# Patient Record
Sex: FEMALE | Race: WHITE | Employment: OTHER | URBAN - METROPOLITAN AREA
[De-identification: names, ages, dates, MRNs, and addresses within clinical notes are randomized per-mention and may not be internally consistent; named-entity substitution may affect disease eponyms.]

---

## 2017-04-03 RX ORDER — DICLOFENAC SODIUM 75 MG/1
TABLET, DELAYED RELEASE ORAL
Qty: 180 TABLET | Refills: 3 | Status: SHIPPED | OUTPATIENT
Start: 2017-04-03 | End: 2018-04-25 | Stop reason: SDUPTHER

## 2017-05-02 ENCOUNTER — OFFICE VISIT (OUTPATIENT)
Dept: ORTHOPEDIC SURGERY | Age: 67
End: 2017-05-02

## 2017-05-02 VITALS
HEART RATE: 78 BPM | HEIGHT: 64 IN | SYSTOLIC BLOOD PRESSURE: 125 MMHG | BODY MASS INDEX: 39.27 KG/M2 | WEIGHT: 230 LBS | DIASTOLIC BLOOD PRESSURE: 74 MMHG

## 2017-05-02 DIAGNOSIS — M75.81 ROTATOR CUFF TENDONITIS, RIGHT: Primary | ICD-10-CM

## 2017-05-02 DIAGNOSIS — M75.22 BICEPS TENDINITIS OF LEFT SHOULDER: ICD-10-CM

## 2017-05-02 DIAGNOSIS — M19.019 ACROMIOCLAVICULAR JOINT ARTHRITIS: ICD-10-CM

## 2017-05-02 PROCEDURE — 1090F PRES/ABSN URINE INCON ASSESS: CPT | Performed by: ORTHOPAEDIC SURGERY

## 2017-05-02 PROCEDURE — G8400 PT W/DXA NO RESULTS DOC: HCPCS | Performed by: ORTHOPAEDIC SURGERY

## 2017-05-02 PROCEDURE — G8417 CALC BMI ABV UP PARAM F/U: HCPCS | Performed by: ORTHOPAEDIC SURGERY

## 2017-05-02 PROCEDURE — 3017F COLORECTAL CA SCREEN DOC REV: CPT | Performed by: ORTHOPAEDIC SURGERY

## 2017-05-02 PROCEDURE — 1036F TOBACCO NON-USER: CPT | Performed by: ORTHOPAEDIC SURGERY

## 2017-05-02 PROCEDURE — 1123F ACP DISCUSS/DSCN MKR DOCD: CPT | Performed by: ORTHOPAEDIC SURGERY

## 2017-05-02 PROCEDURE — 20610 DRAIN/INJ JOINT/BURSA W/O US: CPT | Performed by: ORTHOPAEDIC SURGERY

## 2017-05-02 PROCEDURE — 3014F SCREEN MAMMO DOC REV: CPT | Performed by: ORTHOPAEDIC SURGERY

## 2017-05-02 PROCEDURE — 4040F PNEUMOC VAC/ADMIN/RCVD: CPT | Performed by: ORTHOPAEDIC SURGERY

## 2017-05-02 PROCEDURE — G8427 DOCREV CUR MEDS BY ELIG CLIN: HCPCS | Performed by: ORTHOPAEDIC SURGERY

## 2017-05-02 PROCEDURE — 99214 OFFICE O/P EST MOD 30 MIN: CPT | Performed by: ORTHOPAEDIC SURGERY

## 2017-05-23 ENCOUNTER — OFFICE VISIT (OUTPATIENT)
Dept: ORTHOPEDIC SURGERY | Age: 67
End: 2017-05-23

## 2017-05-23 VITALS
BODY MASS INDEX: 39.26 KG/M2 | DIASTOLIC BLOOD PRESSURE: 81 MMHG | WEIGHT: 229.94 LBS | HEIGHT: 64 IN | HEART RATE: 67 BPM | SYSTOLIC BLOOD PRESSURE: 136 MMHG

## 2017-05-23 DIAGNOSIS — M75.81 ROTATOR CUFF TENDONITIS, RIGHT: Primary | ICD-10-CM

## 2017-05-23 PROCEDURE — G8400 PT W/DXA NO RESULTS DOC: HCPCS | Performed by: ORTHOPAEDIC SURGERY

## 2017-05-23 PROCEDURE — 3014F SCREEN MAMMO DOC REV: CPT | Performed by: ORTHOPAEDIC SURGERY

## 2017-05-23 PROCEDURE — 20610 DRAIN/INJ JOINT/BURSA W/O US: CPT | Performed by: ORTHOPAEDIC SURGERY

## 2017-05-23 PROCEDURE — 1036F TOBACCO NON-USER: CPT | Performed by: ORTHOPAEDIC SURGERY

## 2017-05-23 PROCEDURE — 3017F COLORECTAL CA SCREEN DOC REV: CPT | Performed by: ORTHOPAEDIC SURGERY

## 2017-05-23 PROCEDURE — 1090F PRES/ABSN URINE INCON ASSESS: CPT | Performed by: ORTHOPAEDIC SURGERY

## 2017-05-23 PROCEDURE — 99212 OFFICE O/P EST SF 10 MIN: CPT | Performed by: ORTHOPAEDIC SURGERY

## 2017-05-23 PROCEDURE — G8417 CALC BMI ABV UP PARAM F/U: HCPCS | Performed by: ORTHOPAEDIC SURGERY

## 2017-05-23 PROCEDURE — 1123F ACP DISCUSS/DSCN MKR DOCD: CPT | Performed by: ORTHOPAEDIC SURGERY

## 2017-05-23 PROCEDURE — 4040F PNEUMOC VAC/ADMIN/RCVD: CPT | Performed by: ORTHOPAEDIC SURGERY

## 2017-05-23 PROCEDURE — G8427 DOCREV CUR MEDS BY ELIG CLIN: HCPCS | Performed by: ORTHOPAEDIC SURGERY

## 2017-06-01 ENCOUNTER — TELEPHONE (OUTPATIENT)
Dept: INTERNAL MEDICINE CLINIC | Age: 67
End: 2017-06-01

## 2017-06-01 DIAGNOSIS — F32.A DEPRESSION, UNSPECIFIED DEPRESSION TYPE: ICD-10-CM

## 2017-06-01 DIAGNOSIS — I10 ESSENTIAL HYPERTENSION: ICD-10-CM

## 2017-06-01 RX ORDER — PRAVASTATIN SODIUM 80 MG/1
TABLET ORAL
Qty: 90 TABLET | Refills: 3 | Status: SHIPPED | OUTPATIENT
Start: 2017-06-01 | End: 2018-07-03 | Stop reason: SDUPTHER

## 2017-06-01 RX ORDER — LISINOPRIL AND HYDROCHLOROTHIAZIDE 25; 20 MG/1; MG/1
TABLET ORAL
Qty: 90 TABLET | Refills: 3 | Status: SHIPPED | OUTPATIENT
Start: 2017-06-01 | End: 2018-07-03 | Stop reason: SDUPTHER

## 2017-06-01 RX ORDER — CITALOPRAM 20 MG/1
TABLET ORAL
Qty: 90 TABLET | Refills: 1 | Status: SHIPPED | OUTPATIENT
Start: 2017-06-01 | End: 2017-09-19 | Stop reason: SDUPTHER

## 2017-06-21 ENCOUNTER — OFFICE VISIT (OUTPATIENT)
Dept: ORTHOPEDIC SURGERY | Age: 67
End: 2017-06-21

## 2017-06-21 VITALS
WEIGHT: 229 LBS | HEART RATE: 78 BPM | HEIGHT: 64 IN | SYSTOLIC BLOOD PRESSURE: 131 MMHG | DIASTOLIC BLOOD PRESSURE: 75 MMHG | BODY MASS INDEX: 39.09 KG/M2

## 2017-06-21 DIAGNOSIS — M25.462 EFFUSION OF LEFT KNEE: ICD-10-CM

## 2017-06-21 DIAGNOSIS — M22.42 PATELLA, CHONDROMALACIA, LEFT: ICD-10-CM

## 2017-06-21 DIAGNOSIS — M22.2X2 PATELLOFEMORAL STRESS SYNDROME OF LEFT KNEE: ICD-10-CM

## 2017-06-21 DIAGNOSIS — M23.304 MENISCUS, MEDIAL, DERANGEMENT, LEFT: ICD-10-CM

## 2017-06-21 DIAGNOSIS — M17.12 PRIMARY OSTEOARTHRITIS OF LEFT KNEE: Primary | ICD-10-CM

## 2017-06-21 PROBLEM — M23.305 MENISCUS, MEDIAL, DERANGEMENT: Status: ACTIVE | Noted: 2017-06-21

## 2017-06-21 PROBLEM — M22.40 PATELLA, CHONDROMALACIA: Status: ACTIVE | Noted: 2017-06-21

## 2017-06-21 PROCEDURE — 1123F ACP DISCUSS/DSCN MKR DOCD: CPT | Performed by: ORTHOPAEDIC SURGERY

## 2017-06-21 PROCEDURE — G8427 DOCREV CUR MEDS BY ELIG CLIN: HCPCS | Performed by: ORTHOPAEDIC SURGERY

## 2017-06-21 PROCEDURE — 1036F TOBACCO NON-USER: CPT | Performed by: ORTHOPAEDIC SURGERY

## 2017-06-21 PROCEDURE — 20610 DRAIN/INJ JOINT/BURSA W/O US: CPT | Performed by: ORTHOPAEDIC SURGERY

## 2017-06-21 PROCEDURE — 99214 OFFICE O/P EST MOD 30 MIN: CPT | Performed by: ORTHOPAEDIC SURGERY

## 2017-06-21 PROCEDURE — 3017F COLORECTAL CA SCREEN DOC REV: CPT | Performed by: ORTHOPAEDIC SURGERY

## 2017-06-21 PROCEDURE — 3014F SCREEN MAMMO DOC REV: CPT | Performed by: ORTHOPAEDIC SURGERY

## 2017-06-21 PROCEDURE — 4040F PNEUMOC VAC/ADMIN/RCVD: CPT | Performed by: ORTHOPAEDIC SURGERY

## 2017-06-21 PROCEDURE — 1090F PRES/ABSN URINE INCON ASSESS: CPT | Performed by: ORTHOPAEDIC SURGERY

## 2017-06-21 PROCEDURE — G8417 CALC BMI ABV UP PARAM F/U: HCPCS | Performed by: ORTHOPAEDIC SURGERY

## 2017-06-21 PROCEDURE — G8400 PT W/DXA NO RESULTS DOC: HCPCS | Performed by: ORTHOPAEDIC SURGERY

## 2017-06-26 ENCOUNTER — TELEPHONE (OUTPATIENT)
Dept: ORTHOPEDIC SURGERY | Age: 67
End: 2017-06-26

## 2017-06-27 ENCOUNTER — OFFICE VISIT (OUTPATIENT)
Dept: INTERNAL MEDICINE CLINIC | Age: 67
End: 2017-06-27

## 2017-06-27 VITALS
SYSTOLIC BLOOD PRESSURE: 130 MMHG | RESPIRATION RATE: 16 BRPM | DIASTOLIC BLOOD PRESSURE: 78 MMHG | BODY MASS INDEX: 39.79 KG/M2 | WEIGHT: 231.8 LBS | HEART RATE: 76 BPM

## 2017-06-27 DIAGNOSIS — M25.552 LEFT HIP PAIN: Primary | ICD-10-CM

## 2017-06-27 PROCEDURE — 1090F PRES/ABSN URINE INCON ASSESS: CPT | Performed by: INTERNAL MEDICINE

## 2017-06-27 PROCEDURE — 1123F ACP DISCUSS/DSCN MKR DOCD: CPT | Performed by: INTERNAL MEDICINE

## 2017-06-27 PROCEDURE — G8417 CALC BMI ABV UP PARAM F/U: HCPCS | Performed by: INTERNAL MEDICINE

## 2017-06-27 PROCEDURE — 4040F PNEUMOC VAC/ADMIN/RCVD: CPT | Performed by: INTERNAL MEDICINE

## 2017-06-27 PROCEDURE — G8400 PT W/DXA NO RESULTS DOC: HCPCS | Performed by: INTERNAL MEDICINE

## 2017-06-27 PROCEDURE — 99213 OFFICE O/P EST LOW 20 MIN: CPT | Performed by: INTERNAL MEDICINE

## 2017-06-27 PROCEDURE — 3017F COLORECTAL CA SCREEN DOC REV: CPT | Performed by: INTERNAL MEDICINE

## 2017-06-27 PROCEDURE — 1036F TOBACCO NON-USER: CPT | Performed by: INTERNAL MEDICINE

## 2017-06-27 PROCEDURE — 3014F SCREEN MAMMO DOC REV: CPT | Performed by: INTERNAL MEDICINE

## 2017-06-27 PROCEDURE — G8427 DOCREV CUR MEDS BY ELIG CLIN: HCPCS | Performed by: INTERNAL MEDICINE

## 2017-06-27 RX ORDER — METHOCARBAMOL 750 MG/1
750 TABLET, FILM COATED ORAL 3 TIMES DAILY PRN
Qty: 30 TABLET | Refills: 1 | Status: SHIPPED | OUTPATIENT
Start: 2017-06-27 | End: 2018-04-05

## 2017-06-27 RX ORDER — METHYLPREDNISOLONE 4 MG/1
TABLET ORAL
Qty: 1 KIT | Refills: 0 | Status: SHIPPED | OUTPATIENT
Start: 2017-06-27 | End: 2017-07-03

## 2017-06-27 ASSESSMENT — ENCOUNTER SYMPTOMS: COLOR CHANGE: 0

## 2017-07-27 ENCOUNTER — TELEPHONE (OUTPATIENT)
Dept: INTERNAL MEDICINE CLINIC | Age: 67
End: 2017-07-27

## 2017-09-19 DIAGNOSIS — F32.A DEPRESSION, UNSPECIFIED DEPRESSION TYPE: ICD-10-CM

## 2017-09-19 RX ORDER — CITALOPRAM 20 MG/1
TABLET ORAL
Qty: 90 TABLET | Refills: 3 | Status: SHIPPED | OUTPATIENT
Start: 2017-09-19 | End: 2018-10-01 | Stop reason: SDUPTHER

## 2017-12-04 ENCOUNTER — EVALUATION (OUTPATIENT)
Dept: PHYSICAL THERAPY | Age: 67
End: 2017-12-04

## 2017-12-04 DIAGNOSIS — M25.551 HIP PAIN, RIGHT: ICD-10-CM

## 2017-12-04 DIAGNOSIS — M70.61 TROCHANTERIC BURSITIS OF RIGHT HIP: Primary | ICD-10-CM

## 2017-12-04 PROCEDURE — G8427 DOCREV CUR MEDS BY ELIG CLIN: HCPCS | Performed by: PHYSICAL THERAPIST

## 2017-12-04 PROCEDURE — 97110 THERAPEUTIC EXERCISES: CPT | Performed by: PHYSICAL THERAPIST

## 2017-12-04 PROCEDURE — G8978 MOBILITY CURRENT STATUS: HCPCS | Performed by: PHYSICAL THERAPIST

## 2017-12-04 PROCEDURE — G8539 DOC FUNCT AND CARE PLAN: HCPCS | Performed by: PHYSICAL THERAPIST

## 2017-12-04 PROCEDURE — 97161 PT EVAL LOW COMPLEX 20 MIN: CPT | Performed by: PHYSICAL THERAPIST

## 2017-12-04 PROCEDURE — 97112 NEUROMUSCULAR REEDUCATION: CPT | Performed by: PHYSICAL THERAPIST

## 2017-12-04 PROCEDURE — G8979 MOBILITY GOAL STATUS: HCPCS | Performed by: PHYSICAL THERAPIST

## 2017-12-04 PROCEDURE — G8730 PAIN DOC POS AND PLAN: HCPCS | Performed by: PHYSICAL THERAPIST

## 2017-12-04 PROCEDURE — 1100F PTFALLS ASSESS-DOCD GE2>/YR: CPT | Performed by: PHYSICAL THERAPIST

## 2017-12-04 NOTE — FLOWSHEET NOTE
[facility]   Phone: [phone]    Fax: [phone]                                                       Physical Therapy Daily Treatment Note  Date:  2017    Patient Name:  Victor Manuel Lawrence    :  1950  MRN: [id number]  Medical/Treatment Diagnosis Information:  · Diagnosis: R Trochanteric bursitis  ·    Insurance/Certification information:  PT Insurance Information: Medicare  Physician Information:  Referring Practitioner: Dr. Twila Primrose of care signed (Y/N): sent 17    Date of Patient follow up with Physician: PRN    G-Code (if applicable):      Date G-Code Applied:  17  PT G-Codes  Functional Assessment Tool Used: Hip Outcome Score  Score: 50/76, 66% limitation  Functional Limitation: Mobility: Walking and moving around  Mobility: Walking and Moving Around Current Status (): At least 60 percent but less than 80 percent impaired, limited or restricted  Mobility: Walking and Moving Around Goal Status ():  At least 1 percent but less than 20 percent impaired, limited or restricted    Progress Note: [x]  Yes  []  No  Next due by: Visit #10       Latex Allergy:  [x]NO      []YES  Preferred Language for Healthcare:   [x]English       []other:    Visit # Insurance Allowable   1 ~20  ($1960     Pain level:  5-6/10     SUBJECTIVE:  See eval    OBJECTIVE: See eval      ROM PROM AROM Comments:  17     Left Right Left Right     Flexion     115° 95°     Extension             Abduction     22° 15°     Adduction             ER     54° 25°     IR     30° 15°                      Strength Left Right Comments:  17   Hip flexors 5/5 3/5     Hip extension         Hip abduction 4+/5 4+/5     Hip adduction 4/5 4-/5     Hip ER         Hip IR         Quads         Hamstrings               Flexibility Left Right Comments:  17   Hamstrings No deficit No deficit     ITB (Obers test)   (+) severe limit     gastroc   No deficit        Special  Test Left Right Comments:  12/4/17   FABERS   (+)     Scour test   (-)     Impingement test   (-)     Trendelenburg test         TUG     12.5 sec             RESTRICTIONS/PRECAUTIONS: hx of 4 TIAs, 3 lumbar surgeries    Exercises/Interventions:     Exercise/Equipment Reps/sets Other comments   Stretching     Hamstring     Hip Flexion     ITB- Rope     Grion     Quad     Inclined Calf     Towel Pull 10x10\"    Piriformis     Fig 4 w/ strap 5x20\"              SLR     Supine     Prone     Abduction     Adducton 10x10\"    SLR+          Isometrics     Quad sets 10x10\"         Patellar Glides     Medial     Superior     Inferior          ROM     Passive     Active     Weight Shift     Hang Weights     Sheet Pulls     Ankle Pumps          CKC     Calf raises     Wall sits     Step ups     1 leg stand     Squatting     CC TKE     Balance     Monster Walks     Bridging     Triple threats     Stool Scoots     PRE     Extension  RANGE:   Flexion  RANGE:        Cable Column          Leg Press  RANGE:        Bike     Treadmill                  Therapeutic Exercise and NMR EXR  [x] ([id number]) Provided verbal/tactile cueing for activities related to strengthening, flexibility, endurance, ROM for improvements in LE, proximal hip, and core control with self care, mobility, lifting, ambulation. [x] (17714) Provided verbal/tactile cueing for activities related to improving balance, coordination, kinesthetic sense, posture, motor skill, proprioception  to assist with LE, proximal hip, and core control in self care, mobility, lifting, ambulation and eccentric single leg control.      NMR and Therapeutic Activities:    [] (30650 or 93881) Provided verbal/tactile cueing for activities related to improving balance, coordination, kinesthetic sense, posture, motor skill, proprioception and motor activation to allow for proper function of core, proximal hip and LE with self care and ADLs  [] (49258) Gait Re-education- Provided training and instruction facilitate improvement in movement, function, and ADLs as indicated by Functional Deficits. Long Term Goals: To be achieved in: 6 weeks  1. Disability index score of 15% or less for the Hp Outcome Score to assist with reaching prior level of function. 2. Patient will demonstrate increased R hip flexion, abduction , IR and ER AROM to >110°, >25°, >25°, and >45° to allow for proper joint functioning as indicated by patients Functional Deficits. 3. Patient will demonstrate an increase in R hip Strength in all planes by a half grade or greater to allow for proper functional mobility as indicated by patients Functional Deficits. 4. Patient will return to all functional activities without increased symptoms or restriction. 5. Patient will be able to stand for >1 hour without pain or limitation so that she may return to PLOF for ADLs. 6. Patient will complete TUG with SPC in <10 seconds to demonstrate low risk of falls. Progression Towards Functional goals:  [] Patient is progressing as expected towards functional goals listed. [] Progression is slowed due to complexities listed. [] Progression has been slowed due to co-morbidities.   [x] Plan just implemented, too soon to assess goals progression  [] Other:     ASSESSMENT:  See eval    Treatment/Activity Tolerance:  [] Patient tolerated treatment well [x] Patient limited by fatique  [x] Patient limited by pain  [] Patient limited by other medical complications  [] Other:     Prognosis: [x] Good [] Fair  [] Poor    Patient Requires Follow-up: [x] Yes  [] No    PLAN: See eval  [] Continue per plan of care [] Alter current plan (see comments)  [x] Plan of care initiated [] Hold pending MD visit [] Discharge    Electronically signed by:      Marianna See: [id number]     Mary Ann Santiago PT, DPT

## 2017-12-04 NOTE — PLAN OF CARE
[facility]   Phone: [phone]    Fax: [phone]          Physical Therapy Certification    Dear Referring Practitioner: Dr. Melina Larson,    We had the pleasure of evaluating the following patient for physical therapy services at [address] [place] [address]. A summary of our findings can be found in the initial assessment below. This includes our plan of care. If you have any questions or concerns regarding these findings, please do not hesitate to contact me at the office phone number checked above. Thank you for the referral.       Physician Signature:_______________________________Date:__________________  By signing above (or electronic signature), therapists plan is approved by physician      Patient: aCta Mueller   : 1950   MRN: [id number]  Referring Physician: Referring Practitioner: Dr. Melina Larson      Evaluation Date: 2017      Medical Diagnosis Information:  Diagnosis: R Trochanteric bursitis                                             Insurance information: PT Insurance Information: Medicare     Precautions/ Contra-indications: Hx of 3 lumbar surgeries, Hx of frequent falls  Latex Allergy:  [x]NO      []YES  Preferred Language for Healthcare:   [x]English       []other:    SUBJECTIVE: Patient stated complaint: Patient reports that she has had pain and issues with the R hip and thigh for several months, but notes that weakness and pain have been worsening. She notes that she has had 4 falls in the last 6 weeks. She reports that she has had significant bruising but no other injuries. She reports significant history of lumbar spine involvement with a hx of 3 surgeries the most recent of which was a lumbar fusion. 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  She notes that the neurologist would like her to undergo genetic testing, but test  (-)    Trendelenburg test      TUG   12.5 sec       Reflexes/Sensation:    []Dermatomes/Myotomes intact    []Reflexes equal and normal bilaterally   [x]Other: sensation intact to light touch    Joint mobility:    []Normal    [x]Hypo   []Hyper    Palpation: +2 TTP at greater trochanter    Functional Mobility/Transfers: difficulty and weakness with walking and ascending/descending stairs    Posture: forward flexed posture    Bandages/Dressings/Incisions: n/a    Gait: (include devices/WB status) unsteady, decreased valerie and decreased stride length    Orthopedic Special Tests: see above                       [x] Patient history, allergies, meds reviewed. Medical chart reviewed. See intake form. Review Of Systems (ROS):  [x]Performed Review of systems (Integumentary, CardioPulmonary, Neurological) by intake and observation. Intake form has been scanned into medical record. Patient has been instructed to contact their primary care physician regarding ROS issues if not already being addressed at this time.       Co-morbidities/Complexities (which will affect course of rehabilitation):   []None           Arthritic conditions   []Rheumatoid arthritis (M05.9)  [x]Osteoarthritis (M19.91)   Cardiovascular conditions   [x]Hypertension (I10)  []Hyperlipidemia (E78.5)  []Angina pectoris (I20)  []Atherosclerosis (I70)   Musculoskeletal conditions   []Disc pathology   []Congenital spine pathologies   []Prior surgical intervention  []Osteoporosis (M81.8)  []Osteopenia (M85.8)   Endocrine conditions   []Hypothyroid (E03.9)  []Hyperthyroid Gastrointestinal conditions   []Constipation (Y79.81)   Metabolic conditions   []Morbid obesity (E66.01)  []Diabetes type 1(E10.65) or 2 (E11.65)   []Neuropathy (G60.9)     Pulmonary conditions   []Asthma (J45)  []Coughing   []COPD (J44.9)   Psychological Disorders  [x]Anxiety (F41.9)  []Depression (F32.9)   []Other:   [x]Other: Hx of 4 TIAs, Hx of 3 lumbar surgeries, See scanned medical hx        Barriers to/and or personal factors that will affect rehab potential:              []Age  []Sex              [x]Motivation/Lack of Motivation:                        []Co-Morbidities              []Cognitive Function, education/learning barriers              []Environmental, home barriers              []profession/work barriers  [x]past PT/medical experience: Patient has had prior skilled PT experience and demonstrates good understanding of POC  []other:  Justification: see above    Falls Risk Assessment (30 days):   [] Falls Risk assessed and no intervention required. [x] Falls Risk assessed and Patient requires intervention due to being higher risk   TUG score (>12s at risk):    12.5 seconds (12/4/17)  [x] Falls education provided, including       G-Codes:  PT G-Codes  Functional Assessment Tool Used: Hip Outcome Score  Score: 50/76, 66% limitation  Functional Limitation: Mobility: Walking and moving around  Mobility: Walking and Moving Around Current Status (): At least 60 percent but less than 80 percent impaired, limited or restricted  Mobility: Walking and Moving Around Goal Status (): At least 1 percent but less than 20 percent impaired, limited or restricted        Pain  [x]  Pain diagram was completed, pain was present and a follow up plan to address the pain is in the plan of care. ()   []  Pain diagram was completed and there was no pain present and therefore no follow up plan to address pain in the plan of care. ()   []  Pain diagram was not completed and the reason for not completing the pain diagram is in the medical chart ()  []  Patient refused to participate   []  Severe mental or physical capacity, patient is in urgent emergent situation and to complete the questionnaire would jeopardize the patient's health status        Functional Questionnaire  [x]  Patient completed the functional outcome questionnaire and deficiencies were addressed in the plan of care. demonstrate good body mechanics with sitting, bending, and lifting   [x]Reduced ability to sleep   [] Reduced ability or tolerance with driving and/or computer work   []Reduced ability to perform lifting, carrying tasks   [x]Reduced ability to squat   []Reduced ability to forward bend   [x]Reduced ability to ambulate prolonged functional periods/distances/surfaces   [x]Reduced ability to ascend/descend stairs   []Reduced ability to run, hop, cut or jump   []other:    Participation Restrictions   [x]Reduced participation in self care activities   [x]Reduced participation in home management activities   []Reduced participation in work activities   [x]Reduced participation in social activities. []Reduced participation in sport/recreation activities. Classification :    []Signs/symptoms consistent with post-surgical status including decreased ROM, strength and function.    []Signs/symptoms consistent with joint sprain/strain   []Signs/symptoms consistent with patella-femoral syndrome   []Signs/symptoms consistent with knee OA/hip OA   []Signs/symptoms consistent with internal derangement of knee/Hip   []Signs/symptoms consistent with functional hip weakness/NMR control      [x]Signs/symptoms consistent with tendinitis/tendinosis    []signs/symptoms consistent with pathology which may benefit from Dry needling      []other:      Prognosis/Rehab Potential:      []Excellent   [x]Good    []Fair   []Poor    Tolerance of evaluation/treatment:    []Excellent   [x]Good    []Fair   []Poor    Physical Therapy Evaluation Complexity Justification  [x] A history of present problem with:  [] no personal factors and/or comorbidities that impact the plan of care;  [x]1-2 personal factors and/or comorbidities that impact the plan of care  []3 personal factors and/or comorbidities that impact the plan of care  [x] An examination of body systems using standardized tests and measures addressing any of the following: body structures and

## 2017-12-06 ENCOUNTER — TELEPHONE (OUTPATIENT)
Dept: INTERNAL MEDICINE CLINIC | Age: 67
End: 2017-12-06

## 2017-12-06 DIAGNOSIS — Z78.0 POST-MENOPAUSAL: Primary | ICD-10-CM

## 2017-12-07 ENCOUNTER — TREATMENT (OUTPATIENT)
Dept: PHYSICAL THERAPY | Age: 67
End: 2017-12-07

## 2017-12-07 DIAGNOSIS — M70.61 TROCHANTERIC BURSITIS OF RIGHT HIP: Primary | ICD-10-CM

## 2017-12-07 DIAGNOSIS — M25.551 HIP PAIN, RIGHT: ICD-10-CM

## 2017-12-07 PROCEDURE — G8427 DOCREV CUR MEDS BY ELIG CLIN: HCPCS | Performed by: PHYSICAL THERAPIST

## 2017-12-07 PROCEDURE — 97140 MANUAL THERAPY 1/> REGIONS: CPT | Performed by: PHYSICAL THERAPIST

## 2017-12-07 PROCEDURE — 97112 NEUROMUSCULAR REEDUCATION: CPT | Performed by: PHYSICAL THERAPIST

## 2017-12-07 PROCEDURE — 97110 THERAPEUTIC EXERCISES: CPT | Performed by: PHYSICAL THERAPIST

## 2017-12-07 NOTE — FLOWSHEET NOTE
[facility]   Phone: [phone]    Fax: [phone]                                                       Physical Therapy Daily Treatment Note  Date:  2017    Patient Name:  Klarissa Enciso    :  1950  MRN: [id number]  Medical/Treatment Diagnosis Information:  · Diagnosis: R Trochanteric bursitis     Insurance/Certification information:  PT Insurance Information: Medicare  Physician Information:  Referring Practitioner: Dr. Alyson Ruff of care signed (Y/N): sent 17    Date of Patient follow up with Physician: PRN    G-Code (if applicable):      Date G-Code Applied:  17  PT G-Codes  Functional Assessment Tool Used: Hip Outcome Score  Score: 50/76, 66% limitation  Functional Limitation: Mobility: Walking and moving around  Mobility: Walking and Moving Around Current Status (): At least 60 percent but less than 80 percent impaired, limited or restricted  Mobility: Walking and Moving Around Goal Status (): At least 1 percent but less than 20 percent impaired, limited or restricted      PQRS:   Reviewed medication list with patient. No changes since last PT visit. 17    Progress Note: []  Yes  [x]  No  Next due by: Visit #10       Latex Allergy:  [x]NO      []YES  Preferred Language for Healthcare:   [x]English       []other:    Visit # Insurance Allowable   2 ~20  ($1960 cap)     Pain level:  5-6/10     SUBJECTIVE:  Patient reports that she had significant pain in the R hip the last 2 nights. She notes that she has been compliant with HEP but it has been difficult.     OBJECTIVE: See eval              ROM PROM AROM Comments:  17     Left Right Left Right     Flexion     115° 95°     Extension             Abduction     22° 15°     Adduction             ER     54° 25°     IR     30° 15°                      Strength Left Right Comments:  17   Hip flexors 5/5 3/5     Hip extension         Hip abduction 4+/5 4+/5     Hip adduction 4/5 4-/5     Hip ER         Hip IR         Quads         Hamstrings               Flexibility Left Right Comments:  12/4/17   Hamstrings No deficit No deficit     ITB (Obers test)   (+) severe limit     gastroc   No deficit        Special  Test Left Right Comments:  12/4/17   FABERS   (+)     Scour test   (-)     Impingement test   (-)     Trendelenburg test         TUG     12.5 sec             RESTRICTIONS/PRECAUTIONS: hx of 4 TIAs, 3 lumbar surgeries    Exercises/Interventions:     Exercise/Equipment Reps/sets Other comments   Stretching     Hamstring     Hip Flexion     ITB- Rope     Grion     Quad     Inclined Calf     Heel slide 10x10\"    Piriformis     Fig 4 w/ strap 5x20\"              SLR     Supine     Prone     Abduction     Adducton 10x10\"    SLR+          Isometrics     Quad sets 10x10\"         Patellar Glides     Medial     Superior     Inferior          ROM     Passive     Active     Weight Shift     Hang Weights     Sheet Pulls     Ankle Pumps          CKC     Calf raises 2x10    Wall sits     Step ups     1 leg stand     Mini Squats 2x10    CC TKE     Balance     Monster Walks     Bridging     Triple threats     Stool Scoots     PRE     Extension  RANGE:   Flexion  RANGE:        Cable Column          Leg Press  RANGE:        Bike     Treadmill          Manual/Modalities      STM to lateral R hip, R HF and hip adductor stretches, 10'                 Therapeutic Exercise and NMR EXR  [x] (01834) Provided verbal/tactile cueing for activities related to strengthening, flexibility, endurance, ROM for improvements in LE, proximal hip, and core control with self care, mobility, lifting, ambulation. [x] (06840) Provided verbal/tactile cueing for activities related to improving balance, coordination, kinesthetic sense, posture, motor skill, proprioception  to assist with LE, proximal hip, and core control in self care, mobility, lifting, ambulation and eccentric single leg control.      NMR and Therapeutic Activities:    [] (07586 or 24100) Provided verbal/tactile cueing for activities related to improving balance, coordination, kinesthetic sense, posture, motor skill, proprioception and motor activation to allow for proper function of core, proximal hip and LE with self care and ADLs  [] (97879) Gait Re-education- Provided training and instruction to the patient for proper LE, core and proximal hip recruitment and positioning and eccentric body weight control with ambulation re-education including up and down stairs     Home Exercise Program:    [x] (33675) Reviewed/Progressed HEP activities related to strengthening, flexibility, endurance, ROM of core, proximal hip and LE for functional self-care, mobility, lifting and ambulation/stair navigation   [] (35904)Reviewed/Progressed HEP activities related to improving balance, coordination, kinesthetic sense, posture, motor skill, proprioception of core, proximal hip and LE for self care, mobility, lifting, and ambulation/stair navigation      Manual Treatments:  PROM / STM / Oscillations-Mobs:  G-I, II, III, IV (PA's, Inf., Post.)  [x] (81098) Provided manual therapy to mobilize LE, proximal hip and/or LS spine soft tissue/joints for the purpose of modulating pain, promoting relaxation,  increasing ROM, reducing/eliminating soft tissue swelling/inflammation/restriction, improving soft tissue extensibility and allowing for proper ROM for normal function with self care, mobility, lifting and ambulation.      Modalities:  CP to R hip x10'    Charges:  Timed Code Treatment Minutes: 40   Total Treatment Minutes: 50     [] EVAL (LOW) 58839 (typically 20 minutes face-to-face)  [] EVAL (MOD) 31597 (typically 30 minutes face-to-face)  [] EVAL (HIGH) 09807 (typically 45 minutes face-to-face)  [] RE-EVAL   [x] VC(68407) x  1   [] IONTO  [x] NMR (40080) x  1   [] VASO  [x] Manual (01332) x  1    [] Other:  [] TA (69262) x       [] Mech Traction (46874)  [] ES(attended) (55506) [] ES (un) (14017):     GOALS:   Patient stated goal: Able to stand/walk for 4 hour shift without pain, weakness, or R LE \"giving out    Therapist goals for Patient:   Short Term Goals: To be achieved in: 2 weeks  1. Independent in HEP and progression per patient tolerance, in order to prevent re-injury. 2. Patient will have a decrease in pain to facilitate improvement in movement, function, and ADLs as indicated by Functional Deficits. Long Term Goals: To be achieved in: 6 weeks  1. Disability index score of 15% or less for the Hp Outcome Score to assist with reaching prior level of function. 2. Patient will demonstrate increased R hip flexion, abduction , IR and ER AROM to >110°, >25°, >25°, and >45° to allow for proper joint functioning as indicated by patients Functional Deficits. 3. Patient will demonstrate an increase in R hip Strength in all planes by a half grade or greater to allow for proper functional mobility as indicated by patients Functional Deficits. 4. Patient will return to all functional activities without increased symptoms or restriction. 5. Patient will be able to stand for >1 hour without pain or limitation so that she may return to PLOF for ADLs. 6. Patient will complete TUG with SPC in <10 seconds to demonstrate low risk of falls. Progression Towards Functional goals:  [] Patient is progressing as expected towards functional goals listed. [] Progression is slowed due to complexities listed. [] Progression has been slowed due to co-morbidities. [x] Plan just implemented, too soon to assess goals progression  [] Other:     ASSESSMENT:  Patient continues to demonstrate significant limitations in activity tolerance today. PT experienced difficulty eliciting an ITB stretch due to patient's reports of \"pinching\" in the R hip flexor and groin. Plan to progress activities per patient tolerance.     Treatment/Activity Tolerance:  [] Patient tolerated treatment well [x] Patient limited by fatique  [x] Patient limited by pain  [] Patient limited by other medical complications  [] Other:     Prognosis: [x] Good [] Fair  [] Poor    Patient Requires Follow-up: [x] Yes  [] No    PLAN: See eval  [x] Continue per plan of care [] Alter current plan (see comments)  [] Plan of care initiated [] Hold pending MD visit [] Discharge    Electronically signed by:      Mitch Sell: [id number]     Bo Sacks PT, DPT

## 2017-12-11 ENCOUNTER — OFFICE VISIT (OUTPATIENT)
Dept: INTERNAL MEDICINE CLINIC | Age: 67
End: 2017-12-11

## 2017-12-11 ENCOUNTER — TREATMENT (OUTPATIENT)
Dept: PHYSICAL THERAPY | Age: 67
End: 2017-12-11

## 2017-12-11 VITALS
SYSTOLIC BLOOD PRESSURE: 138 MMHG | WEIGHT: 230 LBS | DIASTOLIC BLOOD PRESSURE: 72 MMHG | TEMPERATURE: 98.4 F | RESPIRATION RATE: 18 BRPM | HEART RATE: 72 BPM | BODY MASS INDEX: 39.48 KG/M2 | OXYGEN SATURATION: 96 %

## 2017-12-11 DIAGNOSIS — M25.551 HIP PAIN, RIGHT: ICD-10-CM

## 2017-12-11 DIAGNOSIS — M70.61 TROCHANTERIC BURSITIS OF RIGHT HIP: Primary | ICD-10-CM

## 2017-12-11 DIAGNOSIS — J02.9 PHARYNGITIS, UNSPECIFIED ETIOLOGY: Primary | ICD-10-CM

## 2017-12-11 LAB — S PYO AG THROAT QL: NORMAL

## 2017-12-11 PROCEDURE — 1123F ACP DISCUSS/DSCN MKR DOCD: CPT | Performed by: INTERNAL MEDICINE

## 2017-12-11 PROCEDURE — 1090F PRES/ABSN URINE INCON ASSESS: CPT | Performed by: INTERNAL MEDICINE

## 2017-12-11 PROCEDURE — G8427 DOCREV CUR MEDS BY ELIG CLIN: HCPCS | Performed by: INTERNAL MEDICINE

## 2017-12-11 PROCEDURE — 3017F COLORECTAL CA SCREEN DOC REV: CPT | Performed by: INTERNAL MEDICINE

## 2017-12-11 PROCEDURE — G8400 PT W/DXA NO RESULTS DOC: HCPCS | Performed by: INTERNAL MEDICINE

## 2017-12-11 PROCEDURE — G8482 FLU IMMUNIZE ORDER/ADMIN: HCPCS | Performed by: INTERNAL MEDICINE

## 2017-12-11 PROCEDURE — 87880 STREP A ASSAY W/OPTIC: CPT | Performed by: INTERNAL MEDICINE

## 2017-12-11 PROCEDURE — 3014F SCREEN MAMMO DOC REV: CPT | Performed by: INTERNAL MEDICINE

## 2017-12-11 PROCEDURE — 4040F PNEUMOC VAC/ADMIN/RCVD: CPT | Performed by: INTERNAL MEDICINE

## 2017-12-11 PROCEDURE — 99213 OFFICE O/P EST LOW 20 MIN: CPT | Performed by: INTERNAL MEDICINE

## 2017-12-11 PROCEDURE — G8417 CALC BMI ABV UP PARAM F/U: HCPCS | Performed by: INTERNAL MEDICINE

## 2017-12-11 PROCEDURE — 1036F TOBACCO NON-USER: CPT | Performed by: INTERNAL MEDICINE

## 2017-12-11 RX ORDER — AMOXICILLIN 250 MG/1
250 CAPSULE ORAL 3 TIMES DAILY
Qty: 30 CAPSULE | Refills: 0 | Status: SHIPPED | OUTPATIENT
Start: 2017-12-11 | End: 2017-12-21

## 2017-12-11 RX ORDER — AMOXICILLIN 250 MG/1
250 CAPSULE ORAL 3 TIMES DAILY
Qty: 30 CAPSULE | Refills: 0 | Status: SHIPPED | OUTPATIENT
Start: 2017-12-11 | End: 2017-12-11 | Stop reason: SDUPTHER

## 2017-12-11 ASSESSMENT — ENCOUNTER SYMPTOMS
COUGH: 0
TROUBLE SWALLOWING: 1
SINUS PAIN: 0
SORE THROAT: 1
SHORTNESS OF BREATH: 0
SINUS PRESSURE: 0

## 2017-12-11 NOTE — PROGRESS NOTES
Subjective:      Patient ID: Natividad Poole is a 74 y.o. female. HPI  Here with couple days of fatigue and ST. Fatigue/ST - some fatigue noted last week but yesterday started with ST. No fever or chills. No head congestion. No cough or sob. Review of Systems   Constitutional: Positive for fatigue. Negative for chills and fever. HENT: Positive for ear pain, sore throat and trouble swallowing. Negative for congestion, sinus pain and sinus pressure. Respiratory: Negative for cough and shortness of breath. Cardiovascular: Negative for chest pain. Objective:   Physical Exam   Constitutional: She appears well-developed and well-nourished. No distress. HENT:   Right Ear: External ear normal.   Left Ear: External ear normal.   Pharynx 1+   Cardiovascular: Normal rate and regular rhythm. Pulmonary/Chest: Effort normal. No respiratory distress. She has no rales. Lymphadenopathy:     She has no cervical adenopathy. Assessment:      1. Pharyngitis, unspecified etiology            Plan:      Karrie Mi was seen today for pharyngitis, fatigue and emesis. Diagnoses and all orders for this visit:    Pharyngitis, unspecified etiology  -     POCT rapid strep A  - Amoxil 250 mg tid for 10 days; only to be used if not improving or worsening over the next 48 hours.

## 2017-12-11 NOTE — FLOWSHEET NOTE
[facility]   Phone: [phone]    Fax: [phone]            Physical Therapy  Cancellation/No-show Note  Patient Name:  Maya Dias  :  1950   Date:  2017  Cancelled visits to date: 1  No-shows to date: 0    For today's appointment patient:  [x]  Cancelled  []  Rescheduled appointment  []  No-show     Reason given by patient:  [x]  Patient ill  []  Conflicting appointment  []  No transportation    []  Conflict with work  []  No reason given  []  Other:     Comments:      Electronically signed by:       [id number] [id number] S: [id number]     Raheem Krishnan, PT

## 2017-12-19 ENCOUNTER — TREATMENT (OUTPATIENT)
Dept: PHYSICAL THERAPY | Age: 67
End: 2017-12-19

## 2017-12-19 DIAGNOSIS — M25.551 HIP PAIN, RIGHT: ICD-10-CM

## 2017-12-19 DIAGNOSIS — M70.61 TROCHANTERIC BURSITIS OF RIGHT HIP: Primary | ICD-10-CM

## 2017-12-19 PROCEDURE — G8427 DOCREV CUR MEDS BY ELIG CLIN: HCPCS | Performed by: PHYSICAL THERAPIST

## 2017-12-19 PROCEDURE — 97112 NEUROMUSCULAR REEDUCATION: CPT | Performed by: PHYSICAL THERAPIST

## 2017-12-19 PROCEDURE — 97110 THERAPEUTIC EXERCISES: CPT | Performed by: PHYSICAL THERAPIST

## 2017-12-19 PROCEDURE — 97140 MANUAL THERAPY 1/> REGIONS: CPT | Performed by: PHYSICAL THERAPIST

## 2017-12-19 NOTE — FLOWSHEET NOTE
Right     Flexion     115° 95°     Extension             Abduction     22° 15°     Adduction             ER     54° 25°     IR     30° 15°                      Strength Left Right Comments:  12/4/17   Hip flexors 5/5 3/5     Hip extension         Hip abduction 4+/5 4+/5     Hip adduction 4/5 4-/5     Hip ER         Hip IR         Quads         Hamstrings               Flexibility Left Right Comments:  12/4/17   Hamstrings No deficit No deficit     ITB (Obers test)   (+) severe limit     gastroc   No deficit        Special  Test Left Right Comments:  12/4/17   FABERS   (+)     Scour test   (-)     Impingement test   (-)     Trendelenburg test         TUG     12.5 sec             RESTRICTIONS/PRECAUTIONS: hx of 4 TIAs, 3 lumbar surgeries    Exercises/Interventions:     Exercise/Equipment Reps/sets Other comments   Stretching     Hamstring     Hip Flexion     ITB- Rope     Grion     Quad     Inclined Calf     Heel slide 10x10\"    Piriformis     Fig 4 w/ strap 5x20\"    SKTC 10x10\"         SLR     Supine     Prone     Abduction     Adducton 10x10\"    SLR+          Isometrics     Quad sets 10x10\"         Patellar Glides     Medial     Superior     Inferior          ROM     Passive     Active     Weight Shift     Hang Weights     Sheet Pulls     Ankle Pumps          CKC     Calf raises 2x10    Wall sits     Step ups     1 leg stand     Mini Squats 2x10    CC TKE     Balance     Monster Walks     Bridging     Triple threats     Stool Scoots     Side stepping 3 laps         PRE     Extension  RANGE:   Flexion  RANGE:        Cable Column          Leg Press  RANGE:        Bike     Treadmill          Manual/Modalities       R HS, SKTC, and ITB stretches, 10'                 Therapeutic Exercise and NMR EXR  [x] (20373) Provided verbal/tactile cueing for activities related to strengthening, flexibility, endurance, ROM for improvements in LE, proximal hip, and core control with self care, mobility, lifting, ambulation.   [x] (51868) Provided verbal/tactile cueing for activities related to improving balance, coordination, kinesthetic sense, posture, motor skill, proprioception  to assist with LE, proximal hip, and core control in self care, mobility, lifting, ambulation and eccentric single leg control. NMR and Therapeutic Activities:    [] (00382 or 18108) Provided verbal/tactile cueing for activities related to improving balance, coordination, kinesthetic sense, posture, motor skill, proprioception and motor activation to allow for proper function of core, proximal hip and LE with self care and ADLs  [] (10905) Gait Re-education- Provided training and instruction to the patient for proper LE, core and proximal hip recruitment and positioning and eccentric body weight control with ambulation re-education including up and down stairs     Home Exercise Program:    [x] (84056) Reviewed/Progressed HEP activities related to strengthening, flexibility, endurance, ROM of core, proximal hip and LE for functional self-care, mobility, lifting and ambulation/stair navigation   [] (03939)Reviewed/Progressed HEP activities related to improving balance, coordination, kinesthetic sense, posture, motor skill, proprioception of core, proximal hip and LE for self care, mobility, lifting, and ambulation/stair navigation      Manual Treatments:  PROM / STM / Oscillations-Mobs:  G-I, II, III, IV (PA's, Inf., Post.)  [x] (57968) Provided manual therapy to mobilize LE, proximal hip and/or LS spine soft tissue/joints for the purpose of modulating pain, promoting relaxation,  increasing ROM, reducing/eliminating soft tissue swelling/inflammation/restriction, improving soft tissue extensibility and allowing for proper ROM for normal function with self care, mobility, lifting and ambulation.      Modalities:  CP to R hip x10'    Charges:  Timed Code Treatment Minutes: 40   Total Treatment Minutes: 50     [] EVAL (LOW) 88003 (typically 20 minutes face-to-face)  [] EVAL (MOD) 49418 (typically 30 minutes face-to-face)  [] EVAL (HIGH) 08871 (typically 45 minutes face-to-face)  [] RE-EVAL   [x] SANCHEZ(89814) x  1   [] IONTO  [x] NMR (05933) x  1   [] VASO  [x] Manual (08543) x  1    [] Other:  [] TA (19021) x       [] Mech Traction (46441)  [] ES(attended) (46713)      [] ES (un) (51760):     GOALS:   Patient stated goal: Able to stand/walk for 4 hour shift without pain, weakness, or R LE \"giving out    Therapist goals for Patient:   Short Term Goals: To be achieved in: 2 weeks  1. Independent in HEP and progression per patient tolerance, in order to prevent re-injury. 2. Patient will have a decrease in pain to facilitate improvement in movement, function, and ADLs as indicated by Functional Deficits. Long Term Goals: To be achieved in: 6 weeks  1. Disability index score of 15% or less for the Hp Outcome Score to assist with reaching prior level of function. 2. Patient will demonstrate increased R hip flexion, abduction , IR and ER AROM to >110°, >25°, >25°, and >45° to allow for proper joint functioning as indicated by patients Functional Deficits. 3. Patient will demonstrate an increase in R hip Strength in all planes by a half grade or greater to allow for proper functional mobility as indicated by patients Functional Deficits. 4. Patient will return to all functional activities without increased symptoms or restriction. 5. Patient will be able to stand for >1 hour without pain or limitation so that she may return to PLOF for ADLs. 6. Patient will complete TUG with SPC in <10 seconds to demonstrate low risk of falls. Progression Towards Functional goals:  [] Patient is progressing as expected towards functional goals listed. [] Progression is slowed due to complexities listed. [] Progression has been slowed due to co-morbidities.   [x] Plan just implemented, too soon to assess goals progression  [] Other:     ASSESSMENT:  Patient

## 2017-12-21 ENCOUNTER — TREATMENT (OUTPATIENT)
Dept: PHYSICAL THERAPY | Age: 67
End: 2017-12-21

## 2017-12-21 DIAGNOSIS — M25.551 HIP PAIN, RIGHT: ICD-10-CM

## 2017-12-21 DIAGNOSIS — M70.61 TROCHANTERIC BURSITIS OF RIGHT HIP: Primary | ICD-10-CM

## 2017-12-21 PROCEDURE — 97110 THERAPEUTIC EXERCISES: CPT | Performed by: PHYSICAL THERAPIST

## 2017-12-21 PROCEDURE — 97112 NEUROMUSCULAR REEDUCATION: CPT | Performed by: PHYSICAL THERAPIST

## 2017-12-21 PROCEDURE — 97140 MANUAL THERAPY 1/> REGIONS: CPT | Performed by: PHYSICAL THERAPIST

## 2017-12-21 PROCEDURE — G8427 DOCREV CUR MEDS BY ELIG CLIN: HCPCS | Performed by: PHYSICAL THERAPIST

## 2017-12-21 NOTE — FLOWSHEET NOTE
[facility]   Phone: [phone]    Fax: [phone]                                                       Physical Therapy Daily Treatment Note  Date:  2017    Patient Name:  Natividad Poole    :  1950  MRN: [id number]  Medical/Treatment Diagnosis Information:  · Diagnosis: R Trochanteric bursitis     Insurance/Certification information:  PT Insurance Information: Medicare  Physician Information:  Referring Practitioner: Dr. Nirali Rodriguez of care signed (Y/N): sent 17    Date of Patient follow up with Physician: 18    G-Code (if applicable):      Date G-Code Applied:  17  PT G-Codes  Functional Assessment Tool Used: Hip Outcome Score  Score: 50/76, 66% limitation  Functional Limitation: Mobility: Walking and moving around  Mobility: Walking and Moving Around Current Status (): At least 60 percent but less than 80 percent impaired, limited or restricted  Mobility: Walking and Moving Around Goal Status (): At least 1 percent but less than 20 percent impaired, limited or restricted      PQRS:   Reviewed medication list with patient. No changes since last PT visit. 17    Progress Note: []  Yes  [x]  No  Next due by: Visit #10       Latex Allergy:  [x]NO      []YES  Preferred Language for Healthcare:   [x]English       []other:    Visit # Insurance Allowable   4 ~20  ($1960 cap)     Pain level:  5-6/10     SUBJECTIVE:  Patient reports that 2 nights ago and yesterday, she \"had the worst pain of her life\" in the R hip and anterior R thigh. She notes that the R quad continues to spasm frequently. She notes that she has made an appointment to follow up with MD on 18.     OBJECTIVE: See eval              ROM PROM AROM Comments:  17     Left Right Left Right     Flexion     115° 95°     Extension             Abduction     22° 15°     Adduction             ER     54° 25°     IR     30° 15°                      Strength Left Right control in self care, mobility, lifting, ambulation and eccentric single leg control. NMR and Therapeutic Activities:    [] (51860 or 25770) Provided verbal/tactile cueing for activities related to improving balance, coordination, kinesthetic sense, posture, motor skill, proprioception and motor activation to allow for proper function of core, proximal hip and LE with self care and ADLs  [] (05354) Gait Re-education- Provided training and instruction to the patient for proper LE, core and proximal hip recruitment and positioning and eccentric body weight control with ambulation re-education including up and down stairs     Home Exercise Program:    [x] (60488) Reviewed/Progressed HEP activities related to strengthening, flexibility, endurance, ROM of core, proximal hip and LE for functional self-care, mobility, lifting and ambulation/stair navigation   [] (49585)Reviewed/Progressed HEP activities related to improving balance, coordination, kinesthetic sense, posture, motor skill, proprioception of core, proximal hip and LE for self care, mobility, lifting, and ambulation/stair navigation      Manual Treatments:  PROM / STM / Oscillations-Mobs:  G-I, II, III, IV (PA's, Inf., Post.)  [x] (55794) Provided manual therapy to mobilize LE, proximal hip and/or LS spine soft tissue/joints for the purpose of modulating pain, promoting relaxation,  increasing ROM, reducing/eliminating soft tissue swelling/inflammation/restriction, improving soft tissue extensibility and allowing for proper ROM for normal function with self care, mobility, lifting and ambulation.      Modalities:  CP to R hip x10'    Charges:  Timed Code Treatment Minutes: 40   Total Treatment Minutes: 50     [] EVAL (LOW) 66764 (typically 20 minutes face-to-face)  [] EVAL (MOD) 40994 (typically 30 minutes face-to-face)  [] EVAL (HIGH) 97267 (typically 45 minutes face-to-face)  [] RE-EVAL   [x] LF(30849) x  1   [] IONTO  [x] NMR (53970) x  1   [] VASO  [x] today. Patient was instructed to continue with gentle stretching for HEP.     Treatment/Activity Tolerance:  [] Patient tolerated treatment well [x] Patient limited by fatique  [x] Patient limited by pain  [] Patient limited by other medical complications  [] Other:     Prognosis: [x] Good [] Fair  [] Poor    Patient Requires Follow-up: [x] Yes  [] No    PLAN: See eval  [x] Continue per plan of care [] Alter current plan (see comments)  [] Plan of care initiated [] Hold pending MD visit [] Discharge    Electronically signed by:      [place]: [id number]     Suzette Ward PT, DPT

## 2017-12-22 ENCOUNTER — NURSE ONLY (OUTPATIENT)
Dept: INTERNAL MEDICINE CLINIC | Age: 67
End: 2017-12-22

## 2017-12-22 DIAGNOSIS — Z23 NEED FOR VACCINATION WITH 13-POLYVALENT PNEUMOCOCCAL CONJUGATE VACCINE: Primary | ICD-10-CM

## 2017-12-22 PROCEDURE — G0009 ADMIN PNEUMOCOCCAL VACCINE: HCPCS | Performed by: INTERNAL MEDICINE

## 2017-12-22 PROCEDURE — 90670 PCV13 VACCINE IM: CPT | Performed by: INTERNAL MEDICINE

## 2017-12-29 ENCOUNTER — OFFICE VISIT (OUTPATIENT)
Dept: ORTHOPEDIC SURGERY | Age: 67
End: 2017-12-29

## 2017-12-29 VITALS
DIASTOLIC BLOOD PRESSURE: 87 MMHG | BODY MASS INDEX: 39.26 KG/M2 | HEART RATE: 76 BPM | SYSTOLIC BLOOD PRESSURE: 160 MMHG | WEIGHT: 229.94 LBS | HEIGHT: 64 IN

## 2017-12-29 DIAGNOSIS — M16.11 ARTHRITIS OF RIGHT HIP: ICD-10-CM

## 2017-12-29 DIAGNOSIS — M25.551 RIGHT HIP PAIN: ICD-10-CM

## 2017-12-29 DIAGNOSIS — M70.61 TROCHANTERIC BURSITIS OF RIGHT HIP: Primary | ICD-10-CM

## 2017-12-29 PROCEDURE — 3014F SCREEN MAMMO DOC REV: CPT | Performed by: ORTHOPAEDIC SURGERY

## 2017-12-29 PROCEDURE — 99213 OFFICE O/P EST LOW 20 MIN: CPT | Performed by: ORTHOPAEDIC SURGERY

## 2017-12-29 PROCEDURE — 1123F ACP DISCUSS/DSCN MKR DOCD: CPT | Performed by: ORTHOPAEDIC SURGERY

## 2017-12-29 PROCEDURE — G8482 FLU IMMUNIZE ORDER/ADMIN: HCPCS | Performed by: ORTHOPAEDIC SURGERY

## 2017-12-29 PROCEDURE — 1036F TOBACCO NON-USER: CPT | Performed by: ORTHOPAEDIC SURGERY

## 2017-12-29 PROCEDURE — G8427 DOCREV CUR MEDS BY ELIG CLIN: HCPCS | Performed by: ORTHOPAEDIC SURGERY

## 2017-12-29 PROCEDURE — G8400 PT W/DXA NO RESULTS DOC: HCPCS | Performed by: ORTHOPAEDIC SURGERY

## 2017-12-29 PROCEDURE — 3017F COLORECTAL CA SCREEN DOC REV: CPT | Performed by: ORTHOPAEDIC SURGERY

## 2017-12-29 PROCEDURE — 1090F PRES/ABSN URINE INCON ASSESS: CPT | Performed by: ORTHOPAEDIC SURGERY

## 2017-12-29 PROCEDURE — 4040F PNEUMOC VAC/ADMIN/RCVD: CPT | Performed by: ORTHOPAEDIC SURGERY

## 2017-12-29 PROCEDURE — G8417 CALC BMI ABV UP PARAM F/U: HCPCS | Performed by: ORTHOPAEDIC SURGERY

## 2017-12-29 RX ORDER — TRAMADOL HYDROCHLORIDE 50 MG/1
50 TABLET ORAL EVERY 6 HOURS PRN
Qty: 40 TABLET | Refills: 0 | Status: CANCELLED | OUTPATIENT
Start: 2017-12-29 | End: 2018-01-05

## 2017-12-29 NOTE — PROGRESS NOTES
Patient returns today for right hip. I did 8 bursal injection little over a month ago and then about 2 weeks ago she had an intra-articular injection which she said helped a lot for 3 days but she said her pain is back after 3 days she is having a lot of pain at this point. She's been on diclofenac as well. Her BMI is about 40. ROS: Pertinent items are noted in HPI. No notes on file    Past Medical History:  No date: Depression  No date: GERD (gastroesophageal reflux disease)  No date: HTN (hypertension)  No date: Hyperlipidemia  No date: Raynaud's disease  No date: Spinal stenosis     Past Surgical History:  07/17/2016: APPENDECTOMY  No date: BLADDER REPAIR  4/13; 10/13; 4/14: FINGER TRIGGER RELEASE      Comment: Multiple fingers  No date: HAMMER TOE SURGERY      Comment: Right.  02/1991: HYSTERECTOMY  12/13: LUMBAR FUSION      Comment: L4-L5;L5-S1  11/1997 and 1981: LUMBAR LAMINECTOMY  No date: SHOULDER SURGERY      Comment: Left    History reviewed. No pertinent family history. Social History    Marital status:             Spouse name:                       Years of education:                 Number of children:               Social History Main Topics    Smoking status: Former Smoker                                                                Packs/day: 0.00      Years: 0.00           Quit date: 7/17/2005    Smokeless tobacco: Never Used                        Alcohol use: No                 Comment: ocassinally    Drug use:  No                Current Outpatient Prescriptions:  citalopram (CELEXA) 20 MG tablet, TAKE 1 TABLET BY MOUTH EVERY DAY, Disp: 90 tablet, Rfl: 3  methocarbamol (ROBAXIN) 750 MG tablet, Take 1 tablet by mouth 3 times daily as needed (Muscle spasms), Disp: 30 tablet, Rfl: 1  pravastatin (PRAVACHOL) 80 MG tablet, TAKE 1 TABLET BY MOUTH DAILY, Disp: 90 tablet, Rfl: 3  lisinopril-hydrochlorothiazide (PRINZIDE;ZESTORETIC) 20-25 MG per tablet, TAKE 1 TABLET BY MOUTH DAILY,

## 2018-01-08 ENCOUNTER — TELEPHONE (OUTPATIENT)
Dept: INTERNAL MEDICINE CLINIC | Age: 68
End: 2018-01-08

## 2018-01-15 DIAGNOSIS — M15.9 PRIMARY OSTEOARTHRITIS INVOLVING MULTIPLE JOINTS: Primary | ICD-10-CM

## 2018-01-15 RX ORDER — HYDROCODONE BITARTRATE AND ACETAMINOPHEN 5; 325 MG/1; MG/1
1 TABLET ORAL EVERY 6 HOURS PRN
Qty: 90 TABLET | Refills: 0 | Status: SHIPPED | OUTPATIENT
Start: 2018-01-15 | End: 2018-02-14

## 2018-01-23 ENCOUNTER — OFFICE VISIT (OUTPATIENT)
Dept: INTERNAL MEDICINE CLINIC | Age: 68
End: 2018-01-23

## 2018-01-23 ENCOUNTER — OFFICE VISIT (OUTPATIENT)
Dept: ORTHOPEDIC SURGERY | Age: 68
End: 2018-01-23

## 2018-01-23 VITALS
HEIGHT: 64 IN | HEART RATE: 79 BPM | WEIGHT: 229.94 LBS | DIASTOLIC BLOOD PRESSURE: 90 MMHG | BODY MASS INDEX: 39.26 KG/M2 | SYSTOLIC BLOOD PRESSURE: 122 MMHG

## 2018-01-23 VITALS
RESPIRATION RATE: 16 BRPM | DIASTOLIC BLOOD PRESSURE: 86 MMHG | OXYGEN SATURATION: 96 % | SYSTOLIC BLOOD PRESSURE: 120 MMHG | HEIGHT: 64 IN | WEIGHT: 228 LBS | BODY MASS INDEX: 38.93 KG/M2 | TEMPERATURE: 98 F | HEART RATE: 70 BPM

## 2018-01-23 DIAGNOSIS — M48.07 SPINAL STENOSIS OF LUMBOSACRAL REGION: ICD-10-CM

## 2018-01-23 DIAGNOSIS — M16.11 PRIMARY OSTEOARTHRITIS OF RIGHT HIP: ICD-10-CM

## 2018-01-23 DIAGNOSIS — M75.82 ROTATOR CUFF TENDINITIS, LEFT: Primary | ICD-10-CM

## 2018-01-23 DIAGNOSIS — K21.9 GASTROESOPHAGEAL REFLUX DISEASE WITHOUT ESOPHAGITIS: ICD-10-CM

## 2018-01-23 DIAGNOSIS — E78.2 MIXED HYPERLIPIDEMIA: ICD-10-CM

## 2018-01-23 DIAGNOSIS — M19.012 ARTHROSIS OF LEFT ACROMIOCLAVICULAR JOINT: ICD-10-CM

## 2018-01-23 DIAGNOSIS — Z01.818 PRE-OP EXAMINATION: Primary | ICD-10-CM

## 2018-01-23 DIAGNOSIS — I10 ESSENTIAL HYPERTENSION: ICD-10-CM

## 2018-01-23 PROCEDURE — 1090F PRES/ABSN URINE INCON ASSESS: CPT | Performed by: INTERNAL MEDICINE

## 2018-01-23 PROCEDURE — 4040F PNEUMOC VAC/ADMIN/RCVD: CPT | Performed by: INTERNAL MEDICINE

## 2018-01-23 PROCEDURE — 20610 DRAIN/INJ JOINT/BURSA W/O US: CPT | Performed by: ORTHOPAEDIC SURGERY

## 2018-01-23 PROCEDURE — G8417 CALC BMI ABV UP PARAM F/U: HCPCS | Performed by: ORTHOPAEDIC SURGERY

## 2018-01-23 PROCEDURE — G8427 DOCREV CUR MEDS BY ELIG CLIN: HCPCS | Performed by: INTERNAL MEDICINE

## 2018-01-23 PROCEDURE — 3017F COLORECTAL CA SCREEN DOC REV: CPT | Performed by: INTERNAL MEDICINE

## 2018-01-23 PROCEDURE — 1036F TOBACCO NON-USER: CPT | Performed by: INTERNAL MEDICINE

## 2018-01-23 PROCEDURE — G8417 CALC BMI ABV UP PARAM F/U: HCPCS | Performed by: INTERNAL MEDICINE

## 2018-01-23 PROCEDURE — 4040F PNEUMOC VAC/ADMIN/RCVD: CPT | Performed by: ORTHOPAEDIC SURGERY

## 2018-01-23 PROCEDURE — 1123F ACP DISCUSS/DSCN MKR DOCD: CPT | Performed by: INTERNAL MEDICINE

## 2018-01-23 PROCEDURE — 93000 ELECTROCARDIOGRAM COMPLETE: CPT | Performed by: INTERNAL MEDICINE

## 2018-01-23 PROCEDURE — 3017F COLORECTAL CA SCREEN DOC REV: CPT | Performed by: ORTHOPAEDIC SURGERY

## 2018-01-23 PROCEDURE — 1090F PRES/ABSN URINE INCON ASSESS: CPT | Performed by: ORTHOPAEDIC SURGERY

## 2018-01-23 PROCEDURE — G8427 DOCREV CUR MEDS BY ELIG CLIN: HCPCS | Performed by: ORTHOPAEDIC SURGERY

## 2018-01-23 PROCEDURE — G8399 PT W/DXA RESULTS DOCUMENT: HCPCS | Performed by: INTERNAL MEDICINE

## 2018-01-23 PROCEDURE — 1036F TOBACCO NON-USER: CPT | Performed by: ORTHOPAEDIC SURGERY

## 2018-01-23 PROCEDURE — 3014F SCREEN MAMMO DOC REV: CPT | Performed by: INTERNAL MEDICINE

## 2018-01-23 PROCEDURE — G8399 PT W/DXA RESULTS DOCUMENT: HCPCS | Performed by: ORTHOPAEDIC SURGERY

## 2018-01-23 PROCEDURE — G8482 FLU IMMUNIZE ORDER/ADMIN: HCPCS | Performed by: ORTHOPAEDIC SURGERY

## 2018-01-23 PROCEDURE — 1123F ACP DISCUSS/DSCN MKR DOCD: CPT | Performed by: ORTHOPAEDIC SURGERY

## 2018-01-23 PROCEDURE — G8482 FLU IMMUNIZE ORDER/ADMIN: HCPCS | Performed by: INTERNAL MEDICINE

## 2018-01-23 PROCEDURE — 99212 OFFICE O/P EST SF 10 MIN: CPT | Performed by: ORTHOPAEDIC SURGERY

## 2018-01-23 PROCEDURE — 99215 OFFICE O/P EST HI 40 MIN: CPT | Performed by: INTERNAL MEDICINE

## 2018-01-23 PROCEDURE — 3014F SCREEN MAMMO DOC REV: CPT | Performed by: ORTHOPAEDIC SURGERY

## 2018-01-23 ASSESSMENT — ENCOUNTER SYMPTOMS
GASTROINTESTINAL NEGATIVE: 1
RESPIRATORY NEGATIVE: 1

## 2018-01-23 NOTE — PROGRESS NOTES
Subjective:      Patient ID: Dana Miranda is a 74 y.o. female. HPI  Melburn Paul is here for pre-op exam prior to right THR by Dr. Irineo Bowden at [facility] on 1/31/18. Has severe pain and disability. Past Medical History:   Diagnosis Date    Depression     GERD (gastroesophageal reflux disease)     HTN (hypertension)     Hyperlipidemia     Raynaud's disease     Spinal stenosis        Past Surgical History:   Procedure Laterality Date    APPENDECTOMY  07/17/2016    BLADDER REPAIR      FINGER TRIGGER RELEASE  4/13; 10/13; 4/14    Multiple fingers    HAMMER TOE SURGERY      Right.  HIP SURGERY      total hip replacement     HYSTERECTOMY  02/1991    LUMBAR FUSION  12/13    L4-L5;L5-S1    LUMBAR LAMINECTOMY  11/1997 and 1981    SHOULDER SURGERY      Left       Current Outpatient Prescriptions   Medication Sig Dispense Refill    HYDROcodone-acetaminophen (NORCO) 5-325 MG per tablet Take 1 tablet by mouth every 6 hours as needed for Pain for up to 30 days. Earliest Fill Date: 1/15/18 90 tablet 0    citalopram (CELEXA) 20 MG tablet TAKE 1 TABLET BY MOUTH EVERY DAY 90 tablet 3    methocarbamol (ROBAXIN) 750 MG tablet Take 1 tablet by mouth 3 times daily as needed (Muscle spasms) 30 tablet 1    pravastatin (PRAVACHOL) 80 MG tablet TAKE 1 TABLET BY MOUTH DAILY 90 tablet 3    lisinopril-hydrochlorothiazide (PRINZIDE;ZESTORETIC) 20-25 MG per tablet TAKE 1 TABLET BY MOUTH DAILY 90 tablet 3    diclofenac (VOLTAREN) 75 MG EC tablet TAKE 1 TABLET BY MOUTH TWICE DAILY 180 tablet 3    CHONDROITIN SULFATE A PO Take 2 tablets by mouth daily      Glucosamine Sulfate 500 MG TABS Take 2 tablets by mouth daily      aspirin 81 MG tablet Take 81 mg by mouth daily      Multiple Vitamins-Minerals (CENTRUM SILVER) TABS Take  by mouth.  Ascorbic Acid (VITAMIN C) 500 MG tablet Take 500 mg by mouth daily.       vitamin E 400 UNIT capsule Take 400 Units by mouth every other day        No current

## 2018-01-23 NOTE — PROGRESS NOTES
Lido    NDC#: 9075-4394-91   Lot:-[id number]  Expiration Date: 5/19  Dose: 8cc  Location: injection was given in Left Shoulder     Depomedrol     NDC#: 1816-8998-67  Lot: [id number]  Expiration Date: 04/20  Dose: 2cc  Location: injection was given in Left  Shoulder

## 2018-01-24 NOTE — PROGRESS NOTES
History of Present Illness:  Abril Gipson is a pleasant 74 y.o. female presenting today for follow-up of LEFT shoulder chronic rotator cuff tendinopathy and low-grade attritional tear. Historically she has responded well to corticosteroid injections getting approximately 1 year of relief. She is here today for another corticosteroid injection. Date of last left shoulder injection 5/2/2017. Lastly, Ms. Bruno Phillips will be undergoing a right hip replacement next week. She was hopeful to have the corticosteroid injection priro to surgery because she will need to weight bear on her arms postoperatively. Medical History:  Patient's medications, allergies, past medical, surgical, social and family histories were reviewed and updated as appropriate. Pertinent items are noted in HPI  Review of systems reviewed from Patient History Form dated on 5/2/2017 and available in the patient's chart under the Media tab. Vital Signs:  Vitals:    01/23/18 1133   BP: (!) 122/90   Pulse: 79         Constitutional: In no apparent distress. Normal affect. Alert and oriented X3 and is cooperative. Left Shoulder Examination:    Inspection:  Benign without gross deformity    Palpation:  Tenderness overlying RCF. Active Range of Motion: No apparent deficit relative to right. Passive Range of Motion:  deferred    Strength:  deferred    Special Tests:  Distally neurovascularly intact          Radiology:     Deferred         Assessment :  Abril Gipson is a pleasant 74 y.o. female presenting today with chronic left shoulder rotator cuff tendinopathy and would like a CS injection today. Impression:  Encounter Diagnoses   Name Primary?     Arthrosis of left acromioclavicular joint     Rotator cuff tendinitis, left Yes       Office Procedures:  Orders Placed This Encounter   Procedures    ID ARTHROCENTESIS ASPIR&/INJ MAJOR JT/BURSA W/O US    ID DEPO MEDROL       Treatment Plan:  Risks, benefits, and aftercare of CS injection

## 2018-01-26 ENCOUNTER — TELEPHONE (OUTPATIENT)
Dept: INTERNAL MEDICINE CLINIC | Age: 68
End: 2018-01-26

## 2018-01-26 NOTE — TELEPHONE ENCOUNTER
Please have patient call surgeon and inform of increased pain in spite of increased Hydrocodone dosages. Patient may also benefit from warm compresses.  Thank you

## 2018-01-26 NOTE — TELEPHONE ENCOUNTER
Called pt, she said she is doubling her dose every 6 hours, she has had no further trauma, and she has not contacted her surgeon because she was instructed to contact PCP about this    Pt said Dr. Mansi Bailey I having her hold diclofenac prior to surgery which is causing her pain to be worse, she is scheduled for surgery on Wednesday

## 2018-03-26 ENCOUNTER — OFFICE VISIT (OUTPATIENT)
Dept: ORTHOPEDIC SURGERY | Age: 68
End: 2018-03-26

## 2018-03-26 VITALS
DIASTOLIC BLOOD PRESSURE: 74 MMHG | WEIGHT: 225 LBS | BODY MASS INDEX: 38.41 KG/M2 | HEART RATE: 81 BPM | HEIGHT: 64 IN | SYSTOLIC BLOOD PRESSURE: 133 MMHG

## 2018-03-26 DIAGNOSIS — M17.12 PRIMARY OSTEOARTHRITIS OF LEFT KNEE: Primary | ICD-10-CM

## 2018-03-26 DIAGNOSIS — M25.562 LEFT KNEE PAIN, UNSPECIFIED CHRONICITY: ICD-10-CM

## 2018-03-26 PROCEDURE — G8417 CALC BMI ABV UP PARAM F/U: HCPCS | Performed by: ORTHOPAEDIC SURGERY

## 2018-03-26 PROCEDURE — 3014F SCREEN MAMMO DOC REV: CPT | Performed by: ORTHOPAEDIC SURGERY

## 2018-03-26 PROCEDURE — 1036F TOBACCO NON-USER: CPT | Performed by: ORTHOPAEDIC SURGERY

## 2018-03-26 PROCEDURE — G8427 DOCREV CUR MEDS BY ELIG CLIN: HCPCS | Performed by: ORTHOPAEDIC SURGERY

## 2018-03-26 PROCEDURE — G8482 FLU IMMUNIZE ORDER/ADMIN: HCPCS | Performed by: ORTHOPAEDIC SURGERY

## 2018-03-26 PROCEDURE — 4040F PNEUMOC VAC/ADMIN/RCVD: CPT | Performed by: ORTHOPAEDIC SURGERY

## 2018-03-26 PROCEDURE — 1090F PRES/ABSN URINE INCON ASSESS: CPT | Performed by: ORTHOPAEDIC SURGERY

## 2018-03-26 PROCEDURE — 3017F COLORECTAL CA SCREEN DOC REV: CPT | Performed by: ORTHOPAEDIC SURGERY

## 2018-03-26 PROCEDURE — 20610 DRAIN/INJ JOINT/BURSA W/O US: CPT | Performed by: ORTHOPAEDIC SURGERY

## 2018-03-26 PROCEDURE — 1123F ACP DISCUSS/DSCN MKR DOCD: CPT | Performed by: ORTHOPAEDIC SURGERY

## 2018-03-26 PROCEDURE — 99214 OFFICE O/P EST MOD 30 MIN: CPT | Performed by: ORTHOPAEDIC SURGERY

## 2018-03-26 PROCEDURE — G8399 PT W/DXA RESULTS DOCUMENT: HCPCS | Performed by: ORTHOPAEDIC SURGERY

## 2018-03-26 NOTE — PROGRESS NOTES
a corticosteroid injection of her left knee and continuing on diclofenac as tolerated in conjunction with home exercises. Risks, benefits, postinjection care were reviewed. Risks and benefits of oral NSAIDs were also reviewed. Home exercises for knee osteoarthritis were provided. Follow-up as needed. All questions were answered to patient's satisfaction and was encouraged to call with any further questions. Jero Rosario is in full agreement with this plan. The indications and risks of steroid injection as well as treatment alternatives were discussed with the patient who consented to the procedure. Under sterile conditions and after informed consent was obtained the patient was given an injection into the LEFT knee. 40 mg of Depo-Medrol and 4 mL of 1% lidocaine were placed in the knee after it was prepped with chlorhexidine. This resulted in good relief of symptoms. There were no complications. The patient was advised to ice the knee this evening and to avoid vigorous activities for the next 2 days. They were advised to call us if there was any erythema, enduration, swelling or increasing pain. Orders Placed This Encounter   Procedures    XR KNEE LEFT (1-2 VIEWS)     56125     Order Specific Question:   Reason for exam:     Answer:   knee pain    XR Knee Bilateral Standard     Order Specific Question:   Reason for exam:     Answer:   knee pain           Andrea Rojo PA-C  [facility]  Date:    3/26/2018      During this examination, Dilia GUERRERO PA-C, functioned as a scribe for Dr. Caroline West. The history taking and physical examination were performed by Dr. Caroline West. All counseling during the appointment was performed between the patient and Dr. Caroline West. 3/26/2018 4:27 PM      This dictation was performed with a verbal recognition program (DRAGON) and it was checked for errors.   It is possible that there are still dictated errors within this office

## 2018-03-26 NOTE — PROGRESS NOTES
Date:  3/26/2018    Name:  Karol Damon  Address:  [id number]    :  1950      Age:   74 y.o.    SSN:  [id number]      Medical Record Number:  [id number]    Reason for Visit:    Chief Complaint    Knee Pain (np left knee pain, fell in 2017; prev pt of dr. Juana Payan)      DOS:3/26/2018     HPI: Karol Damon is a 74 y.o. female here today for evaluation of left knee. She's a previous patient of Dr. Jf Bowers and is transferring care today. She has a long history of left knee osteoarthritis. She's currently taking diclofenac and is tolerating it well and with significant meaningful relief. She's undergone corticosteroid injections in the past (date of last injection 2017) with about 5 months of relief. Denies any numbness or tingling in left lower extremity. Positive history of right total hip replacement on 2018 by Dr. Sagar Duenas. Pain Assessment  Location of Pain: Knee  Location Modifiers: Left  Severity of Pain: 4  Quality of Pain: Aching  Duration of Pain: Persistent  Frequency of Pain: Intermittent  Aggravating Factors: Stairs (movement)  Limiting Behavior: Yes  Relieving Factors: Rest  Work-Related Injury: No  Are there other pain locations you wish to document?: No  ROS: All systems reviewed on patient intake form. Pertinent items are noted in HPI. Past Medical History:   Diagnosis Date    Depression     GERD (gastroesophageal reflux disease)     HTN (hypertension)     Hyperlipidemia     Raynaud's disease     Spinal stenosis         Past Surgical History:   Procedure Laterality Date    APPENDECTOMY  2016    BLADDER REPAIR      FINGER TRIGGER RELEASE  ; 10/13;     Multiple fingers    HAMMER TOE SURGERY      Right.     HIP SURGERY      total hip replacement     HYSTERECTOMY  1991    LUMBAR FUSION      L4-L5;L5-S1    LUMBAR LAMINECTOMY  1997 and     SHOULDER SURGERY      Left       No family history on file.    Social History     Social History    Marital status:      Spouse name: N/A    Number of children: N/A    Years of education: N/A     Social History Main Topics    Smoking status: Former Smoker     Quit date: 7/17/2005    Smokeless tobacco: Never Used    Alcohol use Yes      Comment: occassionally    Drug use: No    Sexual activity: Not Asked     Other Topics Concern    None     Social History Narrative    None       Current Outpatient Prescriptions   Medication Sig Dispense Refill    citalopram (CELEXA) 20 MG tablet TAKE 1 TABLET BY MOUTH EVERY DAY 90 tablet 3    methocarbamol (ROBAXIN) 750 MG tablet Take 1 tablet by mouth 3 times daily as needed (Muscle spasms) 30 tablet 1    pravastatin (PRAVACHOL) 80 MG tablet TAKE 1 TABLET BY MOUTH DAILY 90 tablet 3    lisinopril-hydrochlorothiazide (PRINZIDE;ZESTORETIC) 20-25 MG per tablet TAKE 1 TABLET BY MOUTH DAILY 90 tablet 3    diclofenac (VOLTAREN) 75 MG EC tablet TAKE 1 TABLET BY MOUTH TWICE DAILY 180 tablet 3    CHONDROITIN SULFATE A PO Take 2 tablets by mouth daily      Glucosamine Sulfate 500 MG TABS Take 2 tablets by mouth daily      aspirin 81 MG tablet Take 81 mg by mouth daily      Multiple Vitamins-Minerals (CENTRUM SILVER) TABS Take  by mouth.  Ascorbic Acid (VITAMIN C) 500 MG tablet Take 500 mg by mouth daily.  vitamin E 400 UNIT capsule Take 400 Units by mouth every other day        No current facility-administered medications for this visit. Allergies   Allergen Reactions    Reglan [Metoclopramide] Itching and Other (See Comments)     Flushed      Oxycodone Nausea And Vomiting       Vital signs:  /74   Pulse 81   Ht 5' 4\" (1.626 m)   Wt 225 lb (102.1 kg)   BMI 38.62 kg/m²        Neuro: Alert & oriented x 3,  normal,  no focal deficits noted. Normal affect.   Eyes: sclera clear  Ears: Normal external ear  Mouth:  No perioral lesions  Pulm: Respirations unlabored and regular  Pulse: Regular rate

## 2018-04-05 ENCOUNTER — OFFICE VISIT (OUTPATIENT)
Dept: INTERNAL MEDICINE CLINIC | Age: 68
End: 2018-04-05

## 2018-04-05 VITALS
BODY MASS INDEX: 38.41 KG/M2 | DIASTOLIC BLOOD PRESSURE: 70 MMHG | SYSTOLIC BLOOD PRESSURE: 120 MMHG | HEART RATE: 104 BPM | TEMPERATURE: 99.9 F | HEIGHT: 64 IN | WEIGHT: 225 LBS | RESPIRATION RATE: 16 BRPM | OXYGEN SATURATION: 96 %

## 2018-04-05 DIAGNOSIS — A09 DIARRHEA OF INFECTIOUS ORIGIN: ICD-10-CM

## 2018-04-05 LAB
ANION GAP SERPL CALCULATED.3IONS-SCNC: 16 MMOL/L (ref 3–16)
BASOPHILS ABSOLUTE: 0 K/UL (ref 0–0.2)
BASOPHILS RELATIVE PERCENT: 0.5 %
BUN BLDV-MCNC: 16 MG/DL (ref 7–20)
CALCIUM SERPL-MCNC: 8.8 MG/DL (ref 8.3–10.6)
CHLORIDE BLD-SCNC: 101 MMOL/L (ref 99–110)
CO2: 22 MMOL/L (ref 21–32)
CREAT SERPL-MCNC: 0.6 MG/DL (ref 0.6–1.2)
EOSINOPHILS ABSOLUTE: 0.1 K/UL (ref 0–0.6)
EOSINOPHILS RELATIVE PERCENT: 1.1 %
GFR AFRICAN AMERICAN: >60
GFR NON-AFRICAN AMERICAN: >60
GLUCOSE BLD-MCNC: 109 MG/DL (ref 70–99)
HCT VFR BLD CALC: 40.9 % (ref 36–48)
HEMOGLOBIN: 13.9 G/DL (ref 12–16)
LYMPHOCYTES ABSOLUTE: 0.7 K/UL (ref 1–5.1)
LYMPHOCYTES RELATIVE PERCENT: 10.3 %
MCH RBC QN AUTO: 32.1 PG (ref 26–34)
MCHC RBC AUTO-ENTMCNC: 34.1 G/DL (ref 31–36)
MCV RBC AUTO: 94.3 FL (ref 80–100)
MONOCYTES ABSOLUTE: 0.5 K/UL (ref 0–1.3)
MONOCYTES RELATIVE PERCENT: 7.1 %
NEUTROPHILS ABSOLUTE: 5.9 K/UL (ref 1.7–7.7)
NEUTROPHILS RELATIVE PERCENT: 81 %
PDW BLD-RTO: 13.6 % (ref 12.4–15.4)
PLATELET # BLD: 256 K/UL (ref 135–450)
PMV BLD AUTO: 9.9 FL (ref 5–10.5)
POTASSIUM SERPL-SCNC: 3.4 MMOL/L (ref 3.5–5.1)
RBC # BLD: 4.33 M/UL (ref 4–5.2)
SODIUM BLD-SCNC: 139 MMOL/L (ref 136–145)
WBC # BLD: 7.2 K/UL (ref 4–11)

## 2018-04-05 PROCEDURE — 1090F PRES/ABSN URINE INCON ASSESS: CPT | Performed by: INTERNAL MEDICINE

## 2018-04-05 PROCEDURE — 99213 OFFICE O/P EST LOW 20 MIN: CPT | Performed by: INTERNAL MEDICINE

## 2018-04-05 PROCEDURE — 1036F TOBACCO NON-USER: CPT | Performed by: INTERNAL MEDICINE

## 2018-04-05 PROCEDURE — G8417 CALC BMI ABV UP PARAM F/U: HCPCS | Performed by: INTERNAL MEDICINE

## 2018-04-05 PROCEDURE — 3014F SCREEN MAMMO DOC REV: CPT | Performed by: INTERNAL MEDICINE

## 2018-04-05 PROCEDURE — 4040F PNEUMOC VAC/ADMIN/RCVD: CPT | Performed by: INTERNAL MEDICINE

## 2018-04-05 PROCEDURE — G8427 DOCREV CUR MEDS BY ELIG CLIN: HCPCS | Performed by: INTERNAL MEDICINE

## 2018-04-05 PROCEDURE — 3017F COLORECTAL CA SCREEN DOC REV: CPT | Performed by: INTERNAL MEDICINE

## 2018-04-05 PROCEDURE — 1123F ACP DISCUSS/DSCN MKR DOCD: CPT | Performed by: INTERNAL MEDICINE

## 2018-04-05 PROCEDURE — G8399 PT W/DXA RESULTS DOCUMENT: HCPCS | Performed by: INTERNAL MEDICINE

## 2018-04-05 PROCEDURE — 36415 COLL VENOUS BLD VENIPUNCTURE: CPT | Performed by: INTERNAL MEDICINE

## 2018-04-05 ASSESSMENT — PATIENT HEALTH QUESTIONNAIRE - PHQ9
SUM OF ALL RESPONSES TO PHQ QUESTIONS 1-9: 0
1. LITTLE INTEREST OR PLEASURE IN DOING THINGS: 0
2. FEELING DOWN, DEPRESSED OR HOPELESS: 0
SUM OF ALL RESPONSES TO PHQ9 QUESTIONS 1 & 2: 0

## 2018-04-06 ENCOUNTER — TELEPHONE (OUTPATIENT)
Dept: INTERNAL MEDICINE CLINIC | Age: 68
End: 2018-04-06

## 2018-04-06 RX ORDER — POTASSIUM CHLORIDE 20 MEQ/1
20 TABLET, EXTENDED RELEASE ORAL DAILY
Qty: 60 TABLET | Refills: 3 | Status: SHIPPED | OUTPATIENT
Start: 2018-04-06 | End: 2018-12-27 | Stop reason: SDUPTHER

## 2018-04-25 RX ORDER — DICLOFENAC SODIUM 75 MG/1
TABLET, DELAYED RELEASE ORAL
Qty: 180 TABLET | Refills: 1 | Status: SHIPPED | OUTPATIENT
Start: 2018-04-25 | End: 2018-11-02

## 2018-05-24 ENCOUNTER — TELEPHONE (OUTPATIENT)
Dept: INTERNAL MEDICINE CLINIC | Age: 68
End: 2018-05-24

## 2018-05-31 ENCOUNTER — OFFICE VISIT (OUTPATIENT)
Dept: ORTHOPEDIC SURGERY | Age: 68
End: 2018-05-31

## 2018-05-31 VITALS
WEIGHT: 225.09 LBS | HEART RATE: 81 BPM | DIASTOLIC BLOOD PRESSURE: 79 MMHG | HEIGHT: 64 IN | SYSTOLIC BLOOD PRESSURE: 138 MMHG | BODY MASS INDEX: 38.43 KG/M2

## 2018-05-31 DIAGNOSIS — M75.82 ROTATOR CUFF TENDINITIS, LEFT: Primary | ICD-10-CM

## 2018-05-31 PROCEDURE — G8399 PT W/DXA RESULTS DOCUMENT: HCPCS | Performed by: ORTHOPAEDIC SURGERY

## 2018-05-31 PROCEDURE — 1090F PRES/ABSN URINE INCON ASSESS: CPT | Performed by: ORTHOPAEDIC SURGERY

## 2018-05-31 PROCEDURE — 3017F COLORECTAL CA SCREEN DOC REV: CPT | Performed by: ORTHOPAEDIC SURGERY

## 2018-05-31 PROCEDURE — 1036F TOBACCO NON-USER: CPT | Performed by: ORTHOPAEDIC SURGERY

## 2018-05-31 PROCEDURE — 99213 OFFICE O/P EST LOW 20 MIN: CPT | Performed by: ORTHOPAEDIC SURGERY

## 2018-05-31 PROCEDURE — G8417 CALC BMI ABV UP PARAM F/U: HCPCS | Performed by: ORTHOPAEDIC SURGERY

## 2018-05-31 PROCEDURE — 4040F PNEUMOC VAC/ADMIN/RCVD: CPT | Performed by: ORTHOPAEDIC SURGERY

## 2018-05-31 PROCEDURE — 1123F ACP DISCUSS/DSCN MKR DOCD: CPT | Performed by: ORTHOPAEDIC SURGERY

## 2018-05-31 PROCEDURE — G8427 DOCREV CUR MEDS BY ELIG CLIN: HCPCS | Performed by: ORTHOPAEDIC SURGERY

## 2018-06-07 ENCOUNTER — OFFICE VISIT (OUTPATIENT)
Dept: ORTHOPEDIC SURGERY | Age: 68
End: 2018-06-07

## 2018-06-07 VITALS
HEIGHT: 64 IN | DIASTOLIC BLOOD PRESSURE: 80 MMHG | BODY MASS INDEX: 38.43 KG/M2 | WEIGHT: 225.09 LBS | SYSTOLIC BLOOD PRESSURE: 120 MMHG | HEART RATE: 80 BPM

## 2018-06-07 DIAGNOSIS — M75.82 ROTATOR CUFF TENDINITIS, LEFT: Primary | ICD-10-CM

## 2018-06-07 DIAGNOSIS — M75.122 COMPLETE TEAR OF LEFT ROTATOR CUFF: ICD-10-CM

## 2018-06-07 PROCEDURE — 1090F PRES/ABSN URINE INCON ASSESS: CPT | Performed by: ORTHOPAEDIC SURGERY

## 2018-06-07 PROCEDURE — G8399 PT W/DXA RESULTS DOCUMENT: HCPCS | Performed by: ORTHOPAEDIC SURGERY

## 2018-06-07 PROCEDURE — 1123F ACP DISCUSS/DSCN MKR DOCD: CPT | Performed by: ORTHOPAEDIC SURGERY

## 2018-06-07 PROCEDURE — 4040F PNEUMOC VAC/ADMIN/RCVD: CPT | Performed by: ORTHOPAEDIC SURGERY

## 2018-06-07 PROCEDURE — 3017F COLORECTAL CA SCREEN DOC REV: CPT | Performed by: ORTHOPAEDIC SURGERY

## 2018-06-07 PROCEDURE — 99213 OFFICE O/P EST LOW 20 MIN: CPT | Performed by: ORTHOPAEDIC SURGERY

## 2018-06-07 PROCEDURE — G8417 CALC BMI ABV UP PARAM F/U: HCPCS | Performed by: ORTHOPAEDIC SURGERY

## 2018-06-07 PROCEDURE — G8427 DOCREV CUR MEDS BY ELIG CLIN: HCPCS | Performed by: ORTHOPAEDIC SURGERY

## 2018-06-07 PROCEDURE — 1036F TOBACCO NON-USER: CPT | Performed by: ORTHOPAEDIC SURGERY

## 2018-06-12 ENCOUNTER — TELEPHONE (OUTPATIENT)
Dept: ORTHOPEDIC SURGERY | Age: 68
End: 2018-06-12

## 2018-07-03 ENCOUNTER — TELEPHONE (OUTPATIENT)
Dept: ORTHOPEDIC SURGERY | Age: 68
End: 2018-07-03

## 2018-07-03 ENCOUNTER — OFFICE VISIT (OUTPATIENT)
Dept: INTERNAL MEDICINE CLINIC | Age: 68
End: 2018-07-03

## 2018-07-03 VITALS
BODY MASS INDEX: 39.27 KG/M2 | DIASTOLIC BLOOD PRESSURE: 82 MMHG | HEART RATE: 80 BPM | OXYGEN SATURATION: 98 % | WEIGHT: 230 LBS | HEIGHT: 64 IN | RESPIRATION RATE: 18 BRPM | SYSTOLIC BLOOD PRESSURE: 138 MMHG

## 2018-07-03 DIAGNOSIS — K21.9 GASTROESOPHAGEAL REFLUX DISEASE WITHOUT ESOPHAGITIS: ICD-10-CM

## 2018-07-03 DIAGNOSIS — E78.2 MIXED HYPERLIPIDEMIA: ICD-10-CM

## 2018-07-03 DIAGNOSIS — I73.00 RAYNAUD'S DISEASE WITHOUT GANGRENE: ICD-10-CM

## 2018-07-03 DIAGNOSIS — I10 ESSENTIAL HYPERTENSION: ICD-10-CM

## 2018-07-03 DIAGNOSIS — M75.102 TEAR OF LEFT ROTATOR CUFF, UNSPECIFIED TEAR EXTENT: ICD-10-CM

## 2018-07-03 DIAGNOSIS — Z01.818 PRE-OPERATIVE EXAMINATION: Primary | ICD-10-CM

## 2018-07-03 LAB
ANION GAP SERPL CALCULATED.3IONS-SCNC: 13 MMOL/L (ref 3–16)
BASOPHILS ABSOLUTE: 0.1 K/UL (ref 0–0.2)
BASOPHILS RELATIVE PERCENT: 0.8 %
BUN BLDV-MCNC: 15 MG/DL (ref 7–20)
CALCIUM SERPL-MCNC: 9.5 MG/DL (ref 8.3–10.6)
CHLORIDE BLD-SCNC: 102 MMOL/L (ref 99–110)
CO2: 26 MMOL/L (ref 21–32)
CREAT SERPL-MCNC: 0.6 MG/DL (ref 0.6–1.2)
EOSINOPHILS ABSOLUTE: 0.2 K/UL (ref 0–0.6)
EOSINOPHILS RELATIVE PERCENT: 3 %
GFR AFRICAN AMERICAN: >60
GFR NON-AFRICAN AMERICAN: >60
GLUCOSE BLD-MCNC: 87 MG/DL (ref 70–99)
HCT VFR BLD CALC: 36.5 % (ref 36–48)
HEMOGLOBIN: 12.8 G/DL (ref 12–16)
LYMPHOCYTES ABSOLUTE: 2.2 K/UL (ref 1–5.1)
LYMPHOCYTES RELATIVE PERCENT: 30.4 %
MCH RBC QN AUTO: 32.8 PG (ref 26–34)
MCHC RBC AUTO-ENTMCNC: 35 G/DL (ref 31–36)
MCV RBC AUTO: 93.8 FL (ref 80–100)
MONOCYTES ABSOLUTE: 0.6 K/UL (ref 0–1.3)
MONOCYTES RELATIVE PERCENT: 7.8 %
NEUTROPHILS ABSOLUTE: 4.1 K/UL (ref 1.7–7.7)
NEUTROPHILS RELATIVE PERCENT: 58 %
PDW BLD-RTO: 14.1 % (ref 12.4–15.4)
PLATELET # BLD: 240 K/UL (ref 135–450)
PMV BLD AUTO: 9.8 FL (ref 5–10.5)
POTASSIUM SERPL-SCNC: 4 MMOL/L (ref 3.5–5.1)
RBC # BLD: 3.9 M/UL (ref 4–5.2)
SODIUM BLD-SCNC: 141 MMOL/L (ref 136–145)
WBC # BLD: 7.1 K/UL (ref 4–11)

## 2018-07-03 PROCEDURE — 1123F ACP DISCUSS/DSCN MKR DOCD: CPT | Performed by: INTERNAL MEDICINE

## 2018-07-03 PROCEDURE — G8427 DOCREV CUR MEDS BY ELIG CLIN: HCPCS | Performed by: INTERNAL MEDICINE

## 2018-07-03 PROCEDURE — 3017F COLORECTAL CA SCREEN DOC REV: CPT | Performed by: INTERNAL MEDICINE

## 2018-07-03 PROCEDURE — 4040F PNEUMOC VAC/ADMIN/RCVD: CPT | Performed by: INTERNAL MEDICINE

## 2018-07-03 PROCEDURE — G8399 PT W/DXA RESULTS DOCUMENT: HCPCS | Performed by: INTERNAL MEDICINE

## 2018-07-03 PROCEDURE — 1036F TOBACCO NON-USER: CPT | Performed by: INTERNAL MEDICINE

## 2018-07-03 PROCEDURE — G8417 CALC BMI ABV UP PARAM F/U: HCPCS | Performed by: INTERNAL MEDICINE

## 2018-07-03 PROCEDURE — 36415 COLL VENOUS BLD VENIPUNCTURE: CPT | Performed by: INTERNAL MEDICINE

## 2018-07-03 PROCEDURE — 1090F PRES/ABSN URINE INCON ASSESS: CPT | Performed by: INTERNAL MEDICINE

## 2018-07-03 PROCEDURE — 99215 OFFICE O/P EST HI 40 MIN: CPT | Performed by: INTERNAL MEDICINE

## 2018-07-03 RX ORDER — LISINOPRIL AND HYDROCHLOROTHIAZIDE 25; 20 MG/1; MG/1
TABLET ORAL
Qty: 90 TABLET | Refills: 3 | Status: SHIPPED | OUTPATIENT
Start: 2018-07-03 | End: 2019-03-25 | Stop reason: SDUPTHER

## 2018-07-03 RX ORDER — PRAVASTATIN SODIUM 80 MG/1
TABLET ORAL
Qty: 90 TABLET | Refills: 3 | Status: SHIPPED | OUTPATIENT
Start: 2018-07-03 | End: 2019-03-25 | Stop reason: SDUPTHER

## 2018-07-03 ASSESSMENT — ENCOUNTER SYMPTOMS
GASTROINTESTINAL NEGATIVE: 1
EYES NEGATIVE: 1
RESPIRATORY NEGATIVE: 1

## 2018-07-03 NOTE — PROGRESS NOTES
Subjective:      Patient ID: Los Maynard is a 79 y.o. female. KEIRA Daugherty is here for a pre-op exam prior to a left rotator cuff surgery at [facility], [facility]. on 7/19/18 by Dr. Amelia Alcantara. Has moderate pain and nocturnal pain. She has no history of anesthesia complications. Past Medical History:   Diagnosis Date    Depression     GERD (gastroesophageal reflux disease)     HTN (hypertension)     Hyperlipidemia     Raynaud's disease     Spinal stenosis        Past Surgical History:   Procedure Laterality Date    APPENDECTOMY  07/17/2016    BLADDER REPAIR      FINGER TRIGGER RELEASE  4/13; 10/13; 4/14    Multiple fingers    HAMMER TOE SURGERY      Right.  HIP SURGERY      total hip replacement     HYSTERECTOMY  02/1991    LUMBAR FUSION  12/13    L4-L5;L5-S1    LUMBAR LAMINECTOMY  11/1997 and 1981    SHOULDER SURGERY      Left       Current Outpatient Prescriptions   Medication Sig Dispense Refill    diclofenac (VOLTAREN) 75 MG EC tablet TAKE 1 TABLET BY MOUTH TWICE DAILY 180 tablet 1    potassium chloride (KLOR-CON M) 20 MEQ extended release tablet Take 1 tablet by mouth daily 60 tablet 3    citalopram (CELEXA) 20 MG tablet TAKE 1 TABLET BY MOUTH EVERY DAY 90 tablet 3    pravastatin (PRAVACHOL) 80 MG tablet TAKE 1 TABLET BY MOUTH DAILY 90 tablet 3    lisinopril-hydrochlorothiazide (PRINZIDE;ZESTORETIC) 20-25 MG per tablet TAKE 1 TABLET BY MOUTH DAILY 90 tablet 3    CHONDROITIN SULFATE A PO Take 2 tablets by mouth daily      Glucosamine Sulfate 500 MG TABS Take 2 tablets by mouth daily      aspirin 81 MG tablet Take 81 mg by mouth daily      Multiple Vitamins-Minerals (CENTRUM SILVER) TABS Take  by mouth.  Ascorbic Acid (VITAMIN C) 500 MG tablet Take 500 mg by mouth daily.  vitamin E 400 UNIT capsule Take 400 Units by mouth every other day        No current facility-administered medications for this visit.         Allergies   Allergen Reactions    Reglan [Metoclopramide] Itching and

## 2018-07-16 NOTE — PROGRESS NOTES
[facility] / [facility] ([facility]) [address], [address]    Acknowledgment of Informed Consent for Surgical or Medical Procedure and Sedation  I agree to allow doctor(s) [address] [facility] and his/her associates or assistants, including residents and/or other qualified medical practitioner to perform the following medical treatment or procedure and to administer or direct the administration of sedation as necessary:  Procedure(s): LEFT SHOULDER ARTHROSCOPY, DEBRIDEMENT, DECOMPRESSION, LYSIS OF ADHESIONS, ROTATOR CUFF REPAIR  My doctor has explained the following regarding the proposed procedure:   the explanation of the procedure   the benefits of the procedure   the potential problems that might occur during recuperation   the risks and side effects of the procedure which could include but are not limited to severe blood loss, infection, stroke or death   the benefits, risks and side effect of alternative procedures including the consequences of declining this procedure or any alternative procedures   the likelihood of achieving satisfactory results. I acknowledge no guarantee or assurance has been made to me regarding the results. I understand that during the course of this treatment/procedure, unforeseen conditions can occur which require an additional or different procedure. I agree to allow my physician or assistants to perform such extension of the original procedure as they may find necessary. I understand that sedation will often result in temporary impairment of memory and fine motor skills and that sedation can occasionally progress to a state of deep sedation or general anesthesia. I understand the risks of anesthesia for surgery include, but are not limited to, sore throat, hoarseness, injury to face, mouth, or teeth; nausea; headache; injury to blood vessels or nerves; death, brain damage, or paralysis.     I understand that if I have a Limitation of Treatment order in effect during my hospitalization, the order may or may not be in effect during this procedure. I give my doctor permission to give me blood or blood products. I understand that there are risks with receiving blood such as hepatitis, AIDS, fever, or allergic reaction. I acknowledge that the risks, benefits, and alternatives of this treatment have been explained to me and that no express or implied warranty has been given by the hospital, any blood bank, or any person or entity as to the blood or blood components transfused. At the discretion of my doctor, I agree to allow observers, equipment/product representatives and allow photographing, and/or televising of the procedure, provided my name or identity is maintained confidentially. I agree the hospital may dispose of or use for scientific or educational purposes any tissue, fluid, or body parts which may be removed.     ________________________________Date________Time______ am/pm  ([place] [facility])  Patient or Signature of Closest Relative or Legal Guardian    ________________________________Date________Time______am/pm      Page 1 of  1  Witness

## 2018-07-18 ENCOUNTER — ANESTHESIA EVENT (OUTPATIENT)
Dept: OPERATING ROOM | Age: 68
End: 2018-07-18
Payer: MEDICARE

## 2018-07-18 NOTE — PROGRESS NOTES
you have not been preregistered yet. On the day of your procedure bring your insurance card and photo ID. You will be registered at your bedside once brought back to your room. 5. DO NOT EAT OR DRINK ANYTHING AFTER MIDNIGHT. 6. MEDICATIONS    Take the following medications with a SMALL sip of water: NONE   Use your usual dose of inhalers the morning of surgery. BRING your rescue inhaler with you to hospital.    Anesthesia does NOT want you to take insulin the morning of surgery. They will control your blood sugar while you are at the hospital. Please contact your ordering physician for instructions regarding your insulin the night before your procedure. If you have an insulin pump, please keep it set on basal rate. 7. Do not swallow water when brushing teeth. No gum, candy, mints or ice chips. Refrain from smoking or at least decrease the amount. 8. Dress in loose, comfortable clothing appropriate for redressing after your procedure. Do not wear jewelry (including body piercings), make-up (especially NO eye make-up), fingernail polish (NO toenail polish if foot/leg surgery), lotion, powders or metal hairclips. 9. Dentures, glasses, or contacts will need to be removed before your procedure. Bring cases for your glasses, contacts, dentures, or hearing aids to protect them while you are in surgery. 10. If you use a CPAP, please bring it with you on the day of your procedure. 11. We recommend that valuable personal  belongings, such as credit cards, cash, cell phones, e-tablets or jewelry, be left at home during your stay. The hospital will not be responsible for valuables that are not secured in the hospital safe. However, if your insurance requires a co-pay, you may want to bring a method of payment, i.e. Check or credit card, if you wish to pay your co-pay the day of surgery. 12. If you are to stay overnight, you may bring a bag with personal items.  Please have any large items you may also occur after anesthesia. Your nurse will help you manage these potential side effects. 2. For comfort and safety, arrange to have someone at home with you for the first 24 hours after discharge. 3. You and your family will be given written instructions about your diet, activity, dressing care, medications, and return visits. 4. Once at home, should issues with nausea, pain, or bleeding occur, or should you notice any signs of infection, you should call your surgeon. 5. Always clean your hands before and after caring for your wound. Do not let your family touch your surgery site without cleaning their hands. 6. Narcotic pain medications can cause significant constipation. You may want to add a stool softener to your postoperative medication schedule or speak to your surgeon on how best to manage this SIDE EFFECT. SPECIAL INSTRUCTIONS     Thank you for allowing us to care for you. We strive to exceed your expectations in the delivery of care and service provided to you and your family. If you need to contact us for any reason, please call us at [phone]    Instructions reviewed with patient during preadmission testing phone interview. Brandi Bains. 7/18/2018 .7:46 AM      ADDITIONAL EDUCATIONAL INFORMATION REVIEWED PER PHONE WITH YOU AND/OR YOUR FAMILY:  No Bring a urine sample on day of surgery  Yes Pain Goal-Taking Control of Your Pain  Yes FAQs about Surgical Site Infections  No Hibiclens® Bathing Instructions   Yes Antibacterial Soap  No Shaheen® Wipes Bathing Instructions (Obtained from: https://www.[employer]/. pdf )  No Incentive Spirometer Education  No Other

## 2018-07-19 ENCOUNTER — HOSPITAL ENCOUNTER (OUTPATIENT)
Age: 68
Setting detail: OUTPATIENT SURGERY
Discharge: HOME | End: 2018-07-19
Attending: ORTHOPAEDIC SURGERY | Admitting: ORTHOPAEDIC SURGERY
Payer: MEDICARE

## 2018-07-19 ENCOUNTER — ANESTHESIA (OUTPATIENT)
Dept: OPERATING ROOM | Age: 68
End: 2018-07-19
Payer: MEDICARE

## 2018-07-19 VITALS — DIASTOLIC BLOOD PRESSURE: 66 MMHG | TEMPERATURE: 96.6 F | SYSTOLIC BLOOD PRESSURE: 151 MMHG | OXYGEN SATURATION: 100 %

## 2018-07-19 VITALS
TEMPERATURE: 98 F | HEIGHT: 64 IN | BODY MASS INDEX: 39.27 KG/M2 | RESPIRATION RATE: 16 BRPM | OXYGEN SATURATION: 94 % | SYSTOLIC BLOOD PRESSURE: 126 MMHG | DIASTOLIC BLOOD PRESSURE: 67 MMHG | HEART RATE: 75 BPM | WEIGHT: 230 LBS

## 2018-07-19 DIAGNOSIS — G89.18 POST-OP PAIN: Primary | ICD-10-CM

## 2018-07-19 PROCEDURE — 2580000003 HC RX 258: Performed by: ANESTHESIOLOGY

## 2018-07-19 PROCEDURE — 2500000003 HC RX 250 WO HCPCS: Performed by: NURSE ANESTHETIST, CERTIFIED REGISTERED

## 2018-07-19 PROCEDURE — 6360000002 HC RX W HCPCS

## 2018-07-19 PROCEDURE — 2709999900 HC NON-CHARGEABLE SUPPLY: Performed by: ORTHOPAEDIC SURGERY

## 2018-07-19 PROCEDURE — 64415 NJX AA&/STRD BRCH PLXS IMG: CPT | Performed by: ANESTHESIOLOGY

## 2018-07-19 PROCEDURE — 3700000001 HC ADD 15 MINUTES (ANESTHESIA): Performed by: ORTHOPAEDIC SURGERY

## 2018-07-19 PROCEDURE — 3700000000 HC ANESTHESIA ATTENDED CARE: Performed by: ORTHOPAEDIC SURGERY

## 2018-07-19 PROCEDURE — 3600000004 HC SURGERY LEVEL 4 BASE: Performed by: ORTHOPAEDIC SURGERY

## 2018-07-19 PROCEDURE — 6360000002 HC RX W HCPCS: Performed by: ORTHOPAEDIC SURGERY

## 2018-07-19 PROCEDURE — C1889 IMPLANT/INSERT DEVICE, NOC: HCPCS | Performed by: ORTHOPAEDIC SURGERY

## 2018-07-19 PROCEDURE — L3650 SO 8 ABD RESTRAINT PRE OTS: HCPCS | Performed by: ORTHOPAEDIC SURGERY

## 2018-07-19 PROCEDURE — C1776 JOINT DEVICE (IMPLANTABLE): HCPCS | Performed by: ORTHOPAEDIC SURGERY

## 2018-07-19 PROCEDURE — 7100000011 HC PHASE II RECOVERY - ADDTL 15 MIN: Performed by: ORTHOPAEDIC SURGERY

## 2018-07-19 PROCEDURE — 2580000003 HC RX 258

## 2018-07-19 PROCEDURE — 7100000001 HC PACU RECOVERY - ADDTL 15 MIN: Performed by: ORTHOPAEDIC SURGERY

## 2018-07-19 PROCEDURE — C1713 ANCHOR/SCREW BN/BN,TIS/BN: HCPCS | Performed by: ORTHOPAEDIC SURGERY

## 2018-07-19 PROCEDURE — 7100000000 HC PACU RECOVERY - FIRST 15 MIN: Performed by: ORTHOPAEDIC SURGERY

## 2018-07-19 PROCEDURE — 2720000010 HC SURG SUPPLY STERILE: Performed by: ORTHOPAEDIC SURGERY

## 2018-07-19 PROCEDURE — 7100000010 HC PHASE II RECOVERY - FIRST 15 MIN: Performed by: ORTHOPAEDIC SURGERY

## 2018-07-19 PROCEDURE — 6360000002 HC RX W HCPCS: Performed by: NURSE ANESTHETIST, CERTIFIED REGISTERED

## 2018-07-19 PROCEDURE — 3600000014 HC SURGERY LEVEL 4 ADDTL 15MIN: Performed by: ORTHOPAEDIC SURGERY

## 2018-07-19 DEVICE — ANCHOR SUT L14.7MM DIA5.5MM BIOCOMPOSITE W/ 3 SZ 2: Type: IMPLANTABLE DEVICE | Site: SHOULDER | Status: FUNCTIONAL

## 2018-07-19 RX ORDER — LIDOCAINE HYDROCHLORIDE 20 MG/ML
INJECTION, SOLUTION INFILTRATION; PERINEURAL PRN
Status: DISCONTINUED | OUTPATIENT
Start: 2018-07-19 | End: 2018-07-19 | Stop reason: SDUPTHER

## 2018-07-19 RX ORDER — MORPHINE SULFATE 2 MG/ML
1 INJECTION, SOLUTION INTRAMUSCULAR; INTRAVENOUS EVERY 5 MIN PRN
Status: DISCONTINUED | OUTPATIENT
Start: 2018-07-19 | End: 2018-07-19 | Stop reason: HOSPADM

## 2018-07-19 RX ORDER — LABETALOL HYDROCHLORIDE 5 MG/ML
5 INJECTION, SOLUTION INTRAVENOUS EVERY 10 MIN PRN
Status: DISCONTINUED | OUTPATIENT
Start: 2018-07-19 | End: 2018-07-19 | Stop reason: HOSPADM

## 2018-07-19 RX ORDER — DEXAMETHASONE SODIUM PHOSPHATE 4 MG/ML
INJECTION, SOLUTION INTRA-ARTICULAR; INTRALESIONAL; INTRAMUSCULAR; INTRAVENOUS; SOFT TISSUE PRN
Status: DISCONTINUED | OUTPATIENT
Start: 2018-07-19 | End: 2018-07-19 | Stop reason: SDUPTHER

## 2018-07-19 RX ORDER — OXYCODONE HYDROCHLORIDE 5 MG/1
10 TABLET ORAL PRN
Status: DISCONTINUED | OUTPATIENT
Start: 2018-07-19 | End: 2018-07-19 | Stop reason: HOSPADM

## 2018-07-19 RX ORDER — PROPOFOL 10 MG/ML
INJECTION, EMULSION INTRAVENOUS PRN
Status: DISCONTINUED | OUTPATIENT
Start: 2018-07-19 | End: 2018-07-19 | Stop reason: SDUPTHER

## 2018-07-19 RX ORDER — OXYCODONE HYDROCHLORIDE 5 MG/1
5 TABLET ORAL PRN
Status: DISCONTINUED | OUTPATIENT
Start: 2018-07-19 | End: 2018-07-19 | Stop reason: HOSPADM

## 2018-07-19 RX ORDER — DIPHENHYDRAMINE HYDROCHLORIDE 50 MG/ML
12.5 INJECTION INTRAMUSCULAR; INTRAVENOUS
Status: DISCONTINUED | OUTPATIENT
Start: 2018-07-19 | End: 2018-07-19 | Stop reason: HOSPADM

## 2018-07-19 RX ORDER — ONDANSETRON 2 MG/ML
INJECTION INTRAMUSCULAR; INTRAVENOUS PRN
Status: DISCONTINUED | OUTPATIENT
Start: 2018-07-19 | End: 2018-07-19 | Stop reason: SDUPTHER

## 2018-07-19 RX ORDER — PROMETHAZINE HYDROCHLORIDE 25 MG/ML
6.25 INJECTION, SOLUTION INTRAMUSCULAR; INTRAVENOUS
Status: DISCONTINUED | OUTPATIENT
Start: 2018-07-19 | End: 2018-07-19 | Stop reason: HOSPADM

## 2018-07-19 RX ORDER — FENTANYL CITRATE 50 UG/ML
INJECTION, SOLUTION INTRAMUSCULAR; INTRAVENOUS PRN
Status: DISCONTINUED | OUTPATIENT
Start: 2018-07-19 | End: 2018-07-19 | Stop reason: SDUPTHER

## 2018-07-19 RX ORDER — MEPERIDINE HYDROCHLORIDE 25 MG/ML
12.5 INJECTION INTRAMUSCULAR; INTRAVENOUS; SUBCUTANEOUS EVERY 5 MIN PRN
Status: DISCONTINUED | OUTPATIENT
Start: 2018-07-19 | End: 2018-07-19 | Stop reason: HOSPADM

## 2018-07-19 RX ORDER — ROCURONIUM BROMIDE 10 MG/ML
INJECTION, SOLUTION INTRAVENOUS PRN
Status: DISCONTINUED | OUTPATIENT
Start: 2018-07-19 | End: 2018-07-19 | Stop reason: SDUPTHER

## 2018-07-19 RX ORDER — EPHEDRINE SULFATE 50 MG/ML
INJECTION INTRAVENOUS PRN
Status: DISCONTINUED | OUTPATIENT
Start: 2018-07-19 | End: 2018-07-19 | Stop reason: SDUPTHER

## 2018-07-19 RX ORDER — SODIUM CHLORIDE, SODIUM LACTATE, POTASSIUM CHLORIDE, CALCIUM CHLORIDE 600; 310; 30; 20 MG/100ML; MG/100ML; MG/100ML; MG/100ML
INJECTION, SOLUTION INTRAVENOUS CONTINUOUS
Status: DISCONTINUED | OUTPATIENT
Start: 2018-07-19 | End: 2018-07-19 | Stop reason: HOSPADM

## 2018-07-19 RX ORDER — HYDRALAZINE HYDROCHLORIDE 20 MG/ML
5 INJECTION INTRAMUSCULAR; INTRAVENOUS EVERY 10 MIN PRN
Status: DISCONTINUED | OUTPATIENT
Start: 2018-07-19 | End: 2018-07-19 | Stop reason: HOSPADM

## 2018-07-19 RX ORDER — HYDROCODONE BITARTRATE AND ACETAMINOPHEN 5; 325 MG/1; MG/1
1 TABLET ORAL EVERY 6 HOURS PRN
Qty: 28 TABLET | Refills: 0 | Status: SHIPPED | OUTPATIENT
Start: 2018-07-19 | End: 2018-07-24 | Stop reason: SDUPTHER

## 2018-07-19 RX ADMIN — ROCURONIUM BROMIDE 50 MG: 10 INJECTION, SOLUTION INTRAVENOUS at 12:20

## 2018-07-19 RX ADMIN — ONDANSETRON 4 MG: 2 INJECTION INTRAMUSCULAR; INTRAVENOUS at 13:20

## 2018-07-19 RX ADMIN — SODIUM CHLORIDE, SODIUM LACTATE, POTASSIUM CHLORIDE, AND CALCIUM CHLORIDE: 600; 310; 30; 20 INJECTION, SOLUTION INTRAVENOUS at 10:25

## 2018-07-19 RX ADMIN — EPHEDRINE SULFATE 10 MG: 50 INJECTION INTRAVENOUS at 12:45

## 2018-07-19 RX ADMIN — EPHEDRINE SULFATE 10 MG: 50 INJECTION INTRAVENOUS at 12:42

## 2018-07-19 RX ADMIN — EPHEDRINE SULFATE 10 MG: 50 INJECTION INTRAVENOUS at 13:07

## 2018-07-19 RX ADMIN — SODIUM CHLORIDE, SODIUM LACTATE, POTASSIUM CHLORIDE, AND CALCIUM CHLORIDE: 600; 310; 30; 20 INJECTION, SOLUTION INTRAVENOUS at 14:55

## 2018-07-19 RX ADMIN — Medication 2 G: at 12:28

## 2018-07-19 RX ADMIN — SODIUM CHLORIDE, SODIUM LACTATE, POTASSIUM CHLORIDE, AND CALCIUM CHLORIDE: 600; 310; 30; 20 INJECTION, SOLUTION INTRAVENOUS at 13:25

## 2018-07-19 RX ADMIN — EPHEDRINE SULFATE 10 MG: 50 INJECTION INTRAVENOUS at 13:20

## 2018-07-19 RX ADMIN — DEXAMETHASONE SODIUM PHOSPHATE 4 MG: 4 INJECTION, SOLUTION INTRAMUSCULAR; INTRAVENOUS at 12:20

## 2018-07-19 RX ADMIN — PROPOFOL 180 MG: 10 INJECTION, EMULSION INTRAVENOUS at 12:20

## 2018-07-19 RX ADMIN — EPHEDRINE SULFATE 10 MG: 50 INJECTION INTRAVENOUS at 12:36

## 2018-07-19 RX ADMIN — EPHEDRINE SULFATE 10 MG: 50 INJECTION INTRAVENOUS at 13:01

## 2018-07-19 RX ADMIN — Medication 2 G: at 12:36

## 2018-07-19 RX ADMIN — LIDOCAINE HYDROCHLORIDE 60 MG: 20 INJECTION, SOLUTION INFILTRATION; PERINEURAL at 12:20

## 2018-07-19 RX ADMIN — FENTANYL CITRATE 100 MCG: 50 INJECTION INTRAMUSCULAR; INTRAVENOUS at 12:20

## 2018-07-19 RX ADMIN — SODIUM CHLORIDE, SODIUM LACTATE, POTASSIUM CHLORIDE, AND CALCIUM CHLORIDE: 600; 310; 30; 20 INJECTION, SOLUTION INTRAVENOUS at 12:20

## 2018-07-19 ASSESSMENT — PULMONARY FUNCTION TESTS
PIF_VALUE: 2
PIF_VALUE: 22
PIF_VALUE: 23
PIF_VALUE: 24
PIF_VALUE: 22
PIF_VALUE: 21
PIF_VALUE: 21
PIF_VALUE: 23
PIF_VALUE: 23
PIF_VALUE: 21
PIF_VALUE: 23
PIF_VALUE: 22
PIF_VALUE: 2
PIF_VALUE: 24
PIF_VALUE: 21
PIF_VALUE: 21
PIF_VALUE: 23
PIF_VALUE: 23
PIF_VALUE: 21
PIF_VALUE: 23
PIF_VALUE: 21
PIF_VALUE: 14
PIF_VALUE: 24
PIF_VALUE: 24
PIF_VALUE: 23
PIF_VALUE: 21
PIF_VALUE: 26
PIF_VALUE: 21
PIF_VALUE: 0
PIF_VALUE: 22
PIF_VALUE: 25
PIF_VALUE: 22
PIF_VALUE: 23
PIF_VALUE: 23
PIF_VALUE: 21
PIF_VALUE: 22
PIF_VALUE: 14
PIF_VALUE: 23
PIF_VALUE: 21
PIF_VALUE: 14
PIF_VALUE: 21
PIF_VALUE: 14
PIF_VALUE: 21
PIF_VALUE: 21
PIF_VALUE: 1
PIF_VALUE: 21
PIF_VALUE: 23
PIF_VALUE: 24
PIF_VALUE: 1
PIF_VALUE: 22
PIF_VALUE: 1
PIF_VALUE: 22
PIF_VALUE: 21
PIF_VALUE: 23
PIF_VALUE: 24
PIF_VALUE: 23
PIF_VALUE: 1
PIF_VALUE: 24
PIF_VALUE: 22
PIF_VALUE: 22
PIF_VALUE: 14
PIF_VALUE: 14
PIF_VALUE: 1
PIF_VALUE: 21
PIF_VALUE: 24
PIF_VALUE: 23
PIF_VALUE: 20
PIF_VALUE: 23
PIF_VALUE: 23
PIF_VALUE: 22
PIF_VALUE: 23
PIF_VALUE: 21
PIF_VALUE: 22
PIF_VALUE: 22
PIF_VALUE: 21
PIF_VALUE: 23
PIF_VALUE: 14
PIF_VALUE: 14
PIF_VALUE: 24
PIF_VALUE: 0
PIF_VALUE: 21
PIF_VALUE: 21
PIF_VALUE: 22
PIF_VALUE: 21
PIF_VALUE: 21
PIF_VALUE: 23
PIF_VALUE: 23
PIF_VALUE: 21
PIF_VALUE: 21
PIF_VALUE: 24
PIF_VALUE: 25
PIF_VALUE: 14
PIF_VALUE: 22
PIF_VALUE: 21
PIF_VALUE: 23
PIF_VALUE: 21
PIF_VALUE: 23
PIF_VALUE: 22
PIF_VALUE: 22
PIF_VALUE: 23
PIF_VALUE: 0
PIF_VALUE: 22
PIF_VALUE: 22
PIF_VALUE: 1
PIF_VALUE: 14
PIF_VALUE: 15
PIF_VALUE: 20
PIF_VALUE: 21
PIF_VALUE: 21
PIF_VALUE: 22
PIF_VALUE: 23
PIF_VALUE: 22
PIF_VALUE: 22
PIF_VALUE: 21
PIF_VALUE: 22
PIF_VALUE: 21
PIF_VALUE: 24
PIF_VALUE: 21
PIF_VALUE: 21
PIF_VALUE: 24
PIF_VALUE: 23
PIF_VALUE: 37
PIF_VALUE: 21
PIF_VALUE: 23
PIF_VALUE: 22
PIF_VALUE: 24
PIF_VALUE: 23
PIF_VALUE: 22
PIF_VALUE: 24
PIF_VALUE: 23
PIF_VALUE: 25
PIF_VALUE: 24
PIF_VALUE: 14
PIF_VALUE: 22
PIF_VALUE: 22
PIF_VALUE: 21
PIF_VALUE: 20
PIF_VALUE: 22
PIF_VALUE: 21
PIF_VALUE: 23
PIF_VALUE: 20
PIF_VALUE: 23
PIF_VALUE: 1
PIF_VALUE: 21
PIF_VALUE: 1
PIF_VALUE: 23
PIF_VALUE: 21
PIF_VALUE: 3
PIF_VALUE: 20
PIF_VALUE: 20
PIF_VALUE: 23
PIF_VALUE: 23
PIF_VALUE: 22
PIF_VALUE: 28
PIF_VALUE: 22
PIF_VALUE: 22
PIF_VALUE: 25
PIF_VALUE: 21
PIF_VALUE: 14
PIF_VALUE: 25
PIF_VALUE: 22
PIF_VALUE: 22
PIF_VALUE: 21
PIF_VALUE: 23
PIF_VALUE: 4
PIF_VALUE: 3
PIF_VALUE: 22
PIF_VALUE: 24
PIF_VALUE: 21
PIF_VALUE: 21
PIF_VALUE: 14
PIF_VALUE: 23
PIF_VALUE: 22
PIF_VALUE: 3
PIF_VALUE: 23
PIF_VALUE: 20
PIF_VALUE: 21
PIF_VALUE: 1

## 2018-07-19 ASSESSMENT — PAIN SCALES - GENERAL: PAINLEVEL_OUTOF10: 0

## 2018-07-19 ASSESSMENT — PAIN - FUNCTIONAL ASSESSMENT
PAIN_FUNCTIONAL_ASSESSMENT: 0-10

## 2018-07-19 NOTE — BRIEF OP NOTE
Brief Postoperative Note  ______________________________________________________________    Patient: Doris Pena  YOB: 1950  MRN: [id number]  Date of Procedure: 7/19/2018    Pre-Op Diagnosis: ROTATOR CUFF TEAR LEFT SHOULDER    Post-Op Diagnosis: Same       Procedure(s):  LEFT SHOULDER ARTHROSCOPY, DEBRIDEMENT, DECOMPRESSION, LYSIS OF ADHESIONS, ROTATOR CUFF REPAIR    Anesthesia: General    Surgeon(s):  Delano Ortiz MD  [address] [place] [address]    Staff:  Surgical Assistant: Noble Lockhart Assistant: Татьяна Oliveros First: Justin Ovalles     Estimated Blood Loss: * No values recorded between 7/19/2018 12:08 PM and 7/19/2018  3:06 PM * mL    Complications: None    Specimens:   * No specimens in log *    Implants:  * No implants in log *      Drains:      Findings: failed rotator cuff repair, torn subscap, supra    Delano Ortiz MD  Date: 7/19/2018  Time: 2:57 PM

## 2018-07-19 NOTE — PROGRESS NOTES
Time out done for left interscalene block  IV meds given by anesthesia  O2 3L/per cannula   Block tolerated well   No pain   IV remains patent  Tolerated block well  To OR per stretcher

## 2018-07-19 NOTE — ANESTHESIA PROCEDURE NOTES
Peripheral Block    Patient location during procedure: pre-op  Staffing  Anesthesiologist: Brandon Plaza  Performed: anesthesiologist   Preanesthetic Checklist  Completed: patient identified, site marked, surgical consent, pre-op evaluation, timeout performed, IV checked, risks and benefits discussed, monitors and equipment checked, anesthesia consent given, oxygen available and patient being monitored  Peripheral Block  Patient position: sitting  Prep: ChloraPrep  Patient monitoring: cardiac monitor, continuous pulse ox, frequent blood pressure checks and IV access  Block type: Brachial plexus  Laterality: left  Injection technique: single-shot  Procedures: ultrasound guided  Interscalene  Provider prep: mask and sterile gloves  Needle  Needle type: short-bevel   Needle gauge: 22 G  Needle length: 8 cm  Needle localization: ultrasound guidance  Assessment  Injection assessment: negative aspiration for heme, no paresthesia on injection and local visualized surrounding nerve on ultrasound  Paresthesia pain: none  Slow fractionated injection: yes  Hemodynamics: stable  Additional Notes  Left Ultrasound-Guided Interscalene Brachial Plexus Block    Indication: Postoperative analgesia upon request of the attending surgeon. Procedure: Informed consent obtained and pre-procedural timeout procedure performed. Patient supine in semi-upright position. Landmarks identified. Sterile prep. Left brachial plexus was identified using an ultrasound probe and an 80mm 22G insulated regional block needle was inserted posterior to the left interscalene groove at the level of the C6 tubercle and directed in an anterior manner toward the brachial plexus. The needle was visualized on ultrasound as it entered the plexus sheath. Ropivacaine 0.5% was injected in 5cc increments to a total volume of 30cc after aspiration was performed prior to each increment and found to be negative for both heme and CSF.   Block was tolerated well

## 2018-07-19 NOTE — H&P
Julio Dumont    [address] [facility] Same Day Surgery Update H & P  Department of General Surgery   Surgical Service   CNP Pre-operative History and Physical  Last H & P within the last 30 days. DIAGNOSIS:   ROTATOR CUFF TEAR LEFT SHOULDER    PROCEDURE:  Left Shoulder Arthroscopy, Debridement, Decompression, Lysis Of Adhesions, Rotator Cuff Repair      HISTORY OF PRESENT ILLNESS: Pt. Is a 79 y.o. Morbidly Obese Female who c/o of left shoulder pain, stiffness, limited ROM and weakened grasp. Sx. Not relieved with conservative treatment. Please see initial H & P     Past Medical History:        Diagnosis Date    Arthritis     Cerebral artery occlusion with cerebral infarction ([facility] [address]     TIA X 4    Depression     GERD (gastroesophageal reflux disease)     HTN (hypertension)     Hyperlipidemia     Overweight     Raynaud's disease     Spinal stenosis      Past Surgical History:        Procedure Laterality Date    APPENDECTOMY  07/17/2016    BLADDER REPAIR      FINGER TRIGGER RELEASE  4/13; 10/13; 4/14    Multiple fingers    HAMMER TOE SURGERY      Right.  HERNIA REPAIR      ABDOMINAL INCISIONAL HERNIA REPAIR    HIP SURGERY      total hip replacement     HYSTERECTOMY  02/1991    JOINT REPLACEMENT  01/2018    right THR    LUMBAR FUSION  12/13    L4-L5;L5-S1    LUMBAR LAMINECTOMY  11/1997 and 1981    SHOULDER SURGERY      Left     Past Social History:  Social History     Social History    Marital status:      Spouse name: N/A    Number of children: N/A    Years of education: N/A     Social History Main Topics    Smoking status: Former Smoker     Quit date: 7/17/2005    Smokeless tobacco: Never Used    Alcohol use No    Drug use: No    Sexual activity: Not Asked     Other Topics Concern    None     Social History Narrative    None         Medications Prior to Admission:      Prior to Admission medications    Medication Sig Start Date End Date Taking?  Authorizing Provider

## 2018-07-19 NOTE — ANESTHESIA POSTPROCEDURE EVALUATION
Department of Anesthesiology  Postprocedure Note    Patient: Julio Dumont  MRN: [id number]  YOB: 1950  Date of evaluation: 7/19/2018  Time:  4:10 PM     Procedure Summary     Date:  07/19/18 Room / Location:  [facility] OR 08 / [facility] OR    Anesthesia Start:  4704 Anesthesia Stop:  4121    Procedure:  LEFT SHOULDER EUA, DIAGNOSTIC  ARTHROSCOPY, EXTENSIVE DEBRIDEMENT, LYSIS OF ADHESIONS, REMOVAL OF RETAINED SUTURE, BICEPS TENOTOMY, REVISION ROTATOR CUFF REPAIR (Left Shoulder) Diagnosis:  (ROTATOR CUFF TEAR LEFT SHOULDER)    Surgeon:  Terra Dixon MD Responsible Provider:  Negrito Jefferson MD    Anesthesia Type:  general ASA Status:  3          Anesthesia Type: general    Flora Phase I: Flora Score: 7    Flora Phase II:      Last vitals: Reviewed and per EMR flowsheets.        Anesthesia Post Evaluation    Patient location during evaluation: PACU  Patient participation: complete - patient participated  Level of consciousness: awake and alert  Pain score: 0  Airway patency: patent  Nausea & Vomiting: no nausea and no vomiting  Complications: no  Cardiovascular status: blood pressure returned to baseline  Respiratory status: acceptable  Hydration status: euvolemic

## 2018-07-19 NOTE — PROGRESS NOTES
Patient admitted to PACU #13 from OR per stretcher at 1521 s/p LEFT SHOULDER EUA, DIAGNOSTIC  ARTHROSCOPY, EXTENSIVE DEBRIDEMENT, LYSIS OF ADHESIONS, REMOVAL OF RETAINED SUTURE, BICEPS TENOTOMY, REVISION ROTATOR CUFF REPAIR (Left Shoulder). Report received at bedside in PACU per CRNA. Patient reported to be hemodynamically stable during surgery with no complications reported. Patient connected to PACU monitoring equipment. IVF's infusing with site unremarkable. Patient arrived to PACU responsive but remains drowsy d/t not being fully wakeful from anesthesia but with respirations easy, even and regular with no complaints of pain verbalized. Left shoulder surgical dressing remains C,D,I with ice pack applied. Left arm remains in abductor sling which was placed on patient in OR. No further changes noted. Will continue to monitor.

## 2018-07-19 NOTE — ANESTHESIA PRE PROCEDURE
Department of Anesthesiology  Preprocedure Note       Name:  Tolu Duncan   Age:  79 y.o.  :  1950                                          MRN:  [id number]         Date:  2018      Surgeon: Latia Hill):  Tariq Olivo MD    Procedure: Procedure(s):  LEFT SHOULDER ARTHROSCOPY, DEBRIDEMENT, DECOMPRESSION, LYSIS OF ADHESIONS, ROTATOR CUFF REPAIR    Medications prior to admission:   Prior to Admission medications    Medication Sig Start Date End Date Taking? Authorizing Provider   vitamin D (CHOLECALCIFEROL) 1000 UNIT TABS tablet Take 1,000 Units by mouth daily   Yes Historical Provider, MD   calcium acetate (PHOSLO) 667 MG capsule Take by mouth 3 times daily (with meals)   Yes Historical Provider, MD   pravastatin (PRAVACHOL) 80 MG tablet TAKE 1 TABLET BY MOUTH DAILY 7/3/18  Yes Drake Hawkins MD   lisinopril-hydrochlorothiazide (PRINZIDE;ZESTORETIC) 20-25 MG per tablet TAKE 1 TABLET BY MOUTH DAILY 7/3/18  Yes Drake Hawkins MD   diclofenac (VOLTAREN) 75 MG EC tablet TAKE 1 TABLET BY MOUTH TWICE DAILY 18  Yes Drake Hawkins MD   potassium chloride (KLOR-CON M) 20 MEQ extended release tablet Take 1 tablet by mouth daily 18  Yes Drake Hawkins MD   citalopram (CELEXA) 20 MG tablet TAKE 1 TABLET BY MOUTH EVERY DAY 17  Yes Drake Hawkins MD   CHONDROITIN SULFATE A PO Take 800 tablets by mouth daily    Yes Historical Provider, MD   Glucosamine Sulfate 500 MG TABS Take 2 tablets by mouth daily   Yes Historical Provider, MD   aspirin 81 MG tablet Take 81 mg by mouth daily   Yes Historical Provider, MD   Multiple Vitamins-Minerals (CENTRUM SILVER) TABS Take  by mouth. Yes Historical Provider, MD   Ascorbic Acid (VITAMIN C) 500 MG tablet Take 500 mg by mouth daily.    Yes Historical Provider, MD   vitamin E 400 UNIT capsule Take 400 Units by mouth every other day    Yes Historical Provider, MD       Current medications:    Current Facility-Administered Medications   Medication Dose Route Frequency Provider Last Rate Last Dose    ceFAZolin (ANCEF) 2 g in dextrose 5 % 50 mL IVPB  2 g Intravenous Once Constance Saenz MD        lactated ringers infusion   Intravenous Continuous Sheri Ramires  mL/hr at 07/19/18 1025         Allergies: Allergies   Allergen Reactions    Reglan [Metoclopramide] Itching and Other (See Comments)     Flushed      Tape Madalynn Miners Tape]     Oxycodone Nausea And Vomiting       Problem List:    Patient Active Problem List   Diagnosis Code    Neck pain M54.2    Depression F32.9    Herpes zoster B02.9    Other and unspecified hyperlipidemia E78.5    Spinal stenosis M48.00    Raynaud's disease I73.00    GERD (gastroesophageal reflux disease) K21.9    Essential hypertension I10    Mixed hyperlipidemia E78.2    Patellofemoral stress syndrome of left knee M22.2X2    Patella, 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    Effusion of left knee M25.462    Primary osteoarthritis of left knee M17.12    Meniscus, medial, derangement M23.305    Diarrhea of infectious origin A09       Past Medical History:        Diagnosis Date    Arthritis     Cerebral artery occlusion with cerebral infarction ([facility] [address]     TIA X 4    Depression     GERD (gastroesophageal reflux disease)     HTN (hypertension)     Hyperlipidemia     Overweight     Raynaud's disease     Spinal stenosis        Past Surgical History:        Procedure Laterality Date    APPENDECTOMY  07/17/2016    BLADDER REPAIR      FINGER TRIGGER RELEASE  4/13; 10/13; 4/14    Multiple fingers    HAMMER TOE SURGERY      Right.     HERNIA REPAIR      ABDOMINAL INCISIONAL HERNIA REPAIR    HIP SURGERY      total hip replacement     HYSTERECTOMY  02/1991    JOINT REPLACEMENT  01/2018    right THR    LUMBAR FUSION  12/13    L4-L5;L5-S1    LUMBAR LAMINECTOMY  11/1997 and 1981    SHOULDER SURGERY      Left       Social History:    Social History   Substance Use Topics    Smoking status: Former Smoker     Quit date: 7/17/2005   

## 2018-07-20 ENCOUNTER — TELEPHONE (OUTPATIENT)
Dept: ORTHOPEDIC SURGERY | Age: 68
End: 2018-07-20

## 2018-07-20 NOTE — TELEPHONE ENCOUNTER
Called patient and let her know that yes she can double up on pain meds.   SHe can take 1 to 2 q 4 tp 6

## 2018-07-20 NOTE — OP NOTE
subscapularis and one limb of the  second suture was passed through the subscapularis and that second limb was  passed through the posterior aspect of the supraspinatus tear to bring the  edges together. We tied the knots with mid external rotation. The last  suture from the third anchor was not employed. During the process, we  identified the biceps tendon right through the rotator interval.  This was  quite macerated and shredded and had been encased in the scar. We debrided  the stump using the shaver. No attempt was made to find the distal end of  that tendon as it was likely chronically torn and retracted. We thus had  three suture anchor repairs with one anchor devoted exclusively to  subscapularis, one to the supraspinatus, and one to both. We had very nice  reconstitution of the footprint and did not see a need for any  augmentation. We copiously irrigated the subacromial space. We went back with the  arthroscope into the joint and saw the subscapularis tendon excursion with  rotation. It stayed attached to the footprint and the supraspinatus repair  looked good. Copiously irrigated the glenohumeral joint and subacromial  space. It should be noted that we had also removed quite a bit of  synovitis that we had identified in the glenohumeral joint. This was done  using cautery and the shaver under direct visualization. We closed the  arthroscopic portals using 4-0 Monocryl closure and Steri-Strips. Sterile  compressive dressing was applied. The arm was placed in a padded soft  brace. The patient was repositioned in supine position, promptly awoken  from anesthesia having tolerated the procedure well, and was taken from the  operating room to the recovery room in satisfactory condition. PLAN:  The patient will be discharged on oral analgesics with instructions  to enroll in a delayed rehab program and follow up with me in the office in  one week.         Michaeleen Olszewski, MD    D: 07/19/2018 11:90:39       T: 07/19/2018 22:07:29     [email or web address]  Job#: [id number]     Doc#: [id number]    CC:

## 2018-07-24 DIAGNOSIS — G89.18 POST-OP PAIN: ICD-10-CM

## 2018-07-24 RX ORDER — HYDROCODONE BITARTRATE AND ACETAMINOPHEN 5; 325 MG/1; MG/1
1 TABLET ORAL EVERY 6 HOURS PRN
Qty: 28 TABLET | Refills: 0 | Status: SHIPPED | OUTPATIENT
Start: 2018-07-24 | End: 2018-07-31

## 2018-07-24 NOTE — ADDENDUM NOTE
Addendum  created 07/24/18 1256 by Al Agarwal MD    Child order released for a procedure order, Order Canceled from Note

## 2018-07-24 NOTE — ADDENDUM NOTE
Addendum  created 07/24/18 0756 by Bharat King MD    Child order released for a procedure order, Order Canceled from Note

## 2018-07-31 ENCOUNTER — OFFICE VISIT (OUTPATIENT)
Dept: ORTHOPEDIC SURGERY | Age: 68
End: 2018-07-31

## 2018-07-31 VITALS
HEIGHT: 64 IN | SYSTOLIC BLOOD PRESSURE: 121 MMHG | DIASTOLIC BLOOD PRESSURE: 63 MMHG | BODY MASS INDEX: 38.41 KG/M2 | HEART RATE: 69 BPM | WEIGHT: 225 LBS

## 2018-07-31 DIAGNOSIS — M75.82 ROTATOR CUFF TENDINITIS, LEFT: ICD-10-CM

## 2018-07-31 DIAGNOSIS — G89.18 POST-OP PAIN: ICD-10-CM

## 2018-07-31 DIAGNOSIS — Z98.890 S/P ROTATOR CUFF REPAIR: Primary | ICD-10-CM

## 2018-07-31 PROCEDURE — 99024 POSTOP FOLLOW-UP VISIT: CPT | Performed by: ORTHOPAEDIC SURGERY

## 2018-08-06 ENCOUNTER — EVALUATION (OUTPATIENT)
Dept: PHYSICAL THERAPY | Age: 68
End: 2018-08-06

## 2018-08-06 DIAGNOSIS — M75.122 COMPLETE ROTATOR CUFF TEAR OF LEFT SHOULDER: ICD-10-CM

## 2018-08-06 DIAGNOSIS — G89.18 ACUTE POSTOPERATIVE PAIN OF LEFT SHOULDER: Primary | ICD-10-CM

## 2018-08-06 DIAGNOSIS — M25.512 ACUTE POSTOPERATIVE PAIN OF LEFT SHOULDER: Primary | ICD-10-CM

## 2018-08-06 PROCEDURE — 1100F PTFALLS ASSESS-DOCD GE2>/YR: CPT | Performed by: PHYSICAL THERAPIST

## 2018-08-06 PROCEDURE — G8539 DOC FUNCT AND CARE PLAN: HCPCS | Performed by: PHYSICAL THERAPIST

## 2018-08-06 PROCEDURE — G8730 PAIN DOC POS AND PLAN: HCPCS | Performed by: PHYSICAL THERAPIST

## 2018-08-06 PROCEDURE — 97110 THERAPEUTIC EXERCISES: CPT | Performed by: PHYSICAL THERAPIST

## 2018-08-06 PROCEDURE — G8427 DOCREV CUR MEDS BY ELIG CLIN: HCPCS | Performed by: PHYSICAL THERAPIST

## 2018-08-06 PROCEDURE — G8985 CARRY GOAL STATUS: HCPCS | Performed by: PHYSICAL THERAPIST

## 2018-08-06 PROCEDURE — 97530 THERAPEUTIC ACTIVITIES: CPT | Performed by: PHYSICAL THERAPIST

## 2018-08-06 PROCEDURE — 97161 PT EVAL LOW COMPLEX 20 MIN: CPT | Performed by: PHYSICAL THERAPIST

## 2018-08-06 PROCEDURE — G8984 CARRY CURRENT STATUS: HCPCS | Performed by: PHYSICAL THERAPIST

## 2018-08-06 NOTE — FLOWSHEET NOTE
[facility]   Phone: [phone]    Fax: [phone]        Physical Therapy Daily Treatment Note  Date:  2018    Patient Name:  Karol Wiseman    :  1950  MRN: [id number]  Medical/Treatment Diagnosis Information:  · Diagnosis: s/p Left shoulder revision RTC Repair   DOS      Insurance/Certification information:  PT Insurance Information: Medicare  Physician Information:  Referring Practitioner: Dr Mia Jang of care signed (Y/N):      Date of Patient follow up with Physician: 18    G-Code (if applicable):      Date G-Code Applied:  18  PT G-Codes  Functional Assessment Tool Used: UEFI  Score: 0/80=0%   (100% FUNCTION LIMITATION)  Functional Limitation: Carrying, moving and handling objects  Carrying, Moving and Handling Objects Current Status (): 100 percent impaired, limited or restricted  Carrying, Moving and Handling Objects Goal Status (): At least 1 percent but less than 20 percent impaired, limited or restricted    PQRS: 18  Reviewed medication list with patient. No changes since last PT visit.         Progress Note: [x]  Yes  []  No  Next due by: Visit #10      Latex Allergy:  [x]NO      []YES  Preferred Language for Healthcare:   [x]English       []other:    Visit # Insurance Allowable        Pain level:  1/10     SUBJECTIVE:  See eval    OBJECTIVE:              ROM PROM AROM  Comment     L R L R 18   Flexion         Shoulder ROM NA due to surgery   Abduction             ER             IR             Elbow flex     125 145     Elbow ext     -25 -5           Strength: deferred secondary to surgery L R Comment         Special Tests Results/Comment: deferred secondary to surgery            RESTRICTIONS/PRECAUTIONS:   Delayed protocol    Exercises/Interventions:     Exercise/Equipment Resistance/Repetitions Comments   Cardio/Warm-up     Bike          Stretching/PROM     Wand     Table Slides     UE [place]     Pulleys Pendulum      Elbow AAROM End range assist for elbow ext and flexion x20     Elbow ext hang off edge of table x3'    STRENGTH     Isometrics     Retraction x20     Red ball x5'    Weight shift     Flexion     Abduction     External Rotation     Internal Rotation     Biceps     Triceps          PRE's     Flexion     Abduction     External Rotation     Internal Rotation     Shrugs x20    EXT     Reverse Flys     Serratus     Horizontal Abd with ER     Biceps     Triceps     Retraction          Cable Column/Theraband     External Rotation     Internal Rotation     Shrugs     Lats     Ext     Flex     Scapular Retraction     BIC     TRIC     PNF          Neuromuscular Re-ed     Dynamic Stability                              [place]ack                    Manual/Modalities                           Therapeutic Exercise and NMR EXR  [x] (39578) Provided verbal/tactile cueing for activities related to strengthening, flexibility, endurance, ROM  for improvements in scapular, scapulothoracic and UE control with self care, reaching, carrying, lifting, house/yardwork, driving/computer work.    [] (26122) Provided verbal/tactile cueing for activities related to improving balance, coordination, kinesthetic sense, posture, motor skill, proprioception  to assist with  scapular, scapulothoracic and UE control with self care, reaching, carrying, lifting, house/yardwork, driving/computer work. Therapeutic Activities:    [x] (66582 or 41941) Provided verbal/tactile cueing for activities related to improving balance, coordination, kinesthetic sense, posture, motor skill, proprioception and motor activation to allow for proper function of scapular, scapulothoracic and UE control with self care, carrying, lifting, driving/computer work. 8/6/18 Spent time educating patient on post op expectations and time frames, sling wearing education, and HEP review.     Home Exercise Program:    [x] (19168) Reviewed/Progressed HEP activities to assist with reaching prior level of function. 2. Patient will demonstrate increased AAROM to 50-75% of her right UE to allow for proper joint functioning as indicated by patients Functional Deficits. 3. Patient will return to all basic functional activities without increased symptoms or restriction. 4. Begin to wean out of sling and use left UE for low level activities     Progression Towards Functional goals:  [] Patient is progressing as expected towards functional goals listed. [] Progression is slowed due to complexities listed. [] Progression has been slowed due to co-morbidities. [x] Plan just implemented, too soon to assess goals progression  [] Other:     ASSESSMENT:  See eval    Treatment/Activity Tolerance:  [x] Patient tolerated treatment well [] Patient limited by fatique  [] Patient limited by pain  [] Patient limited by other medical complications  [] Other:     Prognosis: [x] Good [] Fair  [] Poor    Patient Requires Follow-up: [x] Yes  [] No    PLAN: See eval.   Delayed protocol due to revision surgery. Initially see patient 1x/week until she sees Dr Arabella Franco then anticipate ramping up to 2x/week.   [] Continue per plan of care [] Alter current plan (see comments)  [x] Plan of care initiated [] Hold pending MD visit [] Discharge    Electronically signed by:     Vinod Tapia PT #[id number]

## 2018-08-06 NOTE — PLAN OF CARE
[facility]   Phone: [phone]    Fax: [phone]        Physical Therapy Certification    Dear Referring Practitioner: Dr Alfreda Bearden,    We had the pleasure of evaluating the following patient for physical therapy services at [address] [place] [address]. A summary of our findings can be found in the initial assessment below. This includes our plan of care. If you have any questions or concerns regarding these findings, please do not hesitate to contact me at the office phone number checked above. Thank you for the referral.       Physician Signature:_______________________________Date:__________________  By signing above (or electronic signature), therapists plan is approved by physician      Patient: Kevin Alvarez   : 1950   MRN: [id number]  Referring Physician: Referring Practitioner: Dr Alfreda Bearden      Evaluation Date: 2018      Medical Diagnosis Information:  Diagnosis: s/p Left shoulder revision RTC Repair   DOS 18                                             Insurance information: PT Insurance Information: Medicare    Precautions/ Contra-indications: delayed protocol  Latex Allergy:  [x]NO      []YES  Preferred Language for Healthcare:   [x]English       []other:    SUBJECTIVE:   She reports that she originally injured her left shoulder as a result of a fall. Dr Alfreda Bearden repaired her left RTC in . She did feel like she fully recovered from that. Had a couple of cortisone injections in the past.   She has had a couple of falls since then. Most recently in April. She had increased pain and difficulty sleeping as a result of left shoulder pain. Finally got in to see him, had an MRI and surgery was scheduled. She is now s/p left shoulder RTC revision 18. She was told she had an old tear and a new tear in her shoulder. She states her pain levels are doing pretty good, hasn't had to take any meds lately.     Right hand dominant    Relevant Medical History:  History of mild TIAs (not sure if that is what is causing her falls/has had brain scans done in the past), history of back surgery (fusion 2013), Right hip replacement Jan 2018. For other medical history, see chart    Functional Disability Index:PT G-Codes  Functional Assessment Tool Used: UEFI  Score: 0/80=0%   (100% FUNCTION LIMITATION)  Functional Limitation: Carrying, moving and handling objects  Carrying, Moving and Handling Objects Current Status (): 100 percent impaired, limited or restricted  Carrying, Moving and Handling Objects Goal Status ():  At least 1 percent but less than 20 percent impaired, limited or restricted    Pain Scale: 1/10  Easing factors: rest  Provocative factors: any movement out of sling     Type: []Constant   [x]Intermittent  []Radiating []Localized []other:      Numbness/Tingling: none noted    Occupation/School: recently retired    Living Status/Prior Level of Function: Independent with ADLs and IADLs, lives alone in a house (has some trouble with getting dressed/requires some increased time and compensation),   Likes to [place] and gardening; unable to do these currently    OBJECTIVE:      ROM PROM AROM  Comment    L R L R 8/6/18   Flexion     Shoulder ROM NA due to surgery   Abduction        ER        IR        Elbow flex   125 145    Elbow ext   -25 -5        Strength: deferred secondary to surgery L R Comment       Special Tests Results/Comment: deferred secondary to surgery       Reflexes/Sensation:    [x]Dermatomes/Myotomes intact    []Reflexes equal and normal bilaterally   []Other  Joint mobility: NA due to recent surgery   []Normal    []Hypo   []Hyper    Palpation: NA due to recent surgery    Functional Mobility/Transfers: indep    Posture: wearing ultrasling    Bandages/Dressings/Incisions: steri strips still in place over incisions    Gait: (include devices/WB status): WNL    Orthopedic Special Tests: NA documented in the medical chart. (6545E)   []  Falls were addressed in the plan of care. (7616Q)   []  A falls risk assessment was not completed and documented in the medical chart (9531J,2B)   []   Falls were not addressed in the plan of care and reason was documented in the plan of care. (5017B 1P)        Medication     [x] A list of current medications (including prescription, over the counter, herbal supplements, vitamin supplements, mineral supplements, dietary supplements) was reviewed with the patient and documented in the medical chart. ()        ASSESSMENT:   Functional Impairments   []Noted spinal or UE joint hypomobility   []Noted spinal or UE joint hypermobility   [x]Decreased UE functional ROM   [x]Decreased UE functional strength   []Abnormal reflexes/sensation/myotomal/dermatomal deficits   [x]Decreased RC/scapular/core strength and neuromuscular control   []other:      Functional Activity Limitations (from functional questionnaire and intake)   []Reduced ability to tolerate prolonged functional positions   []Reduced ability or difficulty with changes of positions or transfers between positions   []Reduced ability to maintain good posture and demonstrate good body mechanics with sitting, bending, and lifting   [x] Reduced ability or tolerance with driving and/or computer work   []Reduced ability to sleep   [x]Reduced ability to perform lifting, reaching, carrying tasks   [x]Reduced ability to tolerate impact through UE   [x]Reduced ability to reach behind back   [x]Reduced ability to  or hold objects   [x]Reduced ability to throw or toss an object   []other:    Participation Restrictions   [x]Reduced participation in self care activities   [x]Reduced participation in home management activities   []Reduced participation in work activities   [x]Reduced participation in social activities. [x]Reduced participation in sport/recreation activities.     Classification:   [x]Signs/symptoms consistent with post-surgical status including decreased ROM, strength and function. []Signs/symptoms consistent with joint sprain/strain   []Signs/symptoms consistent with shoulder impingement   []Signs/symptoms consistent with shoulder/elbow/wrist tendinopathy   []Signs/symptoms consistent with Rotator cuff tear   []Signs/symptoms consistent with labral tear   []Signs/symptoms consistent with postural dysfunction    []Signs/symptoms consistent with Glenohumeral IR Deficit - <45 degrees   []Signs/symptoms consistent with facet dysfunction of cervical/thoracic spine    []Signs/symptoms consistent with pathology which may benefit from Dry needling     []other:     Prognosis/Rehab Potential:      []Excellent   [x]Good    []Fair   []Poor    Tolerance of evaluation/treatment:    []Excellent   [x]Good    []Fair   []Poor    Physical Therapy Evaluation Complexity Justification  [x] A history of present problem with:  [] no personal factors and/or comorbidities that impact the plan of care;  []1-2 personal factors and/or comorbidities that impact the plan of care  [x]3 personal factors and/or comorbidities that impact the plan of care  [x] An examination of body systems using standardized tests and measures addressing any of the following: body structures and functions (impairments), activity limitations, and/or participation restrictions;:  [x] a total of 1-2 or more elements   [] a total of 3 or more elements   [] a total of 4 or more elements   [x] A clinical presentation with:  [x] stable and/or uncomplicated characteristics   [] evolving clinical presentation with changing characteristics  [] unstable and unpredictable characteristics;   [x] Clinical decision making of [x] low, [] moderate, [] high complexity using standardized patient assessment instrument and/or measurable assessment of functional outcome.     [x] EVAL (LOW) 72994 (typically 20 minutes face-to-face)  [] EVAL (MOD) 12473 (typically 30 minutes face-to-face)  [] EVAL (HIGH) 77784 (typically 45 minutes face-to-face)  [] RE-EVAL     PLAN:  Frequency/Duration:  1 days per week for 4 Weeks:  INTERVENTIONS:  [x] Therapeutic exercise including: strength training, ROM, for Upper extremity and core   [x]  NMR activation and proprioception for UE, scap and Core   [x] Manual therapy as indicated for shoulder, scapula and spine to include: Dry Needling/IASTM, STM, PROM, Gr I-IV mobilizations, manipulation. [x] Modalities as needed that may include: thermal agents, E-stim, Biofeedback, US, iontophoresis as indicated  [x] Patient education on joint protection, postural re-education, activity modification, progression of HEP. HEP instruction: Patient returned proper demonstration of HEP. Issued patient written program clearly stating sets/reps/sessions per day. Written program was scanned into media section of medical record. GOALS:  Patient stated goal: Return to full use of left UE with all PLOF    Therapist goals for Patient:   Short Term Goals: To be achieved in: 2 weeks  1. Independent in HEP and progression per patient tolerance, in order to prevent re-injury. 2. Patient will have a decrease in pain to facilitate improvement in movement, function, and ADLs as indicated by Functional Deficits. Long Term Goals: To be achieved in: 6 weeks  1. Disability index score of 40% or less for the UEFI to assist with reaching prior level of function. 2. Patient will demonstrate increased AAROM to 50-75% of her right UE to allow for proper joint functioning as indicated by patients Functional Deficits. 3. Patient will return to all basic functional activities without increased symptoms or restriction.    4. Begin to wean out of sling and use left UE for low level activities     Electronically signed by:      Jana Curry, [address] #[id number]

## 2018-08-13 ENCOUNTER — TREATMENT (OUTPATIENT)
Dept: PHYSICAL THERAPY | Age: 68
End: 2018-08-13

## 2018-08-13 DIAGNOSIS — M25.512 ACUTE POSTOPERATIVE PAIN OF LEFT SHOULDER: Primary | ICD-10-CM

## 2018-08-13 DIAGNOSIS — M75.122 COMPLETE ROTATOR CUFF TEAR OF LEFT SHOULDER: ICD-10-CM

## 2018-08-13 DIAGNOSIS — G89.18 ACUTE POSTOPERATIVE PAIN OF LEFT SHOULDER: Primary | ICD-10-CM

## 2018-08-13 PROCEDURE — G8427 DOCREV CUR MEDS BY ELIG CLIN: HCPCS | Performed by: PHYSICAL THERAPIST

## 2018-08-13 PROCEDURE — 97110 THERAPEUTIC EXERCISES: CPT | Performed by: PHYSICAL THERAPIST

## 2018-08-13 PROCEDURE — 97530 THERAPEUTIC ACTIVITIES: CPT | Performed by: PHYSICAL THERAPIST

## 2018-08-13 NOTE — FLOWSHEET NOTE
[facility]   Phone: [phone]    Fax: [phone]        Physical Therapy Daily Treatment Note  Date:  2018    Patient Name:  Elvin Teresa    :  1950  MRN: [id number]  Medical/Treatment Diagnosis Information:  · Diagnosis: s/p Left shoulder revision RTC Repair   DOS 3/90/31     Insurance/Certification information:  PT Insurance Information: Medicare  Physician Information:  Referring Practitioner: Dr Sumeet Motley of care signed (Y/N):      Date of Patient follow up with Physician: 18    G-Code (if applicable):      Date G-Code Applied:  18  PT G-Codes  Functional Assessment Tool Used: UEFI  Score: 0/80=0%   (100% FUNCTION LIMITATION)  Functional Limitation: Carrying, moving and handling objects  Carrying, Moving and Handling Objects Current Status (): 100 percent impaired, limited or restricted  Carrying, Moving and Handling Objects Goal Status (): At least 1 percent but less than 20 percent impaired, limited or restricted    PQRS: 18  Reviewed medication list with patient. No changes since last PT visit. Progress Note: []  Yes  []  No  Next due by: Visit #10      Latex Allergy:  [x]NO      []YES  Preferred Language for Healthcare:   [x]English       []other:    Visit # Insurance Allowable   2 ~25     Pain level:  Up to 4/10 8/13/18    SUBJECTIVE:      Patient reports that her exercises are going well. She is doing them as prescribed. She does complain of shoulder pain that gets worse at times. Takes pain meds as needed.          OBJECTIVE:              ROM PROM AROM  Comment     L R L R 18   Flexion         Shoulder ROM NA due to surgery   Abduction             ER             IR             Elbow flex     125 145     Elbow ext     -25 -5           Strength: deferred secondary to surgery L R Comment         Special Tests Results/Comment: deferred secondary to surgery            RESTRICTIONS/PRECAUTIONS:   Delayed UE control with self care, carrying, lifting, driving/computer work. 8/6/18 Spent time educating patient on post op expectations and time frames, sling wearing education, and HEP review. Home Exercise Program:    [x] (51315) Reviewed/Progressed HEP activities related to strengthening, flexibility, endurance, ROM of scapular, scapulothoracic and UE control with self care, reaching, carrying, lifting, house/yardwork, driving/computer work  [] (86790) Reviewed/Progressed HEP activities related to improving balance, coordination, kinesthetic sense, posture, motor skill, proprioception of scapular, scapulothoracic and UE control with self care, reaching, carrying, lifting, house/yardwork, driving/computer work      Manual Treatments:  PROM / STM / Oscillations-Mobs:  G-I, II, III, IV (PA's, Inf., Post.)  [] (93560) Provided manual therapy to mobilize soft tissue/joints of cervical/CT, scapular GHJ and UE for the purpose of modulating pain, promoting relaxation,  increasing ROM, reducing/eliminating soft tissue swelling/inflammation/restriction, improving soft tissue extensibility and allowing for proper ROM for normal function with self care, reaching, carrying, lifting, house/yardwork, driving/computer work    Modalities:  Ice x12' after     Charges:  Timed Code Treatment Minutes: 30   Total Treatment Minutes: 45     [] EVAL (LOW) 96158 (typically 20 minutes face-to-face)  [] EVAL (MOD) 42675 (typically 30 minutes face-to-face)  [] EVAL (HIGH) 95929 (typically 45 minutes face-to-face)  [] RE-EVAL   [x] VO(89872) x  1   [] IONTO  [] NMR (22110) x      [] VASO  [] Manual (21096) x       [] Other:  [x] TA (76179) x  1    [] Mech Traction (40172)  [] ES(attended) (14668)      [] ES (un) (19001):       GOALS:  Patient stated goal: Return to full use of left UE with all PLOF    Therapist goals for Patient:   Short Term Goals: To be achieved in: 2 weeks  1.  Independent in HEP and progression per patient tolerance, in

## 2018-08-20 ENCOUNTER — OFFICE VISIT (OUTPATIENT)
Dept: INTERNAL MEDICINE CLINIC | Age: 68
End: 2018-08-20

## 2018-08-20 ENCOUNTER — TREATMENT (OUTPATIENT)
Dept: PHYSICAL THERAPY | Age: 68
End: 2018-08-20

## 2018-08-20 VITALS
HEART RATE: 80 BPM | HEIGHT: 64 IN | SYSTOLIC BLOOD PRESSURE: 136 MMHG | WEIGHT: 237 LBS | DIASTOLIC BLOOD PRESSURE: 80 MMHG | RESPIRATION RATE: 18 BRPM | BODY MASS INDEX: 40.46 KG/M2

## 2018-08-20 DIAGNOSIS — M75.122 COMPLETE ROTATOR CUFF TEAR OF LEFT SHOULDER: ICD-10-CM

## 2018-08-20 DIAGNOSIS — S52.512A CLOSED DISPLACED FRACTURE OF STYLOID PROCESS OF LEFT RADIUS, INITIAL ENCOUNTER: Primary | ICD-10-CM

## 2018-08-20 DIAGNOSIS — M25.512 ACUTE POSTOPERATIVE PAIN OF LEFT SHOULDER: Primary | ICD-10-CM

## 2018-08-20 DIAGNOSIS — G89.18 ACUTE POSTOPERATIVE PAIN OF LEFT SHOULDER: Primary | ICD-10-CM

## 2018-08-20 PROCEDURE — 3017F COLORECTAL CA SCREEN DOC REV: CPT | Performed by: INTERNAL MEDICINE

## 2018-08-20 PROCEDURE — 1123F ACP DISCUSS/DSCN MKR DOCD: CPT | Performed by: INTERNAL MEDICINE

## 2018-08-20 PROCEDURE — 4040F PNEUMOC VAC/ADMIN/RCVD: CPT | Performed by: INTERNAL MEDICINE

## 2018-08-20 PROCEDURE — 1036F TOBACCO NON-USER: CPT | Performed by: INTERNAL MEDICINE

## 2018-08-20 PROCEDURE — G8427 DOCREV CUR MEDS BY ELIG CLIN: HCPCS | Performed by: INTERNAL MEDICINE

## 2018-08-20 PROCEDURE — 99213 OFFICE O/P EST LOW 20 MIN: CPT | Performed by: INTERNAL MEDICINE

## 2018-08-20 PROCEDURE — G8417 CALC BMI ABV UP PARAM F/U: HCPCS | Performed by: INTERNAL MEDICINE

## 2018-08-20 PROCEDURE — 1101F PT FALLS ASSESS-DOCD LE1/YR: CPT | Performed by: INTERNAL MEDICINE

## 2018-08-20 PROCEDURE — 1090F PRES/ABSN URINE INCON ASSESS: CPT | Performed by: INTERNAL MEDICINE

## 2018-08-20 PROCEDURE — G8399 PT W/DXA RESULTS DOCUMENT: HCPCS | Performed by: INTERNAL MEDICINE

## 2018-08-20 RX ORDER — HYDROCODONE BITARTRATE AND ACETAMINOPHEN 5; 325 MG/1; MG/1
TABLET ORAL
Qty: 60 TABLET | Refills: 0 | Status: SHIPPED | OUTPATIENT
Start: 2018-08-20 | End: 2018-09-20

## 2018-08-20 RX ORDER — HYDROCODONE BITARTRATE AND ACETAMINOPHEN 5; 325 MG/1; MG/1
1 TABLET ORAL EVERY 6 HOURS PRN
COMMUNITY
End: 2018-09-06

## 2018-08-23 ENCOUNTER — OFFICE VISIT (OUTPATIENT)
Dept: ORTHOPEDIC SURGERY | Age: 68
End: 2018-08-23

## 2018-08-23 VITALS
HEIGHT: 64 IN | BODY MASS INDEX: 38.41 KG/M2 | SYSTOLIC BLOOD PRESSURE: 131 MMHG | HEART RATE: 79 BPM | DIASTOLIC BLOOD PRESSURE: 77 MMHG | WEIGHT: 225 LBS

## 2018-08-23 DIAGNOSIS — Z98.890 S/P ROTATOR CUFF REPAIR: Primary | ICD-10-CM

## 2018-08-23 DIAGNOSIS — M75.82 ROTATOR CUFF TENDINITIS, LEFT: ICD-10-CM

## 2018-08-23 DIAGNOSIS — G89.18 POST-OP PAIN: ICD-10-CM

## 2018-08-23 PROCEDURE — 99024 POSTOP FOLLOW-UP VISIT: CPT | Performed by: ORTHOPAEDIC SURGERY

## 2018-08-23 NOTE — PROGRESS NOTES
History of Present Illness:  Lukas Vogt is a pleasant 79 y.o. female here today 5 weeks out from left shoulder arthroscopic revision rotator cuff repair performed on 7/19/2018. She had a fall on Saturday 8/18/2018 landing face down primarily on left side and was evaluated the same day [facility] [address] [facility]. Sustained a left wrist scapholunate dissociation, large abrasion above left eye, and contusion to left knee. Patient had left shoulder brace on and using cane in in right hand at time of fall. She's getting evaluated by Dr. Surekha Dias this afternoon for her left wrist and plans to follow up with Dr. Juan Lennon for left knee. On Monday 8/20/2018 patient saw her PCP who gave her a refill of #60 Hydrocodone 5-325mg. Will be following up with PCP on Monday 8/27/2018  for suture removal of abrasion above left eye. Patient canceled physical therapy appointment for Monday 8/20/2018 and has not been to physical therapy since the fall. Immediately post fall shoulder pain was not signficantly elevated, but on Tuesday she noticed her shoulder began aching more. Pain is an 8/10. No fever, chills, or flulike symptoms. No numbness or tingling in left upper extremity. Medical History:  Patient's medications, allergies, past medical, surgical, social and family histories were reviewed and updated as appropriate. Pertinent items are noted in HPI  Review of systems reviewed from Patient History Form dated on 5/31/2018 and available in the patient's chart under the Media tab. Vital Signs:  Vitals:    08/23/18 1004   BP: 131/77   Pulse: 79         Constitutional: In no apparent distress. Normal affect. Alert and oriented X3 and is cooperative. Left Shoulder Examination:    Inspection: Incisions healing well. No indication of infection. No dehiscence. No diffuse erythema. No ecchymosis. Benign without gross deformity    Palpation: Pain with gentle circumduction, but smooth and without course grinding.  Nontender to light touch    Active Range of Motion: deferred    Passive Range of Motion:  Shoulder height. Strength: deferred    Special Tests:  IOM intact. Distally neurovascularly intact      Radiology:     No new x-rays obtained today         Assessment :  Nury Kang is a 79 y.o. female 5 weeks out from left shoulder arthroscopic revision rotator cuff repair performed on 7/19/2018. She has since suffered bad fall and may have injured her freshly repaired left shoulder in the process. However, there are no obvious signs of this clinically today. Impression:  Encounter Diagnoses   Name Primary?  Post-op pain     Rotator cuff tendinitis, left     S/P rotator cuff repair Yes       Office Procedures:  Orders Placed This Encounter   Procedures    Ambulatory referral to Physical Therapy     Referral Priority:   Routine     Referral Type:   Eval and Treat     Referral Reason:   Specialty Services Required     Requested Specialty:   Physical Therapy     Number of Visits Requested:   1         Plan: Discussed that there's no grinding with circumduction and also no ecchymosis about the shoulder thus we're cautiously optimistic that her repair is still intact. She can bring her shoulder safely to shoulder height if she ends up needing surgery for her wrist. Patient should hold off on physical therapy for now until evaluated by Dr. Donna De La Rosa, she's okay to continue doing pendulums. Follow-up in 2 weeks and/or as needed. Nruy Kang is in agreement with this plan. All questions were answered to patient's satisfaction and was encouraged to call with any further questions. DIXIE Dobbs  8/23/2018     During this examination, Dilia GUERRERO PA-C, functioned as a scribe for Dr. Josias Darden. The history taking and physical examination were performed by Dr. Josias Darden. All counseling during the appointment was performed between the patient and Dr. Josias Darden.  8/23/2018 10:15 AM      This dictation was performed with a

## 2018-08-27 ENCOUNTER — OFFICE VISIT (OUTPATIENT)
Dept: ORTHOPEDIC SURGERY | Age: 68
End: 2018-08-27

## 2018-08-27 ENCOUNTER — OFFICE VISIT (OUTPATIENT)
Dept: INTERNAL MEDICINE CLINIC | Age: 68
End: 2018-08-27

## 2018-08-27 VITALS
WEIGHT: 225 LBS | BODY MASS INDEX: 38.41 KG/M2 | HEART RATE: 69 BPM | HEIGHT: 64 IN | SYSTOLIC BLOOD PRESSURE: 124 MMHG | DIASTOLIC BLOOD PRESSURE: 68 MMHG

## 2018-08-27 VITALS
HEART RATE: 80 BPM | RESPIRATION RATE: 16 BRPM | SYSTOLIC BLOOD PRESSURE: 130 MMHG | BODY MASS INDEX: 38.62 KG/M2 | OXYGEN SATURATION: 97 % | DIASTOLIC BLOOD PRESSURE: 70 MMHG | HEIGHT: 64 IN

## 2018-08-27 DIAGNOSIS — M17.12 PRIMARY OSTEOARTHRITIS OF LEFT KNEE: Primary | ICD-10-CM

## 2018-08-27 DIAGNOSIS — M25.562 LEFT KNEE PAIN, UNSPECIFIED CHRONICITY: ICD-10-CM

## 2018-08-27 DIAGNOSIS — S01.112A LEFT EYELID LACERATION, INITIAL ENCOUNTER: Primary | ICD-10-CM

## 2018-08-27 PROCEDURE — G8427 DOCREV CUR MEDS BY ELIG CLIN: HCPCS | Performed by: INTERNAL MEDICINE

## 2018-08-27 PROCEDURE — 1090F PRES/ABSN URINE INCON ASSESS: CPT | Performed by: ORTHOPAEDIC SURGERY

## 2018-08-27 PROCEDURE — 4040F PNEUMOC VAC/ADMIN/RCVD: CPT | Performed by: INTERNAL MEDICINE

## 2018-08-27 PROCEDURE — 1036F TOBACCO NON-USER: CPT | Performed by: ORTHOPAEDIC SURGERY

## 2018-08-27 PROCEDURE — G8399 PT W/DXA RESULTS DOCUMENT: HCPCS | Performed by: ORTHOPAEDIC SURGERY

## 2018-08-27 PROCEDURE — 1101F PT FALLS ASSESS-DOCD LE1/YR: CPT | Performed by: INTERNAL MEDICINE

## 2018-08-27 PROCEDURE — G8399 PT W/DXA RESULTS DOCUMENT: HCPCS | Performed by: INTERNAL MEDICINE

## 2018-08-27 PROCEDURE — 4040F PNEUMOC VAC/ADMIN/RCVD: CPT | Performed by: ORTHOPAEDIC SURGERY

## 2018-08-27 PROCEDURE — G8417 CALC BMI ABV UP PARAM F/U: HCPCS | Performed by: INTERNAL MEDICINE

## 2018-08-27 PROCEDURE — 1123F ACP DISCUSS/DSCN MKR DOCD: CPT | Performed by: ORTHOPAEDIC SURGERY

## 2018-08-27 PROCEDURE — G8427 DOCREV CUR MEDS BY ELIG CLIN: HCPCS | Performed by: ORTHOPAEDIC SURGERY

## 2018-08-27 PROCEDURE — 1036F TOBACCO NON-USER: CPT | Performed by: INTERNAL MEDICINE

## 2018-08-27 PROCEDURE — 99214 OFFICE O/P EST MOD 30 MIN: CPT | Performed by: ORTHOPAEDIC SURGERY

## 2018-08-27 PROCEDURE — 20610 DRAIN/INJ JOINT/BURSA W/O US: CPT | Performed by: ORTHOPAEDIC SURGERY

## 2018-08-27 PROCEDURE — 1101F PT FALLS ASSESS-DOCD LE1/YR: CPT | Performed by: ORTHOPAEDIC SURGERY

## 2018-08-27 PROCEDURE — 3017F COLORECTAL CA SCREEN DOC REV: CPT | Performed by: ORTHOPAEDIC SURGERY

## 2018-08-27 PROCEDURE — 99213 OFFICE O/P EST LOW 20 MIN: CPT | Performed by: INTERNAL MEDICINE

## 2018-08-27 PROCEDURE — 3017F COLORECTAL CA SCREEN DOC REV: CPT | Performed by: INTERNAL MEDICINE

## 2018-08-27 PROCEDURE — 1090F PRES/ABSN URINE INCON ASSESS: CPT | Performed by: INTERNAL MEDICINE

## 2018-08-27 PROCEDURE — G8417 CALC BMI ABV UP PARAM F/U: HCPCS | Performed by: ORTHOPAEDIC SURGERY

## 2018-08-27 PROCEDURE — 1123F ACP DISCUSS/DSCN MKR DOCD: CPT | Performed by: INTERNAL MEDICINE

## 2018-08-27 NOTE — PROGRESS NOTES
Cortisone injection: left knee     2cc depo medrol 40mg    Lot:[id number]  Exp:08/2020  Ndc:3554-7543-32    4cc ropivocaine 0.5%    VPN:[id number]  Exp:02/2022  Ndc: 32818-331-49

## 2018-08-27 NOTE — PROGRESS NOTES
History of Present Illness:  Lakshmi Singh is a pleasant 79 y.o. female presenting today follow up of left knee osteoarthritis. Underwent a left knee cortisone injection on 3/26/2018 then had a fall in April 2018, but didn't say much because she had an upcoming left shoulder rotator cuff repair surgery planned with Dr. Zhang Gomez for 7/19/2018. Since we last saw her back at the end of March 2018 she's had multiple falls. Her most recent one being last Saturday at which time she was evaluated by [facility]  where they ordered new x-rays. Other treatment has included diclofenac (well tolerated) and home exercises. Medical History:  Patient's medications, allergies, past medical, surgical, social and family histories were reviewed and updated as appropriate. Pertinent items are noted in HPI  Review of systems reviewed from Patient History Form dated on 8/27/2018 and available in the patient's chart under the Media tab. Vital Signs:  Vitals:    08/27/18 1005   BP: 124/68   Pulse: 69         Neuro: Alert & oriented x 3,  normal,  no focal deficits noted. Normal affect. Eyes: sclera clear  Ears: Normal external ear  Mouth:  No perioral lesions  Pulm: Respirations unlabored and regular  Pulse: Regular rate and rhythm   Skin: Warm, well perfused      Constitutional: In no apparent distress. Normal affect. Alert and oriented X3 and is cooperative. LEFT Knee Examination:    Gait: No use of assistive devices. No antalgic gait. Alignment: Alignment appreciated. Inspection/skin: Quadriceps well developed. Skin is intact without erythema or ecchymosis. No gross deformity. Palpation: Crepitus. Nontender along joint line. No pain with compression of patella. Nontender to light touch. Range of Motion: Full ROM. Strength: 5/5 quad strength    Effusion: No apparent effusion. Ligamentous stability: Stable to valgus and varus stress at 0° and 30°. Solid endpoint with Lachman's.  Negative posterior and anterior drawer signs. Patella tracking: Smooth translation of patella. Special tests: Negative Stephanie sign. Patella apprehension sign negative. Neurologic and vascular: Skin is warm and well-perfused. Distally neurovascularly intact. Additional findings: Calf soft nontender. Sensation is intact to light-touch. No pretibial edema. Comparison RIGHT Knee Examination:    Gait: No use of assistive devices. No antalgic gait. Alignment: Alignment appreciated. Inspection/skin: Quadriceps well developed. Skin is intact without erythema or ecchymosis. No gross deformity. Palpation: Crepitus. Nontender along joint line. No pain with compression of patella. Nontender to light touch. Range of Motion: Full ROM. Strength: 5/5 quad strength    Effusion: No apparent effusion. Ligamentous stability: Stable to valgus and varus stress at 0° and 30°. Solid endpoint with Lachman's. Negative posterior and anterior drawer signs. Patella tracking: Smooth translation of patella. Special tests: Negative Stephanie sign. Patella apprehension sign negative. Neurologic and vascular: Skin is warm and well-perfused. Distally neurovascularly intact. Additional findings: Calf soft nontender. Sensation is intact to light-touch. No pretibial edema. Radiology:     Radiographs were obtained and reviewed in the office; 4 views: bilateral PA, bilateral Kaur, bilateral Merchants AND LEFT lateral    Impression: no fractures of left knee. Bilateral knee medial joint space narrowing, left > right. Trauma series XR form [facility]:    XR KNEE LEFT AP LAT INT EXT OBLIQUES AND SUNRISE,  8/18/2018 10:26 AM    CLINICAL HISTORY:  -FALL      COMPARISON:  None.      PROCEDURE COMMENTS: 5 views per protocol.      FINDINGS:    No fracture or traumatic malalignment.  No effusion.     There is mild tricompartmental osteoarthritis.                Assessment :  61-year-old female with taking and physical examination were performed by Dr. Dina Skinner. All counseling during the appointment was performed between the patient and Dr. Dina Skinner. 8/27/2018 10:07 AM      This dictation was performed with a verbal recognition program (DRAGON) and it was checked for errors. It is possible that there are still dictated errors within this office note. If so, please bring any errors to my attention for an addendum. All efforts were made to ensure that this office note is accurate.

## 2018-08-29 NOTE — PROGRESS NOTES
posterior and anterior drawer signs. Patella tracking: Smooth translation of patella. Special tests: Negative Stephanie sign. Patella apprehension sign negative. Neurologic and vascular: Skin is warm and well-perfused. Distally neurovascularly intact. Additional findings: Calf soft nontender. Sensation is intact to light-touch. No pretibial edema. Comparison RIGHT Knee Examination:    Gait: No use of assistive devices. No antalgic gait. Alignment: Alignment appreciated. Inspection/skin: Quadriceps well developed. Skin is intact without erythema or ecchymosis. No gross deformity. Palpation: Crepitus. Nontender along joint line. No pain with compression of patella. Nontender to light touch. Range of Motion: Full ROM. Strength: 5/5 quad strength    Effusion: No apparent effusion. Ligamentous stability: Stable to valgus and varus stress at 0° and 30°. Solid endpoint with Lachman's. Negative posterior and anterior drawer signs. Patella tracking: Smooth translation of patella. Special tests: Negative Stephanie sign. Patella apprehension sign negative. Neurologic and vascular: Skin is warm and well-perfused. Distally neurovascularly intact. Additional findings: Calf soft nontender. Sensation is intact to light-touch. No pretibial edema. Radiology:     Radiographs were obtained and reviewed in the office; 4 views: bilateral PA, bilateral Kaur, bilateral Merchants AND LEFT lateral    Impression: no fractures of left knee. Bilateral knee medial joint space narrowing, left > right. Trauma series XR form [facility]:    XR KNEE LEFT AP LAT INT EXT OBLIQUES AND SUNRISE,  8/18/2018 10:26 AM    CLINICAL HISTORY:  -FALL      COMPARISON:  None.      PROCEDURE COMMENTS: 5 views per protocol.      FINDINGS:    No fracture or traumatic malalignment.  No effusion.     There is mild tricompartmental osteoarthritis.                Assessment :  80-year-old female with taking and physical examination were performed by Dr. Anahi Young. All counseling during the appointment was performed between the patient and Dr. Anahi Young. 8/29/2018 6:13 PM      This dictation was performed with a verbal recognition program (DRAGON) and it was checked for errors. It is possible that there are still dictated errors within this office note. If so, please bring any errors to my attention for an addendum. All efforts were made to ensure that this office note is accurate. Greater than 50% of the visit was spent counseling the patient. I personally reviewed the patient's pain scale, review of systems, family history, social history, past medical history, allergies and medications. 13 point review of systems was collected and reviewed today. It is noncontributory. I personally performed the services described in this documentation and scribed by Candy Erickson PA-C. Juan Alberto Houser MD  Sports Medicine, Arthroscopic Knee and Shoulder Surgery    This dictation was performed with a verbal recognition program [facility]) and it was checked for errors. It is possible that there are still dictated errors within this office note. If so, please bring any errors to my attention for an addendum. All efforts were made to ensure that this office note is accurate.

## 2018-09-06 ENCOUNTER — OFFICE VISIT (OUTPATIENT)
Dept: ORTHOPEDIC SURGERY | Age: 68
End: 2018-09-06

## 2018-09-06 VITALS
HEIGHT: 64 IN | HEART RATE: 81 BPM | SYSTOLIC BLOOD PRESSURE: 115 MMHG | BODY MASS INDEX: 39.27 KG/M2 | WEIGHT: 230 LBS | DIASTOLIC BLOOD PRESSURE: 67 MMHG

## 2018-09-06 DIAGNOSIS — Z98.890 S/P ROTATOR CUFF REPAIR: Primary | ICD-10-CM

## 2018-09-06 DIAGNOSIS — M75.122 COMPLETE TEAR OF LEFT ROTATOR CUFF: ICD-10-CM

## 2018-09-06 DIAGNOSIS — M19.012 ARTHROSIS OF LEFT ACROMIOCLAVICULAR JOINT: ICD-10-CM

## 2018-09-06 DIAGNOSIS — M75.82 ROTATOR CUFF TENDINITIS, LEFT: ICD-10-CM

## 2018-09-06 PROCEDURE — 99024 POSTOP FOLLOW-UP VISIT: CPT | Performed by: ORTHOPAEDIC SURGERY

## 2018-09-11 ENCOUNTER — TREATMENT (OUTPATIENT)
Dept: PHYSICAL THERAPY | Age: 68
End: 2018-09-11

## 2018-09-11 DIAGNOSIS — M75.122 COMPLETE ROTATOR CUFF TEAR OF LEFT SHOULDER: ICD-10-CM

## 2018-09-11 DIAGNOSIS — M25.512 ACUTE POSTOPERATIVE PAIN OF LEFT SHOULDER: Primary | ICD-10-CM

## 2018-09-11 DIAGNOSIS — G89.18 ACUTE POSTOPERATIVE PAIN OF LEFT SHOULDER: Primary | ICD-10-CM

## 2018-09-11 PROCEDURE — G8427 DOCREV CUR MEDS BY ELIG CLIN: HCPCS | Performed by: PHYSICAL THERAPIST

## 2018-09-11 PROCEDURE — 97110 THERAPEUTIC EXERCISES: CPT | Performed by: PHYSICAL THERAPIST

## 2018-09-11 PROCEDURE — 97530 THERAPEUTIC ACTIVITIES: CPT | Performed by: PHYSICAL THERAPIST

## 2018-09-11 PROCEDURE — 97140 MANUAL THERAPY 1/> REGIONS: CPT | Performed by: PHYSICAL THERAPIST

## 2018-09-11 NOTE — FLOWSHEET NOTE
had to take any pain meds lately. She has been out of the sling since she saw Dr Coy Gonzalez, but still puts her arm in there from time to time to give it a break. Going to see the doctor for her wrist today, and hopes to get the cast off and be put in a removable splint.          OBJECTIVE:              ROM PROM AROM  Comment     L R L R 9/11/18   Flexion  110      supine   Abduction  90           ER @45  50           IR             Elbow flex     138 145     Elbow ext     -20 -5           Strength: deferred secondary to surgery L R Comment         Special Tests Results/Comment: deferred secondary to surgery            RESTRICTIONS/PRECAUTIONS:   Delayed protocol    Exercises/Interventions:     Exercise/Equipment Resistance/Repetitions Comments   Cardio/Warm-up     Bike          Stretching/PROM     Wand ER at 0 5\"x15  Supine ER at 45 5\"x15    Table Slides Flexion 5\"x15    UE [place]     Pulleys NPV    Gentle BB TP 10\"x10 Across her back   Elbow AROM Elbow flex/ext x30            STRENGTH     Isometrics     Retraction x20       Weight shift     Flexion     Abduction     External Rotation     Internal Rotation     Biceps     Triceps          PRE's     Flexion     Abduction     External Rotation     Internal Rotation     Shrugs x20    EXT     Reverse Flys     Serratus     Horizontal Abd with ER     Biceps     Triceps     Retraction          Cable Column/Theraband     External Rotation     Internal Rotation     Shrugs     Lats     Ext     Flex     Scapular Retraction     BIC     TRIC     PNF          Neuromuscular Re-ed     Dynamic Stability                              Plyoback                    Manual/Modalities  Manual PROM left shoulder all planes x10'                         Therapeutic Exercise and NMR EXR  [x] (06390) Provided verbal/tactile cueing for activities related to strengthening, flexibility, endurance, ROM  for improvements in scapular, scapulothoracic and UE control with self care, reaching, carrying, Also advised her that it is ok to put her sling on now and then if it gives her some relief, but not to stay in the sling for extended periods of time. Treatment/Activity Tolerance:  [x] Patient tolerated treatment well [] Patient limited by fatique  [] Patient limited by pain  [] Patient limited by other medical complications  [] Other:     Prognosis: [x] Good [] Fair  [] Poor    Patient Requires Follow-up: [x] Yes  [] No    PLAN: See eval.   Delayed protocol due to revision surgery. Initially see patient 1x/week until she sees Dr Josias Darden then anticipate ramping up to 2x/week.   [x] Continue per plan of care [] Alter current plan (see comments)  [] Plan of care initiated [] Hold pending MD visit [] Discharge    Electronically signed by:     Darrius Barone PT #[id number]

## 2018-09-13 ENCOUNTER — TREATMENT (OUTPATIENT)
Dept: PHYSICAL THERAPY | Age: 68
End: 2018-09-13

## 2018-09-13 DIAGNOSIS — G89.18 ACUTE POSTOPERATIVE PAIN OF LEFT SHOULDER: Primary | ICD-10-CM

## 2018-09-13 DIAGNOSIS — M25.512 ACUTE POSTOPERATIVE PAIN OF LEFT SHOULDER: Primary | ICD-10-CM

## 2018-09-13 DIAGNOSIS — M75.122 COMPLETE ROTATOR CUFF TEAR OF LEFT SHOULDER: ICD-10-CM

## 2018-09-13 PROCEDURE — 97140 MANUAL THERAPY 1/> REGIONS: CPT | Performed by: PHYSICAL THERAPIST

## 2018-09-13 PROCEDURE — 97110 THERAPEUTIC EXERCISES: CPT | Performed by: PHYSICAL THERAPIST

## 2018-09-13 PROCEDURE — 97530 THERAPEUTIC ACTIVITIES: CPT | Performed by: PHYSICAL THERAPIST

## 2018-09-13 PROCEDURE — G8427 DOCREV CUR MEDS BY ELIG CLIN: HCPCS | Performed by: PHYSICAL THERAPIST

## 2018-09-13 NOTE — FLOWSHEET NOTE
supine   Abduction  90           ER @45  50           IR             Elbow flex     138 145     Elbow ext     -20 -5           Strength: deferred secondary to surgery L R Comment         Special Tests Results/Comment: deferred secondary to surgery            RESTRICTIONS/PRECAUTIONS:   Delayed protocol    Exercises/Interventions:     Exercise/Equipment Resistance/Repetitions Comments   Cardio/Warm-up     Bike          Stretching/PROM     Wand ER at 0 5\"x15  Supine ER at 45 5\"x15    Table Slides Flexion 5\"x30    UE [place]     Pulleys NPV    Gentle BB TP 10\"x10 Across her back              STRENGTH     Isometrics     Retraction x30       Weight shift     Flexion     Abduction     External Rotation     Internal Rotation     Biceps     Triceps          PRE's     Flexion     Abduction     External Rotation     Internal Rotation     Shrugs x30    EXT     Reverse Flys     Serratus     Horizontal Abd with ER     Biceps     Triceps     Retraction          Cable Column/Theraband     External Rotation     Internal Rotation     Shrugs     Lats     Ext     Flex     Scapular Retraction     BIC     TRIC     PNF          Neuromuscular Re-ed     Dynamic Stability                              Plyoback                    Manual/Modalities  Manual PROM left shoulder all planes x10'  Also did some supine elbow ext stretching                         Therapeutic Exercise and NMR EXR  [x] (22125) Provided verbal/tactile cueing for activities related to strengthening, flexibility, endurance, ROM  for improvements in scapular, scapulothoracic and UE control with self care, reaching, carrying, lifting, house/yardwork, driving/computer work.    [] (68325) Provided verbal/tactile cueing for activities related to improving balance, coordination, kinesthetic sense, posture, motor skill, proprioception  to assist with  scapular, scapulothoracic and UE control with self care, reaching, carrying, lifting, house/yardwork, driving/computer work.    Therapeutic Activities:    [x] (85269 or 76830) Provided verbal/tactile cueing for activities related to improving balance, coordination, kinesthetic sense, posture, motor skill, proprioception and motor activation to allow for proper function of scapular, scapulothoracic and UE control with self care, carrying, lifting, driving/computer work. 8/6/18 Spent time educating patient on post op expectations and time frames, sling wearing education, and HEP review.     Home Exercise Program:    [x] (72201) Reviewed/Progressed HEP activities related to strengthening, flexibility, endurance, ROM of scapular, scapulothoracic and UE control with self care, reaching, carrying, lifting, house/yardwork, driving/computer work  [] (68240) Reviewed/Progressed HEP activities related to improving balance, coordination, kinesthetic sense, posture, motor skill, proprioception of scapular, scapulothoracic and UE control with self care, reaching, carrying, lifting, house/yardwork, driving/computer work      Manual Treatments:  PROM / STM / Oscillations-Mobs:  G-I, II, III, IV (PA's, Inf., Post.)  [x] (92683) Provided manual therapy to mobilize soft tissue/joints of cervical/CT, scapular GHJ and UE for the purpose of modulating pain, promoting relaxation,  increasing ROM, reducing/eliminating soft tissue swelling/inflammation/restriction, improving soft tissue extensibility and allowing for proper ROM for normal function with self care, reaching, carrying, lifting, house/yardwork, driving/computer work    Modalities:  Ice x10' after     Charges:  Timed Code Treatment Minutes: 40   Total Treatment Minutes: 55     [] EVAL (LOW) 99559 (typically 20 minutes face-to-face)  [] EVAL (MOD) 22783 (typically 30 minutes face-to-face)  [] EVAL (HIGH) 98990 (typically 45 minutes face-to-face)  [] RE-EVAL   [x] CQ(67843) x  1   [] IONTO  [] NMR (74594) x      [] VASO  [x] Manual (98416) x  1    [] Other:  [x] TA (55352) x  1    [] [facility] Traction (37237)  [] ES(attended) (59908)      [] ES (un) (36552):       GOALS:  Patient stated goal: Return to full use of left UE with all PLOF    Therapist goals for Patient:   Short Term Goals: To be achieved in: 2 weeks  1. Independent in HEP and progression per patient tolerance, in order to prevent re-injury. 2. Patient will have a decrease in pain to facilitate improvement in movement, function, and ADLs as indicated by Functional Deficits. Long Term Goals: To be achieved in: 6 weeks  1. Disability index score of 40% or less for the UEFI to assist with reaching prior level of function. 2. Patient will demonstrate increased AAROM to 50-75% of her right UE to allow for proper joint functioning as indicated by patients Functional Deficits. 3. Patient will return to all basic functional activities without increased symptoms or restriction. 4. Begin to wean out of sling and use left UE for low level activities     Progression Towards Functional goals:  [] Patient is progressing as expected towards functional goals listed. [] Progression is slowed due to complexities listed. [] Progression has been slowed due to co-morbidities. [x] Plan just implemented, too soon to assess goals progression  [] Other:     ASSESSMENT:    We reviewed her exercises that she has been doing. She demonstrates good technique and takes her time with the reps. She is now wearing a velcro/removable wrist splint and we discussed what ROM exercises she is doing for her wrist.   Reminded her not to do any extreme reaching with her left UE and no lifting of any kind. At times, she finds herself carrying her purse with her left hand because her cane is in her right. Advised her to think about an over the shoulder type of purse.          Treatment/Activity Tolerance:  [x] Patient tolerated treatment well [] Patient limited by fatique  [] Patient limited by pain  [] Patient limited by other medical complications  [] Other: Prognosis: [x] Good [] Fair  [] Poor    Patient Requires Follow-up: [x] Yes  [] No    PLAN: See eval.   Delayed protocol due to revision surgery.  Now 2x/week for progressions    [x] Continue per plan of care [] Alter current plan (see comments)  [] Plan of care initiated [] Hold pending MD visit [] Discharge    Electronically signed by:     David Marroquin PT #[id number]

## 2018-09-18 ENCOUNTER — OFFICE VISIT (OUTPATIENT)
Dept: INTERNAL MEDICINE CLINIC | Age: 68
End: 2018-09-18

## 2018-09-18 ENCOUNTER — TREATMENT (OUTPATIENT)
Dept: PHYSICAL THERAPY | Age: 68
End: 2018-09-18

## 2018-09-18 VITALS
BODY MASS INDEX: 39.48 KG/M2 | HEART RATE: 96 BPM | DIASTOLIC BLOOD PRESSURE: 74 MMHG | OXYGEN SATURATION: 98 % | TEMPERATURE: 98.7 F | RESPIRATION RATE: 18 BRPM | SYSTOLIC BLOOD PRESSURE: 136 MMHG | HEIGHT: 64 IN

## 2018-09-18 DIAGNOSIS — G89.18 ACUTE POSTOPERATIVE PAIN OF LEFT SHOULDER: Primary | ICD-10-CM

## 2018-09-18 DIAGNOSIS — R51.9 TEMPORAL PAIN: ICD-10-CM

## 2018-09-18 DIAGNOSIS — H53.411 VISUAL FIELD SCOTOMA OF RIGHT EYE: Primary | ICD-10-CM

## 2018-09-18 DIAGNOSIS — M75.122 COMPLETE ROTATOR CUFF TEAR OF LEFT SHOULDER: ICD-10-CM

## 2018-09-18 DIAGNOSIS — M25.512 ACUTE POSTOPERATIVE PAIN OF LEFT SHOULDER: Primary | ICD-10-CM

## 2018-09-18 LAB — SEDIMENTATION RATE, ERYTHROCYTE: 54 MM/HR (ref 0–30)

## 2018-09-18 PROCEDURE — 97140 MANUAL THERAPY 1/> REGIONS: CPT | Performed by: PHYSICAL THERAPIST

## 2018-09-18 PROCEDURE — 3017F COLORECTAL CA SCREEN DOC REV: CPT | Performed by: INTERNAL MEDICINE

## 2018-09-18 PROCEDURE — 36415 COLL VENOUS BLD VENIPUNCTURE: CPT | Performed by: INTERNAL MEDICINE

## 2018-09-18 PROCEDURE — 97110 THERAPEUTIC EXERCISES: CPT | Performed by: PHYSICAL THERAPIST

## 2018-09-18 PROCEDURE — 99213 OFFICE O/P EST LOW 20 MIN: CPT | Performed by: INTERNAL MEDICINE

## 2018-09-18 PROCEDURE — 1123F ACP DISCUSS/DSCN MKR DOCD: CPT | Performed by: INTERNAL MEDICINE

## 2018-09-18 PROCEDURE — 1101F PT FALLS ASSESS-DOCD LE1/YR: CPT | Performed by: INTERNAL MEDICINE

## 2018-09-18 PROCEDURE — G8427 DOCREV CUR MEDS BY ELIG CLIN: HCPCS | Performed by: PHYSICAL THERAPIST

## 2018-09-18 PROCEDURE — G8399 PT W/DXA RESULTS DOCUMENT: HCPCS | Performed by: INTERNAL MEDICINE

## 2018-09-18 PROCEDURE — 97530 THERAPEUTIC ACTIVITIES: CPT | Performed by: PHYSICAL THERAPIST

## 2018-09-18 PROCEDURE — G8417 CALC BMI ABV UP PARAM F/U: HCPCS | Performed by: INTERNAL MEDICINE

## 2018-09-18 PROCEDURE — 4040F PNEUMOC VAC/ADMIN/RCVD: CPT | Performed by: INTERNAL MEDICINE

## 2018-09-18 PROCEDURE — 1090F PRES/ABSN URINE INCON ASSESS: CPT | Performed by: INTERNAL MEDICINE

## 2018-09-18 PROCEDURE — G8427 DOCREV CUR MEDS BY ELIG CLIN: HCPCS | Performed by: INTERNAL MEDICINE

## 2018-09-18 PROCEDURE — 1036F TOBACCO NON-USER: CPT | Performed by: INTERNAL MEDICINE

## 2018-09-18 NOTE — FLOWSHEET NOTE
[facility]   Phone: [phone]    Fax: [phone]        Physical Therapy Daily Treatment Note  Date:  2018    Patient Name:  Julio Dumont    :  1950  MRN: [id number]  Medical/Treatment Diagnosis Information:  · Diagnosis: s/p Left shoulder revision RTC Repair   DOS      Insurance/Certification information:  PT Insurance Information: Medicare  Physician Information:  Referring Practitioner: Dr Naomi Girard of care signed (Y/N):      Date of Patient follow up with Physician: 10/4/18    G-Code (if applicable):      Date G-Code Applied:  18  PT G-Codes  Functional Assessment Tool Used: UEFI  Score: 0/80=0%   (100% FUNCTION LIMITATION)  Functional Limitation: Carrying, moving and handling objects  Carrying, Moving and Handling Objects Current Status (): 100 percent impaired, limited or restricted  Carrying, Moving and Handling Objects Goal Status (): At least 1 percent but less than 20 percent impaired, limited or restricted    PQRS: 18  Reviewed medication list with patient. No changes since last PT visit. Progress Note: []  Yes  []  No  Next due by: Visit #10      Latex Allergy:  [x]NO      []YES  Preferred Language for Healthcare:   [x]English       []other:    Visit # Insurance Allowable   5 ~25     Pain level:  Up to 5/10 9/18/18    SUBJECTIVE:   8+ weeks post op  She states that her wrist is moving better. Her hand/ feels weak. She continues to complain of constant soreness in her left upper arm/shoulder. Has to take breaks with ER stretch and BB stretch at home. Not taking any meds for her shoulder on a regular basis, does use ice, has tried Tylenol but that doesn't seem to help.          OBJECTIVE:              ROM PROM AROM  Comment     L R L R 18   Flexion  110      supine   Abduction  90           ER @45  50           IR             Elbow flex     138 145     Elbow ext     -20 -5           Strength:

## 2018-09-18 NOTE — PROGRESS NOTES
Subjective:      Patient ID: Julio Dumont is a 79 y.o. female. HPI  Ovidio Nina is here with recent worsening (increased frequency)  of visual distortion in the right eye, some head pain and now for the last week skin sensitivity lateral to the right eye and temporal area. The visual distortion has occurred in the past but recently more frequent with \"picket fence\" type sensation in that eye alone. Usually infrequent and lasting 20 minutes. Recently mutiple times a day. Last night had some right periorbital pain which is gone now. For the last week has has hypersensitivity of the skin lateral to the right eye and superior and anterior to the ear. No frequent ha's associated with right visual scotomata. No other neurologic symptoms. Review of Systems   Eyes: Positive for visual disturbance. Neurological: Positive for headaches. Negative for dizziness, seizures, facial asymmetry, speech difficulty, weakness and light-headedness. Objective:   Physical Exam   Constitutional: She is oriented to person, place, and time. She appears well-developed and well-nourished. No distress. Eyes: Pupils are equal, round, and reactive to light. EOM are normal.   Neurological: She is alert and oriented to person, place, and time. No cranial nerve deficit. No rash but skin is sensitive to the touch lateral to the right eye. Assessment:      1. Visual field scotoma of right eye    2. Temporal pain              Plan:      Ovidio Nina was seen today for facial pain and headache. Diagnoses and all orders for this visit:    Visual field scotoma of right eye        - I have requested that she see her eye doctor soon.     Temporal pain  -     Sedimentation Rate              Kristie Merino MD

## 2018-09-20 ENCOUNTER — TELEPHONE (OUTPATIENT)
Dept: INTERNAL MEDICINE CLINIC | Age: 68
End: 2018-09-20

## 2018-09-20 ENCOUNTER — TREATMENT (OUTPATIENT)
Dept: PHYSICAL THERAPY | Age: 68
End: 2018-09-20

## 2018-09-20 DIAGNOSIS — M25.512 ACUTE POSTOPERATIVE PAIN OF LEFT SHOULDER: Primary | ICD-10-CM

## 2018-09-20 DIAGNOSIS — M75.122 COMPLETE ROTATOR CUFF TEAR OF LEFT SHOULDER: ICD-10-CM

## 2018-09-20 DIAGNOSIS — G89.18 ACUTE POSTOPERATIVE PAIN OF LEFT SHOULDER: Primary | ICD-10-CM

## 2018-09-20 PROCEDURE — 97530 THERAPEUTIC ACTIVITIES: CPT | Performed by: PHYSICAL THERAPIST

## 2018-09-20 PROCEDURE — G8427 DOCREV CUR MEDS BY ELIG CLIN: HCPCS | Performed by: PHYSICAL THERAPIST

## 2018-09-20 PROCEDURE — 97110 THERAPEUTIC EXERCISES: CPT | Performed by: PHYSICAL THERAPIST

## 2018-09-20 PROCEDURE — 97140 MANUAL THERAPY 1/> REGIONS: CPT | Performed by: PHYSICAL THERAPIST

## 2018-09-20 RX ORDER — PREDNISONE 10 MG/1
TABLET ORAL
Qty: 30 TABLET | Refills: 0 | Status: SHIPPED | OUTPATIENT
Start: 2018-09-20 | End: 2018-09-25 | Stop reason: SDUPTHER

## 2018-09-20 NOTE — FLOWSHEET NOTE
Flexion  110      supine   Abduction  90           ER @45  50           IR             Elbow flex     138 145     Elbow ext     -20 -5           Strength: deferred secondary to surgery L R Comment         Special Tests Results/Comment: deferred secondary to surgery            RESTRICTIONS/PRECAUTIONS:   Delayed protocol    Exercises/Interventions:     Exercise/Equipment Resistance/Repetitions Comments   Cardio/Warm-up     Bike          Stretching/PROM     Wand ER at 0 5\"x15  Supine ER at 45 5\"x15  Supine flexion x15    Table Slides Flexion 5\"x30  Scaption 5\"x30    UE [place]     Pulleys BTD x5'    Gentle BB TP 10\"x10 Across her back              STRENGTH     Isometrics     Retraction x30       Weight shift     Flexion     Abduction     External Rotation     Internal Rotation     Biceps     Triceps          PRE's     Flexion     Abduction     External Rotation     Internal Rotation     Shrugs x30    EXT     Reverse Flys     Serratus     Horizontal Abd with ER     Biceps     Triceps     Retraction          Cable Column/Theraband     External Rotation     Internal Rotation     Shrugs     Lats     Ext     Flex     Scapular Retraction     BIC     TRIC     PNF          Neuromuscular Re-ed     Dynamic Stability                              Plyoback                    Manual/Modalities  Manual PROM left shoulder all planes x10'  Also did some supine elbow ext stretching     Gentle R/S IR/ER holds from neutral 3x30\"                    Therapeutic Exercise and NMR EXR  [x] (46488) Provided verbal/tactile cueing for activities related to strengthening, flexibility, endurance, ROM  for improvements in scapular, scapulothoracic and UE control with self care, reaching, carrying, lifting, house/yardwork, driving/computer work.    [] (68707) Provided verbal/tactile cueing for activities related to improving balance, coordination, kinesthetic sense, posture, motor skill, proprioception  to assist with  scapular, scapulothoracic and UE control with self care, reaching, carrying, lifting, house/yardwork, driving/computer work. Therapeutic Activities:    [x] (39306 or 37299) Provided verbal/tactile cueing for activities related to improving balance, coordination, kinesthetic sense, posture, motor skill, proprioception and motor activation to allow for proper function of scapular, scapulothoracic and UE control with self care, carrying, lifting, driving/computer work. 8/6/18 Spent time educating patient on post op expectations and time frames, sling wearing education, and HEP review.     Home Exercise Program:    [x] (64448) Reviewed/Progressed HEP activities related to strengthening, flexibility, endurance, ROM of scapular, scapulothoracic and UE control with self care, reaching, carrying, lifting, house/yardwork, driving/computer work  [] (85317) Reviewed/Progressed HEP activities related to improving balance, coordination, kinesthetic sense, posture, motor skill, proprioception of scapular, scapulothoracic and UE control with self care, reaching, carrying, lifting, house/yardwork, driving/computer work      Manual Treatments:  PROM / STM / Oscillations-Mobs:  G-I, II, III, IV (PA's, Inf., Post.)  [x] (93102) Provided manual therapy to mobilize soft tissue/joints of cervical/CT, scapular GHJ and UE for the purpose of modulating pain, promoting relaxation,  increasing ROM, reducing/eliminating soft tissue swelling/inflammation/restriction, improving soft tissue extensibility and allowing for proper ROM for normal function with self care, reaching, carrying, lifting, house/yardwork, driving/computer work    Modalities:  Ice x10' after     Charges:  Timed Code Treatment Minutes: 40   Total Treatment Minutes: 50     [] EVAL (LOW) 49472 (typically 20 minutes face-to-face)  [] EVAL (MOD) 59976 (typically 30 minutes face-to-face)  [] EVAL (HIGH) 69544 (typically 45 minutes face-to-face)  [] RE-EVAL   [x] RX(22719) x  1   [] IONTO  [] NMR (19962) x Prognosis: [x] Good [] Fair  [] Poor    Patient Requires Follow-up: [x] Yes  [] No    PLAN: See eval.   Delayed protocol due to revision surgery.  Now 2x/week for progressions    [x] Continue per plan of care [] Alter current plan (see comments)  [] Plan of care initiated [] Hold pending MD visit [] Discharge    Electronically signed by:     Vijay Putnam PT #[id number]

## 2018-09-21 ENCOUNTER — TELEPHONE (OUTPATIENT)
Dept: SURGERY | Age: 68
End: 2018-09-21

## 2018-09-24 NOTE — TELEPHONE ENCOUNTER
Spoke with pt and she is experiencing some facial sensitivity and \"visual migraines\".    Tolerating steroids

## 2018-09-25 ENCOUNTER — TREATMENT (OUTPATIENT)
Dept: PHYSICAL THERAPY | Age: 68
End: 2018-09-25
Payer: MEDICARE

## 2018-09-25 DIAGNOSIS — M25.512 ACUTE POSTOPERATIVE PAIN OF LEFT SHOULDER: Primary | ICD-10-CM

## 2018-09-25 DIAGNOSIS — G89.18 ACUTE POSTOPERATIVE PAIN OF LEFT SHOULDER: Primary | ICD-10-CM

## 2018-09-25 DIAGNOSIS — M75.122 COMPLETE ROTATOR CUFF TEAR OF LEFT SHOULDER: ICD-10-CM

## 2018-09-25 PROCEDURE — G8427 DOCREV CUR MEDS BY ELIG CLIN: HCPCS | Performed by: PHYSICAL THERAPIST

## 2018-09-25 PROCEDURE — 97530 THERAPEUTIC ACTIVITIES: CPT | Performed by: PHYSICAL THERAPIST

## 2018-09-25 PROCEDURE — 97110 THERAPEUTIC EXERCISES: CPT | Performed by: PHYSICAL THERAPIST

## 2018-09-25 PROCEDURE — 97140 MANUAL THERAPY 1/> REGIONS: CPT | Performed by: PHYSICAL THERAPIST

## 2018-09-25 RX ORDER — PREDNISONE 10 MG/1
TABLET ORAL
Qty: 30 TABLET | Refills: 0 | Status: SHIPPED | OUTPATIENT
Start: 2018-09-25 | End: 2018-10-12 | Stop reason: SDUPTHER

## 2018-09-25 NOTE — FLOWSHEET NOTE
[facility]   Phone: [phone]    Fax: [phone]        Physical Therapy Daily Treatment Note  Date:  2018    Patient Name:  Ayana Thompson    :  1950  MRN: [id number]  Medical/Treatment Diagnosis Information:  · Diagnosis: s/p Left shoulder revision RTC Repair   DOS 3/98/98     Insurance/Certification information:  PT Insurance Information: Medicare  Physician Information:  Referring Practitioner: Dr Cora Brady of care signed (Y/N):      Date of Patient follow up with Physician: 10/4/18    G-Code (if applicable):      Date G-Code Applied:  18  PT G-Codes  Functional Assessment Tool Used: UEFI  Score: 0/80=0%   (100% FUNCTION LIMITATION)  Functional Limitation: Carrying, moving and handling objects  Carrying, Moving and Handling Objects Current Status (): 100 percent impaired, limited or restricted  Carrying, Moving and Handling Objects Goal Status (): At least 1 percent but less than 20 percent impaired, limited or restricted    PQRS: 18  Reviewed medication list with patient. No changes since last PT visit. Progress Note: []  Yes  []  No  Next due by: Visit #10      Latex Allergy:  [x]NO      []YES  Preferred Language for Healthcare:   [x]English       []other:    Visit # Insurance Allowable   7 ~25     Pain level:  Up to 5/10 9/18/18    SUBJECTIVE:   9+ weeks post op   States that the Prednisone pack is kicking and her migraine/burning sensation along her head/face is better. Her shoulder also feels better. Able to put her socks and shoes on with greater ease. As well as getting dressed in general is a little easier. Going for a biopsy tomorrow of the artery on the side of her head.         OBJECTIVE:              ROM PROM AROM  Comment     L R L R 18   Flexion  110      supine   Abduction  90           ER @45  50           IR             Elbow flex     138 145     Elbow ext     -20 -5           Strength: deferred secondary to surgery L R Comment         Special Tests Results/Comment: deferred secondary to surgery            RESTRICTIONS/PRECAUTIONS:   Delayed protocol    Exercises/Interventions:     Exercise/Equipment Resistance/Repetitions Comments   Cardio/Warm-up     Bike          Stretching/PROM     Wand ER at 0 5\"x15  Supine ER at 45 5\"x15  Supine flexion x15    Table Slides Flexion 5\"x30  Scaption 5\"x30    UE [place]     Pulleys BTD x5'    Gentle BB TP 10\"x10 Across her back  And starting to pull up over her shoulder              STRENGTH     Isometrics     Retraction x30       Weight shift     Flexion     Abduction     External Rotation     Internal Rotation     Biceps     Triceps          PRE's     Flexion     Abduction     External Rotation     Internal Rotation     Shrugs x30    EXT     Reverse Flys     Serratus     Horizontal Abd with ER     Biceps     Triceps     Retraction          Cable Column/Theraband     External Rotation     Internal Rotation     Shrugs     Lats     Ext     Flex     Scapular Retraction     BIC     TRIC     PNF          Neuromuscular Re-ed     Dynamic Stability                              Plyoback                    Manual/Modalities  Manual PROM left shoulder all planes x10'  Also did some supine elbow ext stretching     Gentle R/S IR/ER holds from neutral 3x30\"     Supine UE at 90, small circles cw/ccw x15 each               Therapeutic Exercise and NMR EXR  [x] (14373) Provided verbal/tactile cueing for activities related to strengthening, flexibility, endurance, ROM  for improvements in scapular, scapulothoracic and UE control with self care, reaching, carrying, lifting, house/yardwork, driving/computer work.    [] (75383) Provided verbal/tactile cueing for activities related to improving balance, coordination, kinesthetic sense, posture, motor skill, proprioception  to assist with  scapular, scapulothoracic and UE control with self care, reaching, carrying, lifting, house/yardwork, driving/computer work. Therapeutic Activities:    [x] (30157 or 86882) Provided verbal/tactile cueing for activities related to improving balance, coordination, kinesthetic sense, posture, motor skill, proprioception and motor activation to allow for proper function of scapular, scapulothoracic and UE control with self care, carrying, lifting, driving/computer work. 8/6/18 Spent time educating patient on post op expectations and time frames, sling wearing education, and HEP review.     Home Exercise Program:    [x] (64841) Reviewed/Progressed HEP activities related to strengthening, flexibility, endurance, ROM of scapular, scapulothoracic and UE control with self care, reaching, carrying, lifting, house/yardwork, driving/computer work  [] (98352) Reviewed/Progressed HEP activities related to improving balance, coordination, kinesthetic sense, posture, motor skill, proprioception of scapular, scapulothoracic and UE control with self care, reaching, carrying, lifting, house/yardwork, driving/computer work      Manual Treatments:  PROM / STM / Oscillations-Mobs:  G-I, II, III, IV (PA's, Inf., Post.)  [x] (95424) Provided manual therapy to mobilize soft tissue/joints of cervical/CT, scapular GHJ and UE for the purpose of modulating pain, promoting relaxation,  increasing ROM, reducing/eliminating soft tissue swelling/inflammation/restriction, improving soft tissue extensibility and allowing for proper ROM for normal function with self care, reaching, carrying, lifting, house/yardwork, driving/computer work    Modalities:  Ice x10' after     Charges:  Timed Code Treatment Minutes: 40   Total Treatment Minutes: 50     [] EVAL (LOW) 31283 (typically 20 minutes face-to-face)  [] EVAL (MOD) 19397 (typically 30 minutes face-to-face)  [] EVAL (HIGH) 55433 (typically 45 minutes face-to-face)  [] RE-EVAL   [x] AD(58261) x  1   [] IONTO  [] NMR (33599) x      [] VASO  [x] Manual (27716) x  1    [] Other:  [x] TA (05987) x  1 [] [facility] Traction (82313)  [] ES(attended) (58869)      [] ES (un) (32393):       GOALS:  Patient stated goal: Return to full use of left UE with all PLOF    Therapist goals for Patient:   Short Term Goals: To be achieved in: 2 weeks  1. Independent in HEP and progression per patient tolerance, in order to prevent re-injury. 2. Patient will have a decrease in pain to facilitate improvement in movement, function, and ADLs as indicated by Functional Deficits. Long Term Goals: To be achieved in: 6 weeks  1. Disability index score of 40% or less for the UEFI to assist with reaching prior level of function. 2. Patient will demonstrate increased AAROM to 50-75% of her right UE to allow for proper joint functioning as indicated by patients Functional Deficits. 3. Patient will return to all basic functional activities without increased symptoms or restriction. 4. Begin to wean out of sling and use left UE for low level activities     Progression Towards Functional goals:  [] Patient is progressing as expected towards functional goals listed. [] Progression is slowed due to complexities listed. [] Progression has been slowed due to co-morbidities. [x] Plan just implemented, too soon to assess goals progression  [] Other:     ASSESSMENT:    Seems to be moving better with her AAROM exercises today for her shoulder. Transitioned to over the shoulder with towel pull behind back stretch, she definitely felt more of a stretch in this position. She has some discomfort with gentle resisted IR more so than ER. Treatment/Activity Tolerance:  [x] Patient tolerated treatment well [] Patient limited by fatique  [] Patient limited by pain  [] Patient limited by other medical complications  [] Other:     Prognosis: [x] Good [] Fair  [] Poor    Patient Requires Follow-up: [x] Yes  [] No    PLAN: See eval.   Delayed protocol due to revision surgery.  Now 2x/week for progressions    [x] Continue per plan of

## 2018-09-26 ENCOUNTER — OFFICE VISIT (OUTPATIENT)
Dept: SURGERY | Age: 68
End: 2018-09-26
Payer: MEDICARE

## 2018-09-26 VITALS
WEIGHT: 234 LBS | SYSTOLIC BLOOD PRESSURE: 129 MMHG | DIASTOLIC BLOOD PRESSURE: 80 MMHG | BODY MASS INDEX: 40.17 KG/M2 | HEART RATE: 79 BPM

## 2018-09-26 DIAGNOSIS — R51.9 RIGHT TEMPORAL HEADACHE: Primary | ICD-10-CM

## 2018-09-26 DIAGNOSIS — Z79.52 CURRENT USE OF STEROID MEDICATION: ICD-10-CM

## 2018-09-26 DIAGNOSIS — H53.411 VISUAL FIELD SCOTOMA OF RIGHT EYE: ICD-10-CM

## 2018-09-26 PROCEDURE — 37609 LIGATION/BX TEMPORAL ARTERY: CPT | Performed by: SURGERY

## 2018-09-26 NOTE — PROGRESS NOTES
Subjective:      Patient ID: Ryan Saleem is a 79 y.o. female. Chief Complaint   Patient presents with    New Patient     Patient states that she has felt a severe burning/pain in the right side of her head. She is currently on Prednisone. She has been cleared by eye doctor.  Headache     HPI  She is here for right temporal artery biopsy. Recent onset of right temporal headache, and visual scotomata. Past history of multiple joint issues some requiring operative therapy (9 operative procedures in 6 years by her report)  Work up for rheumatoid arthritis was negative in past.  Presently she has an elevated sed rate to 54 and a few years ago ESR was normal  Has had carotid artery scan in 2014 as part of a screening and this was essentially a normal study. Past history of Raynaud's disease  Meds reviewed and she is currently taking Prednisone    Review of Systems    Objective:   Physical Exam   Constitutional: She is oriented to person, place, and time. She appears well-developed and well-nourished. Cardiovascular: Normal rate and regular rhythm. Palpable right temporal artery pulse in front of right ear under the hairline. Quality of the artery feels normal to physical exam     Pulmonary/Chest: Effort normal and breath sounds normal.   Musculoskeletal:        Left wrist: She exhibits decreased range of motion (splint on left wrist). Neurological: She is alert and oriented to person, place, and time. Gait (cane use) abnormal.   Nursing note and vitals reviewed. PROCEDURE; Right temporal hair clipped and underlying artery site marked. Skin prepped with Chlorhexidine. Local anesthesia of 1%xylocaine and epinephrine injected. Skin incision made and cautery used for hemostasis. Temporal artery and vein were exposed and the artery was dissected free over a 2 cm length. Inflow and outflow arteries were clamped and the artery was removed. 3-0 Vicryl ties were applied to the artery at both ends.   The

## 2018-09-26 NOTE — Clinical Note
Right temporal artery biopsy successfully completed today here in the office Pathology pending She did well Roberto Ball

## 2018-10-01 ENCOUNTER — TELEPHONE (OUTPATIENT)
Dept: INTERNAL MEDICINE CLINIC | Age: 68
End: 2018-10-01

## 2018-10-01 DIAGNOSIS — F32.A DEPRESSION, UNSPECIFIED DEPRESSION TYPE: ICD-10-CM

## 2018-10-01 RX ORDER — CITALOPRAM 20 MG/1
TABLET ORAL
Qty: 90 TABLET | Refills: 0 | Status: SHIPPED | OUTPATIENT
Start: 2018-10-01 | End: 2018-12-27 | Stop reason: SDUPTHER

## 2018-10-02 ENCOUNTER — TREATMENT (OUTPATIENT)
Dept: PHYSICAL THERAPY | Age: 68
End: 2018-10-02
Payer: MEDICARE

## 2018-10-02 DIAGNOSIS — G89.18 ACUTE POSTOPERATIVE PAIN OF LEFT SHOULDER: Primary | ICD-10-CM

## 2018-10-02 DIAGNOSIS — M25.512 ACUTE POSTOPERATIVE PAIN OF LEFT SHOULDER: Primary | ICD-10-CM

## 2018-10-02 DIAGNOSIS — M75.122 COMPLETE ROTATOR CUFF TEAR OF LEFT SHOULDER: ICD-10-CM

## 2018-10-02 PROCEDURE — G8984 CARRY CURRENT STATUS: HCPCS | Performed by: PHYSICAL THERAPIST

## 2018-10-02 PROCEDURE — 1100F PTFALLS ASSESS-DOCD GE2>/YR: CPT | Performed by: PHYSICAL THERAPIST

## 2018-10-02 PROCEDURE — G8985 CARRY GOAL STATUS: HCPCS | Performed by: PHYSICAL THERAPIST

## 2018-10-02 PROCEDURE — G8539 DOC FUNCT AND CARE PLAN: HCPCS | Performed by: PHYSICAL THERAPIST

## 2018-10-02 PROCEDURE — 97140 MANUAL THERAPY 1/> REGIONS: CPT | Performed by: PHYSICAL THERAPIST

## 2018-10-02 PROCEDURE — 97530 THERAPEUTIC ACTIVITIES: CPT | Performed by: PHYSICAL THERAPIST

## 2018-10-02 PROCEDURE — 97110 THERAPEUTIC EXERCISES: CPT | Performed by: PHYSICAL THERAPIST

## 2018-10-02 PROCEDURE — G8427 DOCREV CUR MEDS BY ELIG CLIN: HCPCS | Performed by: PHYSICAL THERAPIST

## 2018-10-02 PROCEDURE — G8730 PAIN DOC POS AND PLAN: HCPCS | Performed by: PHYSICAL THERAPIST

## 2018-10-02 NOTE — PLAN OF CARE
percent impaired, limited or restricted  Carrying, Moving and Handling Objects Goal Status (): At least 1 percent but less than 20 percent impaired, limited or restricted          PQRS: 10/2/18  Reviewed medication list with patient. No changes since last PT visit. Pain  [x]  Pain diagram was completed, pain was present and a follow up plan to address the pain is in the plan of care. ()   []  Pain diagram was completed and there was no pain present and therefore no follow up plan to address pain in the plan of care. ()   []  Pain diagram was not completed and the reason for not completing the pain diagram is in the medical chart ()  []  Patient refused to participate   []  Severe mental or physical capacity, patient is in urgent emergent situation and to complete the questionnaire would jeopardize the patient's health status        Functional Questionnaire  [x]  Patient completed the functional outcome questionnaire and deficiencies were addressed in the plan of care. ()   []  Patient completed the outcome questionnaire and there were no functional deficits identified and therefore no plan of care is required. ()   []  Patient did not complete the functional outcome questionnaire and the reason why is documented in the medical chart. ()  []  Patient refused to participate   []  Patient unable to complete the questionnaire   []   A functional outcome assessment has been reported on within the last 30 days        Falls  []  The patient has had no falls or 1 fall without injury in the past year. (1101F)   []  There is no documentation of fall in the medical chart (1101F,8P)     [x] The patient has had 2 or more falls or one fall with injury in the past year that is documented in the medical chart. (1100F)  []  A falls risk assessment was completed and documented in the medical chart. (7384W)   []  Falls were addressed in the plan of care.  (1327V)   []  A falls risk assessment was not completed and documented in the medical chart (0497C,1C)   []   Falls were not addressed in the plan of care and reason was documented in the plan of care. (1101N 1P)        Medication     [x] A list of current medications (including prescription, over the counter, herbal supplements, vitamin supplements, mineral supplements, dietary supplements) was reviewed with the patient and documented in the medical chart. ()      Progress Note: [x]  Yes  []  No  Next due by: Visit #10      Latex Allergy:  [x]NO      []YES  Preferred Language for Healthcare:   [x]English       []other:    Visit # Insurance Allowable   8 ~25     Pain level:  Up to 5/10 9/18/18    SUBJECTIVE:   10+ weeks post op   She reports that the last couple of days she has felt real \"jittery\" and not very sure of herself when standing. She believes it is from being off of the Prednisone. Was on for 2 weeks and just finished the prescription. Had her biopsy which was negative. Her shoulder has been a little sore lately. She thinks that is from laying on it some at night. Reports she is a little frustrated that she can't raise her arm up on her own quite yet.       OBJECTIVE:              ROM PROM AROM  Comment     L R L R 10/2/18   Flexion  145      supine   Abduction 120           ER @45  90           IR                              BB/IR   L4 T12           Strength: deferred secondary to surgery L R Comment         Special Tests Results/Comment: deferred secondary to surgery            RESTRICTIONS/PRECAUTIONS:   Delayed protocol    Exercises/Interventions:     Exercise/Equipment Resistance/Repetitions Comments   Cardio/Warm-up     Bike          Stretching/PROM     Wand ER at 0 5\"x15  Supine ER at 45 5\"x15  Supine flexion x15    Table Slides Flexion 5\"x30  Scaption 5\"x30    Wall Slide Flexion 5\"x10 With assist   UE [place]     Pulleys BTD x5'    Gentle BB TP 10\"x10 Across her back  And starting to pull up over her shoulder

## 2018-10-04 ENCOUNTER — TREATMENT (OUTPATIENT)
Dept: PHYSICAL THERAPY | Age: 68
End: 2018-10-04
Payer: MEDICARE

## 2018-10-04 ENCOUNTER — OFFICE VISIT (OUTPATIENT)
Dept: ORTHOPEDIC SURGERY | Age: 68
End: 2018-10-04

## 2018-10-04 VITALS
BODY MASS INDEX: 39.95 KG/M2 | HEART RATE: 86 BPM | WEIGHT: 234 LBS | SYSTOLIC BLOOD PRESSURE: 113 MMHG | DIASTOLIC BLOOD PRESSURE: 67 MMHG | HEIGHT: 64 IN

## 2018-10-04 DIAGNOSIS — Z98.890 S/P ROTATOR CUFF REPAIR: Primary | ICD-10-CM

## 2018-10-04 DIAGNOSIS — M75.122 COMPLETE ROTATOR CUFF TEAR OF LEFT SHOULDER: ICD-10-CM

## 2018-10-04 DIAGNOSIS — G89.18 ACUTE POSTOPERATIVE PAIN OF LEFT SHOULDER: Primary | ICD-10-CM

## 2018-10-04 DIAGNOSIS — M25.512 ACUTE POSTOPERATIVE PAIN OF LEFT SHOULDER: Primary | ICD-10-CM

## 2018-10-04 PROCEDURE — 97110 THERAPEUTIC EXERCISES: CPT | Performed by: PHYSICAL THERAPIST

## 2018-10-04 PROCEDURE — 97530 THERAPEUTIC ACTIVITIES: CPT | Performed by: PHYSICAL THERAPIST

## 2018-10-04 PROCEDURE — 99024 POSTOP FOLLOW-UP VISIT: CPT | Performed by: ORTHOPAEDIC SURGERY

## 2018-10-04 PROCEDURE — G8427 DOCREV CUR MEDS BY ELIG CLIN: HCPCS | Performed by: PHYSICAL THERAPIST

## 2018-10-04 PROCEDURE — 97140 MANUAL THERAPY 1/> REGIONS: CPT | Performed by: PHYSICAL THERAPIST

## 2018-10-07 NOTE — PROGRESS NOTES
History of Present Illness:  Sariah Vargas Returns for follow-up of her left shoulder. She is nearly 3 months out following repair of a massive rotator cuff tear. This was complicated by a setback when she fell and broke her left wrist.  She's been seeing Dr. Inocencio Meckel for this. She did not require surgery. She is now any removable splint. Her shoulder pain feels better but it remains tender to touch. She has pain that radiates down the front to her biceps. She is starting to sleep better at night. Medical History:  Patient's medications, allergies, past medical, surgical, social and family histories were reviewed and updated as appropriate. Pertinent items are noted in HPI  Review of systems reviewed from Patient History Form dated on May 31, 2018 and available in the patient's chart under the Media tab. Vital Signs:  Vitals:    10/04/18 0906   BP: 113/67   Pulse: 86     Constitutional: She is in no apparent distress. She appears her stated age. Her left wrist is in a moldable thermoplastic removable splint. Left Shoulder Examination:    Inspection:  The portals are benign appearing. Palpation:  Tenderness to  Touch. No coarse crepitus. There is a little bit of a click, intermittent. Active Range of Motion: Active elevation is to 80 and abduction to about the same. Passive Range of Motion:  Passive external rotation at the side of 40. Strength:  Negative belly press test.  Negative Lag. Special Tests:  No Herson muscle deformity. Assessment :  My impression is that Jaylen Marte is doing better. She is turning the corner as far as active range of motion is concerned. The left wrist fracture set us back about a month. Impression:  Encounter Diagnosis   Name Primary?     S/P rotator cuff repair Yes       Office Procedures:  Orders Placed This Encounter   Procedures    Ambulatory referral to Physical Therapy     Referral Priority:   Routine     Referral Type:   Eval and

## 2018-10-09 ENCOUNTER — TREATMENT (OUTPATIENT)
Dept: PHYSICAL THERAPY | Age: 68
End: 2018-10-09
Payer: MEDICARE

## 2018-10-09 DIAGNOSIS — M25.512 ACUTE POSTOPERATIVE PAIN OF LEFT SHOULDER: Primary | ICD-10-CM

## 2018-10-09 DIAGNOSIS — G89.18 ACUTE POSTOPERATIVE PAIN OF LEFT SHOULDER: Primary | ICD-10-CM

## 2018-10-09 DIAGNOSIS — M75.122 COMPLETE ROTATOR CUFF TEAR OF LEFT SHOULDER: ICD-10-CM

## 2018-10-09 PROCEDURE — G8427 DOCREV CUR MEDS BY ELIG CLIN: HCPCS | Performed by: PHYSICAL THERAPIST

## 2018-10-09 PROCEDURE — 97140 MANUAL THERAPY 1/> REGIONS: CPT | Performed by: PHYSICAL THERAPIST

## 2018-10-09 PROCEDURE — 97530 THERAPEUTIC ACTIVITIES: CPT | Performed by: PHYSICAL THERAPIST

## 2018-10-09 PROCEDURE — 97110 THERAPEUTIC EXERCISES: CPT | Performed by: PHYSICAL THERAPIST

## 2018-10-09 NOTE — FLOWSHEET NOTE
[facility]   Phone: [phone]    Fax: [phone]        Physical Therapy Daily Treatment Note  Date:  10/9/2018    Patient Name:  Britt Rich    :  1950  MRN: [id number]  Medical/Treatment Diagnosis Information:  · Diagnosis: s/p Left shoulder revision RTC Repair   DOS      Insurance/Certification information:  PT Insurance Information: Medicare  Physician Information:  Referring Practitioner: Dr Micky New of care signed (Y/N):   POC signed 10/3/18    Date of Patient follow up with Physician: 18    G-Code (if applicable):      Date G-Code Applied:  10/2/18    PT G-Codes  Functional Assessment Tool Used: UEFI  Score: 58/80=-72.5%  (27.5% functional limitation)  Functional Limitation: Carrying, moving and handling objects  Carrying, Moving and Handling Objects Current Status (): At least 40 percent but less than 60 percent impaired, limited or restricted  Carrying, Moving and Handling Objects Goal Status (): At least 1 percent but less than 20 percent impaired, limited or restricted        PQRS: 10/9/18  Reviewed medication list with patient. Pt started taking Aleve on Friday for her shoulder and arm. Progress Note: []  Yes  [x]  No  Next due by:  10/30/18      Latex Allergy:  [x]NO      []YES  Preferred Language for Healthcare:   [x]English       []other:    Visit # Insurance Allowable   10 ~25     Pain level:   4/10 10/8/18     SUBJECTIVE:   11+ weeks post op  Pt states she started taking Aleve last Friday for her shoulder pain. She thinks it is helping some but her shoulder is still sore. She notes that the exercise that gives her the most discomfort at home is the wall slide. She sees her hand/wrist doctor today and is hoping to get her splint off.           OBJECTIVE:              ROM PROM AROM  Comment     L R L R 10/2/18   Flexion  145      supine   Abduction 120           ER @45  90           IR

## 2018-10-11 ENCOUNTER — TREATMENT (OUTPATIENT)
Dept: PHYSICAL THERAPY | Age: 68
End: 2018-10-11
Payer: MEDICARE

## 2018-10-11 DIAGNOSIS — M25.512 ACUTE POSTOPERATIVE PAIN OF LEFT SHOULDER: Primary | ICD-10-CM

## 2018-10-11 DIAGNOSIS — M75.122 COMPLETE ROTATOR CUFF TEAR OF LEFT SHOULDER: ICD-10-CM

## 2018-10-11 DIAGNOSIS — G89.18 ACUTE POSTOPERATIVE PAIN OF LEFT SHOULDER: Primary | ICD-10-CM

## 2018-10-11 PROCEDURE — 97110 THERAPEUTIC EXERCISES: CPT | Performed by: PHYSICAL THERAPIST

## 2018-10-11 PROCEDURE — 97530 THERAPEUTIC ACTIVITIES: CPT | Performed by: PHYSICAL THERAPIST

## 2018-10-11 PROCEDURE — G8427 DOCREV CUR MEDS BY ELIG CLIN: HCPCS | Performed by: PHYSICAL THERAPIST

## 2018-10-11 PROCEDURE — 97140 MANUAL THERAPY 1/> REGIONS: CPT | Performed by: PHYSICAL THERAPIST

## 2018-10-11 NOTE — FLOWSHEET NOTE
Kamran Agarwal [facility]   Phone: [phone]    Fax: [phone]        Physical Therapy Daily Treatment Note  Date:  10/11/2018    Patient Name:  Adeline Salcedo    :  1950  MRN: [id number]  Medical/Treatment Diagnosis Information:  · Diagnosis: s/p Left shoulder revision RTC Repair   DOS      Insurance/Certification information:  PT Insurance Information: Medicare  Physician Information:  Referring Practitioner: Dr Baker Star of care signed (Y/N):   POC signed 10/3/18    Date of Patient follow up with Physician: 18    G-Code (if applicable):      Date G-Code Applied:  10/2/18    PT G-Codes  Functional Assessment Tool Used: UEFI  Score: 58/80=-72.5%  (27.5% functional limitation)  Functional Limitation: Carrying, moving and handling objects  Carrying, Moving and Handling Objects Current Status (): At least 40 percent but less than 60 percent impaired, limited or restricted  Carrying, Moving and Handling Objects Goal Status (): At least 1 percent but less than 20 percent impaired, limited or restricted        PQRS: 10/11/18  Reviewed medication list with patient. Pt started taking Aleve on Friday for her shoulder and arm. Progress Note: []  Yes  [x]  No  Next due by:  10/30/18      Latex Allergy:  [x]NO      []YES  Preferred Language for Healthcare:   [x]English       []other:    Visit # Insurance Allowable   11 ~25     Pain level:   3/10 10/11/18     SUBJECTIVE:   11+ weeks post op  Soreness in left shoulder is slowly getting better. Left shoulder does catch at times. She got her wrist splint off on Tuesday. 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             OBJECTIVE:              ROM PROM AROM  Comment     L R L R 10/2/18   Flexion  145      supine   Abduction 120           ER @45  90           IR                              BB/IR   L4 T12   Fair  [] Poor    Patient Requires Follow-up: [x] Yes  [] No    PLAN: See eval.   Delayed protocol due to revision surgery.  Now 2x/week for progressions  [x] Continue per plan of care [] Alter current plan (see comments)  [] Plan of care initiated [] Hold pending MD visit [] Discharge    Electronically signed by:Shauna Tamra Klinefelter, PT    Physical Therapist Tanya Orellana [address] #[id number]  Physical Therapist [place] License #[id number]

## 2018-10-12 RX ORDER — PREDNISONE 10 MG/1
TABLET ORAL
Qty: 60 TABLET | Refills: 0 | Status: SHIPPED | OUTPATIENT
Start: 2018-10-12 | End: 2018-10-27 | Stop reason: SDUPTHER

## 2018-10-16 ENCOUNTER — TREATMENT (OUTPATIENT)
Dept: PHYSICAL THERAPY | Age: 68
End: 2018-10-16
Payer: MEDICARE

## 2018-10-16 DIAGNOSIS — G89.18 ACUTE POSTOPERATIVE PAIN OF LEFT SHOULDER: Primary | ICD-10-CM

## 2018-10-16 DIAGNOSIS — M75.122 COMPLETE ROTATOR CUFF TEAR OF LEFT SHOULDER: ICD-10-CM

## 2018-10-16 DIAGNOSIS — M25.512 ACUTE POSTOPERATIVE PAIN OF LEFT SHOULDER: Primary | ICD-10-CM

## 2018-10-16 PROCEDURE — 97530 THERAPEUTIC ACTIVITIES: CPT | Performed by: PHYSICAL THERAPIST

## 2018-10-16 PROCEDURE — 97110 THERAPEUTIC EXERCISES: CPT | Performed by: PHYSICAL THERAPIST

## 2018-10-16 PROCEDURE — 97140 MANUAL THERAPY 1/> REGIONS: CPT | Performed by: PHYSICAL THERAPIST

## 2018-10-16 PROCEDURE — G8427 DOCREV CUR MEDS BY ELIG CLIN: HCPCS | Performed by: PHYSICAL THERAPIST

## 2018-10-16 NOTE — FLOWSHEET NOTE
Special Tests Results/Comment: deferred secondary to surgery            RESTRICTIONS/PRECAUTIONS:   Delayed protocol    Exercises/Interventions:     Exercise/Equipment Resistance/Repetitions Comments   Cardio/Warm-up     Bike          Stretching/PROM     Wand ER at 0 5\"x15  Supine ER at 45 5\"x15  Supine flexion x15    Table Slides Flexion 5\"x20  Scaption 5\"x20 Inclined on table   Wall Slide Flexion 5\"x10 With assist   UE [place]     Pulleys BTD x6'    Gentle BB TP 10\"x10 Across her back  And starting to pull up over her shoulder              STRENGTH     Isometrics           Weight shift     Flexion     Abduction     External Rotation 10\" x 10 Submax   Internal Rotation 10\" x 10 Submax   Biceps     Triceps          PRE's     Flexion     Abduction     External Rotation     Internal Rotation        EXT     Reverse Flys     Serratus     Horizontal Abd with ER     Biceps 2# x30    Triceps     Retraction          Cable Column/Theraband     External Rotation     Internal Rotation     Shrugs     Lats     Ext     Flex     Scapular Retraction Blue x30    BIC     TRIC     PNF          Neuromuscular Re-ed     Dynamic Stability                              Plyoback                    Manual/Modalities  Manual PROM left shoulder all planes x12'  Also did some supine elbow ext stretching     Gentle R/S IR/ER holds from neutral 3x30\"     Supine UE at 90, small circles cw/ccw, fw/bw, sd/sd x30 each               Therapeutic Exercise and NMR EXR  [x] (17842) Provided verbal/tactile cueing for activities related to strengthening, flexibility, endurance, ROM  for improvements in scapular, scapulothoracic and UE control with self care, reaching, carrying, lifting, house/yardwork, driving/computer work.    [] (89638) Provided verbal/tactile cueing for activities related to improving balance, coordination, kinesthetic sense, posture, motor skill, proprioception  to assist with  scapular, scapulothoracic and UE control with self care, reaching, carrying, lifting, house/yardwork, driving/computer work. Therapeutic Activities:    [x] (03503 or 02292) Provided verbal/tactile cueing for activities related to improving balance, coordination, kinesthetic sense, posture, motor skill, proprioception and motor activation to allow for proper function of scapular, scapulothoracic and UE control with self care, carrying, lifting, driving/computer work. 8/6/18 Spent time educating patient on post op expectations and time frames, sling wearing education, and HEP review.     Home Exercise Program:    [x] (04680) Reviewed/Progressed HEP activities related to strengthening, flexibility, endurance, ROM of scapular, scapulothoracic and UE control with self care, reaching, carrying, lifting, house/yardwork, driving/computer work  [] (27967) Reviewed/Progressed HEP activities related to improving balance, coordination, kinesthetic sense, posture, motor skill, proprioception of scapular, scapulothoracic and UE control with self care, reaching, carrying, lifting, house/yardwork, driving/computer work      Manual Treatments:  PROM / STM / Oscillations-Mobs:  G-I, II, III, IV (PA's, Inf., Post.)  [x] (62666) Provided manual therapy to mobilize soft tissue/joints of cervical/CT, scapular GHJ and UE for the purpose of modulating pain, promoting relaxation,  increasing ROM, reducing/eliminating soft tissue swelling/inflammation/restriction, improving soft tissue extensibility and allowing for proper ROM for normal function with self care, reaching, carrying, lifting, house/yardwork, driving/computer work    Modalities:  Ice x10' after     Charges:  Timed Code Treatment Minutes: 45   Total Treatment Minutes: 55     [] EVAL (LOW) 53251 (typically 20 minutes face-to-face)  [] EVAL (MOD) 42691 (typically 30 minutes face-to-face)  [] EVAL (HIGH) 01836 (typically 45 minutes face-to-face)  [] RE-EVAL   [x] EV(79212) x  1   [] IONTO  [] NMR (84667) x      [] VASO  [x] Manual (38428) x  1    [] Other:  [x] TA (52167) x  1    [] [facility] (97235)  [] ES(attended) (08334)      [] ES (un) (03434):       GOALS:  Patient stated goal: Return to full use of left UE with all PLOF    Therapist goals for Patient:   Short Term Goals: To be achieved in: 2 weeks  1. Independent in HEP and progression per patient tolerance, in order to prevent re-injury. MET  2. Patient will have a decrease in pain to facilitate improvement in movement, function, and ADLs as indicated by Functional Deficits. PROGRESSING    Long Term Goals: To be achieved in: 6 weeks  1. Disability index score of 40% or less for the UEFI to assist with reaching prior level of function. -PROGRESSING  2. Patient will demonstrate increased AAROM to 50-75% of her right UE to allow for proper joint functioning as indicated by patients Functional Deficits. -MOSTLY MET  3. Patient will return to all basic functional activities without increased symptoms or restriction. -PARTIALLY MET  4. Begin to wean out of sling and use left UE for low level activities -MET  GOALS UPDATED 10/2/18    Progression Towards Functional goals:  [] Patient is progressing as expected towards functional goals listed. [] Progression is slowed due to complexities listed. [] Progression has been slowed due to co-morbidities. [x] Plan just implemented, too soon to assess goals progression  [] Other:     ASSESSMENT:     Doing well with current program.   We are holding off on dedicated cuff strengthening as per Dr Rommel Hernández. She was able to do all of her reps of wall slides independently today without any help from her other arm. Good control with her arm in space when supine. The up/down was the most fatiguing.            Treatment/Activity Tolerance:  [x] Patient tolerated treatment well [] Patient limited by fatigue  [] Patient limited by pain  [] Patient limited by other medical complications  [] Other:     Prognosis: [x] Good [] Fair  []

## 2018-10-18 ENCOUNTER — TREATMENT (OUTPATIENT)
Dept: PHYSICAL THERAPY | Age: 68
End: 2018-10-18
Payer: MEDICARE

## 2018-10-18 DIAGNOSIS — M25.512 ACUTE POSTOPERATIVE PAIN OF LEFT SHOULDER: Primary | ICD-10-CM

## 2018-10-18 DIAGNOSIS — M75.122 COMPLETE ROTATOR CUFF TEAR OF LEFT SHOULDER: ICD-10-CM

## 2018-10-18 DIAGNOSIS — G89.18 ACUTE POSTOPERATIVE PAIN OF LEFT SHOULDER: Primary | ICD-10-CM

## 2018-10-18 PROCEDURE — 97140 MANUAL THERAPY 1/> REGIONS: CPT | Performed by: PHYSICAL THERAPIST

## 2018-10-18 PROCEDURE — 97110 THERAPEUTIC EXERCISES: CPT | Performed by: PHYSICAL THERAPIST

## 2018-10-18 PROCEDURE — 97530 THERAPEUTIC ACTIVITIES: CPT | Performed by: PHYSICAL THERAPIST

## 2018-10-18 PROCEDURE — G8427 DOCREV CUR MEDS BY ELIG CLIN: HCPCS | Performed by: PHYSICAL THERAPIST

## 2018-10-18 NOTE — FLOWSHEET NOTE
Kamran Agarwal [facility]   Phone: [phone]    Fax: [phone]        Physical Therapy Daily Treatment Note  Date:  10/18/2018    Patient Name:  Marcie Diane    :  1950  MRN: [id number]  Medical/Treatment Diagnosis Information:  · Diagnosis: s/p Left shoulder revision RTC Repair   DOS      Insurance/Certification information:  PT Insurance Information: Medicare  Physician Information:  Referring Practitioner: Dr Flor Lopes of care signed (Y/N):   POC signed 10/3/18    Date of Patient follow up with Physician: 18    G-Code (if applicable):      Date G-Code Applied:  10/2/18    PT G-Codes  Functional Assessment Tool Used: UEFI  Score: 58/80=-72.5%  (27.5% functional limitation)  Functional Limitation: Carrying, moving and handling objects  Carrying, Moving and Handling Objects Current Status (): At least 40 percent but less than 60 percent impaired, limited or restricted  Carrying, Moving and Handling Objects Goal Status (): At least 1 percent but less than 20 percent impaired, limited or restricted        PQRS: 10/18/18  Reviewed medication list with patient. Pt started taking Aleve on Friday for her shoulder and arm. Progress Note: []  Yes  [x]  No  Next due by:  10/30/18      Latex Allergy:  [x]NO      []YES  Preferred Language for Healthcare:   [x]English       []other:    Visit # Insurance Allowable   13 ~25     Pain level:   3/10 10/11/18     SUBJECTIVE:   ~3 months post op  She reports that she really pushed herself with the exercises and stretches yesterday at home and feels a little sore as a result. Overall, she is happy with her progress and feels like she is getting better every week.           OBJECTIVE:              ROM PROM AROM  Comment     RUBA IBARRA 10/2/18   Flexion  145      supine   Abduction 120           ER @45  90           IR                              BB/IR   L4 T12           Strength: deferred secondary to surgery L R Comment         Special Tests Results/Comment: deferred secondary to surgery            RESTRICTIONS/PRECAUTIONS:   Delayed protocol    Exercises/Interventions:     Exercise/Equipment Resistance/Repetitions Comments   Cardio/Warm-up     Bike          Stretching/PROM     Wand ER at 0 5\"x15  Supine ER at 45 5\"x15  Supine flexion x15    Table Slides Flexion 5\"x20  Scaption 5\"x20 Inclined on table   Wall Slide Flexion 5\"x10    UE [place] AAROM up/down and cw/ccw x30 each [place] on inclined table   Pulleys BTD x6'    Gentle BB TP 10\"x10 Across her back  And starting to pull up over her shoulder              STRENGTH     Isometrics           Weight shift     Flexion     Abduction     External Rotation 10\" x 10 Submax   Internal Rotation 10\" x 10 Submax   Biceps     Triceps          PRE's     Flexion     Abduction     External Rotation     Internal Rotation        EXT     Reverse Flys     Serratus     Horizontal Abd with ER     Biceps 2# x30    Triceps     Retraction          Cable Column/Theraband     External Rotation     Internal Rotation     Shrugs     Lats     Ext     Flex     Scapular Retraction Blue x30    BIC     TRIC     PNF          Neuromuscular Re-ed     Dynamic Stability                              [place]lyoback                    Manual/Modalities  Manual PROM left shoulder all planes x12'  Also did some supine elbow ext stretching     Gentle R/S IR/ER holds from neutral 3x30\"     Supine UE at 90, small circles cw/ccw, fw/bw, sd/sd x30 each               Therapeutic Exercise and NMR EXR  [x] (30598) Provided verbal/tactile cueing for activities related to strengthening, flexibility, endurance, ROM  for improvements in scapular, scapulothoracic and UE control with self care, reaching, carrying, lifting, house/yardwork, driving/computer work.    [] (18975) Provided verbal/tactile cueing for activities related to improving balance, coordination, kinesthetic sense, posture, motor skill, proprioception  to assist with

## 2018-10-23 ENCOUNTER — TREATMENT (OUTPATIENT)
Dept: PHYSICAL THERAPY | Age: 68
End: 2018-10-23
Payer: MEDICARE

## 2018-10-23 DIAGNOSIS — M75.122 COMPLETE ROTATOR CUFF TEAR OF LEFT SHOULDER: ICD-10-CM

## 2018-10-23 DIAGNOSIS — G89.18 ACUTE POSTOPERATIVE PAIN OF LEFT SHOULDER: Primary | ICD-10-CM

## 2018-10-23 DIAGNOSIS — M25.512 ACUTE POSTOPERATIVE PAIN OF LEFT SHOULDER: Primary | ICD-10-CM

## 2018-10-23 PROCEDURE — 97140 MANUAL THERAPY 1/> REGIONS: CPT | Performed by: PHYSICAL THERAPIST

## 2018-10-23 PROCEDURE — 97110 THERAPEUTIC EXERCISES: CPT | Performed by: PHYSICAL THERAPIST

## 2018-10-23 PROCEDURE — G8427 DOCREV CUR MEDS BY ELIG CLIN: HCPCS | Performed by: PHYSICAL THERAPIST

## 2018-10-23 PROCEDURE — 97530 THERAPEUTIC ACTIVITIES: CPT | Performed by: PHYSICAL THERAPIST

## 2018-10-23 NOTE — FLOWSHEET NOTE
Comment         Special Tests Results/Comment: deferred secondary to surgery            RESTRICTIONS/PRECAUTIONS:   Delayed protocol    Exercises/Interventions:     Exercise/Equipment Resistance/Repetitions Comments   Cardio/Warm-up     Bike          Stretching/PROM     Wand ER at 0 5\"x15  Supine ER at 45 5\"x15  Supine flexion x15    Table Slides Flexion 5\"x20  Scaption 5\"x20 Inclined on table   Wall Slide Flexion 5\"x20    UE [place] AAROM up/down and cw/ccw x30 each [place] on inclined table   Pulleys BTD x6'    Gentle BB TP 10\"x10 Across her back  And starting to pull up over her shoulder              STRENGTH     Isometrics           Weight shift     Flexion     Abduction     External Rotation 10\" x 20 Submax   Internal Rotation 10\" x 20 Submax   Biceps     Triceps          PRE's     Flexion     Abduction     External Rotation     Internal Rotation        EXT     Reverse Flys     Serratus     Horizontal Abd with ER     Biceps 3# x30    Triceps     Retraction          Cable Column/Theraband     External Rotation     Internal Rotation     Shrugs     Lats     Ext     Flex     Scapular Retraction Black x30    BIC     TRIC     PNF          Neuromuscular Re-ed     Dynamic Stability                              [place]ly[place]ack                    Manual/Modalities  Manual PROM left shoulder all planes x12'  Also did some supine elbow ext stretching     Gentle R/S IR/ER holds from neutral 3x30\"     Supine UE at 90, small circles cw/ccw, fw/bw, sd/sd x30 each 2# in her hand               Therapeutic Exercise and NMR EXR  [x] (10691) Provided verbal/tactile cueing for activities related to strengthening, flexibility, endurance, ROM  for improvements in scapular, scapulothoracic and UE control with self care, reaching, carrying, lifting, house/yardwork, driving/computer work.    [] (79911) Provided verbal/tactile cueing for activities related to improving balance, coordination, kinesthetic sense, posture, motor skill, proprioception to assist with  scapular, scapulothoracic and UE control with self care, reaching, carrying, lifting, house/yardwork, driving/computer work. Therapeutic Activities:    [x] (94089 or 66651) Provided verbal/tactile cueing for activities related to improving balance, coordination, kinesthetic sense, posture, motor skill, proprioception and motor activation to allow for proper function of scapular, scapulothoracic and UE control with self care, carrying, lifting, driving/computer work. 8/6/18 Spent time educating patient on post op expectations and time frames, sling wearing education, and HEP review.     Home Exercise Program:    [x] (27890) Reviewed/Progressed HEP activities related to strengthening, flexibility, endurance, ROM of scapular, scapulothoracic and UE control with self care, reaching, carrying, lifting, house/yardwork, driving/computer work  [] (85488) Reviewed/Progressed HEP activities related to improving balance, coordination, kinesthetic sense, posture, motor skill, proprioception of scapular, scapulothoracic and UE control with self care, reaching, carrying, lifting, house/yardwork, driving/computer work      Manual Treatments:  PROM / STM / Oscillations-Mobs:  G-I, II, III, IV (PA's, Inf., Post.)  [x] (72442) Provided manual therapy to mobilize soft tissue/joints of cervical/CT, scapular GHJ and UE for the purpose of modulating pain, promoting relaxation,  increasing ROM, reducing/eliminating soft tissue swelling/inflammation/restriction, improving soft tissue extensibility and allowing for proper ROM for normal function with self care, reaching, carrying, lifting, house/yardwork, driving/computer work    Modalities:  Ice x10' after     Charges:  Timed Code Treatment Minutes: 45   Total Treatment Minutes: 60     [] EVAL (LOW) 01234 (typically 20 minutes face-to-face)  [] EVAL (MOD) 41111 (typically 30 minutes face-to-face)  [] EVAL (HIGH) 20467 (typically 45 minutes face-to-face)  [] RE-EVAL

## 2018-10-25 ENCOUNTER — TREATMENT (OUTPATIENT)
Dept: PHYSICAL THERAPY | Age: 68
End: 2018-10-25
Payer: MEDICARE

## 2018-10-25 DIAGNOSIS — M75.122 COMPLETE ROTATOR CUFF TEAR OF LEFT SHOULDER: ICD-10-CM

## 2018-10-25 DIAGNOSIS — G89.18 ACUTE POSTOPERATIVE PAIN OF LEFT SHOULDER: Primary | ICD-10-CM

## 2018-10-25 DIAGNOSIS — M25.512 ACUTE POSTOPERATIVE PAIN OF LEFT SHOULDER: Primary | ICD-10-CM

## 2018-10-25 PROCEDURE — 97140 MANUAL THERAPY 1/> REGIONS: CPT | Performed by: PHYSICAL THERAPIST

## 2018-10-25 PROCEDURE — G8427 DOCREV CUR MEDS BY ELIG CLIN: HCPCS | Performed by: PHYSICAL THERAPIST

## 2018-10-25 PROCEDURE — 97530 THERAPEUTIC ACTIVITIES: CPT | Performed by: PHYSICAL THERAPIST

## 2018-10-25 PROCEDURE — 97110 THERAPEUTIC EXERCISES: CPT | Performed by: PHYSICAL THERAPIST

## 2018-10-25 NOTE — FLOWSHEET NOTE
reaching, carrying, lifting, house/yardwork, driving/computer work. Therapeutic Activities:    [x] (60552 or 22983) Provided verbal/tactile cueing for activities related to improving balance, coordination, kinesthetic sense, posture, motor skill, proprioception and motor activation to allow for proper function of scapular, scapulothoracic and UE control with self care, carrying, lifting, driving/computer work. 8/6/18 Spent time educating patient on post op expectations and time frames, sling wearing education, and HEP review.     Home Exercise Program:    [x] (82071) Reviewed/Progressed HEP activities related to strengthening, flexibility, endurance, ROM of scapular, scapulothoracic and UE control with self care, reaching, carrying, lifting, house/yardwork, driving/computer work  [] (54359) Reviewed/Progressed HEP activities related to improving balance, coordination, kinesthetic sense, posture, motor skill, proprioception of scapular, scapulothoracic and UE control with self care, reaching, carrying, lifting, house/yardwork, driving/computer work      Manual Treatments:  PROM / STM / Oscillations-Mobs:  G-I, II, III, IV (PA's, Inf., Post.)  [x] (29924) Provided manual therapy to mobilize soft tissue/joints of cervical/CT, scapular GHJ and UE for the purpose of modulating pain, promoting relaxation,  increasing ROM, reducing/eliminating soft tissue swelling/inflammation/restriction, improving soft tissue extensibility and allowing for proper ROM for normal function with self care, reaching, carrying, lifting, house/yardwork, driving/computer work    Modalities:  Ice x10' after     Charges:  Timed Code Treatment Minutes: 45   Total Treatment Minutes: 60     [] EVAL (LOW) 76699 (typically 20 minutes face-to-face)  [] EVAL (MOD) 10296 (typically 30 minutes face-to-face)  [] EVAL (HIGH) 51214 (typically 45 minutes face-to-face)  [] RE-EVAL   [x] SE(27135) x  1   [] IONTO  [] NMR (19808) x      [] VASO  [x] Manual protocol due to revision surgery.  Now 2x/week for progressions  [x] Continue per plan of care [] Alter current plan (see comments)  [] Plan of care initiated [] Hold pending MD visit [] Discharge    Electronically signed by    Diaz Farrell, [address] [place] [address] PT #[id number]

## 2018-10-27 RX ORDER — PREDNISONE 10 MG/1
TABLET ORAL
Qty: 9 TABLET | Refills: 0 | Status: SHIPPED | OUTPATIENT
Start: 2018-10-27 | End: 2018-11-08

## 2018-10-30 ENCOUNTER — TREATMENT (OUTPATIENT)
Dept: PHYSICAL THERAPY | Age: 68
End: 2018-10-30
Payer: MEDICARE

## 2018-10-30 DIAGNOSIS — G89.18 ACUTE POSTOPERATIVE PAIN OF LEFT SHOULDER: Primary | ICD-10-CM

## 2018-10-30 DIAGNOSIS — M75.122 COMPLETE ROTATOR CUFF TEAR OF LEFT SHOULDER: ICD-10-CM

## 2018-10-30 DIAGNOSIS — M25.512 ACUTE POSTOPERATIVE PAIN OF LEFT SHOULDER: Primary | ICD-10-CM

## 2018-10-30 PROCEDURE — 97110 THERAPEUTIC EXERCISES: CPT | Performed by: PHYSICAL THERAPIST

## 2018-10-30 PROCEDURE — 97530 THERAPEUTIC ACTIVITIES: CPT | Performed by: PHYSICAL THERAPIST

## 2018-10-30 PROCEDURE — 97140 MANUAL THERAPY 1/> REGIONS: CPT | Performed by: PHYSICAL THERAPIST

## 2018-10-30 PROCEDURE — 1101F PT FALLS ASSESS-DOCD LE1/YR: CPT | Performed by: PHYSICAL THERAPIST

## 2018-10-30 PROCEDURE — G8427 DOCREV CUR MEDS BY ELIG CLIN: HCPCS | Performed by: PHYSICAL THERAPIST

## 2018-10-30 PROCEDURE — G8985 CARRY GOAL STATUS: HCPCS | Performed by: PHYSICAL THERAPIST

## 2018-10-30 PROCEDURE — G8539 DOC FUNCT AND CARE PLAN: HCPCS | Performed by: PHYSICAL THERAPIST

## 2018-10-30 PROCEDURE — G8984 CARRY CURRENT STATUS: HCPCS | Performed by: PHYSICAL THERAPIST

## 2018-10-30 PROCEDURE — G8730 PAIN DOC POS AND PLAN: HCPCS | Performed by: PHYSICAL THERAPIST

## 2018-10-30 NOTE — PLAN OF CARE
care. (0352O)   []  A falls risk assessment was not completed and documented in the medical chart (6088A,9V)   []   Falls were not addressed in the plan of care and reason was documented in the plan of care. (2944T 1P)              Progress Note: [x]  Yes  []  No  Next due by:  10/30/18      Latex Allergy:  [x]NO      []YES  Preferred Language for Healthcare:   [x]English       []other:    Visit # Insurance Allowable   16 ~25     Pain level:   0-1/10 10/30/18     SUBJECTIVE:   3+ months post op    She reports that she is happy with her progress. No longer has constant pain in her shoulder. She states she feels more tired than usual but attributes that to weaning off the Prednisone. Feels like her motion is doing well and just lacks in strength.         OBJECTIVE:              ROM PROM AROM  Comment     L R L R 10/30/18   Flexion  155  supine  120° 145° Stand for AROM   Abduction 150           ER @90  100           IR @ 90 40                            BB/IR   T10-11 T11           Strength: deferred secondary to surgery L R Comment         Special Tests Results/Comment: deferred secondary to surgery            RESTRICTIONS/PRECAUTIONS:   Delayed protocol    Exercises/Interventions:     Exercise/Equipment Resistance/Repetitions Comments   Cardio/Warm-up     Bike          Stretching/PROM     Wand ER at 0 5\"x15  Supine ER at 45 5\"x15  Supine flexion x15    Wall Slide Flexion 5\"x30  Scaption 5\"x15    UE [place] AAROM up/down and cw/ccw x30 each [place] on inclined table   Pulleys BTD x6'    Gentle BB TP 10\"x10 Across her back  And starting to pull up over her shoulder              STRENGTH     Isometrics           Weight shift     Flexion     Abduction     External Rotation 10\" x 20 Submax   Internal Rotation 10\" x 20 Submax   Biceps     Triceps          PRE's     Flexion     Abduction     External Rotation     Internal Rotation        EXT     Reverse Flys     Serratus     Horizontal Abd with ER     Biceps 3# x30 Triceps     Retraction          Cable Column/Theraband     External Rotation     Internal Rotation     Shrugs     Lats     Ext     Flex     Scapular Retraction Black x30    BIC     TRIC     PNF          Neuromuscular Re-ed     Dynamic Stability                              [place]                    Manual/Modalities  Manual PROM left shoulder all planes x12'  Also did some supine elbow ext stretching     Gentle R/S IR/ER holds from neutral 3x30\"     Supine UE at 90, small circles cw/ccw, fw/bw, sd/sd x30 each 2# in her hand               Therapeutic Exercise and NMR EXR  [x] (96071) Provided verbal/tactile cueing for activities related to strengthening, flexibility, endurance, ROM  for improvements in scapular, scapulothoracic and UE control with self care, reaching, carrying, lifting, house/yardwork, driving/computer work.    [] (92450) Provided verbal/tactile cueing for activities related to improving balance, coordination, kinesthetic sense, posture, motor skill, proprioception  to assist with  scapular, scapulothoracic and UE control with self care, reaching, carrying, lifting, house/yardwork, driving/computer work. Therapeutic Activities:    [x] (92578 or 88275) Provided verbal/tactile cueing for activities related to improving balance, coordination, kinesthetic sense, posture, motor skill, proprioception and motor activation to allow for proper function of scapular, scapulothoracic and UE control with self care, carrying, lifting, driving/computer work. 8/6/18 Spent time educating patient on post op expectations and time frames, sling wearing education, and HEP review.     Home Exercise Program:    [x] (43175) Reviewed/Progressed HEP activities related to strengthening, flexibility, endurance, ROM of scapular, scapulothoracic and UE control with self care, reaching, carrying, lifting, house/yardwork, driving/computer work  [] (80066) Reviewed/Progressed HEP activities related to improving balance,

## 2018-11-01 ENCOUNTER — TREATMENT (OUTPATIENT)
Dept: PHYSICAL THERAPY | Age: 68
End: 2018-11-01
Payer: MEDICARE

## 2018-11-01 DIAGNOSIS — G89.18 ACUTE POSTOPERATIVE PAIN OF LEFT SHOULDER: Primary | ICD-10-CM

## 2018-11-01 DIAGNOSIS — M75.122 COMPLETE ROTATOR CUFF TEAR OF LEFT SHOULDER: ICD-10-CM

## 2018-11-01 DIAGNOSIS — M25.512 ACUTE POSTOPERATIVE PAIN OF LEFT SHOULDER: Primary | ICD-10-CM

## 2018-11-01 PROCEDURE — 97110 THERAPEUTIC EXERCISES: CPT | Performed by: PHYSICAL THERAPIST

## 2018-11-01 PROCEDURE — G8427 DOCREV CUR MEDS BY ELIG CLIN: HCPCS | Performed by: PHYSICAL THERAPIST

## 2018-11-01 PROCEDURE — 97140 MANUAL THERAPY 1/> REGIONS: CPT | Performed by: PHYSICAL THERAPIST

## 2018-11-01 PROCEDURE — 97530 THERAPEUTIC ACTIVITIES: CPT | Performed by: PHYSICAL THERAPIST

## 2018-11-01 NOTE — FLOWSHEET NOTE
face-to-face)  [] RE-EVAL   [x] LW(86936) x  1   [] IONTO  [] NMR (36343) x      [] VASO  [x] Manual (39642) x  1    [] Other:  [x] TA (41008) x  1    [] Mech Traction (40431)  [] ES(attended) (21089)      [] ES (un) (30928):       GOALS:  Patient stated goal: Return to full use of left UE with all PLOF    Therapist goals for Patient:   Short Term Goals: To be achieved in: 2 weeks  1. Independent in HEP and progression per patient tolerance, in order to prevent re-injury. MET  2. Patient will have a decrease in pain to facilitate improvement in movement, function, and ADLs as indicated by Functional Deficits. MOSTLY MET    Long Term Goals: To be achieved in: 6 weeks  1. Disability index score of 40% or less for the UEFI to assist with reaching prior level of function. -PROGRESSING  2. Patient will demonstrate increased AAROM to 50-75% of her right UE to allow for proper joint functioning as indicated by patients Functional Deficits. MET  3. Patient will return to all basic functional activities without increased symptoms or restriction. -PARTIALLY MET  4. Begin to wean out of sling and use left UE for low level activities -MET  GOALS UPDATED 10/2/18  GOALS UPDATED 10/30/18  1. Normalize AROM to within 90% of uninvolved  2. Progress RTC strengthening, to be at least 4/5 throughout left UE  3. Able to reach up into her kitchen cabinets and perform tasks at shoulder level and above    Progression Towards Functional goals:  [] Patient is progressing as expected towards functional goals listed. [] Progression is slowed due to complexities listed. [] Progression has been slowed due to co-morbidities. [x] Plan just implemented, too soon to assess goals progression  [] Other:     ASSESSMENT:    She needed some rest breaks today in order to complete all of her exercises. Experienced some shortness of breath, but did recover after resting briefly.      She was really challenged and her shoulder got tired with the

## 2018-11-02 ENCOUNTER — OFFICE VISIT (OUTPATIENT)
Dept: INTERNAL MEDICINE CLINIC | Age: 68
End: 2018-11-02
Payer: MEDICARE

## 2018-11-02 VITALS
HEART RATE: 75 BPM | OXYGEN SATURATION: 98 % | TEMPERATURE: 97.1 F | DIASTOLIC BLOOD PRESSURE: 90 MMHG | RESPIRATION RATE: 16 BRPM | BODY MASS INDEX: 40.63 KG/M2 | SYSTOLIC BLOOD PRESSURE: 150 MMHG | WEIGHT: 238 LBS | HEIGHT: 64 IN

## 2018-11-02 DIAGNOSIS — H60.501 ACUTE OTITIS EXTERNA OF RIGHT EAR, UNSPECIFIED TYPE: Primary | ICD-10-CM

## 2018-11-02 DIAGNOSIS — I15.9 SECONDARY HYPERTENSION: ICD-10-CM

## 2018-11-02 PROCEDURE — 99213 OFFICE O/P EST LOW 20 MIN: CPT | Performed by: NURSE PRACTITIONER

## 2018-11-02 PROCEDURE — G8484 FLU IMMUNIZE NO ADMIN: HCPCS | Performed by: NURSE PRACTITIONER

## 2018-11-02 PROCEDURE — 3017F COLORECTAL CA SCREEN DOC REV: CPT | Performed by: NURSE PRACTITIONER

## 2018-11-02 PROCEDURE — G8399 PT W/DXA RESULTS DOCUMENT: HCPCS | Performed by: NURSE PRACTITIONER

## 2018-11-02 PROCEDURE — G8427 DOCREV CUR MEDS BY ELIG CLIN: HCPCS | Performed by: NURSE PRACTITIONER

## 2018-11-02 PROCEDURE — 4130F TOPICAL PREP RX AOE: CPT | Performed by: NURSE PRACTITIONER

## 2018-11-02 PROCEDURE — 1090F PRES/ABSN URINE INCON ASSESS: CPT | Performed by: NURSE PRACTITIONER

## 2018-11-02 PROCEDURE — 1123F ACP DISCUSS/DSCN MKR DOCD: CPT | Performed by: NURSE PRACTITIONER

## 2018-11-02 PROCEDURE — 1036F TOBACCO NON-USER: CPT | Performed by: NURSE PRACTITIONER

## 2018-11-02 PROCEDURE — 4040F PNEUMOC VAC/ADMIN/RCVD: CPT | Performed by: NURSE PRACTITIONER

## 2018-11-02 PROCEDURE — G8417 CALC BMI ABV UP PARAM F/U: HCPCS | Performed by: NURSE PRACTITIONER

## 2018-11-02 PROCEDURE — 1101F PT FALLS ASSESS-DOCD LE1/YR: CPT | Performed by: NURSE PRACTITIONER

## 2018-11-02 RX ORDER — AMOXICILLIN AND CLAVULANATE POTASSIUM 875; 125 MG/1; MG/1
1 TABLET, FILM COATED ORAL 2 TIMES DAILY
Qty: 20 TABLET | Refills: 0 | Status: SHIPPED | OUTPATIENT
Start: 2018-11-02 | End: 2018-11-12

## 2018-11-02 ASSESSMENT — ENCOUNTER SYMPTOMS
STRIDOR: 0
RHINORRHEA: 0
ABDOMINAL PAIN: 0
WHEEZING: 0
SORE THROAT: 0
SINUS PRESSURE: 0
VOMITING: 0
CHEST TIGHTNESS: 0
BACK PAIN: 0
ANAL BLEEDING: 0
CHOKING: 0
ABDOMINAL DISTENTION: 0
COUGH: 0
DIARRHEA: 0
SINUS PAIN: 0
SHORTNESS OF BREATH: 0

## 2018-11-07 ENCOUNTER — TREATMENT (OUTPATIENT)
Dept: PHYSICAL THERAPY | Age: 68
End: 2018-11-07
Payer: MEDICARE

## 2018-11-07 DIAGNOSIS — M25.512 ACUTE POSTOPERATIVE PAIN OF LEFT SHOULDER: Primary | ICD-10-CM

## 2018-11-07 DIAGNOSIS — G89.18 ACUTE POSTOPERATIVE PAIN OF LEFT SHOULDER: Primary | ICD-10-CM

## 2018-11-07 DIAGNOSIS — M75.122 COMPLETE ROTATOR CUFF TEAR OF LEFT SHOULDER: ICD-10-CM

## 2018-11-07 PROCEDURE — 97530 THERAPEUTIC ACTIVITIES: CPT | Performed by: PHYSICAL THERAPIST

## 2018-11-07 PROCEDURE — 97110 THERAPEUTIC EXERCISES: CPT | Performed by: PHYSICAL THERAPIST

## 2018-11-07 PROCEDURE — G8427 DOCREV CUR MEDS BY ELIG CLIN: HCPCS | Performed by: PHYSICAL THERAPIST

## 2018-11-07 PROCEDURE — 97140 MANUAL THERAPY 1/> REGIONS: CPT | Performed by: PHYSICAL THERAPIST

## 2018-11-08 ENCOUNTER — OFFICE VISIT (OUTPATIENT)
Dept: ORTHOPEDIC SURGERY | Age: 68
End: 2018-11-08
Payer: MEDICARE

## 2018-11-08 ENCOUNTER — TELEPHONE (OUTPATIENT)
Dept: INTERNAL MEDICINE CLINIC | Age: 68
End: 2018-11-08

## 2018-11-08 VITALS
DIASTOLIC BLOOD PRESSURE: 73 MMHG | HEIGHT: 64 IN | HEART RATE: 99 BPM | BODY MASS INDEX: 40.65 KG/M2 | WEIGHT: 238.1 LBS | SYSTOLIC BLOOD PRESSURE: 120 MMHG

## 2018-11-08 DIAGNOSIS — M75.122 COMPLETE TEAR OF LEFT ROTATOR CUFF: ICD-10-CM

## 2018-11-08 DIAGNOSIS — Z98.890 S/P ROTATOR CUFF REPAIR: Primary | ICD-10-CM

## 2018-11-08 DIAGNOSIS — M75.82 ROTATOR CUFF TENDINITIS, LEFT: ICD-10-CM

## 2018-11-08 PROCEDURE — 1090F PRES/ABSN URINE INCON ASSESS: CPT | Performed by: ORTHOPAEDIC SURGERY

## 2018-11-08 PROCEDURE — 4040F PNEUMOC VAC/ADMIN/RCVD: CPT | Performed by: ORTHOPAEDIC SURGERY

## 2018-11-08 PROCEDURE — 1036F TOBACCO NON-USER: CPT | Performed by: ORTHOPAEDIC SURGERY

## 2018-11-08 PROCEDURE — G8427 DOCREV CUR MEDS BY ELIG CLIN: HCPCS | Performed by: ORTHOPAEDIC SURGERY

## 2018-11-08 PROCEDURE — 3017F COLORECTAL CA SCREEN DOC REV: CPT | Performed by: ORTHOPAEDIC SURGERY

## 2018-11-08 PROCEDURE — G8417 CALC BMI ABV UP PARAM F/U: HCPCS | Performed by: ORTHOPAEDIC SURGERY

## 2018-11-08 PROCEDURE — G8484 FLU IMMUNIZE NO ADMIN: HCPCS | Performed by: ORTHOPAEDIC SURGERY

## 2018-11-08 PROCEDURE — 1123F ACP DISCUSS/DSCN MKR DOCD: CPT | Performed by: ORTHOPAEDIC SURGERY

## 2018-11-08 PROCEDURE — 99213 OFFICE O/P EST LOW 20 MIN: CPT | Performed by: ORTHOPAEDIC SURGERY

## 2018-11-08 PROCEDURE — G8399 PT W/DXA RESULTS DOCUMENT: HCPCS | Performed by: ORTHOPAEDIC SURGERY

## 2018-11-08 PROCEDURE — 1101F PT FALLS ASSESS-DOCD LE1/YR: CPT | Performed by: ORTHOPAEDIC SURGERY

## 2018-11-08 NOTE — PROGRESS NOTES
History of Present Illness:  Livan Fonseca is a pleasant 79 y.o. female who is now 3.5 months status post left shoulder arthroscopic revision rotator cuff repair on 7/19/2018. This was complicated by a left wrist fracture sustained during her convalescence. She has been treated by Dr. Roxana Del Cid for this. She has since been able to discontinue wearing a emovable wrist splint. Her shoulder pain is minimal she can even rest on her left side. She's now able to sleep through the night. No new injuries reported. She's been compliant with her postoperative physical therapy instructions, working with Soren Nicolas at [facility] office. Medical History:  Patient's medications, allergies, past medical, surgical, social and family histories were reviewed and updated as appropriate. Pertinent items are noted in HPI  Review of systems reviewed from Patient History Form dated on 5/31/2019 and available in the patient's chart under the Media tab. Vital Signs:  Vitals:    11/08/18 1424   BP: 120/73   Pulse: 99         Constitutional: In no apparent distress. Normal affect. Alert and oriented X3 and is cooperative. Left Shoulder Examination:    Inspection:  Portals well-healed. No indication of infection. No diffuse erythema. Palpation:  Nontender to light touch. Smooth movement with  circumduction of glenohumeral joint. Active Range of Motion: Forward elevation 100. Abduction 120  Internal rotation L3. Passive Range of Motion: Forward flexion 130-140. Strength: 4+/5 External rotation. 4+/5 Internal rotation. Special Tests:  No Herson deformity. Slight weakness with belly press. Negative external rotation lag. Radiology:     No new x-rays obtained today    Assessment :  Livan Fonseca is a 79 y.o. female 3.5 months status post left shoulder arthroscopic revision rotator cuff repair on 7/19/2018. Setback by a left wrist fracture that has since resolved.  She did develop from shoulder stiffness

## 2018-11-14 ENCOUNTER — TREATMENT (OUTPATIENT)
Dept: PHYSICAL THERAPY | Age: 68
End: 2018-11-14
Payer: MEDICARE

## 2018-11-14 DIAGNOSIS — M75.122 COMPLETE ROTATOR CUFF TEAR OF LEFT SHOULDER: ICD-10-CM

## 2018-11-14 DIAGNOSIS — G89.18 ACUTE POSTOPERATIVE PAIN OF LEFT SHOULDER: Primary | ICD-10-CM

## 2018-11-14 DIAGNOSIS — M25.512 ACUTE POSTOPERATIVE PAIN OF LEFT SHOULDER: Primary | ICD-10-CM

## 2018-11-14 PROCEDURE — 97110 THERAPEUTIC EXERCISES: CPT | Performed by: PHYSICAL THERAPIST

## 2018-11-14 PROCEDURE — G8427 DOCREV CUR MEDS BY ELIG CLIN: HCPCS | Performed by: PHYSICAL THERAPIST

## 2018-11-14 PROCEDURE — 97530 THERAPEUTIC ACTIVITIES: CPT | Performed by: PHYSICAL THERAPIST

## 2018-11-14 PROCEDURE — 97140 MANUAL THERAPY 1/> REGIONS: CPT | Performed by: PHYSICAL THERAPIST

## 2018-11-14 NOTE — FLOWSHEET NOTE
Updated her HEP to include IR/ER with bands. She performs all well. Fatigued post workout. Treatment/Activity Tolerance:  [x] Patient tolerated treatment well [] Patient limited by fatigue  [] Patient limited by pain  [] Patient limited by other medical complications  [] Other:     Prognosis: [x] Good [] Fair  [] Poor    Patient Requires Follow-up: [x] Yes  [] No    PLAN: Wean down to 1x/week. Continue to work on AROM and progressive strengthening.      [x] Continue per plan of care [] Alter current plan (see comments)  [] Plan of care initiated [] Hold pending MD visit [] Discharge    Electronically signed by    Kamran Wagner #[id number]

## 2018-11-16 ENCOUNTER — OFFICE VISIT (OUTPATIENT)
Dept: INTERNAL MEDICINE CLINIC | Age: 68
End: 2018-11-16
Payer: MEDICARE

## 2018-11-16 VITALS
TEMPERATURE: 97.6 F | SYSTOLIC BLOOD PRESSURE: 126 MMHG | BODY MASS INDEX: 41 KG/M2 | DIASTOLIC BLOOD PRESSURE: 80 MMHG | HEART RATE: 84 BPM | WEIGHT: 239 LBS | OXYGEN SATURATION: 94 %

## 2018-11-16 DIAGNOSIS — M25.562 CHRONIC PAIN OF LEFT KNEE: ICD-10-CM

## 2018-11-16 DIAGNOSIS — E66.01 MORBID OBESITY (HCC): ICD-10-CM

## 2018-11-16 DIAGNOSIS — R06.09 DYSPNEA ON EXERTION: ICD-10-CM

## 2018-11-16 DIAGNOSIS — I15.9 SECONDARY HYPERTENSION: Primary | ICD-10-CM

## 2018-11-16 DIAGNOSIS — G89.29 CHRONIC PAIN OF LEFT KNEE: ICD-10-CM

## 2018-11-16 PROCEDURE — 1090F PRES/ABSN URINE INCON ASSESS: CPT | Performed by: NURSE PRACTITIONER

## 2018-11-16 PROCEDURE — 1123F ACP DISCUSS/DSCN MKR DOCD: CPT | Performed by: NURSE PRACTITIONER

## 2018-11-16 PROCEDURE — G8417 CALC BMI ABV UP PARAM F/U: HCPCS | Performed by: NURSE PRACTITIONER

## 2018-11-16 PROCEDURE — 4040F PNEUMOC VAC/ADMIN/RCVD: CPT | Performed by: NURSE PRACTITIONER

## 2018-11-16 PROCEDURE — G8427 DOCREV CUR MEDS BY ELIG CLIN: HCPCS | Performed by: NURSE PRACTITIONER

## 2018-11-16 PROCEDURE — G8484 FLU IMMUNIZE NO ADMIN: HCPCS | Performed by: NURSE PRACTITIONER

## 2018-11-16 PROCEDURE — 3017F COLORECTAL CA SCREEN DOC REV: CPT | Performed by: NURSE PRACTITIONER

## 2018-11-16 PROCEDURE — 99214 OFFICE O/P EST MOD 30 MIN: CPT | Performed by: NURSE PRACTITIONER

## 2018-11-16 PROCEDURE — 1036F TOBACCO NON-USER: CPT | Performed by: NURSE PRACTITIONER

## 2018-11-16 PROCEDURE — G8399 PT W/DXA RESULTS DOCUMENT: HCPCS | Performed by: NURSE PRACTITIONER

## 2018-11-16 PROCEDURE — 1101F PT FALLS ASSESS-DOCD LE1/YR: CPT | Performed by: NURSE PRACTITIONER

## 2018-11-16 RX ORDER — LIDOCAINE 50 MG/G
1 PATCH TOPICAL DAILY
Qty: 30 PATCH | Refills: 0 | Status: SHIPPED | OUTPATIENT
Start: 2018-11-16 | End: 2018-11-28

## 2018-11-16 ASSESSMENT — ENCOUNTER SYMPTOMS
ORTHOPNEA: 0
COUGH: 0
SINUS PRESSURE: 0
ABDOMINAL PAIN: 0
SORE THROAT: 0
BACK PAIN: 0
CHEST TIGHTNESS: 0
WHEEZING: 0
ANAL BLEEDING: 0
SINUS PAIN: 0
SHORTNESS OF BREATH: 0
STRIDOR: 0
ABDOMINAL DISTENTION: 0
BLURRED VISION: 0
CHOKING: 0

## 2018-11-16 NOTE — PROGRESS NOTES
Right.    HERNIA REPAIR      ABDOMINAL INCISIONAL HERNIA REPAIR    HIP SURGERY      total hip replacement     HYSTERECTOMY  02/1991    JOINT REPLACEMENT  01/2018    right THR    LUMBAR FUSION  12/13    L4-L5;L5-S1    LUMBAR LAMINECTOMY  11/1997 and 1981    OTHER SURGICAL HISTORY Left 07/19/2018     LEFT SHOULDER EUA, DIAGNOSTIC  ARTHROSCOPY, EXTENSIVE DEBRIDEMENT, LYSIS OF ADHESIONS, REMOVAL OF RETAINED SUTURE, BICEPS TENOTOMY, REVISION ROTATOR CUFF REPAIR (Left Shoulder)    DE SHLDR ARTHROSCOP,DIAGNOSTIC Left 7/19/2018    LEFT SHOULDER EUA, DIAGNOSTIC  ARTHROSCOPY, EXTENSIVE DEBRIDEMENT, LYSIS OF ADHESIONS, REMOVAL OF RETAINED SUTURE, BICEPS TENOTOMY, REVISION ROTATOR CUFF REPAIR performed by Lashay Woo MD at [address]      Left       Allergies   Allergen Reactions    Reglan [Metoclopramide] Itching and Other (See Comments)     Flushed      Tape Valma Tryon Tape]     Oxycodone Nausea And Vomiting       Outpatient Prescriptions Marked as Taking for the 11/16/18 encounter (Office Visit) with FALLON Dennis CNP   Medication Sig Dispense Refill    citalopram (CELEXA) 20 MG tablet TAKE ONE TABLET EVERY DAY 90 tablet 0    vitamin D (CHOLECALCIFEROL) 1000 UNIT TABS tablet Take 1,000 Units by mouth daily      calcium acetate (PHOSLO) 667 MG capsule Take by mouth 3 times daily (with meals)      pravastatin (PRAVACHOL) 80 MG tablet TAKE 1 TABLET BY MOUTH DAILY 90 tablet 3    lisinopril-hydrochlorothiazide (PRINZIDE;ZESTORETIC) 20-25 MG per tablet TAKE 1 TABLET BY MOUTH DAILY 90 tablet 3    potassium chloride (KLOR-CON M) 20 MEQ extended release tablet Take 1 tablet by mouth daily 60 tablet 3    CHONDROITIN SULFATE A PO Take 800 tablets by mouth daily       Glucosamine Sulfate 500 MG TABS Take 2 tablets by mouth daily      aspirin 81 MG tablet Take 81 mg by mouth daily      Multiple Vitamins-Minerals (CENTRUM SILVER) TABS Take  by mouth.       Ascorbic Acid (VITAMIN
plan of care. Return if symptoms worsen or fail to improve.

## 2018-11-19 ENCOUNTER — TELEPHONE (OUTPATIENT)
Dept: INTERNAL MEDICINE CLINIC | Age: 68
End: 2018-11-19

## 2018-11-20 NOTE — TELEPHONE ENCOUNTER
Please have patient follow up with ortho for further recommendations in regards to pain control.  Thank you

## 2018-11-21 ENCOUNTER — TREATMENT (OUTPATIENT)
Dept: PHYSICAL THERAPY | Age: 68
End: 2018-11-21
Payer: MEDICARE

## 2018-11-21 DIAGNOSIS — M75.122 COMPLETE ROTATOR CUFF TEAR OF LEFT SHOULDER: ICD-10-CM

## 2018-11-21 DIAGNOSIS — G89.18 ACUTE POSTOPERATIVE PAIN OF LEFT SHOULDER: Primary | ICD-10-CM

## 2018-11-21 DIAGNOSIS — M25.512 ACUTE POSTOPERATIVE PAIN OF LEFT SHOULDER: Primary | ICD-10-CM

## 2018-11-21 PROCEDURE — 97110 THERAPEUTIC EXERCISES: CPT | Performed by: PHYSICAL THERAPIST

## 2018-11-21 PROCEDURE — 97530 THERAPEUTIC ACTIVITIES: CPT | Performed by: PHYSICAL THERAPIST

## 2018-11-21 PROCEDURE — 97140 MANUAL THERAPY 1/> REGIONS: CPT | Performed by: PHYSICAL THERAPIST

## 2018-11-21 PROCEDURE — G8427 DOCREV CUR MEDS BY ELIG CLIN: HCPCS | Performed by: PHYSICAL THERAPIST

## 2018-11-21 NOTE — FLOWSHEET NOTE
coordination, kinesthetic sense, posture, motor skill, proprioception  to assist with  scapular, scapulothoracic and UE control with self care, reaching, carrying, lifting, house/yardwork, driving/computer work. Therapeutic Activities:    [x] (97818 or 76018) Provided verbal/tactile cueing for activities related to improving balance, coordination, kinesthetic sense, posture, motor skill, proprioception and motor activation to allow for proper function of scapular, scapulothoracic and UE control with self care, carrying, lifting, driving/computer work. 8/6/18 Spent time educating patient on post op expectations and time frames, sling wearing education, and HEP review.     Home Exercise Program:    [x] (45601) Reviewed/Progressed HEP activities related to strengthening, flexibility, endurance, ROM of scapular, scapulothoracic and UE control with self care, reaching, carrying, lifting, house/yardwork, driving/computer work  [] (05236) Reviewed/Progressed HEP activities related to improving balance, coordination, kinesthetic sense, posture, motor skill, proprioception of scapular, scapulothoracic and UE control with self care, reaching, carrying, lifting, house/yardwork, driving/computer work      Manual Treatments:  PROM / STM / Oscillations-Mobs:  G-I, II, III, IV (PA's, Inf., Post.)  [x] (02928) Provided manual therapy to mobilize soft tissue/joints of cervical/CT, scapular GHJ and UE for the purpose of modulating pain, promoting relaxation,  increasing ROM, reducing/eliminating soft tissue swelling/inflammation/restriction, improving soft tissue extensibility and allowing for proper ROM for normal function with self care, reaching, carrying, lifting, house/yardwork, driving/computer work    Modalities:  Ice x10' after     Charges:  Timed Code Treatment Minutes: 45   Total Treatment Minutes: 60     [] EVAL (LOW) 34929 (typically 20 minutes face-to-face)  [] EVAL (MOD) 70645 (typically 30 minutes face-to-face)  [] today.      She is maintaining full PROM throughout. Treatment/Activity Tolerance:  [x] Patient tolerated treatment well [] Patient limited by fatigue  [] Patient limited by pain  [] Patient limited by other medical complications  [] Other:     Prognosis: [x] Good [] Fair  [] Poor    Patient Requires Follow-up: [x] Yes  [] No    PLAN: Wean down to 1x/week. Continue to work on AROM and progressive strengthening.      [x] Continue per plan of care [] Alter current plan (see comments)  [] Plan of care initiated [] Hold pending MD visit [] Discharge    Electronically signed by    Kamran Branham #[id number]

## 2018-11-27 ENCOUNTER — HOSPITAL ENCOUNTER (OUTPATIENT)
Dept: NON INVASIVE DIAGNOSTICS | Age: 68
Discharge: HOME | End: 2018-11-27
Payer: MEDICARE

## 2018-11-27 VITALS — WEIGHT: 236 LBS | BODY MASS INDEX: 40.49 KG/M2

## 2018-11-27 DIAGNOSIS — R06.09 DYSPNEA ON EXERTION: ICD-10-CM

## 2018-11-27 PROCEDURE — 93350 STRESS TTE ONLY: CPT

## 2018-11-27 PROCEDURE — 93017 CV STRESS TEST TRACING ONLY: CPT

## 2018-11-27 PROCEDURE — 6360000002 HC RX W HCPCS

## 2018-11-27 RX ORDER — DOBUTAMINE HYDROCHLORIDE 100 MG/100ML
10 INJECTION INTRAVENOUS CONTINUOUS
Status: DISCONTINUED | OUTPATIENT
Start: 2018-11-27 | End: 2018-11-28 | Stop reason: HOSPADM

## 2018-11-27 RX ADMIN — DOBUTAMINE HYDROCHLORIDE 10 MCG/KG/MIN: 100 INJECTION INTRAVENOUS at 13:43

## 2018-11-27 NOTE — PROGRESS NOTES
52 06/21/2016    HDL 57 12/16/2015    HDL 54 10/16/2015     Lab Results   Component Value Date    LDLCALC 108 (H) 06/21/2016    LDLCALC 89 12/16/2015    LDLCALC 125 (H) 10/16/2015     Lab Results   Component Value Date    LABVLDL 27 06/21/2016    LABVLDL 27 12/16/2015    LABVLDL 38 10/16/2015     Lab Results   Component Value Date    CHOLHDLRATIO 3.4 05/25/2011       No results found for: INR, PROTIME    The 10-year ASCVD risk score (Luz Mast., et al., 2013) is: 8.5%    Values used to calculate the score:      Age: 79 years      Sex: Female      Is Non- : No      Diabetic: No      Tobacco smoker: No      Systolic Blood Pressure: 544 mmHg      Is BP treated: Yes      HDL Cholesterol: 50 mg/dL      Total Cholesterol: 185 mg/dL      Imaging:       Last ECG (if available):  1/23/18  SR 63 bpm    Last Stress (if available): The patient had a Dobutamine Stress Echocardiogram. Baseline echocardiogram   shows normal left ventricular function with an estimated ejection fraction   of 55%. Following the dobutamine infusion there was augmentation of all   segments with no areas of stress induced hypokinesis with an ejection   fraction of 65%.      Patient experienced 10 beat run V-tach during recovery that resolved. Last Cath (if available):    Last TTE/COLT(if available):  11/27/18  Summary   The patient had a Dobutamine Stress Echocardiogram. Baseline echocardiogram   shows normal left ventricular function with an estimated ejection fraction   of 55%. Following the dobutamine infusion there was augmentation of all   segments with no areas of stress induced hypokinesis with an ejection   fraction of 65%. Last CMR  (if available):      Assessment / Plan:     Essential hypertension  Controlled. Continue current meds. SOB (shortness of breath)  Dobutamine stress echo performed yesterday. Images were ok, although during recovery patient developed NSVT, was somewhat hypotensive.    Will plan

## 2018-11-28 ENCOUNTER — OFFICE VISIT (OUTPATIENT)
Dept: CARDIOLOGY CLINIC | Age: 68
End: 2018-11-28
Payer: MEDICARE

## 2018-11-28 ENCOUNTER — NURSE ONLY (OUTPATIENT)
Dept: CARDIOLOGY CLINIC | Age: 68
End: 2018-11-28

## 2018-11-28 VITALS
HEIGHT: 64 IN | BODY MASS INDEX: 40.67 KG/M2 | OXYGEN SATURATION: 96 % | HEART RATE: 86 BPM | SYSTOLIC BLOOD PRESSURE: 130 MMHG | DIASTOLIC BLOOD PRESSURE: 80 MMHG | WEIGHT: 238.2 LBS

## 2018-11-28 DIAGNOSIS — R94.39 ABNORMAL STRESS TEST: Primary | ICD-10-CM

## 2018-11-28 DIAGNOSIS — R06.02 SOB (SHORTNESS OF BREATH): ICD-10-CM

## 2018-11-28 DIAGNOSIS — I10 ESSENTIAL HYPERTENSION: ICD-10-CM

## 2018-11-28 PROCEDURE — G8484 FLU IMMUNIZE NO ADMIN: HCPCS | Performed by: INTERNAL MEDICINE

## 2018-11-28 PROCEDURE — 1101F PT FALLS ASSESS-DOCD LE1/YR: CPT | Performed by: INTERNAL MEDICINE

## 2018-11-28 PROCEDURE — G8399 PT W/DXA RESULTS DOCUMENT: HCPCS | Performed by: INTERNAL MEDICINE

## 2018-11-28 PROCEDURE — 4040F PNEUMOC VAC/ADMIN/RCVD: CPT | Performed by: INTERNAL MEDICINE

## 2018-11-28 PROCEDURE — 1036F TOBACCO NON-USER: CPT | Performed by: INTERNAL MEDICINE

## 2018-11-28 PROCEDURE — G8427 DOCREV CUR MEDS BY ELIG CLIN: HCPCS | Performed by: INTERNAL MEDICINE

## 2018-11-28 PROCEDURE — 1090F PRES/ABSN URINE INCON ASSESS: CPT | Performed by: INTERNAL MEDICINE

## 2018-11-28 PROCEDURE — 3017F COLORECTAL CA SCREEN DOC REV: CPT | Performed by: INTERNAL MEDICINE

## 2018-11-28 PROCEDURE — G8417 CALC BMI ABV UP PARAM F/U: HCPCS | Performed by: INTERNAL MEDICINE

## 2018-11-28 PROCEDURE — 99204 OFFICE O/P NEW MOD 45 MIN: CPT | Performed by: INTERNAL MEDICINE

## 2018-11-28 PROCEDURE — 1123F ACP DISCUSS/DSCN MKR DOCD: CPT | Performed by: INTERNAL MEDICINE

## 2018-11-28 ASSESSMENT — ENCOUNTER SYMPTOMS
COUGH: 0
CHEST TIGHTNESS: 0
ABDOMINAL DISTENTION: 0
ABDOMINAL PAIN: 0
COLOR CHANGE: 0
EYE DISCHARGE: 0
SHORTNESS OF BREATH: 1
VOMITING: 0
BACK PAIN: 0
FACIAL SWELLING: 0
WHEEZING: 0
BLOOD IN STOOL: 0

## 2018-11-28 NOTE — PROGRESS NOTES
[facility].    9.  Discharge instructions will be given to you at the time of your procedure. 10.  For any questions or if you cannot keep this appointment for any reason, please call [phone].

## 2018-11-28 NOTE — ASSESSMENT & PLAN NOTE
Dobutamine stress echo performed yesterday. Images were ok, although during recovery patient developed NSVT, was somewhat hypotensive. Will plan for [facility].

## 2018-11-29 ENCOUNTER — TREATMENT (OUTPATIENT)
Dept: PHYSICAL THERAPY | Age: 68
End: 2018-11-29
Payer: MEDICARE

## 2018-11-29 DIAGNOSIS — M75.122 COMPLETE ROTATOR CUFF TEAR OF LEFT SHOULDER: ICD-10-CM

## 2018-11-29 DIAGNOSIS — G89.18 ACUTE POSTOPERATIVE PAIN OF LEFT SHOULDER: Primary | ICD-10-CM

## 2018-11-29 DIAGNOSIS — M25.512 ACUTE POSTOPERATIVE PAIN OF LEFT SHOULDER: Primary | ICD-10-CM

## 2018-11-29 PROCEDURE — 97110 THERAPEUTIC EXERCISES: CPT | Performed by: PHYSICAL THERAPIST

## 2018-11-29 PROCEDURE — 97140 MANUAL THERAPY 1/> REGIONS: CPT | Performed by: PHYSICAL THERAPIST

## 2018-11-29 PROCEDURE — 97530 THERAPEUTIC ACTIVITIES: CPT | Performed by: PHYSICAL THERAPIST

## 2018-11-29 PROCEDURE — G8427 DOCREV CUR MEDS BY ELIG CLIN: HCPCS | Performed by: PHYSICAL THERAPIST

## 2018-11-30 ENCOUNTER — OFFICE VISIT (OUTPATIENT)
Dept: ORTHOPEDIC SURGERY | Age: 68
End: 2018-11-30
Payer: MEDICARE

## 2018-11-30 VITALS
SYSTOLIC BLOOD PRESSURE: 155 MMHG | DIASTOLIC BLOOD PRESSURE: 81 MMHG | WEIGHT: 235 LBS | HEIGHT: 64 IN | BODY MASS INDEX: 40.12 KG/M2 | HEART RATE: 74 BPM

## 2018-11-30 DIAGNOSIS — M17.12 PATELLOFEMORAL ARTHRITIS OF LEFT KNEE: ICD-10-CM

## 2018-11-30 DIAGNOSIS — M17.12 PRIMARY OSTEOARTHRITIS OF LEFT KNEE: Primary | ICD-10-CM

## 2018-11-30 PROCEDURE — G8417 CALC BMI ABV UP PARAM F/U: HCPCS | Performed by: ORTHOPAEDIC SURGERY

## 2018-11-30 PROCEDURE — G8484 FLU IMMUNIZE NO ADMIN: HCPCS | Performed by: ORTHOPAEDIC SURGERY

## 2018-11-30 PROCEDURE — 20610 DRAIN/INJ JOINT/BURSA W/O US: CPT | Performed by: ORTHOPAEDIC SURGERY

## 2018-11-30 PROCEDURE — 1101F PT FALLS ASSESS-DOCD LE1/YR: CPT | Performed by: ORTHOPAEDIC SURGERY

## 2018-11-30 PROCEDURE — 3017F COLORECTAL CA SCREEN DOC REV: CPT | Performed by: ORTHOPAEDIC SURGERY

## 2018-11-30 PROCEDURE — G8427 DOCREV CUR MEDS BY ELIG CLIN: HCPCS | Performed by: ORTHOPAEDIC SURGERY

## 2018-11-30 PROCEDURE — 99213 OFFICE O/P EST LOW 20 MIN: CPT | Performed by: ORTHOPAEDIC SURGERY

## 2018-11-30 PROCEDURE — 4040F PNEUMOC VAC/ADMIN/RCVD: CPT | Performed by: ORTHOPAEDIC SURGERY

## 2018-11-30 PROCEDURE — 1036F TOBACCO NON-USER: CPT | Performed by: ORTHOPAEDIC SURGERY

## 2018-11-30 PROCEDURE — G8399 PT W/DXA RESULTS DOCUMENT: HCPCS | Performed by: ORTHOPAEDIC SURGERY

## 2018-11-30 PROCEDURE — 1090F PRES/ABSN URINE INCON ASSESS: CPT | Performed by: ORTHOPAEDIC SURGERY

## 2018-11-30 PROCEDURE — 1123F ACP DISCUSS/DSCN MKR DOCD: CPT | Performed by: ORTHOPAEDIC SURGERY

## 2018-11-30 ASSESSMENT — ENCOUNTER SYMPTOMS: SHORTNESS OF BREATH: 1

## 2018-11-30 NOTE — PROGRESS NOTES
the left knee. 80 mg of Depo-Medrol and 4 mL of 0.5% ropivacaine were placed in the left knee after it was prepped with chlorhexidine. This resulted in good relief of symptoms. There were no complications. The patient was advised to ice the knee this evening and to avoid vigorous activities for the next 2 days. They were advised to call us if there was any erythema, enduration, swelling or increasing pain. Patient's blood pressure is noted to be elevated on 2 separate occasions. Patient was advised to recheck their blood pressure this evening and again tomorrow. If it remains elevated the patient was advised to contact their primary care provider. Tamiko Harry PA-C  11/30/2018       During this examination, I, Tamiko Harry PA-C, functioned as a scribe for Dr. Zully Astudillo. The history taking and physical examination were performed by Dr. Zully Astudillo. All counseling during the appointment was performed between the patient and Dr. Zully Astudillo. 11/30/2018 2:55 PM      This dictation was performed with a verbal recognition program (DRAGON) and it was checked for errors. It is possible that there are still dictated errors within this office note. If so, please bring any errors to my attention for an addendum. All efforts were made to ensure that this office note is accurate. I personally reviewed the patient's pain scale, review of systems, family history, social history, past medical history, allergies and medications. 13 point review of systems was collected and reviewed today. It is noncontributory. I personally performed the services described in this documentation and scribed by Tamiko Harry PA-C. Evaristo Madera MD  Sports Medicine, Arthroscopic Knee and Shoulder Surgery    This dictation was performed with a verbal recognition program [facility]) and it was checked for errors. It is possible that there are still dictated errors within this office note.   If so, please bring any errors to my attention for an addendum. All efforts were made to ensure that this office note is accurate.

## 2018-11-30 NOTE — PROGRESS NOTES
Review of Systems   Respiratory: Positive for shortness of breath. Cardiovascular:        High Blood Pressure     Musculoskeletal:        Knee pain     Neurological: Positive for headaches. 4 TIAs     All other systems reviewed and are negative.

## 2018-12-03 ENCOUNTER — HOSPITAL ENCOUNTER (OUTPATIENT)
Dept: NON INVASIVE DIAGNOSTICS | Age: 68
Discharge: HOME | End: 2018-12-03
Payer: MEDICARE

## 2018-12-03 DIAGNOSIS — I10 ESSENTIAL HYPERTENSION: ICD-10-CM

## 2018-12-03 DIAGNOSIS — R06.02 SOB (SHORTNESS OF BREATH): ICD-10-CM

## 2018-12-03 LAB
LV EF: 58 %
LVEF MODALITY: NORMAL

## 2018-12-03 PROCEDURE — C8929 TTE W OR WO FOL WCON,DOPPLER: HCPCS

## 2018-12-03 PROCEDURE — 6360000004 HC RX CONTRAST MEDICATION: Performed by: INTERNAL MEDICINE

## 2018-12-03 RX ADMIN — PERFLUTREN 1.65 MG: 6.52 INJECTION, SUSPENSION INTRAVENOUS at 16:26

## 2018-12-04 ENCOUNTER — TREATMENT (OUTPATIENT)
Dept: PHYSICAL THERAPY | Age: 68
End: 2018-12-04
Payer: MEDICARE

## 2018-12-04 DIAGNOSIS — M25.512 ACUTE POSTOPERATIVE PAIN OF LEFT SHOULDER: Primary | ICD-10-CM

## 2018-12-04 DIAGNOSIS — G89.18 ACUTE POSTOPERATIVE PAIN OF LEFT SHOULDER: Primary | ICD-10-CM

## 2018-12-04 DIAGNOSIS — M75.122 COMPLETE ROTATOR CUFF TEAR OF LEFT SHOULDER: ICD-10-CM

## 2018-12-04 PROCEDURE — 97530 THERAPEUTIC ACTIVITIES: CPT | Performed by: PHYSICAL THERAPIST

## 2018-12-04 PROCEDURE — 97140 MANUAL THERAPY 1/> REGIONS: CPT | Performed by: PHYSICAL THERAPIST

## 2018-12-04 PROCEDURE — G8427 DOCREV CUR MEDS BY ELIG CLIN: HCPCS | Performed by: PHYSICAL THERAPIST

## 2018-12-04 PROCEDURE — 97110 THERAPEUTIC EXERCISES: CPT | Performed by: PHYSICAL THERAPIST

## 2018-12-04 NOTE — PLAN OF CARE
up/down and cw/ccw x30 each [place] on inclined table   Pulleys BTD x6'    Gentle BB TP 10\"x10 Across her back  And starting to pull up over her shoulder              STRENGTH     Isometrics           Weight shift     Flexion     Abduction     Biceps     Triceps          PRE's     Flexion     Abduction     External Rotation SDLY 1# x30    Internal Rotation        EXT     Reverse Flys     Serratus     Horizontal Abd with ER     Biceps 4# x30    Triceps     Retraction          Cable Column/Theraband     External Rotation Red x30    Internal Rotation Green x30    Shrugs     Lats     Ext     Flex     Scapular Retraction Silver x30    BIC     TRIC     PNF          Neuromuscular Re-ed     Dynamic Stability                              Plyoback                    Manual/Modalities  Manual PROM left shoulder all planes x10'  Also did some supine elbow ext stretching     Gentle R/S IR/ER holds from neutral 3x30\"     Supine UE at 90, small circles cw/ccw, fw/bw, sd/sd x30 each 2# in her hand               Therapeutic Exercise and NMR EXR  [x] (08972) Provided verbal/tactile cueing for activities related to strengthening, flexibility, endurance, ROM  for improvements in scapular, scapulothoracic and UE control with self care, reaching, carrying, lifting, house/yardwork, driving/computer work.    [] (02230) Provided verbal/tactile cueing for activities related to improving balance, coordination, kinesthetic sense, posture, motor skill, proprioception  to assist with  scapular, scapulothoracic and UE control with self care, reaching, carrying, lifting, house/yardwork, driving/computer work.     Therapeutic Activities:    [x] (16799 or 13151) Provided verbal/tactile cueing for activities related to improving balance, coordination, kinesthetic sense, posture, motor skill, proprioception and motor activation to allow for proper function of scapular, scapulothoracic and UE control with self care, carrying, lifting, driving/computer work.   8/6/18 Spent time educating patient on post op expectations and time frames, sling wearing education, and HEP review. Home Exercise Program:    [x] (73293) Reviewed/Progressed HEP activities related to strengthening, flexibility, endurance, ROM of scapular, scapulothoracic and UE control with self care, reaching, carrying, lifting, house/yardwork, driving/computer work  [] (23810) Reviewed/Progressed HEP activities related to improving balance, coordination, kinesthetic sense, posture, motor skill, proprioception of scapular, scapulothoracic and UE control with self care, reaching, carrying, lifting, house/yardwork, driving/computer work      Manual Treatments:  PROM / STM / Oscillations-Mobs:  G-I, II, III, IV (PA's, Inf., Post.)  [x] (15543) Provided manual therapy to mobilize soft tissue/joints of cervical/CT, scapular GHJ and UE for the purpose of modulating pain, promoting relaxation,  increasing ROM, reducing/eliminating soft tissue swelling/inflammation/restriction, improving soft tissue extensibility and allowing for proper ROM for normal function with self care, reaching, carrying, lifting, house/yardwork, driving/computer work    Modalities:  Ice x10' after     Charges:  Timed Code Treatment Minutes: 45   Total Treatment Minutes: 65     [] EVAL (LOW) 17778 (typically 20 minutes face-to-face)  [] EVAL (MOD) 29652 (typically 30 minutes face-to-face)  [] EVAL (HIGH) 65667 (typically 45 minutes face-to-face)  [] RE-EVAL   [x] (44736) x  1   [] IONTO  [] NMR (55607) x      [] VASO  [x] Manual (23456) x  1    [] Other:  [x] TA (39584) x  1    [] [facility] (31161[facility]  [] ES(attended) (60202)      [] ES (un) (32483):       GOALS:  Patient stated goal: Return to full use of left UE with all PLOF    Therapist goals for Patient:   Short Term Goals: To be achieved in: 2 weeks  1. Independent in HEP and progression per patient tolerance, in order to prevent re-injury. MET  2.  Patient will have a decrease fatigue  [] Patient limited by pain  [] Patient limited by other medical complications  [] Other:     Prognosis: [x] Good [] Fair  [] Poor    Patient Requires Follow-up: [x] Yes  [] No    PLAN: Wean down to 1x/week. Continue to work on AROM and progressive strengthening. Follow up after Dr Robel Nair visit next week.     [x] Continue per plan of care [] Alter current plan (see comments)  [] Plan of care initiated [] Hold pending MD visit [] Discharge    Electronically signed by    Kamran Leyva #[id number]

## 2018-12-11 ENCOUNTER — OFFICE VISIT (OUTPATIENT)
Dept: ORTHOPEDIC SURGERY | Age: 68
End: 2018-12-11
Payer: MEDICARE

## 2018-12-11 ENCOUNTER — TREATMENT (OUTPATIENT)
Dept: PHYSICAL THERAPY | Age: 68
End: 2018-12-11
Payer: MEDICARE

## 2018-12-11 VITALS
DIASTOLIC BLOOD PRESSURE: 72 MMHG | SYSTOLIC BLOOD PRESSURE: 126 MMHG | HEART RATE: 80 BPM | BODY MASS INDEX: 40.12 KG/M2 | HEIGHT: 64 IN | WEIGHT: 235.01 LBS

## 2018-12-11 DIAGNOSIS — M25.512 ACUTE POSTOPERATIVE PAIN OF LEFT SHOULDER: Primary | ICD-10-CM

## 2018-12-11 DIAGNOSIS — Z98.890 STATUS POST ROTATOR CUFF REPAIR: Primary | ICD-10-CM

## 2018-12-11 DIAGNOSIS — M75.122 COMPLETE ROTATOR CUFF TEAR OF LEFT SHOULDER: ICD-10-CM

## 2018-12-11 DIAGNOSIS — G89.18 ACUTE POSTOPERATIVE PAIN OF LEFT SHOULDER: Primary | ICD-10-CM

## 2018-12-11 PROCEDURE — 97140 MANUAL THERAPY 1/> REGIONS: CPT | Performed by: PHYSICAL THERAPIST

## 2018-12-11 PROCEDURE — G8484 FLU IMMUNIZE NO ADMIN: HCPCS | Performed by: ORTHOPAEDIC SURGERY

## 2018-12-11 PROCEDURE — 99213 OFFICE O/P EST LOW 20 MIN: CPT | Performed by: ORTHOPAEDIC SURGERY

## 2018-12-11 PROCEDURE — G8427 DOCREV CUR MEDS BY ELIG CLIN: HCPCS | Performed by: PHYSICAL THERAPIST

## 2018-12-11 PROCEDURE — 1123F ACP DISCUSS/DSCN MKR DOCD: CPT | Performed by: ORTHOPAEDIC SURGERY

## 2018-12-11 PROCEDURE — 1101F PT FALLS ASSESS-DOCD LE1/YR: CPT | Performed by: ORTHOPAEDIC SURGERY

## 2018-12-11 PROCEDURE — 97530 THERAPEUTIC ACTIVITIES: CPT | Performed by: PHYSICAL THERAPIST

## 2018-12-11 PROCEDURE — G8417 CALC BMI ABV UP PARAM F/U: HCPCS | Performed by: ORTHOPAEDIC SURGERY

## 2018-12-11 PROCEDURE — 1036F TOBACCO NON-USER: CPT | Performed by: ORTHOPAEDIC SURGERY

## 2018-12-11 PROCEDURE — 97110 THERAPEUTIC EXERCISES: CPT | Performed by: PHYSICAL THERAPIST

## 2018-12-11 PROCEDURE — 4040F PNEUMOC VAC/ADMIN/RCVD: CPT | Performed by: ORTHOPAEDIC SURGERY

## 2018-12-11 PROCEDURE — 3017F COLORECTAL CA SCREEN DOC REV: CPT | Performed by: ORTHOPAEDIC SURGERY

## 2018-12-11 PROCEDURE — G8399 PT W/DXA RESULTS DOCUMENT: HCPCS | Performed by: ORTHOPAEDIC SURGERY

## 2018-12-11 PROCEDURE — 1090F PRES/ABSN URINE INCON ASSESS: CPT | Performed by: ORTHOPAEDIC SURGERY

## 2018-12-11 PROCEDURE — G8427 DOCREV CUR MEDS BY ELIG CLIN: HCPCS | Performed by: ORTHOPAEDIC SURGERY

## 2018-12-11 NOTE — PROGRESS NOTES
evaluation and development of a treatment plan for this patient. I personally interviewed the patient and performed a physical examination. In addition, I discussed the patient's condition and treatment options with them. I have also reviewed and agree with the past medical, family and social history unless otherwise noted. All of the patient's questions were answered. Sharad Gtz MD, PhD  12/11/2018

## 2018-12-11 NOTE — FLOWSHEET NOTE
cueing for activities related to improving balance, coordination, kinesthetic sense, posture, motor skill, proprioception  to assist with  scapular, scapulothoracic and UE control with self care, reaching, carrying, lifting, house/yardwork, driving/computer work. Therapeutic Activities:    [x] (85270 or 64881) Provided verbal/tactile cueing for activities related to improving balance, coordination, kinesthetic sense, posture, motor skill, proprioception and motor activation to allow for proper function of scapular, scapulothoracic and UE control with self care, carrying, lifting, driving/computer work. 8/6/18 Spent time educating patient on post op expectations and time frames, sling wearing education, and HEP review.     Home Exercise Program:    [x] (92501) Reviewed/Progressed HEP activities related to strengthening, flexibility, endurance, ROM of scapular, scapulothoracic and UE control with self care, reaching, carrying, lifting, house/yardwork, driving/computer work  [] (38632) Reviewed/Progressed HEP activities related to improving balance, coordination, kinesthetic sense, posture, motor skill, proprioception of scapular, scapulothoracic and UE control with self care, reaching, carrying, lifting, house/yardwork, driving/computer work      Manual Treatments:  PROM / STM / Oscillations-Mobs:  G-I, II, III, IV (PA's, Inf., Post.)  [x] (62440) Provided manual therapy to mobilize soft tissue/joints of cervical/CT, scapular GHJ and UE for the purpose of modulating pain, promoting relaxation,  increasing ROM, reducing/eliminating soft tissue swelling/inflammation/restriction, improving soft tissue extensibility and allowing for proper ROM for normal function with self care, reaching, carrying, lifting, house/yardwork, driving/computer work    Modalities:  Ice x10' after     Charges:  Timed Code Treatment Minutes: 45   Total Treatment Minutes: 65     [] EVAL (LOW) 63373 (typically 20 minutes face-to-face)  [] EVAL (MOD) 68197 (typically 30 minutes face-to-face)  [] EVAL (HIGH) 84007 (typically 45 minutes face-to-face)  [] RE-EVAL   [x] IH(77414) x  1   [] IONTO  [] NMR (97811) x      [] VASO  [x] Manual (81905) x  1    [] Other:  [x] TA (42477) x  1    [] Mech Traction (65714)  [] ES(attended) (66822)      [] ES (un) (98652):       GOALS:  Patient stated goal: Return to full use of left UE with all PLOF    Therapist goals for Patient:   Short Term Goals: To be achieved in: 2 weeks  1. Independent in HEP and progression per patient tolerance, in order to prevent re-injury. MET  2. Patient will have a decrease in pain to facilitate improvement in movement, function, and ADLs as indicated by Functional Deficits. MOSTLY MET    Long Term Goals: To be achieved in: 6 weeks  1. Disability index score of 40% or less for the UEFI to assist with reaching prior level of function. -PROGRESSING  2. Patient will demonstrate increased AAROM to 50-75% of her right UE to allow for proper joint functioning as indicated by patients Functional Deficits. MET  3. Patient will return to all basic functional activities without increased symptoms or restriction. -MOSTLY MET  4. Begin to wean out of sling and use left UE for low level activities -MET  GOALS UPDATED 10/2/18  GOALS UPDATED 10/30/18  1. Normalize AROM to within 90% of uninvolved -MOSTLY MET  2. Progress RTC strengthening, to be at least 4/5 throughout left UE -PROGRESSING  3. Able to reach up into her kitchen cabinets and perform tasks at shoulder level and above -PARTIALLY MET  GOALS UPDATED 12/4/18    Progression Towards Functional goals:  [] Patient is progressing as expected towards functional goals listed. [x] Progression is slowed due to complexities listed. (revision surgery, had a set back with a fall and fractured wrist early in her rehab)  [] Progression has been slowed due to co-morbidities.   [] Plan just implemented, too soon to assess goals progression  [] Other:

## 2018-12-12 ENCOUNTER — HOSPITAL ENCOUNTER (OUTPATIENT)
Dept: CARDIAC CATH/INVASIVE PROCEDURES | Age: 68
Setting detail: OUTPATIENT SURGERY
Discharge: HOME | End: 2018-12-14
Payer: MEDICARE

## 2018-12-12 VITALS — WEIGHT: 235 LBS | BODY MASS INDEX: 40.12 KG/M2 | TEMPERATURE: 98.4 F | HEIGHT: 64 IN

## 2018-12-12 PROBLEM — R06.09 DOE (DYSPNEA ON EXERTION): Status: ACTIVE | Noted: 2018-11-28

## 2018-12-12 LAB
A/G RATIO: 1.4 (ref 1.1–2.2)
ALBUMIN SERPL-MCNC: 4.1 G/DL (ref 3.4–5)
ALP BLD-CCNC: 88 U/L (ref 40–129)
ALT SERPL-CCNC: 20 U/L (ref 10–40)
ANION GAP SERPL CALCULATED.3IONS-SCNC: 14 MMOL/L (ref 3–16)
AST SERPL-CCNC: 25 U/L (ref 15–37)
BILIRUB SERPL-MCNC: <0.2 MG/DL (ref 0–1)
BUN BLDV-MCNC: 8 MG/DL (ref 7–20)
CALCIUM SERPL-MCNC: 9.4 MG/DL (ref 8.3–10.6)
CHLORIDE BLD-SCNC: 100 MMOL/L (ref 99–110)
CO2: 25 MMOL/L (ref 21–32)
CREAT SERPL-MCNC: <0.5 MG/DL (ref 0.6–1.2)
EKG ATRIAL RATE: 77 BPM
EKG DIAGNOSIS: NORMAL
EKG P AXIS: 45 DEGREES
EKG P-R INTERVAL: 160 MS
EKG Q-T INTERVAL: 404 MS
EKG QRS DURATION: 78 MS
EKG QTC CALCULATION (BAZETT): 457 MS
EKG R AXIS: -14 DEGREES
EKG T AXIS: 37 DEGREES
EKG VENTRICULAR RATE: 77 BPM
GFR AFRICAN AMERICAN: >60
GFR NON-AFRICAN AMERICAN: >60
GLOBULIN: 2.9 G/DL
GLUCOSE BLD-MCNC: 105 MG/DL (ref 70–99)
HCT VFR BLD CALC: 40.3 % (ref 36–48)
HEMOGLOBIN: 13.6 G/DL (ref 12–16)
INR BLD: 1.08 (ref 0.86–1.14)
LEFT VENTRICULAR EJECTION FRACTION MODE: NORMAL
LV EF: 60 %
MCH RBC QN AUTO: 31.8 PG (ref 26–34)
MCHC RBC AUTO-ENTMCNC: 33.8 G/DL (ref 31–36)
MCV RBC AUTO: 94 FL (ref 80–100)
PDW BLD-RTO: 13.9 % (ref 12.4–15.4)
PLATELET # BLD: 251 K/UL (ref 135–450)
PMV BLD AUTO: 9.8 FL (ref 5–10.5)
POTASSIUM SERPL-SCNC: 3.5 MMOL/L (ref 3.5–5.1)
PROTHROMBIN TIME: 12.3 SEC (ref 9.8–13)
RBC # BLD: 4.28 M/UL (ref 4–5.2)
SODIUM BLD-SCNC: 139 MMOL/L (ref 136–145)
TOTAL PROTEIN: 7 G/DL (ref 6.4–8.2)
WBC # BLD: 7.6 K/UL (ref 4–11)

## 2018-12-12 PROCEDURE — C1894 INTRO/SHEATH, NON-LASER: HCPCS

## 2018-12-12 PROCEDURE — 93010 ELECTROCARDIOGRAM REPORT: CPT | Performed by: INTERNAL MEDICINE

## 2018-12-12 PROCEDURE — 80053 COMPREHEN METABOLIC PANEL: CPT

## 2018-12-12 PROCEDURE — 99024 POSTOP FOLLOW-UP VISIT: CPT | Performed by: INTERNAL MEDICINE

## 2018-12-12 PROCEDURE — 99153 MOD SED SAME PHYS/QHP EA: CPT | Performed by: INTERNAL MEDICINE

## 2018-12-12 PROCEDURE — 99152 MOD SED SAME PHYS/QHP 5/>YRS: CPT | Performed by: INTERNAL MEDICINE

## 2018-12-12 PROCEDURE — 2709999900 HC NON-CHARGEABLE SUPPLY

## 2018-12-12 PROCEDURE — 93458 L HRT ARTERY/VENTRICLE ANGIO: CPT | Performed by: INTERNAL MEDICINE

## 2018-12-12 PROCEDURE — C1769 GUIDE WIRE: HCPCS

## 2018-12-12 PROCEDURE — 85027 COMPLETE CBC AUTOMATED: CPT

## 2018-12-12 PROCEDURE — 6360000004 HC RX CONTRAST MEDICATION: Performed by: INTERNAL MEDICINE

## 2018-12-12 PROCEDURE — 93005 ELECTROCARDIOGRAM TRACING: CPT | Performed by: INTERNAL MEDICINE

## 2018-12-12 PROCEDURE — 6360000002 HC RX W HCPCS

## 2018-12-12 PROCEDURE — 2500000003 HC RX 250 WO HCPCS

## 2018-12-12 PROCEDURE — 85610 PROTHROMBIN TIME: CPT

## 2018-12-12 PROCEDURE — C1887 CATHETER, GUIDING: HCPCS

## 2018-12-12 RX ORDER — ACETAMINOPHEN 325 MG/1
650 TABLET ORAL EVERY 4 HOURS PRN
Status: DISCONTINUED | OUTPATIENT
Start: 2018-12-12 | End: 2018-12-15 | Stop reason: HOSPADM

## 2018-12-12 RX ORDER — SODIUM CHLORIDE 0.9 % (FLUSH) 0.9 %
10 SYRINGE (ML) INJECTION PRN
Status: DISCONTINUED | OUTPATIENT
Start: 2018-12-12 | End: 2018-12-15 | Stop reason: HOSPADM

## 2018-12-12 RX ORDER — SODIUM CHLORIDE 0.9 % (FLUSH) 0.9 %
10 SYRINGE (ML) INJECTION EVERY 12 HOURS SCHEDULED
Status: DISCONTINUED | OUTPATIENT
Start: 2018-12-12 | End: 2018-12-15 | Stop reason: HOSPADM

## 2018-12-12 RX ORDER — SODIUM CHLORIDE 0.9 % (FLUSH) 0.9 %
10 SYRINGE (ML) INJECTION PRN
Status: DISCONTINUED | OUTPATIENT
Start: 2018-12-12 | End: 2018-12-12 | Stop reason: SDUPTHER

## 2018-12-12 RX ORDER — SODIUM CHLORIDE 9 MG/ML
INJECTION, SOLUTION INTRAVENOUS CONTINUOUS
Status: ACTIVE | OUTPATIENT
Start: 2018-12-12 | End: 2018-12-12

## 2018-12-12 RX ORDER — SODIUM CHLORIDE 0.9 % (FLUSH) 0.9 %
10 SYRINGE (ML) INJECTION EVERY 12 HOURS SCHEDULED
Status: DISCONTINUED | OUTPATIENT
Start: 2018-12-12 | End: 2018-12-12 | Stop reason: SDUPTHER

## 2018-12-12 RX ORDER — ASPIRIN 325 MG
325 TABLET ORAL ONCE
Status: DISCONTINUED | OUTPATIENT
Start: 2018-12-12 | End: 2018-12-15 | Stop reason: HOSPADM

## 2018-12-12 RX ORDER — SODIUM CHLORIDE 9 MG/ML
INJECTION, SOLUTION INTRAVENOUS CONTINUOUS
Status: DISCONTINUED | OUTPATIENT
Start: 2018-12-12 | End: 2018-12-12 | Stop reason: SDUPTHER

## 2018-12-12 RX ADMIN — IOVERSOL 80 ML: 741 INJECTION INTRA-ARTERIAL; INTRAVENOUS at 10:20

## 2018-12-12 NOTE — H&P
79 y.o. woman here [facility] on referral from Dr. [address] [place]. Had a stress echo for RAMOS (1 flight of steps). No chest pain. Has had several prior TIA. No n/v/syncope. On her stress echo, she had hypotension and NSVT. Referred for cath. Past Medical History:   Diagnosis Date    Arthritis     Cerebral artery occlusion with cerebral infarction ([address]     TIA X 4    Depression     GERD (gastroesophageal reflux disease)     HTN (hypertension)     Hyperlipidemia     Overweight     Raynaud's disease     Spinal stenosis      Past Surgical History:   Procedure Laterality Date    APPENDECTOMY  07/17/2016    BLADDER REPAIR      FINGER TRIGGER RELEASE  4/13; 10/13; 4/14    Multiple fingers    HAMMER TOE SURGERY      Right.     HERNIA REPAIR      ABDOMINAL INCISIONAL HERNIA REPAIR    HIP SURGERY      total hip replacement     HYSTERECTOMY  02/1991    JOINT REPLACEMENT  01/2018    right THR    LUMBAR FUSION  12/13    L4-L5;L5-S1    LUMBAR LAMINECTOMY  11/1997 and 1981    OTHER SURGICAL HISTORY Left 07/19/2018     LEFT SHOULDER EUA, DIAGNOSTIC  ARTHROSCOPY, EXTENSIVE DEBRIDEMENT, LYSIS OF ADHESIONS, REMOVAL OF RETAINED SUTURE, BICEPS TENOTOMY, REVISION ROTATOR CUFF REPAIR (Left Shoulder)    [place] SHLDR ARTHROSCOP,DIAGNOSTIC Left 7/19/2018    LEFT SHOULDER EUA, DIAGNOSTIC  ARTHROSCOPY, EXTENSIVE DEBRIDEMENT, LYSIS OF ADHESIONS, REMOVAL OF RETAINED SUTURE, BICEPS TENOTOMY, REVISION ROTATOR CUFF REPAIR performed by Vinny Barnes MD at [facility]     Social History   Substance Use Topics    Smoking status: Former Smoker     Quit date: 7/17/2005    Smokeless tobacco: Never Used    Alcohol use No     Allergies   Allergen Reactions    Reglan [Metoclopramide] Itching and Other (See Comments)     Flushed      Tape [Adhesive Tape]     Oxycodone Nausea And Vomiting     Family History   Problem Relation Age of Onset    Alzheimer's Disease Mother     Prostate Cancer Father     Cancer Brother

## 2018-12-17 ENCOUNTER — TELEPHONE (OUTPATIENT)
Dept: CARDIOLOGY CLINIC | Age: 68
End: 2018-12-17

## 2018-12-21 ENCOUNTER — TREATMENT (OUTPATIENT)
Dept: PHYSICAL THERAPY | Age: 68
End: 2018-12-21
Payer: MEDICARE

## 2018-12-21 DIAGNOSIS — M25.512 ACUTE POSTOPERATIVE PAIN OF LEFT SHOULDER: Primary | ICD-10-CM

## 2018-12-21 DIAGNOSIS — G89.18 ACUTE POSTOPERATIVE PAIN OF LEFT SHOULDER: Primary | ICD-10-CM

## 2018-12-21 DIAGNOSIS — M75.122 COMPLETE ROTATOR CUFF TEAR OF LEFT SHOULDER: ICD-10-CM

## 2018-12-21 PROCEDURE — 97530 THERAPEUTIC ACTIVITIES: CPT | Performed by: PHYSICAL THERAPIST

## 2018-12-21 PROCEDURE — 97110 THERAPEUTIC EXERCISES: CPT | Performed by: PHYSICAL THERAPIST

## 2018-12-21 PROCEDURE — 97140 MANUAL THERAPY 1/> REGIONS: CPT | Performed by: PHYSICAL THERAPIST

## 2018-12-21 PROCEDURE — G8427 DOCREV CUR MEDS BY ELIG CLIN: HCPCS | Performed by: PHYSICAL THERAPIST

## 2018-12-21 NOTE — FLOWSHEET NOTE
balance, coordination, kinesthetic sense, posture, motor skill, proprioception  to assist with  scapular, scapulothoracic and UE control with self care, reaching, carrying, lifting, house/yardwork, driving/computer work. Therapeutic Activities:    [x] (58362 or 68499) Provided verbal/tactile cueing for activities related to improving balance, coordination, kinesthetic sense, posture, motor skill, proprioception and motor activation to allow for proper function of scapular, scapulothoracic and UE control with self care, carrying, lifting, driving/computer work. 8/6/18 Spent time educating patient on post op expectations and time frames, sling wearing education, and HEP review.     Home Exercise Program:    [x] (19901) Reviewed/Progressed HEP activities related to strengthening, flexibility, endurance, ROM of scapular, scapulothoracic and UE control with self care, reaching, carrying, lifting, house/yardwork, driving/computer work  [] (84563) Reviewed/Progressed HEP activities related to improving balance, coordination, kinesthetic sense, posture, motor skill, proprioception of scapular, scapulothoracic and UE control with self care, reaching, carrying, lifting, house/yardwork, driving/computer work      Manual Treatments:  PROM / STM / Oscillations-Mobs:  G-I, II, III, IV (PA's, Inf., Post.)  [x] (24456) Provided manual therapy to mobilize soft tissue/joints of cervical/CT, scapular GHJ and UE for the purpose of modulating pain, promoting relaxation,  increasing ROM, reducing/eliminating soft tissue swelling/inflammation/restriction, improving soft tissue extensibility and allowing for proper ROM for normal function with self care, reaching, carrying, lifting, house/yardwork, driving/computer work    Modalities:  Ice x10' after     Charges:  Timed Code Treatment Minutes: 45   Total Treatment Minutes: 70     [] EVAL (LOW) 12463 (typically 20 minutes face-to-face)  [] EVAL (MOD) 86870 (typically 30 minutes ASSESSMENT:    We are cautiously optimistic there is no further injury to her left shoulder. She has a negative drop arm test.   Still not able to lift her left UE as high as her right. We reviewed her HEP for all stretching and progressive strengthening exercises. She understands all and plans to keep up with her exercises until her knee surgery. Treatment/Activity Tolerance:  [x] Patient tolerated treatment well [] Patient limited by fatigue  [] Patient limited by pain  [] Patient limited by other medical complications  [] Other:     Prognosis: [x] Good [] Fair  [] Poor    Patient Requires Follow-up: [x] Yes  [] No    PLAN: Plan to hold PT for her shoulder for now. She needs to continue with her HEP. As well as having her TKA coming up in the near future.   [x] Continue per plan of care [] Alter current plan (see comments)  [] Plan of care initiated [x] Hold pending MD visit [] Discharge    Electronically signed by    Ressie Olszewski, [place] #[id number]

## 2018-12-27 DIAGNOSIS — F32.A DEPRESSION, UNSPECIFIED DEPRESSION TYPE: ICD-10-CM

## 2018-12-27 RX ORDER — CITALOPRAM 20 MG/1
TABLET ORAL
Qty: 90 TABLET | Refills: 1 | Status: SHIPPED | OUTPATIENT
Start: 2018-12-27 | End: 2019-03-25 | Stop reason: SDUPTHER

## 2018-12-27 RX ORDER — POTASSIUM CHLORIDE 20 MEQ/1
TABLET, EXTENDED RELEASE ORAL
Qty: 90 TABLET | Refills: 1 | Status: SHIPPED | OUTPATIENT
Start: 2018-12-27 | End: 2019-03-25 | Stop reason: SDUPTHER

## 2019-01-07 ENCOUNTER — TELEPHONE (OUTPATIENT)
Dept: INTERNAL MEDICINE CLINIC | Age: 69
End: 2019-01-07

## 2019-01-07 DIAGNOSIS — E78.2 MIXED HYPERLIPIDEMIA: Primary | ICD-10-CM

## 2019-01-07 ASSESSMENT — ENCOUNTER SYMPTOMS
FACIAL SWELLING: 0
ABDOMINAL PAIN: 0
BACK PAIN: 0
COLOR CHANGE: 0
BLOOD IN STOOL: 0
CHEST TIGHTNESS: 0
VOMITING: 0
COUGH: 0
SHORTNESS OF BREATH: 1
WHEEZING: 0
EYE DISCHARGE: 0
ABDOMINAL DISTENTION: 0

## 2019-01-08 ENCOUNTER — NURSE ONLY (OUTPATIENT)
Dept: INTERNAL MEDICINE CLINIC | Age: 69
End: 2019-01-08
Payer: MEDICARE

## 2019-01-08 DIAGNOSIS — E78.2 MIXED HYPERLIPIDEMIA: ICD-10-CM

## 2019-01-08 LAB
CHOLESTEROL, TOTAL: 166 MG/DL (ref 0–199)
HDLC SERPL-MCNC: 51 MG/DL (ref 40–60)
LDL CHOLESTEROL CALCULATED: 67 MG/DL
TRIGL SERPL-MCNC: 239 MG/DL (ref 0–150)
VLDLC SERPL CALC-MCNC: 48 MG/DL

## 2019-01-08 PROCEDURE — 90732 PPSV23 VACC 2 YRS+ SUBQ/IM: CPT | Performed by: INTERNAL MEDICINE

## 2019-01-08 PROCEDURE — G0009 ADMIN PNEUMOCOCCAL VACCINE: HCPCS | Performed by: INTERNAL MEDICINE

## 2019-01-08 PROCEDURE — 90662 IIV NO PRSV INCREASED AG IM: CPT | Performed by: INTERNAL MEDICINE

## 2019-01-08 PROCEDURE — G0008 ADMIN INFLUENZA VIRUS VAC: HCPCS | Performed by: INTERNAL MEDICINE

## 2019-01-08 PROCEDURE — 36415 COLL VENOUS BLD VENIPUNCTURE: CPT | Performed by: INTERNAL MEDICINE

## 2019-01-09 ENCOUNTER — OFFICE VISIT (OUTPATIENT)
Dept: CARDIOLOGY CLINIC | Age: 69
End: 2019-01-09
Payer: MEDICARE

## 2019-01-09 VITALS
WEIGHT: 240 LBS | HEIGHT: 64 IN | OXYGEN SATURATION: 92 % | DIASTOLIC BLOOD PRESSURE: 70 MMHG | HEART RATE: 100 BPM | SYSTOLIC BLOOD PRESSURE: 120 MMHG | BODY MASS INDEX: 40.97 KG/M2

## 2019-01-09 DIAGNOSIS — E78.2 MIXED HYPERLIPIDEMIA: ICD-10-CM

## 2019-01-09 DIAGNOSIS — I10 ESSENTIAL HYPERTENSION: ICD-10-CM

## 2019-01-09 PROCEDURE — 3017F COLORECTAL CA SCREEN DOC REV: CPT | Performed by: INTERNAL MEDICINE

## 2019-01-09 PROCEDURE — 4040F PNEUMOC VAC/ADMIN/RCVD: CPT | Performed by: INTERNAL MEDICINE

## 2019-01-09 PROCEDURE — 99212 OFFICE O/P EST SF 10 MIN: CPT | Performed by: INTERNAL MEDICINE

## 2019-01-09 PROCEDURE — 1123F ACP DISCUSS/DSCN MKR DOCD: CPT | Performed by: INTERNAL MEDICINE

## 2019-01-09 PROCEDURE — 1101F PT FALLS ASSESS-DOCD LE1/YR: CPT | Performed by: INTERNAL MEDICINE

## 2019-01-09 PROCEDURE — G8482 FLU IMMUNIZE ORDER/ADMIN: HCPCS | Performed by: INTERNAL MEDICINE

## 2019-01-09 PROCEDURE — G8427 DOCREV CUR MEDS BY ELIG CLIN: HCPCS | Performed by: INTERNAL MEDICINE

## 2019-01-09 PROCEDURE — 1036F TOBACCO NON-USER: CPT | Performed by: INTERNAL MEDICINE

## 2019-01-09 PROCEDURE — 1090F PRES/ABSN URINE INCON ASSESS: CPT | Performed by: INTERNAL MEDICINE

## 2019-01-09 PROCEDURE — G8399 PT W/DXA RESULTS DOCUMENT: HCPCS | Performed by: INTERNAL MEDICINE

## 2019-01-09 PROCEDURE — G8417 CALC BMI ABV UP PARAM F/U: HCPCS | Performed by: INTERNAL MEDICINE

## 2019-01-28 ENCOUNTER — OFFICE VISIT (OUTPATIENT)
Dept: ORTHOPEDIC SURGERY | Age: 69
End: 2019-01-28
Payer: MEDICARE

## 2019-01-28 VITALS
BODY MASS INDEX: 40.97 KG/M2 | HEIGHT: 64 IN | DIASTOLIC BLOOD PRESSURE: 66 MMHG | SYSTOLIC BLOOD PRESSURE: 128 MMHG | HEART RATE: 74 BPM | WEIGHT: 240 LBS

## 2019-01-28 DIAGNOSIS — M17.12 PRIMARY OSTEOARTHRITIS OF LEFT KNEE: Primary | ICD-10-CM

## 2019-01-28 PROCEDURE — 4040F PNEUMOC VAC/ADMIN/RCVD: CPT | Performed by: ORTHOPAEDIC SURGERY

## 2019-01-28 PROCEDURE — G8482 FLU IMMUNIZE ORDER/ADMIN: HCPCS | Performed by: ORTHOPAEDIC SURGERY

## 2019-01-28 PROCEDURE — 99213 OFFICE O/P EST LOW 20 MIN: CPT | Performed by: ORTHOPAEDIC SURGERY

## 2019-01-28 PROCEDURE — G8427 DOCREV CUR MEDS BY ELIG CLIN: HCPCS | Performed by: ORTHOPAEDIC SURGERY

## 2019-01-28 PROCEDURE — G8399 PT W/DXA RESULTS DOCUMENT: HCPCS | Performed by: ORTHOPAEDIC SURGERY

## 2019-01-28 PROCEDURE — G8417 CALC BMI ABV UP PARAM F/U: HCPCS | Performed by: ORTHOPAEDIC SURGERY

## 2019-01-28 PROCEDURE — 3017F COLORECTAL CA SCREEN DOC REV: CPT | Performed by: ORTHOPAEDIC SURGERY

## 2019-01-28 PROCEDURE — 1090F PRES/ABSN URINE INCON ASSESS: CPT | Performed by: ORTHOPAEDIC SURGERY

## 2019-01-28 PROCEDURE — 1101F PT FALLS ASSESS-DOCD LE1/YR: CPT | Performed by: ORTHOPAEDIC SURGERY

## 2019-01-28 PROCEDURE — 1036F TOBACCO NON-USER: CPT | Performed by: ORTHOPAEDIC SURGERY

## 2019-01-28 PROCEDURE — 1123F ACP DISCUSS/DSCN MKR DOCD: CPT | Performed by: ORTHOPAEDIC SURGERY

## 2019-01-28 ASSESSMENT — ENCOUNTER SYMPTOMS: SHORTNESS OF BREATH: 1

## 2019-02-12 ENCOUNTER — OFFICE VISIT (OUTPATIENT)
Dept: ORTHOPEDIC SURGERY | Age: 69
End: 2019-02-12
Payer: MEDICARE

## 2019-02-12 VITALS
BODY MASS INDEX: 40.97 KG/M2 | HEIGHT: 64 IN | WEIGHT: 240 LBS | HEART RATE: 83 BPM | DIASTOLIC BLOOD PRESSURE: 75 MMHG | SYSTOLIC BLOOD PRESSURE: 126 MMHG

## 2019-02-12 DIAGNOSIS — Z98.890 S/P ROTATOR CUFF REPAIR: Primary | ICD-10-CM

## 2019-02-12 DIAGNOSIS — M67.912 DYSFUNCTION OF LEFT ROTATOR CUFF: ICD-10-CM

## 2019-02-12 PROCEDURE — 1090F PRES/ABSN URINE INCON ASSESS: CPT | Performed by: ORTHOPAEDIC SURGERY

## 2019-02-12 PROCEDURE — G8417 CALC BMI ABV UP PARAM F/U: HCPCS | Performed by: ORTHOPAEDIC SURGERY

## 2019-02-12 PROCEDURE — G8482 FLU IMMUNIZE ORDER/ADMIN: HCPCS | Performed by: ORTHOPAEDIC SURGERY

## 2019-02-12 PROCEDURE — 99213 OFFICE O/P EST LOW 20 MIN: CPT | Performed by: ORTHOPAEDIC SURGERY

## 2019-02-12 PROCEDURE — G8427 DOCREV CUR MEDS BY ELIG CLIN: HCPCS | Performed by: ORTHOPAEDIC SURGERY

## 2019-02-12 PROCEDURE — 1123F ACP DISCUSS/DSCN MKR DOCD: CPT | Performed by: ORTHOPAEDIC SURGERY

## 2019-02-12 PROCEDURE — 1036F TOBACCO NON-USER: CPT | Performed by: ORTHOPAEDIC SURGERY

## 2019-02-12 PROCEDURE — 4040F PNEUMOC VAC/ADMIN/RCVD: CPT | Performed by: ORTHOPAEDIC SURGERY

## 2019-02-12 PROCEDURE — 1101F PT FALLS ASSESS-DOCD LE1/YR: CPT | Performed by: ORTHOPAEDIC SURGERY

## 2019-02-12 PROCEDURE — 3017F COLORECTAL CA SCREEN DOC REV: CPT | Performed by: ORTHOPAEDIC SURGERY

## 2019-02-12 PROCEDURE — G8399 PT W/DXA RESULTS DOCUMENT: HCPCS | Performed by: ORTHOPAEDIC SURGERY

## 2019-02-12 RX ORDER — MELOXICAM 15 MG/1
15 TABLET ORAL DAILY
Qty: 30 TABLET | Refills: 3 | Status: SHIPPED | OUTPATIENT
Start: 2019-02-12 | End: 2019-04-30 | Stop reason: ALTCHOICE

## 2019-02-22 ENCOUNTER — EVALUATION (OUTPATIENT)
Dept: PHYSICAL THERAPY | Age: 69
End: 2019-02-22
Payer: MEDICARE

## 2019-02-22 DIAGNOSIS — M75.122 COMPLETE ROTATOR CUFF TEAR OF LEFT SHOULDER: ICD-10-CM

## 2019-02-22 DIAGNOSIS — G89.18 ACUTE POSTOPERATIVE PAIN OF LEFT SHOULDER: Primary | ICD-10-CM

## 2019-02-22 DIAGNOSIS — M25.512 ACUTE POSTOPERATIVE PAIN OF LEFT SHOULDER: Primary | ICD-10-CM

## 2019-02-22 PROCEDURE — 97140 MANUAL THERAPY 1/> REGIONS: CPT | Performed by: PHYSICAL THERAPIST

## 2019-02-22 PROCEDURE — 1101F PT FALLS ASSESS-DOCD LE1/YR: CPT | Performed by: PHYSICAL THERAPIST

## 2019-02-22 PROCEDURE — G8539 DOC FUNCT AND CARE PLAN: HCPCS | Performed by: PHYSICAL THERAPIST

## 2019-02-22 PROCEDURE — 97164 PT RE-EVAL EST PLAN CARE: CPT | Performed by: PHYSICAL THERAPIST

## 2019-02-22 PROCEDURE — 97530 THERAPEUTIC ACTIVITIES: CPT | Performed by: PHYSICAL THERAPIST

## 2019-02-22 PROCEDURE — G8427 DOCREV CUR MEDS BY ELIG CLIN: HCPCS | Performed by: PHYSICAL THERAPIST

## 2019-02-22 PROCEDURE — 97110 THERAPEUTIC EXERCISES: CPT | Performed by: PHYSICAL THERAPIST

## 2019-02-22 PROCEDURE — G8730 PAIN DOC POS AND PLAN: HCPCS | Performed by: PHYSICAL THERAPIST

## 2019-02-22 PROCEDURE — G8985 CARRY GOAL STATUS: HCPCS | Performed by: PHYSICAL THERAPIST

## 2019-02-22 PROCEDURE — G8984 CARRY CURRENT STATUS: HCPCS | Performed by: PHYSICAL THERAPIST

## 2019-02-26 ENCOUNTER — TREATMENT (OUTPATIENT)
Dept: PHYSICAL THERAPY | Age: 69
End: 2019-02-26
Payer: MEDICARE

## 2019-02-26 DIAGNOSIS — G89.18 ACUTE POSTOPERATIVE PAIN OF LEFT SHOULDER: Primary | ICD-10-CM

## 2019-02-26 DIAGNOSIS — M25.512 ACUTE POSTOPERATIVE PAIN OF LEFT SHOULDER: Primary | ICD-10-CM

## 2019-02-26 DIAGNOSIS — M75.122 COMPLETE ROTATOR CUFF TEAR OF LEFT SHOULDER: ICD-10-CM

## 2019-02-26 PROCEDURE — 97530 THERAPEUTIC ACTIVITIES: CPT | Performed by: PHYSICAL THERAPIST

## 2019-02-26 PROCEDURE — 97140 MANUAL THERAPY 1/> REGIONS: CPT | Performed by: PHYSICAL THERAPIST

## 2019-02-26 PROCEDURE — 97110 THERAPEUTIC EXERCISES: CPT | Performed by: PHYSICAL THERAPIST

## 2019-02-26 PROCEDURE — G8427 DOCREV CUR MEDS BY ELIG CLIN: HCPCS | Performed by: PHYSICAL THERAPIST

## 2019-02-26 RX ORDER — DICLOFENAC SODIUM 75 MG/1
75 TABLET, DELAYED RELEASE ORAL 2 TIMES DAILY
Qty: 60 TABLET | Refills: 1 | Status: SHIPPED | OUTPATIENT
Start: 2019-02-26 | End: 2019-04-01 | Stop reason: SDUPTHER

## 2019-03-07 ENCOUNTER — TREATMENT (OUTPATIENT)
Dept: PHYSICAL THERAPY | Age: 69
End: 2019-03-07

## 2019-03-07 DIAGNOSIS — M25.512 ACUTE POSTOPERATIVE PAIN OF LEFT SHOULDER: Primary | ICD-10-CM

## 2019-03-07 DIAGNOSIS — G89.18 ACUTE POSTOPERATIVE PAIN OF LEFT SHOULDER: Primary | ICD-10-CM

## 2019-03-07 DIAGNOSIS — M75.122 COMPLETE ROTATOR CUFF TEAR OF LEFT SHOULDER: ICD-10-CM

## 2019-03-14 ENCOUNTER — TREATMENT (OUTPATIENT)
Dept: PHYSICAL THERAPY | Age: 69
End: 2019-03-14
Payer: MEDICARE

## 2019-03-14 DIAGNOSIS — G89.18 ACUTE POSTOPERATIVE PAIN OF LEFT SHOULDER: Primary | ICD-10-CM

## 2019-03-14 DIAGNOSIS — M25.512 ACUTE POSTOPERATIVE PAIN OF LEFT SHOULDER: Primary | ICD-10-CM

## 2019-03-14 DIAGNOSIS — M75.122 COMPLETE ROTATOR CUFF TEAR OF LEFT SHOULDER: ICD-10-CM

## 2019-03-14 PROCEDURE — 97140 MANUAL THERAPY 1/> REGIONS: CPT | Performed by: PHYSICAL THERAPIST

## 2019-03-14 PROCEDURE — 97110 THERAPEUTIC EXERCISES: CPT | Performed by: PHYSICAL THERAPIST

## 2019-03-14 PROCEDURE — 97530 THERAPEUTIC ACTIVITIES: CPT | Performed by: PHYSICAL THERAPIST

## 2019-03-21 ENCOUNTER — TREATMENT (OUTPATIENT)
Dept: PHYSICAL THERAPY | Age: 69
End: 2019-03-21
Payer: MEDICARE

## 2019-03-21 ENCOUNTER — OFFICE VISIT (OUTPATIENT)
Dept: ORTHOPEDIC SURGERY | Age: 69
End: 2019-03-21
Payer: MEDICARE

## 2019-03-21 VITALS
BODY MASS INDEX: 40.99 KG/M2 | HEART RATE: 70 BPM | HEIGHT: 64 IN | WEIGHT: 240.08 LBS | SYSTOLIC BLOOD PRESSURE: 135 MMHG | DIASTOLIC BLOOD PRESSURE: 77 MMHG

## 2019-03-21 DIAGNOSIS — M17.12 PRIMARY OSTEOARTHRITIS OF LEFT KNEE: ICD-10-CM

## 2019-03-21 DIAGNOSIS — M25.512 ACUTE POSTOPERATIVE PAIN OF LEFT SHOULDER: Primary | ICD-10-CM

## 2019-03-21 DIAGNOSIS — Z98.890 S/P ROTATOR CUFF REPAIR: Primary | ICD-10-CM

## 2019-03-21 DIAGNOSIS — M67.912 DYSFUNCTION OF LEFT ROTATOR CUFF: ICD-10-CM

## 2019-03-21 DIAGNOSIS — G89.18 ACUTE POSTOPERATIVE PAIN OF LEFT SHOULDER: Primary | ICD-10-CM

## 2019-03-21 DIAGNOSIS — M75.122 COMPLETE ROTATOR CUFF TEAR OF LEFT SHOULDER: ICD-10-CM

## 2019-03-21 PROCEDURE — 1101F PT FALLS ASSESS-DOCD LE1/YR: CPT | Performed by: ORTHOPAEDIC SURGERY

## 2019-03-21 PROCEDURE — G8482 FLU IMMUNIZE ORDER/ADMIN: HCPCS | Performed by: ORTHOPAEDIC SURGERY

## 2019-03-21 PROCEDURE — 97530 THERAPEUTIC ACTIVITIES: CPT | Performed by: PHYSICAL THERAPIST

## 2019-03-21 PROCEDURE — G8427 DOCREV CUR MEDS BY ELIG CLIN: HCPCS | Performed by: PHYSICAL THERAPIST

## 2019-03-21 PROCEDURE — G8399 PT W/DXA RESULTS DOCUMENT: HCPCS | Performed by: ORTHOPAEDIC SURGERY

## 2019-03-21 PROCEDURE — 1090F PRES/ABSN URINE INCON ASSESS: CPT | Performed by: ORTHOPAEDIC SURGERY

## 2019-03-21 PROCEDURE — 3017F COLORECTAL CA SCREEN DOC REV: CPT | Performed by: ORTHOPAEDIC SURGERY

## 2019-03-21 PROCEDURE — 1036F TOBACCO NON-USER: CPT | Performed by: ORTHOPAEDIC SURGERY

## 2019-03-21 PROCEDURE — G8417 CALC BMI ABV UP PARAM F/U: HCPCS | Performed by: ORTHOPAEDIC SURGERY

## 2019-03-21 PROCEDURE — 99213 OFFICE O/P EST LOW 20 MIN: CPT | Performed by: ORTHOPAEDIC SURGERY

## 2019-03-21 PROCEDURE — 97110 THERAPEUTIC EXERCISES: CPT | Performed by: PHYSICAL THERAPIST

## 2019-03-21 PROCEDURE — 4040F PNEUMOC VAC/ADMIN/RCVD: CPT | Performed by: ORTHOPAEDIC SURGERY

## 2019-03-21 PROCEDURE — 1123F ACP DISCUSS/DSCN MKR DOCD: CPT | Performed by: ORTHOPAEDIC SURGERY

## 2019-03-21 PROCEDURE — G8427 DOCREV CUR MEDS BY ELIG CLIN: HCPCS | Performed by: ORTHOPAEDIC SURGERY

## 2019-03-21 PROCEDURE — 97140 MANUAL THERAPY 1/> REGIONS: CPT | Performed by: PHYSICAL THERAPIST

## 2019-03-25 DIAGNOSIS — E78.2 MIXED HYPERLIPIDEMIA: ICD-10-CM

## 2019-03-25 DIAGNOSIS — I10 ESSENTIAL HYPERTENSION: ICD-10-CM

## 2019-03-25 DIAGNOSIS — F32.A DEPRESSION, UNSPECIFIED DEPRESSION TYPE: ICD-10-CM

## 2019-03-25 RX ORDER — PRAVASTATIN SODIUM 80 MG/1
TABLET ORAL
Qty: 90 TABLET | Refills: 3 | Status: SHIPPED | OUTPATIENT
Start: 2019-03-25 | End: 2019-12-03 | Stop reason: SDUPTHER

## 2019-03-25 RX ORDER — POTASSIUM CHLORIDE 20 MEQ/1
TABLET, EXTENDED RELEASE ORAL
Qty: 90 TABLET | Refills: 3 | Status: SHIPPED | OUTPATIENT
Start: 2019-03-25 | End: 2019-05-02

## 2019-03-25 RX ORDER — LISINOPRIL AND HYDROCHLOROTHIAZIDE 25; 20 MG/1; MG/1
TABLET ORAL
Qty: 90 TABLET | Refills: 3 | Status: SHIPPED | OUTPATIENT
Start: 2019-03-25 | End: 2019-04-30 | Stop reason: ALTCHOICE

## 2019-03-25 RX ORDER — CITALOPRAM 20 MG/1
TABLET ORAL
Qty: 90 TABLET | Refills: 1 | Status: SHIPPED | OUTPATIENT
Start: 2019-03-25 | End: 2019-09-17 | Stop reason: SDUPTHER

## 2019-03-28 ENCOUNTER — TREATMENT (OUTPATIENT)
Dept: PHYSICAL THERAPY | Age: 69
End: 2019-03-28
Payer: MEDICARE

## 2019-03-28 DIAGNOSIS — G89.18 ACUTE POSTOPERATIVE PAIN OF LEFT SHOULDER: Primary | ICD-10-CM

## 2019-03-28 DIAGNOSIS — M25.512 ACUTE POSTOPERATIVE PAIN OF LEFT SHOULDER: Primary | ICD-10-CM

## 2019-03-28 DIAGNOSIS — M75.122 COMPLETE ROTATOR CUFF TEAR OF LEFT SHOULDER: ICD-10-CM

## 2019-03-28 PROCEDURE — 97140 MANUAL THERAPY 1/> REGIONS: CPT | Performed by: PHYSICAL THERAPIST

## 2019-03-28 PROCEDURE — 97110 THERAPEUTIC EXERCISES: CPT | Performed by: PHYSICAL THERAPIST

## 2019-03-28 PROCEDURE — 97530 THERAPEUTIC ACTIVITIES: CPT | Performed by: PHYSICAL THERAPIST

## 2019-04-02 RX ORDER — DICLOFENAC SODIUM 75 MG/1
75 TABLET, DELAYED RELEASE ORAL 2 TIMES DAILY
Qty: 180 TABLET | Refills: 0 | Status: ON HOLD | OUTPATIENT
Start: 2019-04-02 | End: 2019-08-15 | Stop reason: HOSPADM

## 2019-04-22 ENCOUNTER — OFFICE VISIT (OUTPATIENT)
Dept: ORTHOPEDIC SURGERY | Age: 69
End: 2019-04-22
Payer: MEDICARE

## 2019-04-22 VITALS
BODY MASS INDEX: 39.27 KG/M2 | SYSTOLIC BLOOD PRESSURE: 134 MMHG | HEART RATE: 76 BPM | HEIGHT: 64 IN | WEIGHT: 230 LBS | DIASTOLIC BLOOD PRESSURE: 73 MMHG

## 2019-04-22 DIAGNOSIS — M17.12 PRIMARY OSTEOARTHRITIS OF LEFT KNEE: Primary | ICD-10-CM

## 2019-04-22 DIAGNOSIS — M17.0 PRIMARY OSTEOARTHRITIS OF BOTH KNEES: ICD-10-CM

## 2019-04-22 PROCEDURE — 1036F TOBACCO NON-USER: CPT | Performed by: ORTHOPAEDIC SURGERY

## 2019-04-22 PROCEDURE — G8427 DOCREV CUR MEDS BY ELIG CLIN: HCPCS | Performed by: ORTHOPAEDIC SURGERY

## 2019-04-22 PROCEDURE — 99213 OFFICE O/P EST LOW 20 MIN: CPT | Performed by: ORTHOPAEDIC SURGERY

## 2019-04-22 PROCEDURE — 3017F COLORECTAL CA SCREEN DOC REV: CPT | Performed by: ORTHOPAEDIC SURGERY

## 2019-04-22 PROCEDURE — L1812 KO ELASTIC W/JOINTS PRE OTS: HCPCS | Performed by: ORTHOPAEDIC SURGERY

## 2019-04-22 PROCEDURE — G8399 PT W/DXA RESULTS DOCUMENT: HCPCS | Performed by: ORTHOPAEDIC SURGERY

## 2019-04-22 PROCEDURE — G8417 CALC BMI ABV UP PARAM F/U: HCPCS | Performed by: ORTHOPAEDIC SURGERY

## 2019-04-22 PROCEDURE — 4040F PNEUMOC VAC/ADMIN/RCVD: CPT | Performed by: ORTHOPAEDIC SURGERY

## 2019-04-22 PROCEDURE — 1090F PRES/ABSN URINE INCON ASSESS: CPT | Performed by: ORTHOPAEDIC SURGERY

## 2019-04-22 PROCEDURE — 1123F ACP DISCUSS/DSCN MKR DOCD: CPT | Performed by: ORTHOPAEDIC SURGERY

## 2019-04-22 ASSESSMENT — ENCOUNTER SYMPTOMS: BACK PAIN: 1

## 2019-04-22 NOTE — PROGRESS NOTES
Review of Systems   Cardiovascular:        High blood pressure    Genitourinary: Positive for frequency. Incontinence    Musculoskeletal: Positive for back pain. Left knee pain    Neurological:        Hx mini stroke   Psychiatric/Behavioral:        Depression    All other systems reviewed and are negative.

## 2019-04-22 NOTE — PROGRESS NOTES
ARTHROSCOPY, EXTENSIVE DEBRIDEMENT, LYSIS OF ADHESIONS, REMOVAL OF RETAINED SUTURE, BICEPS TENOTOMY, REVISION ROTATOR CUFF REPAIR performed by Mamie Phipps MD at [address]      Left       Family History   Problem Relation Age of Onset    Alzheimer's Disease Mother     Prostate Cancer Father     Cancer Brother     Heart Attack Maternal Aunt        Social History     Socioeconomic History    Marital status:      Spouse name: None    Number of children: None    Years of education: None    Highest education level: None   Occupational History    None   Social Needs    Financial resource strain: None    Food insecurity:     Worry: None     Inability: None    Transportation needs:     Medical: None     Non-medical: None   Tobacco Use    Smoking status: Former Smoker     Last attempt to quit: 2005     Years since quittin.7    Smokeless tobacco: Never Used   Substance and Sexual Activity    Alcohol use: No    Drug use: No    Sexual activity: None   Lifestyle    Physical activity:     Days per week: None     Minutes per session: None    Stress: None   Relationships    Social connections:     Talks on phone: None     Gets together: None     Attends Scientologist service: None     Active member of club or organization: None     Attends meetings of clubs or organizations: None     Relationship status: None    Intimate partner violence:     Fear of current or ex partner: None     Emotionally abused: None     Physically abused: None     Forced sexual activity: None   Other Topics Concern    None   Social History Narrative    None       Current Outpatient Medications   Medication Sig Dispense Refill    diclofenac (VOLTAREN) 75 MG EC tablet Take 1 tablet by mouth 2 times daily 180 tablet 0    pravastatin (PRAVACHOL) 80 MG tablet TAKE ONE TABLET EVERY DAY 90 tablet 3    citalopram (CELEXA) 20 MG tablet TAKE ONE TABLET EVERY DAY 90 tablet 1    potassium chloride (KLOR-CON M) 20 MEQ extended release tablet TAKE ONE TABLET EVERY DAY 90 tablet 3    lisinopril-hydrochlorothiazide (PRINZIDE;ZESTORETIC) 20-25 MG per tablet TAKE ONE TABLET EVERY DAY 90 tablet 3    meloxicam (MOBIC) 15 MG tablet Take 1 tablet by mouth daily (Patient taking differently: Take 7.5 mg by mouth daily ) 30 tablet 3    zoster recombinant adjuvanted vaccine (SHINGRIX) 50 MCG/0.5ML SUSR injection Inject 0.5 mLs into the muscle See Admin Instructions 1 dose now and repeat in 2-6 months 1 each 0    vitamin D (CHOLECALCIFEROL) 1000 UNIT TABS tablet Take 1,000 Units by mouth daily      CHONDROITIN SULFATE A PO Take 800 tablets by mouth daily       Glucosamine Sulfate 500 MG TABS Take 2 tablets by mouth daily      aspirin 81 MG tablet Take 81 mg by mouth daily      Multiple Vitamins-Minerals (CENTRUM SILVER) TABS Take  by mouth.  Ascorbic Acid (VITAMIN C) 500 MG tablet Take 500 mg by mouth daily.  vitamin E 400 UNIT capsule Take 400 Units by mouth every other day        No current facility-administered medications for this visit. Allergies   Allergen Reactions    Reglan [Metoclopramide] Itching and Other (See Comments)     Flushed      Tape Theola Chokio Tape]     Oxycodone Nausea And Vomiting       Vital signs:  /73   Pulse 76   Ht 5' 4\" (1.626 m)   Wt 230 lb (104.3 kg)   BMI 39.48 kg/m²        Neuro: Alert & oriented x 3,  normal,  no focal deficits noted. Normal affect. Eyes: sclera clear  Ears: Normal external ear  Mouth:  No perioral lesions  Pulm: Respirations unlabored and regular  Pulse: Regular rate and rhythm   Skin: Warm, well perfused      Constitutional: In no apparent distress. Normal affect. Alert and oriented X3 and is cooperative. LEFT Knee Exam:        Gait/Alignment: Varus alignment. Ambulates with cane. Patella tracking: Normal      Inspection/Skin: No rashes are identified.  Varicosities present.      Effusion: Small effusion     Palpation: Medial joint line tenderness. Mild crepitus.     Range of Motion: Full extension and 120° of flexion     Strength: Mild quadriceps weakness.     Ligamentous Stability: Pseudo-laxity is present medially. Anterior and posterior cruciate ligaments were intact. Lateral collateral ligament is intact.     Neurologic and vascular: Skin is warm dry and well perfused. Sensation is intact to light touch over the knee.     Additional findings: Calf soft nontender. No patellar instability. RIGHT Knee Examination:    Gait: No antalgic gait. Ambulates with cane. Alignment: Alignment appreciated. Inspection/skin: Quadriceps well developed. Skin is intact without erythema or ecchymosis. No gross deformity. Palpation: Crepitus present. Nontender along joint line. No pain with compression of patella. Nontender to light touch. Range of Motion: Full ROM. Strength: 5/5 quad strength    Effusion: No apparent effusion. Ligamentous stability: Stable to valgus and varus stress at 0° and 30°. Solid endpoint with Lachman's. Negative posterior and anterior drawer signs. Patella tracking: Smooth translation of patella. Special tests: Negative Stephanie sign. Patella apprehension sign negative. Neurologic and vascular: Skin is warm and well-perfused. Distally neurovascularly intact. Additional findings: Calf soft nontender. Sensation is intact to light-touch. No pretibial edema. Radiology:     No new imaging was obtained during today's visit. Assessment :  Medial compartment osteoarthritis, bilateral knees, left greater than right. Impression:  Encounter Diagnosis   Name Primary?  Primary osteoarthritis of left knee Yes       Office Procedures:  No orders of the defined types were placed in this encounter. Plan: The nature and natural history of osteoarthritis was discussed in detail the patient today.   Treatment options both surgical and nonsurgical were discussed in detail. Patient was counseled with regard to the importance of activity modification as well as weight control. The role for medications, intra-articular injections as well as surgery were discussed. Patient's questions were answered.     I believe patient is a candidate for a Synvisc 1 injection to the left knee today. The patient expresses interest in proceeding with this entity today. We have discussed post injection care as well. I have also advised her that she may notice ecchymosis over the left knee due to the amount of varicose veins that are present in this extremity. I would like to see her back in early June to further discuss a total knee arthroplasty for August.     She will follow up in the meantime as needed if her symptoms persist or worsen. Julio Dumont is in agreement with this plan. All questions were answered to patient's satisfaction and was encouraged to call with any further questions. The postinjection information sheet was provided. The risks benefits and alternatives to Synvisc 1 injection were discussed with the patient. The patient has undergone treatment with physical therapy anti-inflammatory medication and steroid injection in the past and has been unresponsive. X-rays confirm that there is significant osteoarthritis. After informed consent was obtained, the injection site was prepped with chlorhexidine following which the skin was anesthetized with ethyl chloride. The injection site was then prepared with 4 mL of 1% lidocaine following which 6cc (48 mg) of Synvisc was placed into the LEFT knee without complication. The patient was able to flex the knee to 90° immediately after the injection. The patient was advised to take it easy the next few days and ice and if there is any soreness. The patient was advised to contact us if any swelling, redness or increasing pain develops.   All questions were answered and the patient will see me for followup on

## 2019-04-22 NOTE — PROGRESS NOTES
synvisc 1 injection:Left Knee    Lot: [id number]  Exp: 04/2021  Ndc: [address]-2    3cc lidocaine 1%  Lot:[id number]  Exp:08/2020  [facility]:9776-6207-80

## 2019-04-23 ENCOUNTER — TELEPHONE (OUTPATIENT)
Dept: CARDIOLOGY CLINIC | Age: 69
End: 2019-04-23

## 2019-04-23 NOTE — TELEPHONE ENCOUNTER
Patient had lightheaded/ dizzy spell last week while working outside. BP 83/43. Told patient if this happens again to take BP, lay down and prop up her legs and call. Also made her an appointment with Dr. Sharri Webb for next week.

## 2019-04-23 NOTE — TELEPHONE ENCOUNTER
Pt states she had an episode last week where her bp dropped to 83/43. Denies any other symptoms. Pt would like Ania Party to call her to discuss.

## 2019-04-29 ASSESSMENT — ENCOUNTER SYMPTOMS
ABDOMINAL DISTENTION: 0
EYE DISCHARGE: 0
COLOR CHANGE: 0
ABDOMINAL PAIN: 0
BLOOD IN STOOL: 0
FACIAL SWELLING: 0
VOMITING: 0
WHEEZING: 0
BACK PAIN: 0
COUGH: 0
CHEST TIGHTNESS: 0

## 2019-04-29 NOTE — PROGRESS NOTES
Tobacco Use    Smoking status: Former Smoker     Last attempt to quit: 2005     Years since quittin.7    Smokeless tobacco: Never Used   Substance Use Topics    Alcohol use: No    Drug use: No       Family History  Family History   Problem Relation Age of Onset    Alzheimer's Disease Mother     Prostate Cancer Father     Cancer Brother     Heart Attack Maternal Aunt        Allergies   Allergies   Allergen Reactions    Reglan [Metoclopramide] Itching and Other (See Comments)     Flushed      Tape [Adhesive Tape]     Oxycodone Nausea And Vomiting       Medications:     Home Medications:  Were reviewed and are listed in nursing record. and/or listed below    Prior to Admission medications    Medication Sig Start Date End Date Taking? Authorizing Provider   lisinopril (PRINIVIL;ZESTRIL) 10 MG tablet Take 1 tablet by mouth daily 19  Yes Amy Rudolph MD   diclofenac (VOLTAREN) 75 MG EC tablet Take 1 tablet by mouth 2 times daily 19  Yes Ricardo Rao MD   pravastatin (PRAVACHOL) 80 MG tablet TAKE ONE TABLET EVERY DAY 3/25/19  Yes Ricardo Rao MD   citalopram (CELEXA) 20 MG tablet TAKE ONE TABLET EVERY DAY 3/25/19  Yes Ricardo Rao MD   potassium chloride (KLOR-CON M) 20 MEQ extended release tablet TAKE ONE TABLET EVERY DAY 3/25/19  Yes Ricardo Rao MD   vitamin D (CHOLECALCIFEROL) 1000 UNIT TABS tablet Take 1,000 Units by mouth daily   Yes Historical Provider, MD   CHONDROITIN SULFATE A PO Take 800 tablets by mouth daily    Yes Historical Provider, MD   Glucosamine Sulfate 500 MG TABS Take 2 tablets by mouth daily   Yes Historical Provider, MD   aspirin 81 MG tablet Take 81 mg by mouth daily   Yes Historical Provider, MD   Multiple Vitamins-Minerals (CENTRUM SILVER) TABS Take  by mouth. Yes Historical Provider, MD   Ascorbic Acid (VITAMIN C) 500 MG tablet Take 500 mg by mouth daily.    Yes Historical Provider, MD   vitamin E 400 UNIT capsule Take 400 Units by mouth every other day    Yes Historical Provider, MD   zoster recombinant adjuvanted vaccine [facility]) 50 MCG/0.5ML SUSR injection Inject 0.5 mLs into the muscle See Admin Instructions 1 dose now and repeat in 2-6 months 1/7/19   Brooke Bunch MD        Review of Systems   Constitutional: Negative for activity change, appetite change, diaphoresis, fatigue, fever and unexpected weight change. HENT: Negative for congestion, facial swelling, mouth sores and nosebleeds. Eyes: Negative for discharge and visual disturbance. Respiratory: Negative for cough, chest tightness, shortness of breath and wheezing. Cardiovascular: Negative for chest pain, palpitations and leg swelling. Gastrointestinal: Negative for abdominal distention, abdominal pain, blood in stool and vomiting. Endocrine: Negative for cold intolerance, heat intolerance and polyuria. Genitourinary: Negative for difficulty urinating, dysuria, frequency and hematuria. Musculoskeletal: Negative for back pain, joint swelling, myalgias and neck pain. Skin: Negative for color change, pallor and rash. Allergic/Immunologic: Negative for immunocompromised state. Neurological: Negative for dizziness, syncope (presyncope), weakness, light-headedness, numbness and headaches. Hematological: Negative for adenopathy. Does not bruise/bleed easily. Psychiatric/Behavioral: Negative for behavioral problems, confusion, decreased concentration and suicidal ideas. The patient is not nervous/anxious.         Vitals:    04/30/19 1139   BP: 100/70   Pulse:     Weight: 236 lb 6.4 oz (107.2 kg)       Vitals:    04/30/19 1129 04/30/19 1138 04/30/19 1139   BP: 110/70 100/80 100/70   Site: Left Upper Arm Right Upper Arm Left Upper Arm   Position: Supine Sitting Standing   Cuff Size: Large Adult Large Adult Large Adult   Pulse: 83     Weight: 236 lb 6.4 oz (107.2 kg)     Height: 5' 4\" (1.626 m)         BP Readings from Last 3 Encounters:   04/30/19 100/70   04/22/19 134/73   03/21/19 135/77       Wt Readings from Last 3 Encounters:   04/30/19 236 lb 6.4 oz (107.2 kg)   04/22/19 230 lb (104.3 kg)   03/21/19 240 lb 1.3 oz (108.9 kg)       Physical Exam   Constitutional: She is oriented to person, place, and time. She appears well-developed and well-nourished. No distress. HENT:   Head: Normocephalic and atraumatic. Eyes: Pupils are equal, round, and reactive to light. EOM are normal.   Neck: Normal range of motion. No JVD present. No thyromegaly present. Cardiovascular: Normal rate, regular rhythm, S1 normal, S2 normal, normal heart sounds and intact distal pulses. PMI is not displaced. Exam reveals no gallop and no friction rub. No murmur heard. Pulmonary/Chest: Effort normal and breath sounds normal. No stridor. No respiratory distress. She has no wheezes. She has no rales. She exhibits no tenderness. Abdominal: Soft. Bowel sounds are normal. She exhibits no distension. There is no tenderness. There is no rebound and no guarding. Musculoskeletal: Normal range of motion. She exhibits no edema or tenderness. Lymphadenopathy:     She has no cervical adenopathy. Neurological: She is alert and oriented to person, place, and time. Coordination normal.   Skin: Skin is warm and dry. No rash noted. She is not diaphoretic. No erythema. Psychiatric: She has a normal mood and affect.  Her behavior is normal. Judgment and thought content normal.       Labs:       Lab Results   Component Value Date    WBC 7.6 12/12/2018    HGB 13.6 12/12/2018    HCT 40.3 12/12/2018    MCV 94.0 12/12/2018     12/12/2018     Lab Results   Component Value Date     02/11/2019    K 4.2 02/11/2019     02/11/2019    CO2 27 02/11/2019    BUN 14 02/11/2019    CREATININE 0.62 02/11/2019    GLUCOSE 83 02/11/2019    CALCIUM 10.0 02/11/2019    PROT 7.0 12/12/2018    LABALBU 4.1 12/12/2018    BILITOT <0.2 12/12/2018    ALKPHOS 88 12/12/2018    AST 25 12/12/2018    ALT 20 12/12/2018 LABGLOM >60 12/12/2018    GFRAA >60 12/12/2018    AGRATIO 1.4 12/12/2018    GLOB 2.9 12/12/2018         Lab Results   Component Value Date    CHOL 166 01/08/2019    CHOL 185 06/21/2016    CHOL 173 12/16/2015     Lab Results   Component Value Date    TRIG 239 (H) 01/08/2019    TRIG 133 06/21/2016    TRIG 136 12/16/2015     Lab Results   Component Value Date    HDL 51 01/08/2019    HDL 50 06/21/2016    HDL 57 12/16/2015     Lab Results   Component Value Date    LDLCALC 67 01/08/2019    LDLCALC 108 (H) 06/21/2016    LDLCALC 89 12/16/2015     Lab Results   Component Value Date    LABVLDL 48 01/08/2019    LABVLDL 27 06/21/2016    LABVLDL 27 12/16/2015     Lab Results   Component Value Date    CHOLHDLRATIO 3.4 05/25/2011       Lab Results   Component Value Date    INR 1.08 12/12/2018    PROTIME 12.3 12/12/2018       The ASCVD Risk score ([place]ia [place]., et al., 2013) failed to calculate for the following reasons: The patient has a prior MI or stroke diagnosis      Imaging:       Last ECG (if available):    Last Stress (if available): The patient had a Dobutamine Stress Echocardiogram. Baseline echocardiogram   shows normal left ventricular function with an estimated ejection fraction   of 55%. Following the dobutamine infusion there was augmentation of all   segments with no areas of stress induced hypokinesis with an ejection   fraction of 65%.      Patient experienced 10 beat run V-tach during recovery that resolved. Last Cath (if available):  12/12/18  Angiographic Findings:  Right dominant system  Left Main:  Short.  Normal   Left Anterior Descending:  No significant CAD   Circumflex:  No significant CAD   Right Coronary:  No significant CAD   Left Ventriculogram:  EF 60%   Hemodynamics (mm Hg):  Left Ventricular Pressure:  117/0, 2  Central Aortic Pressure:  116/60 (84)    Last TTE/COLT(if available):  11/27/18  Summary   The patient had a Dobutamine Stress Echocardiogram. Baseline echocardiogram   shows normal left ventricular function with an estimated ejection fraction   of 55%. Following the dobutamine infusion there was augmentation of all   segments with no areas of stress induced hypokinesis with an ejection   fraction of 65%. Last CMR  (if available):      Assessment / Plan:     Essential hypertension  Borderline and some pre syncopal sxs  Will dc Lisinopril/HCTZ  Decrease Lisinopril to 10mg. Stroke [facility])  Will get monitor given palpitations and stroke seen in MRI to rule out any possible Afib/flutter    I had the opportunity to review the clinical symptoms and presentation of Mya [place].     Tobacco use was discussed with the patient and educated on the negative effects. I have asked the patient to not utilize these agents. All questions and concerns were addressed to the patient/family. Alternatives to my treatment were discussed. The note was completed using EMR. Every effort wasmade to ensure accuracy; however, inadvertent computerized transcription errors may be present. Thank you for allowing me to participate in thecare or [address]  Rayshawn Ko MD, [facility]

## 2019-04-30 ENCOUNTER — OFFICE VISIT (OUTPATIENT)
Dept: CARDIOLOGY CLINIC | Age: 69
End: 2019-04-30
Payer: MEDICARE

## 2019-04-30 VITALS
DIASTOLIC BLOOD PRESSURE: 70 MMHG | BODY MASS INDEX: 40.36 KG/M2 | WEIGHT: 236.4 LBS | HEART RATE: 83 BPM | SYSTOLIC BLOOD PRESSURE: 100 MMHG | HEIGHT: 64 IN

## 2019-04-30 DIAGNOSIS — I63.9 CEREBROVASCULAR ACCIDENT (CVA), UNSPECIFIED MECHANISM (HCC): ICD-10-CM

## 2019-04-30 DIAGNOSIS — I10 ESSENTIAL HYPERTENSION: ICD-10-CM

## 2019-04-30 DIAGNOSIS — R94.39 ABNORMAL STRESS ECHO: Primary | ICD-10-CM

## 2019-04-30 PROCEDURE — 4040F PNEUMOC VAC/ADMIN/RCVD: CPT | Performed by: INTERNAL MEDICINE

## 2019-04-30 PROCEDURE — 99213 OFFICE O/P EST LOW 20 MIN: CPT | Performed by: INTERNAL MEDICINE

## 2019-04-30 PROCEDURE — 3017F COLORECTAL CA SCREEN DOC REV: CPT | Performed by: INTERNAL MEDICINE

## 2019-04-30 PROCEDURE — G8417 CALC BMI ABV UP PARAM F/U: HCPCS | Performed by: INTERNAL MEDICINE

## 2019-04-30 PROCEDURE — G8598 ASA/ANTIPLAT THER USED: HCPCS | Performed by: INTERNAL MEDICINE

## 2019-04-30 PROCEDURE — 1036F TOBACCO NON-USER: CPT | Performed by: INTERNAL MEDICINE

## 2019-04-30 PROCEDURE — 93000 ELECTROCARDIOGRAM COMPLETE: CPT | Performed by: INTERNAL MEDICINE

## 2019-04-30 PROCEDURE — 1090F PRES/ABSN URINE INCON ASSESS: CPT | Performed by: INTERNAL MEDICINE

## 2019-04-30 PROCEDURE — G8399 PT W/DXA RESULTS DOCUMENT: HCPCS | Performed by: INTERNAL MEDICINE

## 2019-04-30 PROCEDURE — G8427 DOCREV CUR MEDS BY ELIG CLIN: HCPCS | Performed by: INTERNAL MEDICINE

## 2019-04-30 PROCEDURE — 1123F ACP DISCUSS/DSCN MKR DOCD: CPT | Performed by: INTERNAL MEDICINE

## 2019-04-30 RX ORDER — LISINOPRIL 10 MG/1
10 TABLET ORAL DAILY
Qty: 30 TABLET | Refills: 5 | Status: SHIPPED | OUTPATIENT
Start: 2019-04-30 | End: 2019-05-30

## 2019-04-30 ASSESSMENT — ENCOUNTER SYMPTOMS: SHORTNESS OF BREATH: 0

## 2019-05-02 ENCOUNTER — OFFICE VISIT (OUTPATIENT)
Dept: ORTHOPEDIC SURGERY | Age: 69
End: 2019-05-02
Payer: MEDICARE

## 2019-05-02 VITALS
DIASTOLIC BLOOD PRESSURE: 66 MMHG | SYSTOLIC BLOOD PRESSURE: 128 MMHG | WEIGHT: 236.33 LBS | HEIGHT: 64 IN | HEART RATE: 74 BPM | BODY MASS INDEX: 40.35 KG/M2

## 2019-05-02 DIAGNOSIS — Z98.890 S/P ROTATOR CUFF REPAIR: Primary | ICD-10-CM

## 2019-05-02 PROCEDURE — 99213 OFFICE O/P EST LOW 20 MIN: CPT | Performed by: ORTHOPAEDIC SURGERY

## 2019-05-02 PROCEDURE — 1036F TOBACCO NON-USER: CPT | Performed by: ORTHOPAEDIC SURGERY

## 2019-05-02 PROCEDURE — G8399 PT W/DXA RESULTS DOCUMENT: HCPCS | Performed by: ORTHOPAEDIC SURGERY

## 2019-05-02 PROCEDURE — G8598 ASA/ANTIPLAT THER USED: HCPCS | Performed by: ORTHOPAEDIC SURGERY

## 2019-05-02 PROCEDURE — 4040F PNEUMOC VAC/ADMIN/RCVD: CPT | Performed by: ORTHOPAEDIC SURGERY

## 2019-05-02 PROCEDURE — 1123F ACP DISCUSS/DSCN MKR DOCD: CPT | Performed by: ORTHOPAEDIC SURGERY

## 2019-05-02 PROCEDURE — G8427 DOCREV CUR MEDS BY ELIG CLIN: HCPCS | Performed by: ORTHOPAEDIC SURGERY

## 2019-05-02 PROCEDURE — 1090F PRES/ABSN URINE INCON ASSESS: CPT | Performed by: ORTHOPAEDIC SURGERY

## 2019-05-02 PROCEDURE — 3017F COLORECTAL CA SCREEN DOC REV: CPT | Performed by: ORTHOPAEDIC SURGERY

## 2019-05-02 PROCEDURE — G8417 CALC BMI ABV UP PARAM F/U: HCPCS | Performed by: ORTHOPAEDIC SURGERY

## 2019-05-02 NOTE — PROGRESS NOTES
[address] [place] [address]  History and Physical  Shoulder Pain    Date:  2019    Name:  Inna Lombardi  Address:  [address] [address]    :  1950      Age:   81 y.o.    SSN:  [id number]      Medical Record Number:  [id number]    Reason for Visit:    Shoulder Pain (F/u left shoulder)      HPI:   Inna Lombardi is a 81 y.o. female who presents to our office today complaining of  left shoulder pain. The patient underwent a left shoulder arthroscopy for a arthroscopic biceps tenotomy, arthroscopic removal of retained sutures,  arthroscopic revision of rotator cuff repair with separate repairs of supraspinatus and subscapularis tendon tears on 18. She did sustain a fall a month after surgery. She did extensive therapy with Ced Christensen at the [place] office up until 2019. The patient feels that her motion is not where she would think she should be. She denies any new injuries. Pain Assessment  Location of Pain: Shoulder  Location Modifiers: Left  Severity of Pain: 4  Quality of Pain: Aching, Dull  Duration of Pain: Persistent  Frequency of Pain: Constant  Aggravating Factors: (sleeping)  Limiting Behavior: Yes  Relieving Factors: Ice, Exercise  Result of Injury: No  Work-Related Injury: No  Are there other pain locations you wish to document?: No    Patient has had no medical changes since last evaluated      Review of Systems:  A 14 point review of systems available in the scanned medical record as documented by the patient on 19. The review is negative with the exception of those things mentioned in the History of Present Illness and Past Medical History. Past Medical History:  Patient's medications, allergies, past medical, surgical, social and family histories were reviewed and updated as appropriate. Allergies:   Allergies   Allergen Reactions    Reglan [Metoclopramide] Itching and Other (See Comments)     Flushed      Tape [place] Tape]     Oxycodone Nausea And Vomiting       Physical Exam:  Vitals:    05/02/19 1514   BP: 128/66   Pulse: 74     General: Kayleigh Leonardo is a healthy and well appearing 81 y.o. female who is sitting comfortably in our office in acute distress. Alert and oriented. General/Appearance: Alert and oriented and in no apparent distress. Skin:  There are no skin lesions, cellulitis, or extreme edema. The patient has warm and well-perfused Bilateral upper extremities with brisk capillary refill. left Shoulder Exam:  Inspection:  No gross deformities, no signs of infection. Palpation:  She has subacromial crepitus    Active Range of Motion: Forward elevation of 90, abduction of 90 with scapulothoracic compensation, external rotation with elbow at the side 50, internal rotation to the back is L1    Passive Range of Motion: Passively forward elevation can be further increased to 160. Strength:  4/5 external rotation with resistance, -4/5 supraspinatus    Special Tests:  Negative belly press. No Herson muscle deformity. Neurovascular: Sensation to light touch is intact, no motor deficits, palpable radial pulses 2+    Neuro: alert. oriented  Eyes: Extra-ocular muscles intact  Mouth: Oral mucosa moist. No perioral lesions  Pulm: Respirations unlabored and regular. Skin: warm, well perfused    Additional Examinations:    Examination of the contralateral extremity does not show any tenderness, deformity or injury. Range of motion is unremarkable. There is no gross instability. There are no rashes, ulcerations or lesions. Strength and tone are normal.    Assessment:  Kayleigh Leonardo is a 81y.o. year old female with a history of limited motion with a history of undergoing two arthroscopies for rotation cuff repair. Impression:  Encounter Diagnosis   Name Primary?     S/P rotator cuff repair Yes       Office Procedures:  Orders Placed This Encounter   Procedures    MRI SHOULDER LEFT WO CONTRAST Standing Status:   Future     Standing Expiration Date:   5/2/2020       Plan: We will plan to obtain an MRI of the left shoulder to evaluate if the rotator cuff tendon has healed or not. She was told to hold off on rehab until we have the results of the MRI. She was agreeable to that. All her questions were answered today. 5/2/2019  3:48 PM      Pasquale Cartwright PA-C  Orthopaedic Sports Medicine Physician Assistant    During this examination, Pasquale GUERRERO PA-C, functioned as a scribe for Dr. Shonna Meraz. This dictation was performed with a verbal recognition program (DRAGON) and it was checked for errors. It is possible that there are still dictated errors within this office note. If so, please bring any errors to my attention for an addendum. All efforts were made to ensure that this office note is accurate.  ________________  I, Dr. Shonna Meraz, personally performed the services described in this documentation as described by Pasquale Cartwright PA-C in my presence, and it is both accurate and complete. Samer S. Vernell Closs, MD, PhD  5/2/2019

## 2019-05-16 ENCOUNTER — NURSE ONLY (OUTPATIENT)
Dept: CARDIOLOGY CLINIC | Age: 69
End: 2019-05-16

## 2019-05-16 PROCEDURE — 0298T PR EXT ECG > 48HR TO 21 DAY REVIEW AND INTERPRETATN: CPT | Performed by: INTERNAL MEDICINE

## 2019-05-30 ENCOUNTER — OFFICE VISIT (OUTPATIENT)
Dept: ORTHOPEDIC SURGERY | Age: 69
End: 2019-05-30
Payer: MEDICARE

## 2019-05-30 VITALS
DIASTOLIC BLOOD PRESSURE: 81 MMHG | SYSTOLIC BLOOD PRESSURE: 138 MMHG | WEIGHT: 236.33 LBS | BODY MASS INDEX: 40.35 KG/M2 | HEIGHT: 64 IN | HEART RATE: 77 BPM

## 2019-05-30 DIAGNOSIS — M67.912 ROTATOR CUFF DYSFUNCTION, LEFT: Primary | ICD-10-CM

## 2019-05-30 PROCEDURE — 1036F TOBACCO NON-USER: CPT | Performed by: ORTHOPAEDIC SURGERY

## 2019-05-30 PROCEDURE — G8427 DOCREV CUR MEDS BY ELIG CLIN: HCPCS | Performed by: ORTHOPAEDIC SURGERY

## 2019-05-30 PROCEDURE — 99213 OFFICE O/P EST LOW 20 MIN: CPT | Performed by: ORTHOPAEDIC SURGERY

## 2019-05-30 PROCEDURE — 1123F ACP DISCUSS/DSCN MKR DOCD: CPT | Performed by: ORTHOPAEDIC SURGERY

## 2019-05-30 PROCEDURE — 4040F PNEUMOC VAC/ADMIN/RCVD: CPT | Performed by: ORTHOPAEDIC SURGERY

## 2019-05-30 PROCEDURE — G8417 CALC BMI ABV UP PARAM F/U: HCPCS | Performed by: ORTHOPAEDIC SURGERY

## 2019-05-30 PROCEDURE — G8598 ASA/ANTIPLAT THER USED: HCPCS | Performed by: ORTHOPAEDIC SURGERY

## 2019-05-30 PROCEDURE — G8399 PT W/DXA RESULTS DOCUMENT: HCPCS | Performed by: ORTHOPAEDIC SURGERY

## 2019-05-30 PROCEDURE — 3017F COLORECTAL CA SCREEN DOC REV: CPT | Performed by: ORTHOPAEDIC SURGERY

## 2019-05-30 PROCEDURE — 1090F PRES/ABSN URINE INCON ASSESS: CPT | Performed by: ORTHOPAEDIC SURGERY

## 2019-05-30 RX ORDER — LISINOPRIL 10 MG/1
10 TABLET ORAL
COMMUNITY
End: 2019-06-12 | Stop reason: SDUPTHER

## 2019-05-30 NOTE — PROGRESS NOTES
History of Present Illness:  Francisco Connolly is a pleasant, 81 y.o., female, here today for follow up of left shoulder and MRI results. She did undergo a left shoulder arthroscopy for a arthroscopic biceps tenotomy, arthroscopic removal of retained sutures, arthroscopic revision of rotator cuff repair with separate repairs of supraspinatus and subscapularis tendon tears on 7/19/18. We ordered an MRI to assess for rotator cuff healing. She continues to have limited motion. She does have a trip to [place] planned for end of July. She reports no new injuries since her last visit. Medical History:  Patient's medications, allergies, past medical, surgical, social and family histories were reviewed and updated as appropriate. Review of Systems  A 14 point review of systems was completed by the patient on 4/22/19 and is available in the media section of the scanned medical record and was reviewed on 5/30/2019. The review is negative with the exception of those things mentioned in the HPI and Past Medical History    Vital Signs:  Vitals:    05/30/19 1428   BP: 138/81   Pulse: 77       General/Appearance: Alert and oriented and in no apparent distress. Skin:  There are no skin lesions, cellulitis, or extreme edema. The patient has warm and well-perfused Bilateral upper extremities with brisk capillary refill. LEFT Shoulder Exam:  Inspection:  No gross deformities, no signs of infection. Palpation:  Subacromial crepitus    Active Range of Motion: Forward Elevation 110, Internal Rotation L1    Passive Range of Motion: Deferred    Strength: Deferred    Special Tests:  No Herson muscle deformity. Neurovascular: Sensation to light touch is intact, no motor deficits, palpable radial pulses 2+        Radiology:     No new XR obtained at this time. MRI from 5/15/19  CONCLUSION:   1.  Large recurrent cuff tear. Complete retracted tear supraspinatus tendon critical zone    fibers.  Moderate peritendinobursitis and capsulitis. Moderate muscle fatty replacement. 2. Moderately tendinopathic infraspinatus insertion. Fibers frayed. 3. Chronically torn subscapularis insertion. Fibers scarred. Severe fatty atrophy subscapularis    muscle. 4. Biceps tenodesis. 5. Please see above. Assessment :  Ms. Jose Alvarado is a pleasant, 81 y.o. 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 This is likely irreparable. She also has a history of balance issues and falling which does not canelo well for revision repair. Impression:  Encounter Diagnosis   Name Primary?  Rotator cuff dysfunction, left Yes       Office Procedures:  No orders of the defined types were placed in this encounter. Treatment Plan:  We discussed diagnosis and treatment options in detail. We discussed surgical and non surgical options. Conservatively she may utilize corticosteroid injections and activity modification in conjunction with formal physical therapy to try to maintain her motion. There is an option for a revision rotator cuff repair with possible graft. Additionally there is an option for a balloon arthroplasty when it becomes available, however this is not yet approved by the FDA. . Lastly she may consider a reverse total shoulder replacement, however at this time her motion is not limited to the point to warrant a shoulder replacement. After discussing her options, she would like to plan for a revision rotator cuff repair following her trip in July. We also discussed with Jose Alvarado that she may want to consider having her knee replacement prior to her shoulder surgery. She will keep her follow up appointment with Dr. Dale Jansen and contact us when she is ready to move forward with scheduling. We will see Brunilda Lizama back preoperatively in mid-July and/or as needed. All questions were answered to patient's satisfaction and was encouraged to call with any further questions or concerns.  Jose Alvarado is

## 2019-06-10 ENCOUNTER — OFFICE VISIT (OUTPATIENT)
Dept: ORTHOPEDIC SURGERY | Age: 69
End: 2019-06-10
Payer: MEDICARE

## 2019-06-10 VITALS
HEIGHT: 64 IN | SYSTOLIC BLOOD PRESSURE: 136 MMHG | BODY MASS INDEX: 40.29 KG/M2 | DIASTOLIC BLOOD PRESSURE: 75 MMHG | WEIGHT: 236 LBS | HEART RATE: 69 BPM

## 2019-06-10 DIAGNOSIS — M17.12 PRIMARY OSTEOARTHRITIS OF LEFT KNEE: Primary | ICD-10-CM

## 2019-06-10 PROCEDURE — G8399 PT W/DXA RESULTS DOCUMENT: HCPCS | Performed by: ORTHOPAEDIC SURGERY

## 2019-06-10 PROCEDURE — G8417 CALC BMI ABV UP PARAM F/U: HCPCS | Performed by: ORTHOPAEDIC SURGERY

## 2019-06-10 PROCEDURE — 1123F ACP DISCUSS/DSCN MKR DOCD: CPT | Performed by: ORTHOPAEDIC SURGERY

## 2019-06-10 PROCEDURE — 1090F PRES/ABSN URINE INCON ASSESS: CPT | Performed by: ORTHOPAEDIC SURGERY

## 2019-06-10 PROCEDURE — 99213 OFFICE O/P EST LOW 20 MIN: CPT | Performed by: ORTHOPAEDIC SURGERY

## 2019-06-10 PROCEDURE — G8598 ASA/ANTIPLAT THER USED: HCPCS | Performed by: ORTHOPAEDIC SURGERY

## 2019-06-10 PROCEDURE — 4040F PNEUMOC VAC/ADMIN/RCVD: CPT | Performed by: ORTHOPAEDIC SURGERY

## 2019-06-10 PROCEDURE — 1036F TOBACCO NON-USER: CPT | Performed by: ORTHOPAEDIC SURGERY

## 2019-06-10 PROCEDURE — 3017F COLORECTAL CA SCREEN DOC REV: CPT | Performed by: ORTHOPAEDIC SURGERY

## 2019-06-10 PROCEDURE — G8427 DOCREV CUR MEDS BY ELIG CLIN: HCPCS | Performed by: ORTHOPAEDIC SURGERY

## 2019-06-10 NOTE — PROGRESS NOTES
of systems was collected today, reviewed and is included in the medical record. It is available under the media tab. I personally performed the services described in this documentation and scribed by [address] [place] [address] DIXIE. Carlee Chamberlain MD  Sports Medicine, Arthroscopic Knee and Shoulder Surgery    This dictation was performed with a verbal recognition program [facility]) and it was checked for errors. It is possible that there are still dictated errors within this office note. If so, please bring any errors to my attention for an addendum. All efforts were made to ensure that this office note is accurate.

## 2019-06-12 ENCOUNTER — TELEPHONE (OUTPATIENT)
Dept: CARDIOLOGY CLINIC | Age: 69
End: 2019-06-12

## 2019-06-12 ENCOUNTER — OFFICE VISIT (OUTPATIENT)
Dept: CARDIOLOGY CLINIC | Age: 69
End: 2019-06-12
Payer: MEDICARE

## 2019-06-12 VITALS
SYSTOLIC BLOOD PRESSURE: 140 MMHG | DIASTOLIC BLOOD PRESSURE: 80 MMHG | HEART RATE: 88 BPM | OXYGEN SATURATION: 97 % | BODY MASS INDEX: 39.27 KG/M2 | WEIGHT: 228.8 LBS

## 2019-06-12 DIAGNOSIS — R55 PRE-SYNCOPE: ICD-10-CM

## 2019-06-12 DIAGNOSIS — I10 ESSENTIAL HYPERTENSION: ICD-10-CM

## 2019-06-12 DIAGNOSIS — Z01.810 PREOP CARDIOVASCULAR EXAM: ICD-10-CM

## 2019-06-12 PROCEDURE — 99213 OFFICE O/P EST LOW 20 MIN: CPT | Performed by: INTERNAL MEDICINE

## 2019-06-12 PROCEDURE — 3017F COLORECTAL CA SCREEN DOC REV: CPT | Performed by: INTERNAL MEDICINE

## 2019-06-12 PROCEDURE — G8598 ASA/ANTIPLAT THER USED: HCPCS | Performed by: INTERNAL MEDICINE

## 2019-06-12 PROCEDURE — 1090F PRES/ABSN URINE INCON ASSESS: CPT | Performed by: INTERNAL MEDICINE

## 2019-06-12 PROCEDURE — G8399 PT W/DXA RESULTS DOCUMENT: HCPCS | Performed by: INTERNAL MEDICINE

## 2019-06-12 PROCEDURE — 1123F ACP DISCUSS/DSCN MKR DOCD: CPT | Performed by: INTERNAL MEDICINE

## 2019-06-12 PROCEDURE — 1036F TOBACCO NON-USER: CPT | Performed by: INTERNAL MEDICINE

## 2019-06-12 PROCEDURE — 4040F PNEUMOC VAC/ADMIN/RCVD: CPT | Performed by: INTERNAL MEDICINE

## 2019-06-12 PROCEDURE — G8417 CALC BMI ABV UP PARAM F/U: HCPCS | Performed by: INTERNAL MEDICINE

## 2019-06-12 PROCEDURE — G8427 DOCREV CUR MEDS BY ELIG CLIN: HCPCS | Performed by: INTERNAL MEDICINE

## 2019-06-12 RX ORDER — LISINOPRIL 20 MG/1
20 TABLET ORAL DAILY
Qty: 30 TABLET | Refills: 5 | Status: SHIPPED | OUTPATIENT
Start: 2019-06-12 | End: 2019-09-17 | Stop reason: SDUPTHER

## 2019-06-12 RX ORDER — LISINOPRIL 20 MG/1
10 TABLET ORAL DAILY
Qty: 30 TABLET | Refills: 5 | Status: SHIPPED | OUTPATIENT
Start: 2019-06-12 | End: 2019-06-12 | Stop reason: ALTCHOICE

## 2019-06-12 ASSESSMENT — ENCOUNTER SYMPTOMS
BACK PAIN: 0
COLOR CHANGE: 0
SHORTNESS OF BREATH: 0
ABDOMINAL DISTENTION: 0
EYE DISCHARGE: 0
ABDOMINAL PAIN: 0
FACIAL SWELLING: 0
BLOOD IN STOOL: 0
WHEEZING: 0
CHEST TIGHTNESS: 0
VOMITING: 0
COUGH: 0

## 2019-06-12 NOTE — PROGRESS NOTES
[facility]     Outpatient Cardiology         Chief Complaint   Patient presents with    Follow-up     s/p Zio 14 days    Hypertension     HPI     Sherwin Cabrales a 81 y.o. female here for follow up visit, post monitor results. Since last appointment her dizziness has improved, her blood pressure seems to be somewhat elevated today. On the last appointment her combo pill was discontinued and only kept on lisinopril 10. No more dizzy spells she is working out, and feeling fine. Brings heart rate log which has been within normal limits and blood pressure which is mildly elevated. Hypertension, not stable BP is mildly elevated. Dizzy spells resolved. PMH  Past Medical History:   Diagnosis Date    Arthritis     Cerebral artery occlusion with cerebral infarction ([facility] [address]     TIA X 4    Depression     GERD (gastroesophageal reflux disease)     HTN (hypertension)     Hyperlipidemia     Overweight     Raynaud's disease     Spinal stenosis        PSH  Past Surgical History:   Procedure Laterality Date    APPENDECTOMY  07/17/2016    BLADDER REPAIR      FINGER TRIGGER RELEASE  4/13; 10/13; 4/14    Multiple fingers    HAMMER TOE SURGERY      Right.     HERNIA REPAIR      ABDOMINAL INCISIONAL HERNIA REPAIR    HIP SURGERY      total hip replacement     HYSTERECTOMY  02/1991    JOINT REPLACEMENT  01/2018    right THR    LUMBAR FUSION  12/13    L4-L5;L5-S1    LUMBAR LAMINECTOMY  11/1997 and 1981    OTHER SURGICAL HISTORY Left 07/19/2018     LEFT SHOULDER EUA, DIAGNOSTIC  ARTHROSCOPY, EXTENSIVE DEBRIDEMENT, LYSIS OF ADHESIONS, REMOVAL OF RETAINED SUTURE, BICEPS TENOTOMY, REVISION ROTATOR CUFF REPAIR (Left Shoulder)    CONRADO KOVACS ARTHROSCOP,DIAGNOSTIC Left 7/19/2018    LEFT SHOULDER EUA, DIAGNOSTIC  ARTHROSCOPY, EXTENSIVE DEBRIDEMENT, LYSIS OF ADHESIONS, REMOVAL OF RETAINED SUTURE, BICEPS TENOTOMY, REVISION ROTATOR CUFF REPAIR performed by Domenico Jennings MD at [address] [place] [address] SURGERY      Left        Social HIstory  Social History     Tobacco Use    Smoking status: Former Smoker     Last attempt to quit: 2005     Years since quittin.9    Smokeless tobacco: Never Used   Substance Use Topics    Alcohol use: No    Drug use: No       Family History  Family History   Problem Relation Age of Onset    Alzheimer's Disease Mother     Prostate Cancer Father     Cancer Brother     Heart Attack Maternal Aunt        Allergies   Allergies   Allergen Reactions    Reglan [Metoclopramide] Itching and Other (See Comments)     Flushed      Tape [Adhesive Tape]     Oxycodone Nausea And Vomiting       Medications:     Home Medications:  Were reviewed and are listed in nursing record. and/or listed below    Prior to Admission medications    Medication Sig Start Date End Date Taking? Authorizing Provider   lisinopril (PRINIVIL;ZESTRIL) 20 MG tablet Take 0.5 tablets by mouth daily 19  Yes Chantel Amezquita MD   diclofenac (VOLTAREN) 75 MG EC tablet Take 1 tablet by mouth 2 times daily 19  Yes Anais Varma MD   pravastatin (PRAVACHOL) 80 MG tablet TAKE ONE TABLET EVERY DAY 3/25/19  Yes Anais Varma MD   citalopram (CELEXA) 20 MG tablet TAKE ONE TABLET EVERY DAY 3/25/19  Yes Anais Varma MD   vitamin D (CHOLECALCIFEROL) 1000 UNIT TABS tablet Take 1,000 Units by mouth daily   Yes Historical Provider, MD   CHONDROITIN SULFATE A PO Take 800 tablets by mouth daily    Yes Historical Provider, MD   Glucosamine Sulfate 500 MG TABS Take 2 tablets by mouth daily   Yes Historical Provider, MD   aspirin 81 MG tablet Take 81 mg by mouth daily   Yes Historical Provider, MD   Multiple Vitamins-Minerals (CENTRUM SILVER) TABS Take  by mouth. Yes Historical Provider, MD   Ascorbic Acid (VITAMIN C) 500 MG tablet Take 500 mg by mouth daily.    Yes Historical Provider, MD   vitamin E 400 UNIT capsule Take 400 Units by mouth every other day    Yes Historical Provider, MD   zoster 06/10/19 236 lb (107 kg)   05/30/19 236 lb 5.3 oz (107.2 kg)       Physical Exam   Constitutional: She is oriented to person, place, and time. She appears well-developed and well-nourished. No distress. HENT:   Head: Normocephalic and atraumatic. Eyes: Pupils are equal, round, and reactive to light. EOM are normal.   Neck: Normal range of motion. No JVD present. No thyromegaly present. Cardiovascular: Normal rate, regular rhythm, S1 normal, S2 normal, normal heart sounds and intact distal pulses. PMI is not displaced. Exam reveals no gallop and no friction rub. No murmur heard. Pulmonary/Chest: Effort normal and breath sounds normal. No stridor. No respiratory distress. She has no wheezes. She has no rales. She exhibits no tenderness. Abdominal: Soft. Bowel sounds are normal. She exhibits no distension. There is no tenderness. There is no rebound and no guarding. Musculoskeletal: Normal range of motion. She exhibits no edema or tenderness. Lymphadenopathy:     She has no cervical adenopathy. Neurological: She is alert and oriented to person, place, and time. Coordination normal.   Skin: Skin is warm and dry. No rash noted. She is not diaphoretic. No erythema. Psychiatric: She has a normal mood and affect.  Her behavior is normal. Judgment and thought content normal.       Labs:       Lab Results   Component Value Date    WBC 7.6 12/12/2018    HGB 13.6 12/12/2018    HCT 40.3 12/12/2018    MCV 94.0 12/12/2018     12/12/2018     Lab Results   Component Value Date     02/11/2019    K 4.2 02/11/2019     02/11/2019    CO2 27 02/11/2019    BUN 14 02/11/2019    CREATININE 0.62 02/11/2019    GLUCOSE 83 02/11/2019    CALCIUM 10.0 02/11/2019    PROT 7.0 12/12/2018    LABALBU 4.1 12/12/2018    BILITOT <0.2 12/12/2018    ALKPHOS 88 12/12/2018    AST 25 12/12/2018    ALT 20 12/12/2018    LABGLOM >60 12/12/2018    GFRAA >60 12/12/2018    AGRATIO 1.4 12/12/2018    GLOB 2.9 12/12/2018 Lab Results   Component Value Date    CHOL 166 01/08/2019    CHOL 185 06/21/2016    CHOL 173 12/16/2015     Lab Results   Component Value Date    TRIG 239 (H) 01/08/2019    TRIG 133 06/21/2016    TRIG 136 12/16/2015     Lab Results   Component Value Date    HDL 51 01/08/2019    HDL 50 06/21/2016    HDL 57 12/16/2015     Lab Results   Component Value Date    LDLCALC 67 01/08/2019    LDLCALC 108 (H) 06/21/2016    LDLCALC 89 12/16/2015     Lab Results   Component Value Date    LABVLDL 48 01/08/2019    LABVLDL 27 06/21/2016    LABVLDL 27 12/16/2015     Lab Results   Component Value Date    CHOLHDLRATIO 3.4 05/25/2011       Lab Results   Component Value Date    INR 1.08 12/12/2018    PROTIME 12.3 12/12/2018       The ASCVD Risk score (Andrzej Puri et al., 2013) failed to calculate for the following reasons: The patient has a prior MI or stroke diagnosis      Imaging:       Last ECG (if available):  Normal sinus rhythm    Last Stress (if available): The patient had a Dobutamine Stress Echocardiogram. Baseline echocardiogram   shows normal left ventricular function with an estimated ejection fraction   of 55%. Following the dobutamine infusion there was augmentation of all   segments with no areas of stress induced hypokinesis with an ejection   fraction of 65%.      Patient experienced 10 beat run V-tach during recovery that resolved. Last Cath (if available):  12/12/18  Angiographic Findings:  Right dominant system  Left Main:  Short. Normal   Left Anterior Descending:  No significant CAD   Circumflex:  No significant CAD   Right Coronary:  No significant CAD   Left Ventriculogram:  EF 60%   Hemodynamics (mm Hg):  Left Ventricular Pressure:  117/0, 2  Central Aortic Pressure:  116/60 (84)    Last TTE/COLT(if available):  11/27/18  Summary   The patient had a Dobutamine Stress Echocardiogram. Baseline echocardiogram   shows normal left ventricular function with an estimated ejection fraction   of 55%. Following the dobutamine infusion there was augmentation of all   segments with no areas of stress induced hypokinesis with an ejection   fraction of 65%. Last CMR  (if available):      Assessment / Plan:     Essential hypertension  Will increase lisinopril to 20 mg daily    Preop cardiovascular exam  Except a low risk for noncardiac surgery. Most recent ischemic work-up was a coronary angiography last year with normal coronaries. Pre-syncope  Solved, normal monitor results    I had the opportunity to review the clinical symptoms and presentation of Josias Haider.     Tobacco use was discussed with the patient and educated on the negative effects. I have asked the patient to not utilize these agents. All questions and concerns were addressed to the patient/family. Alternatives to my treatment were discussed. The note was completed using EMR. Every effort wasmade to ensure accuracy; however, inadvertent computerized transcription errors may be present. Thank you for allowing me to participate in thecare or [address]  Solange Simmons MD, [facility]

## 2019-06-12 NOTE — TELEPHONE ENCOUNTER
Pt calling to report that the dosage on the lisinopril    Is incorrect    Please call her to explain

## 2019-06-12 NOTE — TELEPHONE ENCOUNTER
Medication corrected. Per Dr. Cindy Cali note, Lisinopril increased to 20 mg daily. Patient verbalized understanding.

## 2019-06-12 NOTE — LETTER
[address] [place] [address] [place] [address]  Phone: [phone]  Fax: [phone]    Ines Epperson MD        6/12/2019      Leslie Fuentes YOB: 1950 has an acceptable risk for a non cardiac surgery. No further cardiac work up needed at this time. If there are any questions, please feel free to contact my office at [phone].       Sincerely,                Ines Epperson MD

## 2019-06-12 NOTE — ASSESSMENT & PLAN NOTE
Except a low risk for noncardiac surgery. Most recent ischemic work-up was a coronary angiography last year with normal coronaries.

## 2019-06-13 ENCOUNTER — TELEPHONE (OUTPATIENT)
Dept: ORTHOPEDIC SURGERY | Age: 69
End: 2019-06-13

## 2019-06-13 DIAGNOSIS — R00.2 PALPITATIONS: ICD-10-CM

## 2019-06-20 ENCOUNTER — TELEPHONE (OUTPATIENT)
Dept: ORTHOPEDIC SURGERY | Age: 69
End: 2019-06-20

## 2019-07-11 RX ORDER — LISINOPRIL 10 MG/1
10 TABLET ORAL NIGHTLY
Qty: 90 TABLET | Refills: 1 | Status: SHIPPED | OUTPATIENT
Start: 2019-07-11 | End: 2019-09-28 | Stop reason: SDUPTHER

## 2019-07-12 PROBLEM — Z01.810 PREOP CARDIOVASCULAR EXAM: Status: RESOLVED | Noted: 2019-06-12 | Resolved: 2019-07-12

## 2019-07-15 ENCOUNTER — TELEPHONE (OUTPATIENT)
Dept: INTERNAL MEDICINE CLINIC | Age: 69
End: 2019-07-15

## 2019-07-16 ENCOUNTER — OFFICE VISIT (OUTPATIENT)
Dept: INTERNAL MEDICINE CLINIC | Age: 69
End: 2019-07-16
Payer: MEDICARE

## 2019-07-16 VITALS
SYSTOLIC BLOOD PRESSURE: 136 MMHG | BODY MASS INDEX: 37.9 KG/M2 | OXYGEN SATURATION: 97 % | HEART RATE: 68 BPM | HEIGHT: 64 IN | DIASTOLIC BLOOD PRESSURE: 70 MMHG | WEIGHT: 222 LBS

## 2019-07-16 DIAGNOSIS — Z01.818 PRE-OPERATIVE EXAM: Primary | ICD-10-CM

## 2019-07-16 DIAGNOSIS — E78.2 MIXED HYPERLIPIDEMIA: ICD-10-CM

## 2019-07-16 DIAGNOSIS — M17.12 PRIMARY OSTEOARTHRITIS OF LEFT KNEE: ICD-10-CM

## 2019-07-16 DIAGNOSIS — I10 ESSENTIAL HYPERTENSION: ICD-10-CM

## 2019-07-16 DIAGNOSIS — K21.9 GASTROESOPHAGEAL REFLUX DISEASE WITHOUT ESOPHAGITIS: ICD-10-CM

## 2019-07-16 DIAGNOSIS — M48.061 SPINAL STENOSIS OF LUMBAR REGION WITHOUT NEUROGENIC CLAUDICATION: ICD-10-CM

## 2019-07-16 PROCEDURE — G8598 ASA/ANTIPLAT THER USED: HCPCS | Performed by: INTERNAL MEDICINE

## 2019-07-16 PROCEDURE — G8417 CALC BMI ABV UP PARAM F/U: HCPCS | Performed by: INTERNAL MEDICINE

## 2019-07-16 PROCEDURE — 1123F ACP DISCUSS/DSCN MKR DOCD: CPT | Performed by: INTERNAL MEDICINE

## 2019-07-16 PROCEDURE — G8399 PT W/DXA RESULTS DOCUMENT: HCPCS | Performed by: INTERNAL MEDICINE

## 2019-07-16 PROCEDURE — 99215 OFFICE O/P EST HI 40 MIN: CPT | Performed by: INTERNAL MEDICINE

## 2019-07-16 PROCEDURE — 1090F PRES/ABSN URINE INCON ASSESS: CPT | Performed by: INTERNAL MEDICINE

## 2019-07-16 PROCEDURE — 3017F COLORECTAL CA SCREEN DOC REV: CPT | Performed by: INTERNAL MEDICINE

## 2019-07-16 PROCEDURE — 4040F PNEUMOC VAC/ADMIN/RCVD: CPT | Performed by: INTERNAL MEDICINE

## 2019-07-16 PROCEDURE — G8427 DOCREV CUR MEDS BY ELIG CLIN: HCPCS | Performed by: INTERNAL MEDICINE

## 2019-07-16 PROCEDURE — 1036F TOBACCO NON-USER: CPT | Performed by: INTERNAL MEDICINE

## 2019-07-16 ASSESSMENT — ENCOUNTER SYMPTOMS
GASTROINTESTINAL NEGATIVE: 1
RESPIRATORY NEGATIVE: 1

## 2019-07-16 NOTE — PROGRESS NOTES
region without neurogenic claudication            Plan:      Bethany appears stable for the anticipated surgical procedure. No history of anesthesia complications. No pre-op testing was performed.           Florin Muhammad MD

## 2019-07-29 ENCOUNTER — OFFICE VISIT (OUTPATIENT)
Dept: ORTHOPEDIC SURGERY | Age: 69
End: 2019-07-29
Payer: MEDICARE

## 2019-07-29 VITALS
SYSTOLIC BLOOD PRESSURE: 150 MMHG | HEART RATE: 76 BPM | DIASTOLIC BLOOD PRESSURE: 85 MMHG | WEIGHT: 222 LBS | BODY MASS INDEX: 37.9 KG/M2 | HEIGHT: 64 IN

## 2019-07-29 DIAGNOSIS — M17.12 PRIMARY OSTEOARTHRITIS OF LEFT KNEE: Primary | ICD-10-CM

## 2019-07-29 PROCEDURE — 1123F ACP DISCUSS/DSCN MKR DOCD: CPT | Performed by: ORTHOPAEDIC SURGERY

## 2019-07-29 PROCEDURE — MISCCOLD COLD THERAPY UNIT AND PAD: Performed by: ORTHOPAEDIC SURGERY

## 2019-07-29 PROCEDURE — G8399 PT W/DXA RESULTS DOCUMENT: HCPCS | Performed by: ORTHOPAEDIC SURGERY

## 2019-07-29 PROCEDURE — 4040F PNEUMOC VAC/ADMIN/RCVD: CPT | Performed by: ORTHOPAEDIC SURGERY

## 2019-07-29 PROCEDURE — G8427 DOCREV CUR MEDS BY ELIG CLIN: HCPCS | Performed by: ORTHOPAEDIC SURGERY

## 2019-07-29 PROCEDURE — G8417 CALC BMI ABV UP PARAM F/U: HCPCS | Performed by: ORTHOPAEDIC SURGERY

## 2019-07-29 PROCEDURE — 1036F TOBACCO NON-USER: CPT | Performed by: ORTHOPAEDIC SURGERY

## 2019-07-29 PROCEDURE — G8598 ASA/ANTIPLAT THER USED: HCPCS | Performed by: ORTHOPAEDIC SURGERY

## 2019-07-29 PROCEDURE — 99213 OFFICE O/P EST LOW 20 MIN: CPT | Performed by: ORTHOPAEDIC SURGERY

## 2019-07-29 PROCEDURE — 3017F COLORECTAL CA SCREEN DOC REV: CPT | Performed by: ORTHOPAEDIC SURGERY

## 2019-07-29 PROCEDURE — 1090F PRES/ABSN URINE INCON ASSESS: CPT | Performed by: ORTHOPAEDIC SURGERY

## 2019-07-29 NOTE — PROGRESS NOTES
Chief Complaint  Follow-up (left knee. to discuss options. )      History of Present Illness:  Letty Jimenez is a pleasant 81 y.o. female who presents today for follow up evaluation of left knee pain. She has a known history of left knee osteoarthritis. She has been treated conservatively with intraarticular cortisone injections, synvisc one injections, diclofenac, brace, and formal physical therapy. She complains of constant pain in her left knee that wakes her up at night. She denies any new injuries. Medical History:  Patient's medications, allergies, past medical, surgical, social and family histories were reviewed and updated as appropriate. Pertinent items are noted in HPI  Review of systems reviewed from Patient History Form completed today and available in the patient's chart under the Media tab. Past Medical History:   Diagnosis Date    Arthritis     Cerebral artery occlusion with cerebral infarction ([facility] [address]     TIA X 4    Depression     GERD (gastroesophageal reflux disease)     HTN (hypertension)     Hyperlipidemia     Overweight     Raynaud's disease     Spinal stenosis         Past Surgical History:   Procedure Laterality Date    APPENDECTOMY  07/17/2016    BLADDER REPAIR      FINGER TRIGGER RELEASE  4/13; 10/13; 4/14    Multiple fingers    HAMMER TOE SURGERY      Right.     HERNIA REPAIR      ABDOMINAL INCISIONAL HERNIA REPAIR    HIP SURGERY      total hip replacement     HYSTERECTOMY  02/1991    JOINT REPLACEMENT  01/2018    right THR    LUMBAR FUSION  12/13    L4-L5;L5-S1    LUMBAR LAMINECTOMY  11/1997 and 1981    OTHER SURGICAL HISTORY Left 07/19/2018     LEFT SHOULDER EUA, DIAGNOSTIC  ARTHROSCOPY, EXTENSIVE DEBRIDEMENT, LYSIS OF ADHESIONS, REMOVAL OF RETAINED SUTURE, BICEPS TENOTOMY, REVISION ROTATOR CUFF REPAIR (Left Shoulder)    IL FERMÍN ARTHROSCOP,DIAGNOSTIC Left 7/19/2018    LEFT SHOULDER EUA, DIAGNOSTIC  ARTHROSCOPY, EXTENSIVE DEBRIDEMENT, LYSIS OF ADHESIONS, REMOVAL OF RETAINED SUTURE, BICEPS TENOTOMY, REVISION ROTATOR CUFF REPAIR performed by Rhina Boeck, MD at [address]      Left       Family History   Problem Relation Age of Onset    Alzheimer's Disease Mother     Prostate Cancer Father     Cancer Brother     Heart Attack Maternal Aunt        Social History     Socioeconomic History    Marital status:      Spouse name: Not on file    Number of children: Not on file    Years of education: Not on file    Highest education level: Not on file   Occupational History    Not on file   Social Needs    Financial resource strain: Not on file    Food insecurity:     Worry: Not on file     Inability: Not on file    Transportation needs:     Medical: Not on file     Non-medical: Not on file   Tobacco Use    Smoking status: Former Smoker     Last attempt to quit: 2005     Years since quittin.0    Smokeless tobacco: Never Used   Substance and Sexual Activity    Alcohol use: No    Drug use: No    Sexual activity: Not on file   Lifestyle    Physical activity:     Days per week: Not on file     Minutes per session: Not on file    Stress: Not on file   Relationships    Social connections:     Talks on phone: Not on file     Gets together: Not on file     Attends Evangelical service: Not on file     Active member of club or organization: Not on file     Attends meetings of clubs or organizations: Not on file     Relationship status: Not on file    Intimate partner violence:     Fear of current or ex partner: Not on file     Emotionally abused: Not on file     Physically abused: Not on file     Forced sexual activity: Not on file   Other Topics Concern    Not on file   Social History Narrative    Not on file       Current Outpatient Medications   Medication Sig Dispense Refill    lisinopril (PRINIVIL;ZESTRIL) 10 MG tablet Take 1 tablet by mouth nightly 90 tablet 1    lisinopril (PRINIVIL;ZESTRIL) 20 MG tablet Take 1 tablet by flexion     Strength: Mild quadriceps weakness.     Ligamentous Stability: Pseudo-laxity is present medially. Anterior and posterior cruciate ligaments were intact. Lateral collateral ligament is intact.     Neurologic and vascular: Skin is warm dry and well perfused. Sensation is intact to light touch over the knee.     Additional findings: Calf soft nontender. No patellar instability. Right Knee Exam:    Gait: No use of assistive devices. No antalgic gait. Alignment: normal alignment. Inspection/skin: Skin is intact without erythema or ecchymosis. No gross deformity. Palpation: no crepitus. no joint line tenderness present. Range of Motion: There is full range of motion. Strength: Normal quadriceps development. Effusion: No effusion or swelling present. Ligamentous stability: No cruciate or collateral ligament instability. Neurologic and vascular: Skin is warm and well-perfused. Sensation is intact to light-touch. Special tests: Negative Stephanie sign. Radiology:     Pertinent imaging reviewed. No new imaging was obtained during today's visit. Assessment :  Osteoarthritis, left knee    Impression:  Encounter Diagnosis   Name Primary?  Primary osteoarthritis of left knee Yes       Office Procedures:  Orders Placed This Encounter   Procedures    Cold Therapy Unit and Pad $150     Scheduling Instructions:      Patient was supplied a Cold Therapy Unit and Pad. This retail item was supplied to provide functional support and assist in protecting the affected area. Verbal and written instructions for the use of and application of this item were provided. The patient was educated and fit by a healthcare professional with expert knowledge and specialization in brace application.  They were instructed to contact the office immediately should the equipment result       in increased pain, decreased sensation, increased swelling or worsening of the condition. Plan: Pertinent imaging was reviewed. The etiology, natural history, and treatment options for the disorder were discussed. The roles of activity medication, antiinflammatories, injections, bracing, physical therapy, and surgical interventions were all described to the patient and questions were answered. Patient has bone-on-bone osteoarthritis which is limiting day-to-day activities and significantly impacting quality of life. Treatment has included exercises as well as anti-inflammatories medications and steroid injections without relief. Symptoms have been ongoing for over a year. At this point the patient is reasonable candidate for total knee arthroplasty. The procedure was discussed in detail as well as the potential complications of DVT, pulmonary embolism, loosening, persistent pain, infection, bleeding, neurologic injury and complications from anesthesia. The time required for rehabilitation was discussed. The patient feels that there is adequate support at home and that this would be a reasonable option after surgery. We will enroll the patient in the preoperative total joint replacement program at [facility]  We will check the preoperative hematocrit/hemoglobin and schedule the surgery as appropriate. Risks, benefits and potential complications of total knee arthroplasty surgery were discussed with the patient. Risks discussed include but are not limited to bleeding, infection, anesthetic risk, injury to nerves and blood vessels, deep vein thrombosis, residual stiffness and weakness, residual pain and the possible need for revision surgery. The patient also understands that anesthetic risks include cardiopulmonary issues, drug reactions and even death. The patient voices an understanding of the importance of physical therapy and home exercises after surgery. All questions were answered.     Preoperative labs will be reviewed prior to surgery  Urine analysis

## 2019-08-01 RX ORDER — GABAPENTIN 300 MG/1
300 CAPSULE ORAL ONCE
Status: CANCELLED | OUTPATIENT
Start: 2019-08-07 | End: 2019-08-01

## 2019-08-01 RX ORDER — DEXAMETHASONE SODIUM PHOSPHATE 4 MG/ML
10 INJECTION, SOLUTION INTRA-ARTICULAR; INTRALESIONAL; INTRAMUSCULAR; INTRAVENOUS; SOFT TISSUE ONCE
Status: CANCELLED | OUTPATIENT
Start: 2019-08-07 | End: 2019-08-01

## 2019-08-02 ENCOUNTER — HOSPITAL ENCOUNTER (OUTPATIENT)
Dept: PREADMISSION TESTING | Age: 69
Discharge: HOME | End: 2019-08-06
Payer: MEDICARE

## 2019-08-02 VITALS
HEIGHT: 64 IN | SYSTOLIC BLOOD PRESSURE: 153 MMHG | OXYGEN SATURATION: 96 % | RESPIRATION RATE: 16 BRPM | TEMPERATURE: 98.2 F | DIASTOLIC BLOOD PRESSURE: 80 MMHG | BODY MASS INDEX: 38.24 KG/M2 | HEART RATE: 70 BPM | WEIGHT: 224 LBS

## 2019-08-02 LAB
ABO/RH: NORMAL
ANION GAP SERPL CALCULATED.3IONS-SCNC: 9 MMOL/L (ref 3–16)
ANTIBODY SCREEN: NORMAL
APTT: 31.1 SEC (ref 26–36)
BASOPHILS ABSOLUTE: 0.1 K/UL (ref 0–0.2)
BASOPHILS RELATIVE PERCENT: 0.9 %
BILIRUBIN URINE: NEGATIVE
BLOOD, URINE: NEGATIVE
BUN BLDV-MCNC: 10 MG/DL (ref 7–20)
CALCIUM SERPL-MCNC: 9.5 MG/DL (ref 8.3–10.6)
CHLORIDE BLD-SCNC: 102 MMOL/L (ref 99–110)
CLARITY: CLEAR
CO2: 29 MMOL/L (ref 21–32)
COLOR: YELLOW
CREAT SERPL-MCNC: 0.6 MG/DL (ref 0.6–1.2)
EKG ATRIAL RATE: 59 BPM
EKG DIAGNOSIS: NORMAL
EKG P AXIS: 54 DEGREES
EKG P-R INTERVAL: 168 MS
EKG Q-T INTERVAL: 410 MS
EKG QRS DURATION: 76 MS
EKG QTC CALCULATION (BAZETT): 405 MS
EKG R AXIS: 73 DEGREES
EKG T AXIS: 50 DEGREES
EKG VENTRICULAR RATE: 59 BPM
EOSINOPHILS ABSOLUTE: 0.3 K/UL (ref 0–0.6)
EOSINOPHILS RELATIVE PERCENT: 3.5 %
GFR AFRICAN AMERICAN: >60
GFR NON-AFRICAN AMERICAN: >60
GLUCOSE BLD-MCNC: 84 MG/DL (ref 70–99)
GLUCOSE URINE: NEGATIVE MG/DL
HCT VFR BLD CALC: 39.4 % (ref 36–48)
HEMOGLOBIN: 13.2 G/DL (ref 12–16)
INR BLD: 1 (ref 0.86–1.14)
KETONES, URINE: NEGATIVE MG/DL
LEUKOCYTE ESTERASE, URINE: NEGATIVE
LYMPHOCYTES ABSOLUTE: 3.3 K/UL (ref 1–5.1)
LYMPHOCYTES RELATIVE PERCENT: 46.2 %
MCH RBC QN AUTO: 32.5 PG (ref 26–34)
MCHC RBC AUTO-ENTMCNC: 33.4 G/DL (ref 31–36)
MCV RBC AUTO: 97.2 FL (ref 80–100)
MICROSCOPIC EXAMINATION: NORMAL
MONOCYTES ABSOLUTE: 0.7 K/UL (ref 0–1.3)
MONOCYTES RELATIVE PERCENT: 9.3 %
NEUTROPHILS ABSOLUTE: 2.9 K/UL (ref 1.7–7.7)
NEUTROPHILS RELATIVE PERCENT: 40.1 %
NITRITE, URINE: NEGATIVE
PDW BLD-RTO: 12.8 % (ref 12.4–15.4)
PH UA: 7 (ref 5–8)
PLATELET # BLD: 232 K/UL (ref 135–450)
PMV BLD AUTO: 9.7 FL (ref 5–10.5)
POTASSIUM SERPL-SCNC: 4 MMOL/L (ref 3.5–5.1)
PROTEIN UA: NEGATIVE MG/DL
PROTHROMBIN TIME: 11.4 SEC (ref 9.8–13)
RBC # BLD: 4.05 M/UL (ref 4–5.2)
SODIUM BLD-SCNC: 140 MMOL/L (ref 136–145)
SPECIFIC GRAVITY UA: 1.01 (ref 1–1.03)
URINE TYPE: NORMAL
UROBILINOGEN, URINE: 0.2 E.U./DL
WBC # BLD: 7.3 K/UL (ref 4–11)

## 2019-08-02 PROCEDURE — 87086 URINE CULTURE/COLONY COUNT: CPT

## 2019-08-02 PROCEDURE — 87641 MR-STAPH DNA AMP PROBE: CPT

## 2019-08-02 PROCEDURE — 85610 PROTHROMBIN TIME: CPT

## 2019-08-02 PROCEDURE — 85025 COMPLETE CBC W/AUTO DIFF WBC: CPT

## 2019-08-02 PROCEDURE — 86900 BLOOD TYPING SEROLOGIC ABO: CPT

## 2019-08-02 PROCEDURE — 80048 BASIC METABOLIC PNL TOTAL CA: CPT

## 2019-08-02 PROCEDURE — 81003 URINALYSIS AUTO W/O SCOPE: CPT

## 2019-08-02 PROCEDURE — 93005 ELECTROCARDIOGRAM TRACING: CPT | Performed by: ORTHOPAEDIC SURGERY

## 2019-08-02 PROCEDURE — 86850 RBC ANTIBODY SCREEN: CPT

## 2019-08-02 PROCEDURE — 85730 THROMBOPLASTIN TIME PARTIAL: CPT

## 2019-08-02 PROCEDURE — 86901 BLOOD TYPING SEROLOGIC RH(D): CPT

## 2019-08-02 RX ORDER — CALCIUM CARBONATE 500(1250)
500 TABLET ORAL DAILY
COMMUNITY

## 2019-08-02 NOTE — PROGRESS NOTES
[address]                          Date of Procedure 8/7 Time of Procedure 0800    PRIOR TO PROCEDURE DATE:  1. Please follow any guidelines/instructions prior to your procedure as advised by your surgeon. 2. Arrange for someone to drive you home and be with you for the first 24 hours after discharge for your safety after your procedure for which you received sedation. Ensure it is someone we can share information with regarding your discharge. 3. You must contact your surgeon for instructions IF:   You are taking any blood thinners, aspirin, anti-inflammatory or vitamin E.   There is a change in your physical condition such as a cold, fever, rash, cuts, sores or any other infection, especially near your surgical site. 4. Do not drink alcohol the day before or day of your procedure. 5. A Pre-op History and Physical for surgery MUST be completed by your Physician or Urgent Care within 30 days of your procedure date. Please bring a copy with you on the day of your procedure and along with any other testing performed. THE DAY OF YOUR PROCEDURE:  1. Follow instructions for ARRIVAL TIME as DIRECTED BY YOUR SURGEON. If your surgeon does not give you a specific arrival time, please arrive at  [place] [address] . 2. Enter the MAIN entrance from [address] [place] [address] and follow the signs to the free [employer] or [employer] parking (offered free of charge 6am-5pm). 3. Enter the Main Entrance of the hospital (do NOT enter from the lower level of the parking garage). Upon entrance, check in with the  at the main desk on your left. If no one is available at the desk, proceed into the [facility] Waiting Room and go through the door directly into the [facility]. There is a Check-in desk ACROSS from Room 5 (marked with a sign hanging from the ceiling).  The phone number for the surgery center is [phone].    4. DO NOT EAT ANYTHING eight hours prior to surgery. May have 8 ounces of water 4 hours prior to surgery (exception would be medication instructions below only)     5. MEDICATIONS    Take the following medications with a SMALL sip of water: take lisinopril only    Use your usual dose of inhalers the morning of surgery. BRING your rescue inhaler with you to hospital.    Anesthesia does NOT want you to take insulin the morning of surgery. They will control your blood sugar while you are at the hospital. Please contact your ordering physician for instructions regarding your insulin the night before your procedure. If you have an insulin pump, please keep it set on basal rate. 6. Do not swallow water when brushing teeth. No gum, candy, mints or ice chips. Refrain from smoking or at least decrease the amount. 7. Dress in loose, comfortable clothing appropriate for redressing after your procedure. Do not wear jewelry (including body piercings), make-up (especially NO eye make-up), fingernail polish (NO toenail polish if foot/leg surgery), lotion, powders or metal hairclips. 8. Dentures, glasses, or contacts will need to be removed before your procedure. Bring cases for your glasses, contacts, dentures, or hearing aids to protect them while you are in surgery. 9. If you use a CPAP, please bring it with you on the day of your procedure. 10. We recommend that valuable personal  belongings, such as cash, cell phones, e-tablets or jewelry, be left at home during your stay. The hospital will not be responsible for valuables that are not secured in the hospital safe. However, if your insurance requires a co-pay, you may want to bring a method of payment, i.e. Check or credit card, if you wish to pay your co-pay the day of surgery. 11. If you are to stay overnight, you may bring a bag with personal items. Please have any large items you may need brought in by your family after your arrival to your hospital room.     12. If you have a Living

## 2019-08-03 LAB — MRSA SCREEN RT-PCR: NORMAL

## 2019-08-06 ENCOUNTER — ANESTHESIA EVENT (OUTPATIENT)
Dept: OPERATING ROOM | Age: 69
End: 2019-08-06
Payer: MEDICARE

## 2019-08-06 LAB — URINE CULTURE, ROUTINE: NORMAL

## 2019-08-07 ENCOUNTER — ANESTHESIA (OUTPATIENT)
Dept: OPERATING ROOM | Age: 69
End: 2019-08-07
Payer: MEDICARE

## 2019-08-07 ENCOUNTER — HOSPITAL ENCOUNTER (OUTPATIENT)
Age: 69
Setting detail: OUTPATIENT SURGERY
Discharge: HOME | End: 2019-08-07
Attending: ORTHOPAEDIC SURGERY | Admitting: ORTHOPAEDIC SURGERY
Payer: MEDICARE

## 2019-08-07 ENCOUNTER — TELEPHONE (OUTPATIENT)
Dept: ORTHOPEDIC SURGERY | Age: 69
End: 2019-08-07

## 2019-08-07 VITALS
RESPIRATION RATE: 16 BRPM | BODY MASS INDEX: 38.24 KG/M2 | SYSTOLIC BLOOD PRESSURE: 136 MMHG | TEMPERATURE: 98 F | HEART RATE: 63 BPM | OXYGEN SATURATION: 94 % | WEIGHT: 224 LBS | DIASTOLIC BLOOD PRESSURE: 82 MMHG | HEIGHT: 64 IN

## 2019-08-07 PROCEDURE — 6370000000 HC RX 637 (ALT 250 FOR IP): Performed by: ORTHOPAEDIC SURGERY

## 2019-08-07 RX ORDER — LIDOCAINE HYDROCHLORIDE 10 MG/ML
1 INJECTION, SOLUTION EPIDURAL; INFILTRATION; INTRACAUDAL; PERINEURAL
Status: DISCONTINUED | OUTPATIENT
Start: 2019-08-07 | End: 2019-08-07 | Stop reason: HOSPADM

## 2019-08-07 RX ORDER — SODIUM CHLORIDE 0.9 % (FLUSH) 0.9 %
10 SYRINGE (ML) INJECTION EVERY 12 HOURS SCHEDULED
Status: DISCONTINUED | OUTPATIENT
Start: 2019-08-07 | End: 2019-08-07 | Stop reason: HOSPADM

## 2019-08-07 RX ORDER — SODIUM CHLORIDE, SODIUM LACTATE, POTASSIUM CHLORIDE, CALCIUM CHLORIDE 600; 310; 30; 20 MG/100ML; MG/100ML; MG/100ML; MG/100ML
INJECTION, SOLUTION INTRAVENOUS CONTINUOUS
Status: DISCONTINUED | OUTPATIENT
Start: 2019-08-07 | End: 2019-08-07 | Stop reason: HOSPADM

## 2019-08-07 RX ORDER — DEXAMETHASONE SODIUM PHOSPHATE 4 MG/ML
10 INJECTION, SOLUTION INTRA-ARTICULAR; INTRALESIONAL; INTRAMUSCULAR; INTRAVENOUS; SOFT TISSUE ONCE
Status: DISCONTINUED | OUTPATIENT
Start: 2019-08-07 | End: 2019-08-07 | Stop reason: HOSPADM

## 2019-08-07 RX ORDER — SODIUM CHLORIDE 0.9 % (FLUSH) 0.9 %
10 SYRINGE (ML) INJECTION PRN
Status: DISCONTINUED | OUTPATIENT
Start: 2019-08-07 | End: 2019-08-07 | Stop reason: HOSPADM

## 2019-08-07 RX ORDER — GABAPENTIN 300 MG/1
300 CAPSULE ORAL ONCE
Status: COMPLETED | OUTPATIENT
Start: 2019-08-07 | End: 2019-08-07

## 2019-08-07 RX ADMIN — GABAPENTIN 300 MG: 300 CAPSULE ORAL at 07:22

## 2019-08-07 ASSESSMENT — ENCOUNTER SYMPTOMS: SHORTNESS OF BREATH: 0

## 2019-08-07 ASSESSMENT — LIFESTYLE VARIABLES: SMOKING_STATUS: 0

## 2019-08-07 ASSESSMENT — PAIN - FUNCTIONAL ASSESSMENT: PAIN_FUNCTIONAL_ASSESSMENT: 0-10

## 2019-08-07 NOTE — H&P
Robyn Rosado    [address] [facility] Same Day Surgery Update H & P  Department of General Surgery   Surgical Service   Pre-operative History and Physical  Last H & P within the last 30 days. DIAGNOSIS:   LEFT KNEE OSTEOARTHRITIS    PROCEDURE:  [place] TOTAL KNEE ARTHROPLASTY [95649] (LEFT TOTAL KNEE ARTHROPLASTY)     HISTORY OF PRESENT ILLNESS:    Patient with chronic left knee pain, swelling and instability in the setting of arthrosis. The symptoms have been recalcitrant to conservative treatment and the patient presents today for the above procedure. Past Medical History:        Diagnosis Date    Arthritis     Cerebral artery occlusion with cerebral infarction ([address]     TIA X 4    Depression     GERD (gastroesophageal reflux disease)     HTN (hypertension)     Hyperlipidemia     Overweight     Raynaud's disease     Spinal stenosis     Use of cane as ambulatory aid      Past Surgical History:        Procedure Laterality Date    APPENDECTOMY  07/17/2016    BACK SURGERY  x3 total     BLADDER REPAIR      CARDIAC CATHETERIZATION  2018    FINGER TRIGGER RELEASE  4/13; 10/13; 4/14    Multiple fingers    HAMMER TOE SURGERY      Right.     HERNIA REPAIR      ABDOMINAL INCISIONAL HERNIA REPAIR    HIP SURGERY      total hip replacement     HYSTERECTOMY  02/1991    JOINT REPLACEMENT  01/2018    right THR    LUMBAR FUSION  12/13    L4-L5;L5-S1    LUMBAR LAMINECTOMY  11/1997 and 1981    OTHER SURGICAL HISTORY Left 07/19/2018     LEFT SHOULDER EUA, DIAGNOSTIC  ARTHROSCOPY, EXTENSIVE DEBRIDEMENT, LYSIS OF ADHESIONS, REMOVAL OF RETAINED SUTURE, BICEPS TENOTOMY, REVISION ROTATOR CUFF REPAIR (Left Shoulder)    W[place] SHGAURANGR ARTHROSCOP,DIAGNOSTIC Left 7/19/2018    LEFT SHOULDER EUA, DIAGNOSTIC  ARTHROSCOPY, EXTENSIVE DEBRIDEMENT, LYSIS OF ADHESIONS, REMOVAL OF RETAINED SUTURE, BICEPS TENOTOMY, REVISION ROTATOR CUFF REPAIR performed by Miles Smith MD at [address]      Left     Past 8/7/2019

## 2019-08-07 NOTE — ANESTHESIA PRE PROCEDURE
Years since quittin.0    Smokeless tobacco: Never Used   Substance Use Topics    Alcohol use: No                                Counseling given: Not Answered      Vital Signs (Current): There were no vitals filed for this visit. BP Readings from Last 3 Encounters:   19 136/82   19 (!) 153/80   19 (!) 150/85       NPO Status:                                                                                 BMI:   Wt Readings from Last 3 Encounters:   19 224 lb (101.6 kg)   19 224 lb (101.6 kg)   19 222 lb (100.7 kg)     There is no height or weight on file to calculate BMI.    CBC:   Lab Results   Component Value Date    WBC 7.3 2019    RBC 4.05 2019    HGB 13.2 2019    HCT 39.4 2019    MCV 97.2 2019    RDW 12.8 2019     2019       CMP:   Lab Results   Component Value Date     2019    K 4.0 2019     2019    CO2 29 2019    BUN 10 2019    CREATININE 0.6 2019    GFRAA >60 2019    GFRAA >60 2012    AGRATIO 1.4 2018    LABGLOM >60 2019    GLUCOSE 84 2019    PROT 7.0 2018    PROT 7.8 2012    CALCIUM 9.5 2019    BILITOT <0.2 2018    ALKPHOS 88 2018    AST 25 2018    ALT 20 2018       POC Tests: No results for input(s): POCGLU, POCNA, POCK, POCCL, POCBUN, POCHEMO, POCHCT in the last 72 hours.     Coags:   Lab Results   Component Value Date    PROTIME 11.4 2019    INR 1.00 2019    APTT 31.1 2019       HCG (If Applicable): No results found for: PREGTESTUR, PREGSERUM, HCG, HCGQUANT     ABGs: No results found for: PHART, PO2ART, HOL3OOO, LMD4NLP, BEART, A3UBIVHK     Type & Screen (If Applicable):  No results found for: [facility]    Anesthesia Evaluation  Patient summary reviewed and Nursing notes reviewed no history of anesthetic complications:   Airway:

## 2019-08-13 ENCOUNTER — ANESTHESIA EVENT (OUTPATIENT)
Dept: OPERATING ROOM | Age: 69
End: 2019-08-13
Payer: MEDICARE

## 2019-08-14 ENCOUNTER — ANESTHESIA (OUTPATIENT)
Dept: OPERATING ROOM | Age: 69
End: 2019-08-14
Payer: MEDICARE

## 2019-08-14 ENCOUNTER — HOSPITAL ENCOUNTER (OUTPATIENT)
Age: 69
Setting detail: OBSERVATION
Discharge: HOME | End: 2019-08-15
Attending: ORTHOPAEDIC SURGERY | Admitting: ORTHOPAEDIC SURGERY
Payer: MEDICARE

## 2019-08-14 VITALS — TEMPERATURE: 99.1 F | OXYGEN SATURATION: 97 % | SYSTOLIC BLOOD PRESSURE: 125 MMHG | DIASTOLIC BLOOD PRESSURE: 58 MMHG

## 2019-08-14 DIAGNOSIS — Z96.652 S/P TOTAL KNEE ARTHROPLASTY, LEFT: Primary | ICD-10-CM

## 2019-08-14 LAB
ABO/RH: NORMAL
ANTIBODY SCREEN: NORMAL

## 2019-08-14 PROCEDURE — 6370000000 HC RX 637 (ALT 250 FOR IP): Performed by: ORTHOPAEDIC SURGERY

## 2019-08-14 PROCEDURE — 2700000000 HC OXYGEN THERAPY PER DAY

## 2019-08-14 PROCEDURE — C1776 JOINT DEVICE (IMPLANTABLE): HCPCS | Performed by: ORTHOPAEDIC SURGERY

## 2019-08-14 PROCEDURE — 2500000003 HC RX 250 WO HCPCS: Performed by: NURSE ANESTHETIST, CERTIFIED REGISTERED

## 2019-08-14 PROCEDURE — 2580000003 HC RX 258: Performed by: ANESTHESIOLOGY

## 2019-08-14 PROCEDURE — 2580000003 HC RX 258: Performed by: ORTHOPAEDIC SURGERY

## 2019-08-14 PROCEDURE — 2720000010 HC SURG SUPPLY STERILE: Performed by: ORTHOPAEDIC SURGERY

## 2019-08-14 PROCEDURE — 3700000000 HC ANESTHESIA ATTENDED CARE: Performed by: ORTHOPAEDIC SURGERY

## 2019-08-14 PROCEDURE — 6360000002 HC RX W HCPCS: Performed by: NURSE ANESTHETIST, CERTIFIED REGISTERED

## 2019-08-14 PROCEDURE — 36415 COLL VENOUS BLD VENIPUNCTURE: CPT

## 2019-08-14 PROCEDURE — 6360000002 HC RX W HCPCS: Performed by: ORTHOPAEDIC SURGERY

## 2019-08-14 PROCEDURE — 3600000015 HC SURGERY LEVEL 5 ADDTL 15MIN: Performed by: ORTHOPAEDIC SURGERY

## 2019-08-14 PROCEDURE — 94150 VITAL CAPACITY TEST: CPT

## 2019-08-14 PROCEDURE — 94799 UNLISTED PULMONARY SVC/PX: CPT

## 2019-08-14 PROCEDURE — 7100000001 HC PACU RECOVERY - ADDTL 15 MIN: Performed by: ORTHOPAEDIC SURGERY

## 2019-08-14 PROCEDURE — 3600000005 HC SURGERY LEVEL 5 BASE: Performed by: ORTHOPAEDIC SURGERY

## 2019-08-14 PROCEDURE — G0378 HOSPITAL OBSERVATION PER HR: HCPCS

## 2019-08-14 PROCEDURE — 86900 BLOOD TYPING SEROLOGIC ABO: CPT

## 2019-08-14 PROCEDURE — 7100000000 HC PACU RECOVERY - FIRST 15 MIN: Performed by: ORTHOPAEDIC SURGERY

## 2019-08-14 PROCEDURE — 2500000003 HC RX 250 WO HCPCS: Performed by: ORTHOPAEDIC SURGERY

## 2019-08-14 PROCEDURE — 86901 BLOOD TYPING SEROLOGIC RH(D): CPT

## 2019-08-14 PROCEDURE — 86850 RBC ANTIBODY SCREEN: CPT

## 2019-08-14 PROCEDURE — 94761 N-INVAS EAR/PLS OXIMETRY MLT: CPT

## 2019-08-14 PROCEDURE — 2709999900 HC NON-CHARGEABLE SUPPLY: Performed by: ORTHOPAEDIC SURGERY

## 2019-08-14 PROCEDURE — 6360000002 HC RX W HCPCS: Performed by: ANESTHESIOLOGY

## 2019-08-14 PROCEDURE — C1713 ANCHOR/SCREW BN/BN,TIS/BN: HCPCS | Performed by: ORTHOPAEDIC SURGERY

## 2019-08-14 PROCEDURE — 3700000001 HC ADD 15 MINUTES (ANESTHESIA): Performed by: ORTHOPAEDIC SURGERY

## 2019-08-14 DEVICE — COMPONENT PAT DIA35MM KNEE POLY DOME CEM MEDIALIZED ATTUNE: Type: IMPLANTABLE DEVICE | Site: KNEE | Status: FUNCTIONAL

## 2019-08-14 DEVICE — COMPONENT TOT KNEE CAPPED FIX BEAR ATTUNE: Type: IMPLANTABLE DEVICE | Site: KNEE | Status: FUNCTIONAL

## 2019-08-14 DEVICE — INSERT TIB UPCHARGEBLE ATTUNE: Type: IMPLANTABLE DEVICE | Site: KNEE | Status: FUNCTIONAL

## 2019-08-14 DEVICE — IMPLANTABLE DEVICE: Type: IMPLANTABLE DEVICE | Site: KNEE | Status: FUNCTIONAL

## 2019-08-14 DEVICE — BASEPLATE TIB SZ 4 FIX BEAR CEM S+ TECHNOLOGY ATTUNE: Type: IMPLANTABLE DEVICE | Site: KNEE | Status: FUNCTIONAL

## 2019-08-14 DEVICE — CEMENT BNE 20ML 40GM FULL DOSE PMMA W/O ANTIBIO M VISC: Type: IMPLANTABLE DEVICE | Site: KNEE | Status: FUNCTIONAL

## 2019-08-14 RX ORDER — PRAVASTATIN SODIUM 80 MG/1
80 TABLET ORAL NIGHTLY
Status: DISCONTINUED | OUTPATIENT
Start: 2019-08-14 | End: 2019-08-15 | Stop reason: HOSPADM

## 2019-08-14 RX ORDER — HYDRALAZINE HYDROCHLORIDE 20 MG/ML
5 INJECTION INTRAMUSCULAR; INTRAVENOUS EVERY 10 MIN PRN
Status: DISCONTINUED | OUTPATIENT
Start: 2019-08-14 | End: 2019-08-14 | Stop reason: HOSPADM

## 2019-08-14 RX ORDER — OXYCODONE HYDROCHLORIDE AND ACETAMINOPHEN 5; 325 MG/1; MG/1
2 TABLET ORAL PRN
Status: DISCONTINUED | OUTPATIENT
Start: 2019-08-14 | End: 2019-08-14 | Stop reason: HOSPADM

## 2019-08-14 RX ORDER — ONDANSETRON 2 MG/ML
4 INJECTION INTRAMUSCULAR; INTRAVENOUS EVERY 6 HOURS PRN
Status: DISCONTINUED | OUTPATIENT
Start: 2019-08-14 | End: 2019-08-15 | Stop reason: HOSPADM

## 2019-08-14 RX ORDER — SODIUM CHLORIDE 0.9 % (FLUSH) 0.9 %
10 SYRINGE (ML) INJECTION PRN
Status: DISCONTINUED | OUTPATIENT
Start: 2019-08-14 | End: 2019-08-15 | Stop reason: HOSPADM

## 2019-08-14 RX ORDER — LABETALOL 20 MG/4 ML (5 MG/ML) INTRAVENOUS SYRINGE
5 EVERY 10 MIN PRN
Status: DISCONTINUED | OUTPATIENT
Start: 2019-08-14 | End: 2019-08-14 | Stop reason: HOSPADM

## 2019-08-14 RX ORDER — MAGNESIUM HYDROXIDE 1200 MG/15ML
LIQUID ORAL CONTINUOUS PRN
Status: COMPLETED | OUTPATIENT
Start: 2019-08-14 | End: 2019-08-14

## 2019-08-14 RX ORDER — SODIUM CHLORIDE, SODIUM LACTATE, POTASSIUM CHLORIDE, CALCIUM CHLORIDE 600; 310; 30; 20 MG/100ML; MG/100ML; MG/100ML; MG/100ML
INJECTION, SOLUTION INTRAVENOUS CONTINUOUS
Status: DISCONTINUED | OUTPATIENT
Start: 2019-08-14 | End: 2019-08-15 | Stop reason: HOSPADM

## 2019-08-14 RX ORDER — ONDANSETRON 2 MG/ML
INJECTION INTRAMUSCULAR; INTRAVENOUS PRN
Status: DISCONTINUED | OUTPATIENT
Start: 2019-08-14 | End: 2019-08-14 | Stop reason: SDUPTHER

## 2019-08-14 RX ORDER — FENTANYL CITRATE 50 UG/ML
50 INJECTION, SOLUTION INTRAMUSCULAR; INTRAVENOUS EVERY 5 MIN PRN
Status: DISCONTINUED | OUTPATIENT
Start: 2019-08-14 | End: 2019-08-14 | Stop reason: HOSPADM

## 2019-08-14 RX ORDER — DEXAMETHASONE SODIUM PHOSPHATE 4 MG/ML
INJECTION, SOLUTION INTRA-ARTICULAR; INTRALESIONAL; INTRAMUSCULAR; INTRAVENOUS; SOFT TISSUE PRN
Status: DISCONTINUED | OUTPATIENT
Start: 2019-08-14 | End: 2019-08-14 | Stop reason: SDUPTHER

## 2019-08-14 RX ORDER — MIDAZOLAM HYDROCHLORIDE 1 MG/ML
INJECTION INTRAMUSCULAR; INTRAVENOUS PRN
Status: DISCONTINUED | OUTPATIENT
Start: 2019-08-14 | End: 2019-08-14 | Stop reason: SDUPTHER

## 2019-08-14 RX ORDER — VANCOMYCIN HYDROCHLORIDE 1 G/20ML
INJECTION, POWDER, LYOPHILIZED, FOR SOLUTION INTRAVENOUS PRN
Status: DISCONTINUED | OUTPATIENT
Start: 2019-08-14 | End: 2019-08-14 | Stop reason: ALTCHOICE

## 2019-08-14 RX ORDER — VITAMIN E 268 MG
400 CAPSULE ORAL EVERY OTHER DAY
Status: DISCONTINUED | OUTPATIENT
Start: 2019-08-15 | End: 2019-08-15 | Stop reason: HOSPADM

## 2019-08-14 RX ORDER — ROCURONIUM BROMIDE 10 MG/ML
INJECTION, SOLUTION INTRAVENOUS PRN
Status: DISCONTINUED | OUTPATIENT
Start: 2019-08-14 | End: 2019-08-14 | Stop reason: SDUPTHER

## 2019-08-14 RX ORDER — HYDROCODONE BITARTRATE AND ACETAMINOPHEN 7.5; 325 MG/1; MG/1
1 TABLET ORAL EVERY 4 HOURS PRN
Status: DISCONTINUED | OUTPATIENT
Start: 2019-08-14 | End: 2019-08-15 | Stop reason: HOSPADM

## 2019-08-14 RX ORDER — OXYCODONE HYDROCHLORIDE AND ACETAMINOPHEN 5; 325 MG/1; MG/1
1 TABLET ORAL PRN
Status: DISCONTINUED | OUTPATIENT
Start: 2019-08-14 | End: 2019-08-14 | Stop reason: HOSPADM

## 2019-08-14 RX ORDER — CELECOXIB 100 MG/1
200 CAPSULE ORAL DAILY
Status: DISCONTINUED | OUTPATIENT
Start: 2019-08-14 | End: 2019-08-15 | Stop reason: HOSPADM

## 2019-08-14 RX ORDER — EPHEDRINE SULFATE 50 MG/ML
INJECTION, SOLUTION INTRAVENOUS PRN
Status: DISCONTINUED | OUTPATIENT
Start: 2019-08-14 | End: 2019-08-14 | Stop reason: SDUPTHER

## 2019-08-14 RX ORDER — ONDANSETRON 2 MG/ML
4 INJECTION INTRAMUSCULAR; INTRAVENOUS
Status: DISCONTINUED | OUTPATIENT
Start: 2019-08-14 | End: 2019-08-14 | Stop reason: HOSPADM

## 2019-08-14 RX ORDER — HYDROCODONE BITARTRATE AND ACETAMINOPHEN 7.5; 325 MG/1; MG/1
2 TABLET ORAL EVERY 4 HOURS PRN
Status: DISCONTINUED | OUTPATIENT
Start: 2019-08-14 | End: 2019-08-15 | Stop reason: HOSPADM

## 2019-08-14 RX ORDER — CITALOPRAM 20 MG/1
20 TABLET ORAL DAILY
Status: DISCONTINUED | OUTPATIENT
Start: 2019-08-14 | End: 2019-08-15 | Stop reason: HOSPADM

## 2019-08-14 RX ORDER — NEOSTIGMINE METHYLSULFATE 5 MG/5 ML
SYRINGE (ML) INTRAVENOUS PRN
Status: DISCONTINUED | OUTPATIENT
Start: 2019-08-14 | End: 2019-08-14 | Stop reason: SDUPTHER

## 2019-08-14 RX ORDER — SODIUM CHLORIDE, SODIUM LACTATE, POTASSIUM CHLORIDE, CALCIUM CHLORIDE 600; 310; 30; 20 MG/100ML; MG/100ML; MG/100ML; MG/100ML
INJECTION, SOLUTION INTRAVENOUS CONTINUOUS
Status: DISCONTINUED | OUTPATIENT
Start: 2019-08-14 | End: 2019-08-14

## 2019-08-14 RX ORDER — SODIUM CHLORIDE 0.9 % (FLUSH) 0.9 %
10 SYRINGE (ML) INJECTION EVERY 12 HOURS SCHEDULED
Status: DISCONTINUED | OUTPATIENT
Start: 2019-08-14 | End: 2019-08-15 | Stop reason: HOSPADM

## 2019-08-14 RX ORDER — ASCORBIC ACID 500 MG
500 TABLET ORAL DAILY
Status: DISCONTINUED | OUTPATIENT
Start: 2019-08-14 | End: 2019-08-15 | Stop reason: HOSPADM

## 2019-08-14 RX ORDER — DEXAMETHASONE SODIUM PHOSPHATE 4 MG/ML
10 INJECTION, SOLUTION INTRA-ARTICULAR; INTRALESIONAL; INTRAMUSCULAR; INTRAVENOUS; SOFT TISSUE ONCE
Status: COMPLETED | OUTPATIENT
Start: 2019-08-14 | End: 2019-08-14

## 2019-08-14 RX ORDER — PROPOFOL 10 MG/ML
INJECTION, EMULSION INTRAVENOUS PRN
Status: DISCONTINUED | OUTPATIENT
Start: 2019-08-14 | End: 2019-08-14 | Stop reason: SDUPTHER

## 2019-08-14 RX ORDER — FENTANYL CITRATE 50 UG/ML
INJECTION, SOLUTION INTRAMUSCULAR; INTRAVENOUS PRN
Status: DISCONTINUED | OUTPATIENT
Start: 2019-08-14 | End: 2019-08-14 | Stop reason: SDUPTHER

## 2019-08-14 RX ORDER — POLYETHYLENE GLYCOL 3350 17 G/17G
17 POWDER, FOR SOLUTION ORAL DAILY PRN
Status: DISCONTINUED | OUTPATIENT
Start: 2019-08-14 | End: 2019-08-15 | Stop reason: HOSPADM

## 2019-08-14 RX ORDER — HYDROMORPHONE HCL 110MG/55ML
PATIENT CONTROLLED ANALGESIA SYRINGE INTRAVENOUS PRN
Status: DISCONTINUED | OUTPATIENT
Start: 2019-08-14 | End: 2019-08-14 | Stop reason: SDUPTHER

## 2019-08-14 RX ORDER — DOCUSATE SODIUM 100 MG/1
100 CAPSULE, LIQUID FILLED ORAL 2 TIMES DAILY
Status: DISCONTINUED | OUTPATIENT
Start: 2019-08-14 | End: 2019-08-15 | Stop reason: HOSPADM

## 2019-08-14 RX ORDER — HYDROCODONE BITARTRATE AND ACETAMINOPHEN 7.5; 325 MG/1; MG/1
1 TABLET ORAL EVERY 4 HOURS PRN
Status: DISCONTINUED | OUTPATIENT
Start: 2019-08-14 | End: 2019-08-14

## 2019-08-14 RX ORDER — GLYCOPYRROLATE 1 MG/5 ML
SYRINGE (ML) INTRAVENOUS PRN
Status: DISCONTINUED | OUTPATIENT
Start: 2019-08-14 | End: 2019-08-14 | Stop reason: SDUPTHER

## 2019-08-14 RX ORDER — CALCIUM CARBONATE 500(1250)
500 TABLET ORAL DAILY
Status: DISCONTINUED | OUTPATIENT
Start: 2019-08-14 | End: 2019-08-15 | Stop reason: HOSPADM

## 2019-08-14 RX ORDER — GABAPENTIN 300 MG/1
300 CAPSULE ORAL ONCE
Status: COMPLETED | OUTPATIENT
Start: 2019-08-14 | End: 2019-08-14

## 2019-08-14 RX ORDER — FENTANYL CITRATE 50 UG/ML
25 INJECTION, SOLUTION INTRAMUSCULAR; INTRAVENOUS EVERY 5 MIN PRN
Status: DISCONTINUED | OUTPATIENT
Start: 2019-08-14 | End: 2019-08-14 | Stop reason: HOSPADM

## 2019-08-14 RX ORDER — LIDOCAINE HYDROCHLORIDE 20 MG/ML
INJECTION, SOLUTION INTRAVENOUS PRN
Status: DISCONTINUED | OUTPATIENT
Start: 2019-08-14 | End: 2019-08-14 | Stop reason: SDUPTHER

## 2019-08-14 RX ADMIN — SODIUM CHLORIDE, SODIUM LACTATE, POTASSIUM CHLORIDE, AND CALCIUM CHLORIDE: 600; 310; 30; 20 INJECTION, SOLUTION INTRAVENOUS at 08:17

## 2019-08-14 RX ADMIN — VITAMIN D, TAB 1000IU (100/BT) 1000 UNITS: 25 TAB at 18:42

## 2019-08-14 RX ADMIN — ROCURONIUM BROMIDE 20 MG: 10 INJECTION, SOLUTION INTRAVENOUS at 10:46

## 2019-08-14 RX ADMIN — DOCUSATE SODIUM 100 MG: 100 CAPSULE, LIQUID FILLED ORAL at 20:25

## 2019-08-14 RX ADMIN — HYDROCODONE BITARTRATE AND ACETAMINOPHEN 1 TABLET: 7.5; 325 TABLET ORAL at 23:47

## 2019-08-14 RX ADMIN — EPHEDRINE SULFATE 10 MG: 50 INJECTION, SOLUTION INTRAMUSCULAR; INTRAVENOUS; SUBCUTANEOUS at 09:58

## 2019-08-14 RX ADMIN — Medication 0.2 MG: at 09:53

## 2019-08-14 RX ADMIN — CALCIUM 500 MG: 500 TABLET ORAL at 18:42

## 2019-08-14 RX ADMIN — FENTANYL CITRATE 100 MCG: 50 INJECTION INTRAMUSCULAR; INTRAVENOUS at 10:22

## 2019-08-14 RX ADMIN — SODIUM CHLORIDE, POTASSIUM CHLORIDE, SODIUM LACTATE AND CALCIUM CHLORIDE: 600; 310; 30; 20 INJECTION, SOLUTION INTRAVENOUS at 16:16

## 2019-08-14 RX ADMIN — ONDANSETRON 4 MG: 2 INJECTION INTRAMUSCULAR; INTRAVENOUS at 13:32

## 2019-08-14 RX ADMIN — Medication 3.5 MG: at 13:32

## 2019-08-14 RX ADMIN — SODIUM CHLORIDE, SODIUM LACTATE, POTASSIUM CHLORIDE, AND CALCIUM CHLORIDE: 600; 310; 30; 20 INJECTION, SOLUTION INTRAVENOUS at 10:47

## 2019-08-14 RX ADMIN — FENTANYL CITRATE 100 MCG: 50 INJECTION INTRAMUSCULAR; INTRAVENOUS at 09:52

## 2019-08-14 RX ADMIN — HYDROMORPHONE HYDROCHLORIDE 0.5 MG: 2 INJECTION, SOLUTION INTRAMUSCULAR; INTRAVENOUS; SUBCUTANEOUS at 10:50

## 2019-08-14 RX ADMIN — HYDROMORPHONE HYDROCHLORIDE 1 MG: 2 INJECTION, SOLUTION INTRAMUSCULAR; INTRAVENOUS; SUBCUTANEOUS at 10:33

## 2019-08-14 RX ADMIN — DEXAMETHASONE SODIUM PHOSPHATE 10 MG: 4 INJECTION, SOLUTION INTRAMUSCULAR; INTRAVENOUS at 08:17

## 2019-08-14 RX ADMIN — EPHEDRINE SULFATE 10 MG: 50 INJECTION, SOLUTION INTRAMUSCULAR; INTRAVENOUS; SUBCUTANEOUS at 12:36

## 2019-08-14 RX ADMIN — Medication 2 G: at 10:07

## 2019-08-14 RX ADMIN — EPHEDRINE SULFATE 15 MG: 50 INJECTION, SOLUTION INTRAMUSCULAR; INTRAVENOUS; SUBCUTANEOUS at 12:48

## 2019-08-14 RX ADMIN — DEXAMETHASONE SODIUM PHOSPHATE 8 MG: 4 INJECTION, SOLUTION INTRAMUSCULAR; INTRAVENOUS at 09:52

## 2019-08-14 RX ADMIN — CITALOPRAM HYDROBROMIDE 20 MG: 20 TABLET ORAL at 18:42

## 2019-08-14 RX ADMIN — OXYCODONE HYDROCHLORIDE AND ACETAMINOPHEN 500 MG: 500 TABLET ORAL at 18:42

## 2019-08-14 RX ADMIN — PROPOFOL 160 MG: 10 INJECTION, EMULSION INTRAVENOUS at 09:52

## 2019-08-14 RX ADMIN — PRAVASTATIN SODIUM 80 MG: 80 TABLET ORAL at 20:25

## 2019-08-14 RX ADMIN — MIDAZOLAM HYDROCHLORIDE 1 MG: 2 INJECTION, SOLUTION INTRAMUSCULAR; INTRAVENOUS at 09:48

## 2019-08-14 RX ADMIN — HYDROMORPHONE HYDROCHLORIDE 0.25 MG: 1 INJECTION, SOLUTION INTRAMUSCULAR; INTRAVENOUS; SUBCUTANEOUS at 15:19

## 2019-08-14 RX ADMIN — ROCURONIUM BROMIDE 50 MG: 10 INJECTION, SOLUTION INTRAVENOUS at 09:52

## 2019-08-14 RX ADMIN — ASPIRIN 325 MG: 325 TABLET, COATED ORAL at 20:25

## 2019-08-14 RX ADMIN — Medication 0.6 MG: at 13:32

## 2019-08-14 RX ADMIN — LIDOCAINE HYDROCHLORIDE 60 MG: 20 INJECTION, SOLUTION INTRAVENOUS at 09:52

## 2019-08-14 RX ADMIN — ONDANSETRON 4 MG: 2 INJECTION INTRAMUSCULAR; INTRAVENOUS at 09:52

## 2019-08-14 RX ADMIN — GABAPENTIN 300 MG: 300 CAPSULE ORAL at 08:17

## 2019-08-14 RX ADMIN — PROPOFOL 40 MG: 10 INJECTION, EMULSION INTRAVENOUS at 13:44

## 2019-08-14 ASSESSMENT — PULMONARY FUNCTION TESTS
PIF_VALUE: 22
PIF_VALUE: 21
PIF_VALUE: 22
PIF_VALUE: 22
PIF_VALUE: 23
PIF_VALUE: 3
PIF_VALUE: 22
PIF_VALUE: 22
PIF_VALUE: 3
PIF_VALUE: 22
PIF_VALUE: 21
PIF_VALUE: 6
PIF_VALUE: 22
PIF_VALUE: 22
PIF_VALUE: 21
PIF_VALUE: 20
PIF_VALUE: 22
PIF_VALUE: 22
PIF_VALUE: 19
PIF_VALUE: 22
PIF_VALUE: 20
PIF_VALUE: 21
PIF_VALUE: 22
PIF_VALUE: 21
PIF_VALUE: 22
PIF_VALUE: 21
PIF_VALUE: 21
PIF_VALUE: 22
PIF_VALUE: 22
PIF_VALUE: 20
PIF_VALUE: 22
PIF_VALUE: 23
PIF_VALUE: 0
PIF_VALUE: 1
PIF_VALUE: 3
PIF_VALUE: 20
PIF_VALUE: 3
PIF_VALUE: 22
PIF_VALUE: 22
PIF_VALUE: 4
PIF_VALUE: 22
PIF_VALUE: 17
PIF_VALUE: 22
PIF_VALUE: 21
PIF_VALUE: 22
PIF_VALUE: 22
PIF_VALUE: 18
PIF_VALUE: 20
PIF_VALUE: 22
PIF_VALUE: 21
PIF_VALUE: 22
PIF_VALUE: 20
PIF_VALUE: 22
PIF_VALUE: 21
PIF_VALUE: 17
PIF_VALUE: 20
PIF_VALUE: 22
PIF_VALUE: 0
PIF_VALUE: 21
PIF_VALUE: 22
PIF_VALUE: 22
PIF_VALUE: 21
PIF_VALUE: 22
PIF_VALUE: 20
PIF_VALUE: 21
PIF_VALUE: 22
PIF_VALUE: 23
PIF_VALUE: 6
PIF_VALUE: 22
PIF_VALUE: 21
PIF_VALUE: 2
PIF_VALUE: 3
PIF_VALUE: 21
PIF_VALUE: 22
PIF_VALUE: 20
PIF_VALUE: 22
PIF_VALUE: 20
PIF_VALUE: 22
PIF_VALUE: 18
PIF_VALUE: 20
PIF_VALUE: 22
PIF_VALUE: 21
PIF_VALUE: 20
PIF_VALUE: 21
PIF_VALUE: 21
PIF_VALUE: 22
PIF_VALUE: 21
PIF_VALUE: 22
PIF_VALUE: 20
PIF_VALUE: 22
PIF_VALUE: 23
PIF_VALUE: 21
PIF_VALUE: 22
PIF_VALUE: 22
PIF_VALUE: 21
PIF_VALUE: 22
PIF_VALUE: 23
PIF_VALUE: 22
PIF_VALUE: 23
PIF_VALUE: 22
PIF_VALUE: 21
PIF_VALUE: 22
PIF_VALUE: 20
PIF_VALUE: 22
PIF_VALUE: 3
PIF_VALUE: 22
PIF_VALUE: 20
PIF_VALUE: 22
PIF_VALUE: 21
PIF_VALUE: 21
PIF_VALUE: 22
PIF_VALUE: 22
PIF_VALUE: 21
PIF_VALUE: 22
PIF_VALUE: 22
PIF_VALUE: 18
PIF_VALUE: 22
PIF_VALUE: 3
PIF_VALUE: 22
PIF_VALUE: 22
PIF_VALUE: 20
PIF_VALUE: 22
PIF_VALUE: 23
PIF_VALUE: 22
PIF_VALUE: 22
PIF_VALUE: 3
PIF_VALUE: 22
PIF_VALUE: 22
PIF_VALUE: 21
PIF_VALUE: 18
PIF_VALUE: 22
PIF_VALUE: 22
PIF_VALUE: 21
PIF_VALUE: 21
PIF_VALUE: 22
PIF_VALUE: 18
PIF_VALUE: 22
PIF_VALUE: 21
PIF_VALUE: 22
PIF_VALUE: 21
PIF_VALUE: 22
PIF_VALUE: 43
PIF_VALUE: 22
PIF_VALUE: 21
PIF_VALUE: 22
PIF_VALUE: 20
PIF_VALUE: 18
PIF_VALUE: 22
PIF_VALUE: 22
PIF_VALUE: 18
PIF_VALUE: 22
PIF_VALUE: 22
PIF_VALUE: 20
PIF_VALUE: 21
PIF_VALUE: 20
PIF_VALUE: 22
PIF_VALUE: 20
PIF_VALUE: 22
PIF_VALUE: 22
PIF_VALUE: 5
PIF_VALUE: 0
PIF_VALUE: 17
PIF_VALUE: 21
PIF_VALUE: 21
PIF_VALUE: 1
PIF_VALUE: 21
PIF_VALUE: 0
PIF_VALUE: 22
PIF_VALUE: 20
PIF_VALUE: 22
PIF_VALUE: 22
PIF_VALUE: 18
PIF_VALUE: 20
PIF_VALUE: 19
PIF_VALUE: 22
PIF_VALUE: 21
PIF_VALUE: 22
PIF_VALUE: 23
PIF_VALUE: 22
PIF_VALUE: 21
PIF_VALUE: 22
PIF_VALUE: 21
PIF_VALUE: 19
PIF_VALUE: 22
PIF_VALUE: 20
PIF_VALUE: 22
PIF_VALUE: 22
PIF_VALUE: 23
PIF_VALUE: 21
PIF_VALUE: 22
PIF_VALUE: 22
PIF_VALUE: 21
PIF_VALUE: 22

## 2019-08-14 ASSESSMENT — PAIN - FUNCTIONAL ASSESSMENT
PAIN_FUNCTIONAL_ASSESSMENT: PREVENTS OR INTERFERES SOME ACTIVE ACTIVITIES AND ADLS
PAIN_FUNCTIONAL_ASSESSMENT: 0-10

## 2019-08-14 ASSESSMENT — PAIN DESCRIPTION - LOCATION: LOCATION: KNEE

## 2019-08-14 ASSESSMENT — PAIN SCALES - GENERAL
PAINLEVEL_OUTOF10: 1
PAINLEVEL_OUTOF10: 5
PAINLEVEL_OUTOF10: 4

## 2019-08-14 ASSESSMENT — ENCOUNTER SYMPTOMS: SHORTNESS OF BREATH: 0

## 2019-08-14 ASSESSMENT — PAIN DESCRIPTION - PAIN TYPE: TYPE: SURGICAL PAIN

## 2019-08-14 ASSESSMENT — PAIN DESCRIPTION - DESCRIPTORS: DESCRIPTORS: ACHING

## 2019-08-14 ASSESSMENT — PAIN DESCRIPTION - PROGRESSION: CLINICAL_PROGRESSION: NOT CHANGED

## 2019-08-14 ASSESSMENT — PAIN DESCRIPTION - ORIENTATION: ORIENTATION: LEFT

## 2019-08-14 ASSESSMENT — PAIN DESCRIPTION - ONSET: ONSET: ON-GOING

## 2019-08-14 ASSESSMENT — PAIN DESCRIPTION - FREQUENCY: FREQUENCY: CONTINUOUS

## 2019-08-14 ASSESSMENT — LIFESTYLE VARIABLES: SMOKING_STATUS: 0

## 2019-08-14 NOTE — ANESTHESIA PRE PROCEDURE
Neuro/Psych:   (+) CVA:, psychiatric history:   (-) seizures            ROS comment: Spinal stenosis GI/Hepatic/Renal:   (+) morbid obesity     (-) GERD, hepatitis and liver disease       Endo/Other:        (-) diabetes mellitus, hyperthyroidism, blood dyscrasia               Abdominal:           Vascular:     - DVT and PE. Anesthesia Plan      general     ASA 3     (  )  Induction: intravenous. MIPS: Postoperative opioids intended and Prophylactic antiemetics administered. Anesthetic plan and risks discussed with patient. Plan discussed with CRNA.     Attending anesthesiologist reviewed and agrees with Art Lyons MD   8/14/2019

## 2019-08-15 VITALS
OXYGEN SATURATION: 94 % | TEMPERATURE: 98 F | HEIGHT: 64 IN | BODY MASS INDEX: 37.05 KG/M2 | SYSTOLIC BLOOD PRESSURE: 116 MMHG | RESPIRATION RATE: 16 BRPM | HEART RATE: 81 BPM | DIASTOLIC BLOOD PRESSURE: 67 MMHG | WEIGHT: 217.04 LBS

## 2019-08-15 LAB
HCT VFR BLD CALC: 31.8 % (ref 36–48)
HEMOGLOBIN: 10.7 G/DL (ref 12–16)

## 2019-08-15 PROCEDURE — 97530 THERAPEUTIC ACTIVITIES: CPT

## 2019-08-15 PROCEDURE — G0378 HOSPITAL OBSERVATION PER HR: HCPCS

## 2019-08-15 PROCEDURE — 97166 OT EVAL MOD COMPLEX 45 MIN: CPT

## 2019-08-15 PROCEDURE — 97162 PT EVAL MOD COMPLEX 30 MIN: CPT

## 2019-08-15 PROCEDURE — 36415 COLL VENOUS BLD VENIPUNCTURE: CPT

## 2019-08-15 PROCEDURE — 97110 THERAPEUTIC EXERCISES: CPT

## 2019-08-15 PROCEDURE — 85014 HEMATOCRIT: CPT

## 2019-08-15 PROCEDURE — 97116 GAIT TRAINING THERAPY: CPT

## 2019-08-15 PROCEDURE — 97535 SELF CARE MNGMENT TRAINING: CPT

## 2019-08-15 PROCEDURE — 6370000000 HC RX 637 (ALT 250 FOR IP): Performed by: ORTHOPAEDIC SURGERY

## 2019-08-15 PROCEDURE — 85018 HEMOGLOBIN: CPT

## 2019-08-15 RX ORDER — CELECOXIB 200 MG/1
200 CAPSULE ORAL DAILY
Qty: 60 CAPSULE | Refills: 3 | Status: SHIPPED | OUTPATIENT
Start: 2019-08-16 | End: 2019-12-19 | Stop reason: ALTCHOICE

## 2019-08-15 RX ORDER — PSEUDOEPHEDRINE HCL 30 MG
100 TABLET ORAL 2 TIMES DAILY
Qty: 100 CAPSULE | Refills: 0 | Status: SHIPPED | OUTPATIENT
Start: 2019-08-15 | End: 2019-12-19

## 2019-08-15 RX ORDER — HYDROCODONE BITARTRATE AND ACETAMINOPHEN 7.5; 325 MG/1; MG/1
1 TABLET ORAL EVERY 4 HOURS PRN
Qty: 28 TABLET | Refills: 0 | Status: SHIPPED | OUTPATIENT
Start: 2019-08-15 | End: 2019-08-22

## 2019-08-15 RX ADMIN — CELECOXIB 200 MG: 100 CAPSULE ORAL at 06:27

## 2019-08-15 RX ADMIN — DOCUSATE SODIUM 100 MG: 100 CAPSULE, LIQUID FILLED ORAL at 08:14

## 2019-08-15 RX ADMIN — ASPIRIN 325 MG: 325 TABLET, COATED ORAL at 08:15

## 2019-08-15 RX ADMIN — CELECOXIB 200 MG: 100 CAPSULE ORAL at 08:14

## 2019-08-15 RX ADMIN — VITAMIN D, TAB 1000IU (100/BT) 1000 UNITS: 25 TAB at 08:15

## 2019-08-15 RX ADMIN — HYDROCODONE BITARTRATE AND ACETAMINOPHEN 1 TABLET: 7.5; 325 TABLET ORAL at 06:45

## 2019-08-15 RX ADMIN — HYDROCODONE BITARTRATE AND ACETAMINOPHEN 1 TABLET: 7.5; 325 TABLET ORAL at 06:27

## 2019-08-15 RX ADMIN — VITAMIN E CAP 400 UNIT 400 UNITS: 400 CAP at 08:14

## 2019-08-15 RX ADMIN — HYDROCODONE BITARTRATE AND ACETAMINOPHEN 2 TABLET: 7.5; 325 TABLET ORAL at 10:49

## 2019-08-15 RX ADMIN — OXYCODONE HYDROCHLORIDE AND ACETAMINOPHEN 500 MG: 500 TABLET ORAL at 08:15

## 2019-08-15 RX ADMIN — CITALOPRAM HYDROBROMIDE 20 MG: 20 TABLET ORAL at 08:15

## 2019-08-15 RX ADMIN — CALCIUM 500 MG: 500 TABLET ORAL at 08:14

## 2019-08-15 ASSESSMENT — PAIN DESCRIPTION - LOCATION
LOCATION: KNEE
LOCATION: INCISION
LOCATION: INCISION
LOCATION: INCISION;KNEE
LOCATION: INCISION;KNEE
LOCATION: KNEE

## 2019-08-15 ASSESSMENT — PAIN SCALES - GENERAL
PAINLEVEL_OUTOF10: 4
PAINLEVEL_OUTOF10: 6
PAINLEVEL_OUTOF10: 6
PAINLEVEL_OUTOF10: 8
PAINLEVEL_OUTOF10: 4

## 2019-08-15 ASSESSMENT — PAIN DESCRIPTION - FREQUENCY
FREQUENCY: CONTINUOUS
FREQUENCY: CONTINUOUS
FREQUENCY: INTERMITTENT
FREQUENCY: INTERMITTENT
FREQUENCY: CONTINUOUS
FREQUENCY: INTERMITTENT

## 2019-08-15 ASSESSMENT — PAIN DESCRIPTION - PAIN TYPE
TYPE: SURGICAL PAIN

## 2019-08-15 ASSESSMENT — PAIN DESCRIPTION - ORIENTATION
ORIENTATION: LEFT

## 2019-08-15 ASSESSMENT — PAIN DESCRIPTION - PROGRESSION
CLINICAL_PROGRESSION: NOT CHANGED

## 2019-08-15 ASSESSMENT — PAIN DESCRIPTION - DESCRIPTORS
DESCRIPTORS: ACHING

## 2019-08-15 ASSESSMENT — PAIN DESCRIPTION - ONSET
ONSET: ON-GOING

## 2019-08-15 NOTE — DISCHARGE SUMMARY
distress. Alert and oriented. Neuro: alert. oriented  Eyes: Extra-ocular muscles intact  Mouth: Oral mucosa moist. No perioral lesions  Pulm: Respirations unlabored and regular. Heart: Regular rate and rhythm   Skin: warm, well perfused  MS:  Extremity pain, swelling, and limited ROM. DISCHARGE DIAGNOSES:  Problem List Items Addressed This Visit     S/P total knee arthroplasty, left - Primary    Relevant Medications    HYDROcodone-acetaminophen (NORCO) 7.5-325 MG per tablet          HOSPITAL COURSE:  Kristin Lugo is a 81 y.o. patient who was admitted to the hospital on the day of surgery. Surgery went as planned. After surgery, the patient was admitted to the Med/Surg unit for postoperative care. Postoperative course was uneventful. The patient tolerated a regular diet and had good pain control on oral pain medication. Discharged occurred on  and they were discharged home in stable condition. DISCHARGE INSTRUCTIONS:  --  Weightbearing as tolerated. --  Keep dressing in place at all times until seen by your doctor. Do not get your operative leg wet. --  If you have a glued on adhesive dressing do NOT remove this. This will fall off on its own in 2-3 weeks. --  Resume pre-op diet. --  Total joint precautions. Do not place a pillow behind the knee. If resting place a pillow under the heel. --  Take the medicines prescribed for pain. --  Aspirin 81mg by mouth kaila daily for DVT prophylaxis. --  Resume home medications per PCP. --  Follow up with orthopaedic surgeon as scheduled   --  Follow up with primary care physician within 6-8 weeks. --  For any questions or concerns, call [facility] and Orthopaedic office or go to the nearest emergency department. [address]  Date:  8/15/2019

## 2019-08-15 NOTE — PLAN OF CARE
Problem: Falls - Risk of:  Goal: Will remain free from falls  Description  Will remain free from falls  8/15/2019 0918 by Long Sharma RN  Outcome: Ongoing  Note:   Patient alert and oriented X4, non-skid socks on, bed in lowest position and locked, side rails up X2, call light and belongings within reach, bed alarm on for safety, and fall sign posted. Will continue to monitor. 8/14/2019 2225 by Sunil Henao RN  Outcome: Ongoing  Note:   Patient is a high fall risk. All fall precautions in place: bed alarm on, nonskid socks on pt, call light within reach, bed locked in lowest position. Will continue to monitor pt safety. Problem: Pain:  Goal: Pain level will decrease  Description  Pain level will decrease  8/15/2019 0918 by Long Sharma RN  Outcome: Ongoing  Note:   Pt complaining of 4/10 pain. Pt currently up with therapy. Will continue to monitor   8/14/2019 2225 by Sunil Henao RN  Note:   Pain is controlled this shift. Pt denies any pain intervention at this time. Ice applied. Will continue to monitor.

## 2019-08-15 NOTE — PROGRESS NOTES
Dr. Suly Guzman at bedside in PACU soon after patients arrival. Dr. Suly Guzman stated to only place a pillow under patients left heel and no pillow under knee.
Occupational Therapy   Occupational Therapy Initial Assessment/Treatment  Date: 8/15/2019   Patient Name: Armando Bell  MRN: [id number]     : 1950    Date of Service: 8/15/2019    Discharge Recommendations:  Armando Bell scored a 19/24 on the AM-PAC ADL Inpatient form. Current research shows that an AM-PAC score of 18 or greater is typically associated with a discharge to the patient's home setting. Based on the patients AM-PAC score and their current ADL deficits, it is recommended that the patient have 2-3 sessions per week of Occupational Therapy at d/c to increase the patients independence. OT Equipment Recommendations  Equipment Needed: No    Assessment   Performance deficits / Impairments: Decreased functional mobility ; Decreased ADL status; Decreased endurance  Assessment: Pt is pleasant and talkative following L TKA. She performed functional mobility w/ CGA progressing to SBA with RW. Pt required min A for upper body dressing, max A for socks, as she typically uses a sock-aid, and SBA to don pants. She performed grooming tasks standing at bathroom sink with SBA. Feel pt would benefit from continued OT tx while here to improve functional independence and safety at home. Treatment Diagnosis: Impaired functional mobility, ADL status, and endurance  Prognosis: Good  Decision Making: Medium Complexity  Patient Education: Role of OT, ADL adaptive strategies, transfer training; pt verbalized understanding  REQUIRES OT FOLLOW UP: Yes  Activity Tolerance  Activity Tolerance: Patient Tolerated treatment well  Safety Devices  Safety Devices in place: Yes  Type of devices: Call light within reach; Left in chair;Chair alarm in place;Nurse notified        Treatment Diagnosis: Impaired functional mobility, ADL status, and endurance      Restrictions  Position Activity Restriction  Other position/activity restrictions: WBAT R, up with assist    Subjective   General  Chart Reviewed:  Yes  Additional Pertinent Hx:
Oral airway removed without any difficulties at 1430.
Physician notified of Celebrex administered twice today, 400mg total administered for the day. Pt A/O X4, VSS, Neurological checks WNL. No s/s of bleeding, pt denies n/v. Will continue to monitor.
Pt is alert and oriented times 4. VSS Pt ambulating with assist of one walker. Pain is well controlled with oral pain medication. Pt denies any needs. All fall precautions in place. Will continue to monitor.
total ); and Total knee arthroplasty (Left, 8/14/2019). Restrictions  Position Activity Restriction  Other position/activity restrictions: WBAT R, up with assist     Vision/Hearing  Vision: Impaired  Vision Exceptions: Wears glasses for reading  Hearing: Within functional limits       Subjective  General  Chart Reviewed: Yes  Additional Pertinent Hx: 82 yo admitted 8/14/19 for R TKR per Dr. Nelwyn Lundborg. PMhx: TIAx4, HTN, morbid obesity, spinal stenosis, R THR, lumbar fusion. Family / Caregiver Present: Yes(daughter present at end of session)  Diagnosis: R TKR  Follows Commands: Within Functional Limits  Subjective  Subjective: Pt found up in chair and agreeable to PT. \" I'm going home today.  \"  Pain Screening  Patient Currently in Pain: (rates L knee pain 7/10, RN aware)         Orientation  Orientation  Overall Orientation Status: Within Functional Limits     Social/Functional History  Social/Functional History  Lives With: Alone  Type of Home: House  Home Layout: One level, Laundry in basement  Home Access: Stairs to enter with rails  Entrance Stairs - Number of Steps: 8 steps  Entrance Stairs - Rails: Both  Bathroom Shower/Tub: Tub/Shower unit  Bathroom Toilet: Standard  Bathroom Equipment: Shower chair, 3-in-1 commode, Tub transfer bench, Hand-held shower  Home Equipment: Cane, Rolling walker, 4 wheeled walker, Reacher, Sock aid, Long-handled shoehorn, Quad cane(and adjustable bed)  ADL Assistance: [address] [place] [address] Avenue: [address] [place] [address] Responsibilities: Yes  Ambulation Assistance: Independent(typically w/ cane; uses 4 wheeled walker for long distances)  Transfer Assistance: Independent  Active : No(Pt's daughter drives)  Occupation: Retired  Leisure & Hobbies: Go to the gym  Additional Comments: Pt's daughter will stay with her initially at d/c and able to provide 24 hr A; pt has experienced falls from LLE weakness (last fall in May)       Objective          AROM LLE (degrees)  LLE

## 2019-08-15 NOTE — CARE COORDINATION
due at time of pick-up or delivery - cash, check, or card accepted)     Able to afford home medications/ co-pay costs: Yes    ADLS:  Current PT AM-PAC Score:   /24  Current OT AM-PAC Score: 19 /24      DISCHARGE Disposition: Home with [address]: Cristian Rosas     LOC at discharge: Not Applicable  CAIO Completed: No    Notification completed in HENS/PAS?:  Not Applicable    IMM Completed:   Not Indicated    Transportation:  Transportation PLAN for discharge: family       Home Care:  [address] ordered at discharge: Yes  [address]: Cristian Ralph  Phone: [phone]  Fax: NA  Orders faxed: Yes      Referrals made at [facility] for outpatient continued care:  Not Applicable    Additional CM Notes: JEFF Villalpando and her family were provided with choice of provider; she and her family are in agreement with the discharge plan.     Care Transitions patient: Erica Monique RN  [facility] [employer]  Case Management Department  Ph: [phone]  Fax: [phone]

## 2019-08-19 DIAGNOSIS — Z96.652 S/P TOTAL KNEE ARTHROPLASTY, LEFT: Primary | ICD-10-CM

## 2019-08-19 RX ORDER — HYDROCODONE BITARTRATE AND ACETAMINOPHEN 5; 325 MG/1; MG/1
1-2 TABLET ORAL EVERY 6 HOURS PRN
Qty: 20 TABLET | Refills: 0 | Status: SHIPPED | OUTPATIENT
Start: 2019-08-19 | End: 2019-08-24

## 2019-08-22 ENCOUNTER — TELEPHONE (OUTPATIENT)
Dept: ORTHOPEDIC SURGERY | Age: 69
End: 2019-08-22

## 2019-08-23 ENCOUNTER — OFFICE VISIT (OUTPATIENT)
Dept: ORTHOPEDIC SURGERY | Age: 69
End: 2019-08-23

## 2019-08-23 DIAGNOSIS — Z96.652 S/P TOTAL KNEE ARTHROPLASTY, LEFT: Primary | ICD-10-CM

## 2019-08-23 PROCEDURE — 99024 POSTOP FOLLOW-UP VISIT: CPT | Performed by: ORTHOPAEDIC SURGERY

## 2019-08-23 RX ORDER — GABAPENTIN 100 MG/1
100 CAPSULE ORAL 3 TIMES DAILY
Qty: 90 CAPSULE | Refills: 0 | Status: SHIPPED | OUTPATIENT
Start: 2019-08-23 | End: 2021-06-24

## 2019-08-23 RX ORDER — DIAPER,BRIEF,INFANT-TODD,DISP
EACH MISCELLANEOUS
Qty: 1 TUBE | Refills: 1 | Status: SHIPPED | OUTPATIENT
Start: 2019-08-23 | End: 2019-08-30

## 2019-08-23 RX ORDER — HYDROCODONE BITARTRATE AND ACETAMINOPHEN 5; 325 MG/1; MG/1
2 TABLET ORAL EVERY 8 HOURS PRN
Qty: 28 TABLET | Refills: 0 | Status: SHIPPED | OUTPATIENT
Start: 2019-08-23 | End: 2019-08-30

## 2019-08-26 ENCOUNTER — OFFICE VISIT (OUTPATIENT)
Dept: ORTHOPEDIC SURGERY | Age: 69
End: 2019-08-26

## 2019-08-26 VITALS — WEIGHT: 217 LBS | HEIGHT: 64 IN | BODY MASS INDEX: 37.05 KG/M2

## 2019-08-26 VITALS
WEIGHT: 217 LBS | SYSTOLIC BLOOD PRESSURE: 130 MMHG | BODY MASS INDEX: 37.05 KG/M2 | HEIGHT: 64 IN | DIASTOLIC BLOOD PRESSURE: 80 MMHG | HEART RATE: 85 BPM

## 2019-08-26 DIAGNOSIS — Z96.652 S/P TOTAL KNEE ARTHROPLASTY, LEFT: Primary | ICD-10-CM

## 2019-08-26 PROCEDURE — 99024 POSTOP FOLLOW-UP VISIT: CPT | Performed by: ORTHOPAEDIC SURGERY

## 2019-08-26 NOTE — PROGRESS NOTES
Chief Complaint  Follow-up (left knee. s/p 12 days tkr. pt states that her pain and rash is a little better )      History of Present Illness:  Dena Jackson is a pleasant 81 y.o. female 12 days status post left total knee arthroplasty on 8/14/2019 here today for a skin check. We last saw her 3 days ago and treated her superficial skin reaction with hydrocortisone topical cream. We also added Gabapentin and had her continue Celebrex and Norco. Since starting the Gabapentin her pain levels have gone down and she is only needed 1 Norco per day. No fever reported. No new injuries reported. Medical History:  Patient's medications, allergies, past medical, surgical, social and family histories were reviewed and updated as appropriate. Pertinent items are noted in HPI  Review of systems reviewed from Patient History Form completed today and available in the patient's chart under the Media tab. Vital Signs: There were no vitals taken for this visit. Neuro: Alert & oriented x 3,  normal,  no focal deficits noted. Normal affect. Eyes: sclera clear  Ears: Normal external ear  Mouth:  No perioral lesions  Pulm: Respirations unlabored and regular  Pulse: Extremities well perfused. 2+ peripheral pulses. Skin: Warm. No ulcerations. Constitutional: The physical examination finds the patient to be well-developed and well-nourished. The patient is alert and oriented x3 and was cooperative throughout the visit. LEFT knee exam    Gait: No use of assistive devices. No antalgic gait. Alignment: normal alignment. Inspection/skin: Incision healing well. Erythema in pattern of adhesive from aqua dressing. No indication of infection. No dehiscence. No drainage. Palpation: Non tender to light touch    Range of Motion: near 0 extension to 90 flexion. Strength: Normal quadriceps development. Effusion: Minimal effusion    Ligamentous stability: No gross instability.      Neurologic and vascular: is possible that there are still dictated errors within this office note. If so, please bring any errors to my attention for an addendum. All efforts were made to ensure that this office note is accurate. I personally reviewed the patient's pain scale, review of systems, family history, social history, past medical history, allergies and medications. Review of systems was collected today, reviewed and is included in the medical record. It is available under the media tab. I personally performed the services described in this documentation and scribed by Sudhir Brunner PA-C. Caren Newman MD  Sports Medicine, Arthroscopic Knee and Shoulder Surgery    This dictation was performed with a verbal recognition program [facility]) and it was checked for errors. It is possible that there are still dictated errors within this office note. If so, please bring any errors to my attention for an addendum. All efforts were made to ensure that this office note is accurate.

## 2019-08-30 ENCOUNTER — EVALUATION (OUTPATIENT)
Dept: PHYSICAL THERAPY | Age: 69
End: 2019-08-30
Payer: MEDICARE

## 2019-08-30 DIAGNOSIS — M17.12 PRIMARY OSTEOARTHRITIS OF LEFT KNEE: ICD-10-CM

## 2019-08-30 DIAGNOSIS — M25.562 ACUTE POSTOPERATIVE PAIN OF LEFT KNEE: Primary | ICD-10-CM

## 2019-08-30 DIAGNOSIS — M25.662 DECREASED RANGE OF MOTION (ROM) OF LEFT KNEE: ICD-10-CM

## 2019-08-30 DIAGNOSIS — G89.18 ACUTE POSTOPERATIVE PAIN OF LEFT KNEE: Primary | ICD-10-CM

## 2019-08-30 PROCEDURE — G8427 DOCREV CUR MEDS BY ELIG CLIN: HCPCS | Performed by: PHYSICAL THERAPIST

## 2019-08-30 PROCEDURE — 97161 PT EVAL LOW COMPLEX 20 MIN: CPT | Performed by: PHYSICAL THERAPIST

## 2019-08-30 PROCEDURE — 97016 VASOPNEUMATIC DEVICE THERAPY: CPT | Performed by: PHYSICAL THERAPIST

## 2019-08-30 PROCEDURE — G8730 PAIN DOC POS AND PLAN: HCPCS | Performed by: PHYSICAL THERAPIST

## 2019-08-30 PROCEDURE — 97530 THERAPEUTIC ACTIVITIES: CPT | Performed by: PHYSICAL THERAPIST

## 2019-08-30 PROCEDURE — G8539 DOC FUNCT AND CARE PLAN: HCPCS | Performed by: PHYSICAL THERAPIST

## 2019-08-30 PROCEDURE — 97110 THERAPEUTIC EXERCISES: CPT | Performed by: PHYSICAL THERAPIST

## 2019-08-30 PROCEDURE — 1101F PT FALLS ASSESS-DOCD LE1/YR: CPT | Performed by: PHYSICAL THERAPIST

## 2019-08-30 NOTE — FLOWSHEET NOTE
activities related to improving balance, coordination, kinesthetic sense, posture, motor skill, proprioception of core, proximal hip and LE for self care, mobility, lifting, and ambulation/stair navigation      Manual Treatments:  PROM / STM / Oscillations-Mobs:  G-I, II, III, IV (PA's, Inf., Post.)  [] (36499) Provided manual therapy to mobilize LE, proximal hip and/or LS spine soft tissue/joints for the purpose of modulating pain, promoting relaxation,  increasing ROM, reducing/eliminating soft tissue swelling/inflammation/restriction, improving soft tissue extensibility and allowing for proper ROM for normal function with self care, mobility, lifting and ambulation. Modalities:  Vaso elevated medium pressure x15'    Charges:  Timed Code Treatment Minutes: 40   Total Treatment Minutes: 65     [x] EVAL (LOW) 06989 (typically 20 minutes face-to-face)  [] EVAL (MOD) 75939 (typically 30 minutes face-to-face)  [] EVAL (HIGH) 04360 (typically 45 minutes face-to-face)  [] RE-EVAL   [x] HU(93308) x  1   [] IONTO  [] NMR (98279) x      [x] VASO x15'  [] Manual (75369) x       [] Other:  [x] TA (80094) x  1    [] Mech Traction (30480)  [] ES(attended) (10424)      [] ES (un) (58501):       GOALS:  Patient stated goal: restore full PLOF    Therapist goals for Patient:   Short Term Goals: To be achieved in: 2 weeks  1. Independent in HEP and progression per patient tolerance, in order to prevent re-injury. 2. Patient will have a decrease in pain to facilitate improvement in movement, function, and ADLs as indicated by Functional Deficits. Long Term Goals: To be achieved in: 4-8 weeks  1. Disability index score of 15% or less for the KOS ADLs to assist with reaching prior level of function. 2. Patient will demonstrate increased AROM to 0- >120 to allow for proper joint functioning as indicated by patients Functional Deficits.    3. Patient will demonstrate an increase in Strength to good proximal hip strength and

## 2019-08-30 NOTE — PLAN OF CARE
[facility]   Phone: [phone]    Fax: [phone]          Physical Therapy Certification    Dear Referring Practitioner: Dr Ernestina Villela,    We had the pleasure of evaluating the following patient for physical therapy services at [address] [place] [address]. A summary of our findings can be found in the initial assessment below. This includes our plan of care. If you have any questions or concerns regarding these findings, please do not hesitate to contact me at the office phone number checked above. Thank you for the referral.       Physician Signature:_______________________________Date:__________________  By signing above (or electronic signature), therapists plan is approved by physician      Patient: Thania Nolasco   : 1950   MRN: [id number]  Referring Physician: Referring Practitioner: Dr Ernestina Villela      Evaluation Date: 2019      Medical Diagnosis Information:  Diagnosis: S/p Left TKA    DOA 19                                             Insurance information: PT Insurance Information: MEDICARE     Precautions/ Contra-indications: some dizziness at times (positional)  Latex Allergy:  [x]NO      []YES (reacts to tape on her skin)  Preferred Language for Healthcare:   [x]English       []other:    SUBJECTIVE:   She has been dealing with knee arthritis over the years. This has progressively been worsening. She has also been dealing with other medical issues. She finally got around to taking care of this left knee. She is s/p left TKA on 19. This is her first knee surgery. She was taking her pain meds fairly regularly, has backed down to 1-2 a day. The doctor also put her Gabapentin. She stayed in the hospital one day after surgery. Then her [facility] therapy started right away and she had about 6 visits with the last one being this past Wednesday. She did have a pretty bad reaction to the post op bandage.    Her skin is finally

## 2019-09-03 ENCOUNTER — TREATMENT (OUTPATIENT)
Dept: PHYSICAL THERAPY | Age: 69
End: 2019-09-03
Payer: MEDICARE

## 2019-09-03 DIAGNOSIS — G89.18 ACUTE POSTOPERATIVE PAIN OF LEFT KNEE: Primary | ICD-10-CM

## 2019-09-03 DIAGNOSIS — M25.662 DECREASED RANGE OF MOTION (ROM) OF LEFT KNEE: ICD-10-CM

## 2019-09-03 DIAGNOSIS — M17.12 PRIMARY OSTEOARTHRITIS OF LEFT KNEE: ICD-10-CM

## 2019-09-03 DIAGNOSIS — M25.562 ACUTE POSTOPERATIVE PAIN OF LEFT KNEE: Primary | ICD-10-CM

## 2019-09-03 PROCEDURE — 97016 VASOPNEUMATIC DEVICE THERAPY: CPT | Performed by: PHYSICAL THERAPIST

## 2019-09-03 PROCEDURE — 97112 NEUROMUSCULAR REEDUCATION: CPT | Performed by: PHYSICAL THERAPIST

## 2019-09-03 PROCEDURE — 97110 THERAPEUTIC EXERCISES: CPT | Performed by: PHYSICAL THERAPIST

## 2019-09-03 PROCEDURE — 97530 THERAPEUTIC ACTIVITIES: CPT | Performed by: PHYSICAL THERAPIST

## 2019-09-03 PROCEDURE — G8427 DOCREV CUR MEDS BY ELIG CLIN: HCPCS | Performed by: PHYSICAL THERAPIST

## 2019-09-03 NOTE — FLOWSHEET NOTE
patient for proper LE, core and proximal hip recruitment and positioning and eccentric body weight control with ambulation re-education including up and down stairs     Home Exercise Program:    [x] (40486) Reviewed/Progressed HEP activities related to strengthening, flexibility, endurance, ROM of core, proximal hip and LE for functional self-care, mobility, lifting and ambulation/stair navigation   [] (78428)Reviewed/Progressed HEP activities related to improving balance, coordination, kinesthetic sense, posture, motor skill, proprioception of core, proximal hip and LE for self care, mobility, lifting, and ambulation/stair navigation      Manual Treatments:  PROM / STM / Oscillations-Mobs:  G-I, II, III, IV (PA's, Inf., Post.)  [] (56772) Provided manual therapy to mobilize LE, proximal hip and/or LS spine soft tissue/joints for the purpose of modulating pain, promoting relaxation,  increasing ROM, reducing/eliminating soft tissue swelling/inflammation/restriction, improving soft tissue extensibility and allowing for proper ROM for normal function with self care, mobility, lifting and ambulation. Modalities:  Vaso elevated medium pressure x15'    Charges:  Timed Code Treatment Minutes: 40   Total Treatment Minutes: 65     [] EVAL (LOW) 69162 (typically 20 minutes face-to-face)  [] EVAL (MOD) 79563 (typically 30 minutes face-to-face)  [] EVAL (HIGH) 14114 (typically 45 minutes face-to-face)  [] RE-EVAL   [x] QN(44761) x  1   [] IONTO  [x] NMR (96909) x  1   [x] VASO x15'  [] Manual (89214) x       [] Other:  [x] TA (29582) x  1    [] Mech Traction (92847)  [] ES(attended) (03603)      [] ES (un) (35528):       GOALS:  Patient stated goal: restore full PLOF    Therapist goals for Patient:   Short Term Goals: To be achieved in: 2 weeks  1. Independent in HEP and progression per patient tolerance, in order to prevent re-injury.    2. Patient will have a decrease in pain to facilitate improvement in movement,

## 2019-09-05 ENCOUNTER — TREATMENT (OUTPATIENT)
Dept: PHYSICAL THERAPY | Age: 69
End: 2019-09-05
Payer: MEDICARE

## 2019-09-05 DIAGNOSIS — M25.662 DECREASED RANGE OF MOTION (ROM) OF LEFT KNEE: ICD-10-CM

## 2019-09-05 DIAGNOSIS — M17.12 PRIMARY OSTEOARTHRITIS OF LEFT KNEE: ICD-10-CM

## 2019-09-05 DIAGNOSIS — G89.18 ACUTE POSTOPERATIVE PAIN OF LEFT KNEE: Primary | ICD-10-CM

## 2019-09-05 DIAGNOSIS — M25.562 ACUTE POSTOPERATIVE PAIN OF LEFT KNEE: Primary | ICD-10-CM

## 2019-09-05 PROCEDURE — 97530 THERAPEUTIC ACTIVITIES: CPT | Performed by: PHYSICAL THERAPIST

## 2019-09-05 PROCEDURE — 97016 VASOPNEUMATIC DEVICE THERAPY: CPT | Performed by: PHYSICAL THERAPIST

## 2019-09-05 PROCEDURE — 97110 THERAPEUTIC EXERCISES: CPT | Performed by: PHYSICAL THERAPIST

## 2019-09-05 PROCEDURE — 97140 MANUAL THERAPY 1/> REGIONS: CPT | Performed by: PHYSICAL THERAPIST

## 2019-09-05 PROCEDURE — G8427 DOCREV CUR MEDS BY ELIG CLIN: HCPCS | Performed by: PHYSICAL THERAPIST

## 2019-09-05 PROCEDURE — 97112 NEUROMUSCULAR REEDUCATION: CPT | Performed by: PHYSICAL THERAPIST

## 2019-09-05 NOTE — FLOWSHEET NOTE
to surgery   Homans (-)         RESTRICTIONS/PRECAUTIONS: none for the LE (has significant medical history), some balance issues at times    Exercises/Interventions:   Exercise/Equipment Resistance/Repetitions Other comments   Cardio/Warm-up     Bike     Treadmill          Stretching     Hamstring 5x30\" Supine HS TP stretch   Hip Flexion     ITB     Grion     Quad     Inclined Calf     Towel Pull 5x30\"         ROM     Passive     Active     Weight Shift     Weight Hangs     Sheet Pulls 10\"x10    Ankle Pumps           Patellar Glides     Medial     Superior     Inferior          STRENGTH     SLR     Supine 3x10 Needs to rest at time   Prone     Abduction     Adducton     SLR+     SAQ x30 2#    Isometrics     Quad sets 5\"x15    BS Hip ADD 5\"x15    CKC     Calf raises x30    Wall sits     Step ups     1 leg stand     Squatting     CC TKE     Balance Biodex LOS L10 small spread x4         PRE     Extension LAQ x30 2# RANGE:   Flexion Standing KF 0# x30 RANGE:   Leg Press  RANGE:        Cable Column               Manual/Modalities Gait with straight cane in the clinic     STM/scar mobilization/patellar mobs x8'               Therapeutic Exercise and NMR EXR  [x] (70210) Provided verbal/tactile cueing for activities related to strengthening, flexibility, endurance, ROM for improvements in LE, proximal hip, and core control with self care, mobility, lifting, ambulation. [x] (19799) Provided verbal/tactile cueing for activities related to improving balance, coordination, kinesthetic sense, posture, motor skill, proprioception  to assist with LE, proximal hip, and core control in self care, mobility, lifting, ambulation and eccentric single leg control.      NMR and Therapeutic Activities:    [x] (30492 or 13182) Provided verbal/tactile cueing for activities related to improving balance, coordination, kinesthetic sense, posture, motor skill, proprioception and motor activation to allow for proper function of core, proximal hip and LE with self care and ADLs  [] (70934) Gait Re-education- Provided training and instruction to the patient for proper LE, core and proximal hip recruitment and positioning and eccentric body weight control with ambulation re-education including up and down stairs     Home Exercise Program:    [x] (41429) Reviewed/Progressed HEP activities related to strengthening, flexibility, endurance, ROM of core, proximal hip and LE for functional self-care, mobility, lifting and ambulation/stair navigation   [] (07600)Reviewed/Progressed HEP activities related to improving balance, coordination, kinesthetic sense, posture, motor skill, proprioception of core, proximal hip and LE for self care, mobility, lifting, and ambulation/stair navigation      Manual Treatments:  PROM / STM / Oscillations-Mobs:  G-I, II, III, IV (PA's, Inf., Post.)  [x] (20096) Provided manual therapy to mobilize LE, proximal hip and/or LS spine soft tissue/joints for the purpose of modulating pain, promoting relaxation,  increasing ROM, reducing/eliminating soft tissue swelling/inflammation/restriction, improving soft tissue extensibility and allowing for proper ROM for normal function with self care, mobility, lifting and ambulation. Modalities:  Vaso elevated medium pressure x15'    Charges:  Timed Code Treatment Minutes: 50   Total Treatment Minutes: 65     [] EVAL (LOW) 71111 (typically 20 minutes face-to-face)  [] EVAL (MOD) 27966 (typically 30 minutes face-to-face)  [] EVAL (HIGH) 02000 (typically 45 minutes face-to-face)  [] RE-EVAL   [x] ZC(56077) x  1   [] IONTO  [x] NMR (93855) x  1   [x] VASO x15'  [x] Manual (80667) x  1    [] Other:  [x] TA (73173) x  1    [] Mech Traction (99471)  [] ES(attended) (80157)      [] ES (un) (65429):       GOALS:  Patient stated goal: restore full PLOF    Therapist goals for Patient:   Short Term Goals: To be achieved in: 2 weeks  1.  Independent in HEP and progression per patient tolerance, in order to prevent re-injury. 2. Patient will have a decrease in pain to facilitate improvement in movement, function, and ADLs as indicated by Functional Deficits. Long Term Goals: To be achieved in: 4-8 weeks  1. Disability index score of 15% or less for the KOS ADLs to assist with reaching prior level of function. 2. Patient will demonstrate increased AROM to 0- >120 to allow for proper joint functioning as indicated by patients Functional Deficits. 3. Patient will demonstrate an increase in Strength to good proximal hip strength and control to allow for proper functional mobility as indicated by patients Functional Deficits. 4. Patient will return to prior functional activities without increased symptoms or restriction. 5. Normal gait with just use of cane at times due to balance issues. Able to alternate stairs with the use of a rail. Progression Towards Functional goals:  [] Patient is progressing as expected towards functional goals listed. [] Progression is slowed due to complexities listed. [] Progression has been slowed due to co-morbidities. [x] Plan just implemented, too soon to assess goals progression  [] Other:     ASSESSMENT:   We switched her hamstring stretch to a towel pull on her back vs seated HS stretch. She stated her back felt better with this change. Still fatigues with SLR, tends to have an extensor lag throughout. Also did more STM to her incision area today. Has more tightness and tenderness along the inferior third of her scar. She states her knee really felt better after the STM to her scar.          Treatment/Activity Tolerance:  [x] Patient tolerated treatment well [] Patient limited by fatique  [] Patient limited by pain  [] Patient limited by other medical complications  [] Other:     Prognosis: [x] Good [] Fair  [] Poor    Patient Requires Follow-up: [x] Yes  [] No    PLAN: See ben.   Cont 2x/week for for 4-8 weeks TKA protocol to restore ROM, strength,

## 2019-09-10 ENCOUNTER — OFFICE VISIT (OUTPATIENT)
Dept: ORTHOPEDIC SURGERY | Age: 69
End: 2019-09-10

## 2019-09-10 ENCOUNTER — TREATMENT (OUTPATIENT)
Dept: PHYSICAL THERAPY | Age: 69
End: 2019-09-10
Payer: MEDICARE

## 2019-09-10 DIAGNOSIS — G89.18 ACUTE POSTOPERATIVE PAIN OF LEFT KNEE: Primary | ICD-10-CM

## 2019-09-10 DIAGNOSIS — M25.562 ACUTE POSTOPERATIVE PAIN OF LEFT KNEE: Primary | ICD-10-CM

## 2019-09-10 DIAGNOSIS — M17.12 PRIMARY OSTEOARTHRITIS OF LEFT KNEE: ICD-10-CM

## 2019-09-10 DIAGNOSIS — M25.662 DECREASED RANGE OF MOTION (ROM) OF LEFT KNEE: ICD-10-CM

## 2019-09-10 DIAGNOSIS — Z96.652 S/P TOTAL KNEE ARTHROPLASTY, LEFT: Primary | ICD-10-CM

## 2019-09-10 PROCEDURE — 99024 POSTOP FOLLOW-UP VISIT: CPT | Performed by: ORTHOPAEDIC SURGERY

## 2019-09-10 PROCEDURE — 97112 NEUROMUSCULAR REEDUCATION: CPT | Performed by: PHYSICAL THERAPIST

## 2019-09-10 PROCEDURE — G8427 DOCREV CUR MEDS BY ELIG CLIN: HCPCS | Performed by: PHYSICAL THERAPIST

## 2019-09-10 PROCEDURE — 97016 VASOPNEUMATIC DEVICE THERAPY: CPT | Performed by: PHYSICAL THERAPIST

## 2019-09-10 PROCEDURE — 97110 THERAPEUTIC EXERCISES: CPT | Performed by: PHYSICAL THERAPIST

## 2019-09-10 PROCEDURE — 97140 MANUAL THERAPY 1/> REGIONS: CPT | Performed by: PHYSICAL THERAPIST

## 2019-09-10 RX ORDER — GABAPENTIN 100 MG/1
200 CAPSULE ORAL 3 TIMES DAILY
Qty: 180 CAPSULE | Refills: 1 | Status: SHIPPED | OUTPATIENT
Start: 2019-09-10 | End: 2021-06-24

## 2019-09-10 NOTE — PROGRESS NOTES
Chief Complaint  Follow-up (left knee. s/p 3 weeks tkr. pt c/o burning and sharp medial pain )      History of Present Illness:  Kristin Lugo is a pleasant 81 y.o. female 3 weeks status post left total knee arthroplasty on 8/14/2019. She has been struggling with postoperative pain. She complains of burning tingling to the medial aspect of her incision with numbness to the lateral aspect of her incision. She is using Materna scar cream on the anterior aspect of her knee. She is taking 100 mg of gabapentin twice daily. Denies fevers chills redness or discharge. She is ambulating well and does not have pain with walking her pain is at rest.  Taking Norco and Celebrex for pain control. Medical History:  Patient's medications, allergies, past medical, surgical, social and family histories were reviewed and updated as appropriate. Pain Assessment  Location of Pain: Knee  Location Modifiers: Left  Severity of Pain: 6  Quality of Pain: (burning )  Duration of Pain: Persistent  Frequency of Pain: Constant  Aggravating Factors: Walking(any type of movement / touching )  Limiting Behavior: Yes  Relieving Factors: Rest  Result of Injury: No  Work-Related Injury: No  Are there other pain locations you wish to document?: No  ROS: Review of systems reviewed from Patient History Form completed today and available in the patient's chart under the Media tab. Pertinent items are noted in HPI  Review of systems reviewed from Patient History Form completed today and available in the patient's chart under the Media tab. Vital Signs: There were no vitals taken for this visit. Neuro: Alert & oriented x 3,  normal,  no focal deficits noted. Normal affect. Eyes: sclera clear  Ears: Normal external ear  Mouth:  No perioral lesions  Pulm: Respirations unlabored and regular  Pulse: Extremities well perfused. 2+ peripheral pulses. Skin: Warm. No ulcerations.       Constitutional: The physical examination finds the patient to be well-developed and well-nourished. The patient is alert and oriented x3 and was cooperative throughout the visit. Left knee exam    Gait: Of a cane, antalgic    Alignment: normal alignment. Inspection/skin: Incisions healing well. No indication of infection. No dehiscence. No drainage. No diffuse erythema. Palpation: Tender palpation to light touch over the medial aspect of the knee and the incision    Range of Motion: 0-115    Strength: Normal quadriceps development. Effusion: Minimal effusion    Ligamentous stability: No gross instability. Neurologic and vascular:  Calf soft and nontender. Skin is warm and well-perfused. Sensation is intact to light-touch. Right knee exam    Gait: No use of assistive devices. No antalgic gait. Alignment: normal alignment. Inspection/skin: Skin is intact without erythema or ecchymosis. No gross deformity. Palpation: mild crepitus. no joint line tenderness present. Range of Motion: There is full range of motion. Strength: Normal quadriceps development. Effusion: No effusion or swelling present. Ligamentous stability: No cruciate or collateral ligament instability. Neurologic and vascular: Skin is warm and well-perfused. Sensation is intact to light-touch. 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  S/P total knee arthroplasty, left Yes       Office Procedures:  No orders of the defined types were placed in this encounter. Plan: Patient is having neuralgias surrounding her incisions. We are going to increase her gabapentin to 200 mg 3 times daily. She may titrate to response up to that level.   Were also going to give her compounding cream including gabapentin and lidocaine to place on the medial

## 2019-09-10 NOTE — FLOWSHEET NOTE
patient tolerance, in order to prevent re-injury. 2. Patient will have a decrease in pain to facilitate improvement in movement, function, and ADLs as indicated by Functional Deficits. Long Term Goals: To be achieved in: 4-8 weeks  1. Disability index score of 15% or less for the KOS ADLs to assist with reaching prior level of function. 2. Patient will demonstrate increased AROM to 0- >120 to allow for proper joint functioning as indicated by patients Functional Deficits. 3. Patient will demonstrate an increase in Strength to good proximal hip strength and control to allow for proper functional mobility as indicated by patients Functional Deficits. 4. Patient will return to prior functional activities without increased symptoms or restriction. 5. Normal gait with just use of cane at times due to balance issues. Able to alternate stairs with the use of a rail. Progression Towards Functional goals:  [] Patient is progressing as expected towards functional goals listed. [] Progression is slowed due to complexities listed. [] Progression has been slowed due to co-morbidities. [x] Plan just implemented, too soon to assess goals progression  [] Other:     ASSESSMENT:   She is doing well. Her ROM has increased by another 5 degrees or so. Still needs work on quad strength. She needs to rest more than 3 times in between sets. Still has some extensor lag with leg raises. Responded well to patellar mobs. She did have more \"burning\" pain complaints with pressure on scar and at the apex of the patella.          Treatment/Activity Tolerance:  [x] Patient tolerated treatment well [] Patient limited by fatique  [] Patient limited by pain  [] Patient limited by other medical complications  [] Other:     Prognosis: [x] Good [] Fair  [] Poor    Patient Requires Follow-up: [x] Yes  [] No    PLAN: See ben.   Popeye 2x/week for for 4-8 weeks TKA protocol to restore ROM, strength, and

## 2019-09-13 ENCOUNTER — TREATMENT (OUTPATIENT)
Dept: PHYSICAL THERAPY | Age: 69
End: 2019-09-13
Payer: MEDICARE

## 2019-09-13 DIAGNOSIS — M17.12 PRIMARY OSTEOARTHRITIS OF LEFT KNEE: ICD-10-CM

## 2019-09-13 DIAGNOSIS — G89.18 ACUTE POSTOPERATIVE PAIN OF LEFT KNEE: Primary | ICD-10-CM

## 2019-09-13 DIAGNOSIS — M25.562 ACUTE POSTOPERATIVE PAIN OF LEFT KNEE: Primary | ICD-10-CM

## 2019-09-13 DIAGNOSIS — M25.662 DECREASED RANGE OF MOTION (ROM) OF LEFT KNEE: ICD-10-CM

## 2019-09-13 PROCEDURE — 97112 NEUROMUSCULAR REEDUCATION: CPT | Performed by: PHYSICAL THERAPIST

## 2019-09-13 PROCEDURE — 97110 THERAPEUTIC EXERCISES: CPT | Performed by: PHYSICAL THERAPIST

## 2019-09-13 PROCEDURE — 97140 MANUAL THERAPY 1/> REGIONS: CPT | Performed by: PHYSICAL THERAPIST

## 2019-09-13 PROCEDURE — G8427 DOCREV CUR MEDS BY ELIG CLIN: HCPCS | Performed by: PHYSICAL THERAPIST

## 2019-09-13 PROCEDURE — 97016 VASOPNEUMATIC DEVICE THERAPY: CPT | Performed by: PHYSICAL THERAPIST

## 2019-09-17 ENCOUNTER — TREATMENT (OUTPATIENT)
Dept: PHYSICAL THERAPY | Age: 69
End: 2019-09-17
Payer: MEDICARE

## 2019-09-17 DIAGNOSIS — M25.562 ACUTE POSTOPERATIVE PAIN OF LEFT KNEE: Primary | ICD-10-CM

## 2019-09-17 DIAGNOSIS — F32.A DEPRESSION, UNSPECIFIED DEPRESSION TYPE: ICD-10-CM

## 2019-09-17 DIAGNOSIS — M17.12 PRIMARY OSTEOARTHRITIS OF LEFT KNEE: ICD-10-CM

## 2019-09-17 DIAGNOSIS — M25.662 DECREASED RANGE OF MOTION (ROM) OF LEFT KNEE: ICD-10-CM

## 2019-09-17 DIAGNOSIS — G89.18 ACUTE POSTOPERATIVE PAIN OF LEFT KNEE: Primary | ICD-10-CM

## 2019-09-17 PROCEDURE — 97140 MANUAL THERAPY 1/> REGIONS: CPT | Performed by: PHYSICAL THERAPIST

## 2019-09-17 PROCEDURE — 97110 THERAPEUTIC EXERCISES: CPT | Performed by: PHYSICAL THERAPIST

## 2019-09-17 PROCEDURE — G8427 DOCREV CUR MEDS BY ELIG CLIN: HCPCS | Performed by: PHYSICAL THERAPIST

## 2019-09-17 PROCEDURE — 97112 NEUROMUSCULAR REEDUCATION: CPT | Performed by: PHYSICAL THERAPIST

## 2019-09-17 PROCEDURE — 97016 VASOPNEUMATIC DEVICE THERAPY: CPT | Performed by: PHYSICAL THERAPIST

## 2019-09-17 RX ORDER — LISINOPRIL 20 MG/1
TABLET ORAL
Qty: 90 TABLET | Refills: 1 | Status: SHIPPED | OUTPATIENT
Start: 2019-09-17 | End: 2019-12-03 | Stop reason: SDUPTHER

## 2019-09-17 RX ORDER — CITALOPRAM 20 MG/1
TABLET ORAL
Qty: 90 TABLET | Refills: 1 | Status: SHIPPED | OUTPATIENT
Start: 2019-09-17 | End: 2019-12-03 | Stop reason: SDUPTHER

## 2019-09-17 NOTE — FLOWSHEET NOTE
HEP and progression per patient tolerance, in order to prevent re-injury. 2. Patient will have a decrease in pain to facilitate improvement in movement, function, and ADLs as indicated by Functional Deficits. Long Term Goals: To be achieved in: 4-8 weeks  1. Disability index score of 15% or less for the KOS ADLs to assist with reaching prior level of function. 2. Patient will demonstrate increased AROM to 0- >120 to allow for proper joint functioning as indicated by patients Functional Deficits. 3. Patient will demonstrate an increase in Strength to good proximal hip strength and control to allow for proper functional mobility as indicated by patients Functional Deficits. 4. Patient will return to prior functional activities without increased symptoms or restriction. 5. Normal gait with just use of cane at times due to balance issues. Able to alternate stairs with the use of a rail. Progression Towards Functional goals:  [] Patient is progressing as expected towards functional goals listed. [] Progression is slowed due to complexities listed. [] Progression has been slowed due to co-morbidities. [x] Plan just implemented, too soon to assess goals progression  [] Other:     ASSESSMENT:   We added in step ups today. Initially we tried level 2, but she didn't feel comfortable with it. She did better with Level 1 leading with the right LE. Maintaining good ROM and felt a good stretch with swiss ball rolls today. Advised her to try 3# at home for SAQ, LAQ, and KF. Treatment/Activity Tolerance:  [x] Patient tolerated treatment well [] Patient limited by fatique  [] Patient limited by pain  [] Patient limited by other medical complications  [] Other:     Prognosis: [x] Good [] Fair  [] Poor    Patient Requires Follow-up: [x] Yes  [] No    PLAN: See ben.   Cont 2x/week for for 4-8 weeks TKA protocol to restore ROM, strength, and function. Needs POC update to Dr Kimberly Clarke next visit.   [x]

## 2019-09-17 NOTE — TELEPHONE ENCOUNTER
Requested Prescriptions     Pending Prescriptions Disp Refills    lisinopril (PRINIVIL;ZESTRIL) 20 MG tablet [Pharmacy Med Name: LISINOPRIL 20 MG TAB 20 TAB] 90 tablet 1     Sig: TAKE ONE TABLET EVERY DAY         Last ov:6/12/19    Next ov:12/18/19    Last fill:7/11/19    Last labs:8/2/19

## 2019-09-19 ENCOUNTER — TREATMENT (OUTPATIENT)
Dept: PHYSICAL THERAPY | Age: 69
End: 2019-09-19
Payer: MEDICARE

## 2019-09-19 DIAGNOSIS — M25.562 ACUTE POSTOPERATIVE PAIN OF LEFT KNEE: Primary | ICD-10-CM

## 2019-09-19 DIAGNOSIS — M25.662 DECREASED RANGE OF MOTION (ROM) OF LEFT KNEE: ICD-10-CM

## 2019-09-19 DIAGNOSIS — M17.12 PRIMARY OSTEOARTHRITIS OF LEFT KNEE: ICD-10-CM

## 2019-09-19 DIAGNOSIS — G89.18 ACUTE POSTOPERATIVE PAIN OF LEFT KNEE: Primary | ICD-10-CM

## 2019-09-19 PROCEDURE — 97110 THERAPEUTIC EXERCISES: CPT | Performed by: PHYSICAL THERAPIST

## 2019-09-19 PROCEDURE — G8427 DOCREV CUR MEDS BY ELIG CLIN: HCPCS | Performed by: PHYSICAL THERAPIST

## 2019-09-19 PROCEDURE — 97016 VASOPNEUMATIC DEVICE THERAPY: CPT | Performed by: PHYSICAL THERAPIST

## 2019-09-19 PROCEDURE — 97112 NEUROMUSCULAR REEDUCATION: CPT | Performed by: PHYSICAL THERAPIST

## 2019-09-19 PROCEDURE — 97140 MANUAL THERAPY 1/> REGIONS: CPT | Performed by: PHYSICAL THERAPIST

## 2019-09-25 ENCOUNTER — OFFICE VISIT (OUTPATIENT)
Dept: ORTHOPEDIC SURGERY | Age: 69
End: 2019-09-25

## 2019-09-25 VITALS
BODY MASS INDEX: 37.05 KG/M2 | WEIGHT: 217 LBS | SYSTOLIC BLOOD PRESSURE: 132 MMHG | HEIGHT: 64 IN | DIASTOLIC BLOOD PRESSURE: 70 MMHG | HEART RATE: 70 BPM

## 2019-09-25 DIAGNOSIS — Z96.652 S/P TOTAL KNEE ARTHROPLASTY, LEFT: Primary | ICD-10-CM

## 2019-09-25 PROCEDURE — 99024 POSTOP FOLLOW-UP VISIT: CPT | Performed by: ORTHOPAEDIC SURGERY

## 2019-09-25 RX ORDER — TRAMADOL HYDROCHLORIDE 50 MG/1
50 TABLET ORAL EVERY 6 HOURS PRN
Qty: 28 TABLET | Refills: 0 | Status: SHIPPED | OUTPATIENT
Start: 2019-09-25 | End: 2019-10-02

## 2019-09-25 NOTE — PROGRESS NOTES
assistive devices. No antalgic gait. Alignment: normal alignment. Inspection/skin: Incision well healed. No indication of infection. No dehiscence. No drainage. No diffuse erythema. Palpation: Non tender to light touch    Range of Motion: 0-120    Strength: Normal quadriceps development. Effusion: Minimal effusion    Ligamentous stability: No gross instability. Neurologic and vascular:  Calf soft and nontender. Skin is warm and well-perfused. Sensation is intact to light-touch. RIGHT knee exam    Gait: No use of assistive devices. No antalgic gait. Alignment: normal alignment. Inspection/skin: Skin is intact without erythema or ecchymosis. No gross deformity. Palpation: mild crepitus. no joint line tenderness present. Range of Motion: There is full range of motion. Strength: Normal quadriceps development. Effusion: No effusion or swelling present. Ligamentous stability: No cruciate or collateral ligament instability. Neurologic and vascular: Skin is warm and well-perfused. Sensation is intact to light-touch. Special tests: Calf soft and nontender      Radiology:       Pertinent imaging reviewed, images only - no report available. Assessment :  70yo female 6 weeks status post left total knee arthroplasty on 8/14/2019. Impression:  Encounter Diagnosis   Name Primary?  S/P total knee arthroplasty, left Yes       Office Procedures:  No orders of the defined types were placed in this encounter. Plan: Continue physical therapy/home exercises. Will provide Tramadol #28 to be used as needed for pain - precautions reviewed. Followup in 6 weeks or sooner if needed. Ravi Marie is in agreement with this plan. All questions were answered to patient's satisfaction and was encouraged to call with any further questions. Patient is post operative.  There will be increasing pain and discomfort that will not be relieved without a higher short term dosage of

## 2019-09-27 ENCOUNTER — TREATMENT (OUTPATIENT)
Dept: PHYSICAL THERAPY | Age: 69
End: 2019-09-27
Payer: MEDICARE

## 2019-09-27 DIAGNOSIS — G89.18 ACUTE POSTOPERATIVE PAIN OF LEFT KNEE: Primary | ICD-10-CM

## 2019-09-27 DIAGNOSIS — M25.662 DECREASED RANGE OF MOTION (ROM) OF LEFT KNEE: ICD-10-CM

## 2019-09-27 DIAGNOSIS — M17.12 PRIMARY OSTEOARTHRITIS OF LEFT KNEE: ICD-10-CM

## 2019-09-27 DIAGNOSIS — M25.562 ACUTE POSTOPERATIVE PAIN OF LEFT KNEE: Primary | ICD-10-CM

## 2019-09-27 PROCEDURE — 97112 NEUROMUSCULAR REEDUCATION: CPT | Performed by: PHYSICAL THERAPIST

## 2019-09-27 PROCEDURE — G8427 DOCREV CUR MEDS BY ELIG CLIN: HCPCS | Performed by: PHYSICAL THERAPIST

## 2019-09-27 PROCEDURE — 97140 MANUAL THERAPY 1/> REGIONS: CPT | Performed by: PHYSICAL THERAPIST

## 2019-09-27 PROCEDURE — 97016 VASOPNEUMATIC DEVICE THERAPY: CPT | Performed by: PHYSICAL THERAPIST

## 2019-09-27 PROCEDURE — 97110 THERAPEUTIC EXERCISES: CPT | Performed by: PHYSICAL THERAPIST

## 2019-09-27 RX ORDER — DICLOFENAC SODIUM 75 MG/1
75 TABLET, DELAYED RELEASE ORAL
COMMUNITY
Start: 2015-10-31 | End: 2019-09-27 | Stop reason: SDUPTHER

## 2019-09-27 RX ORDER — DICLOFENAC SODIUM 75 MG/1
75 TABLET, DELAYED RELEASE ORAL 2 TIMES DAILY
Qty: 60 TABLET | Refills: 1 | Status: SHIPPED | OUTPATIENT
Start: 2019-09-27 | End: 2019-12-03 | Stop reason: SDUPTHER

## 2019-09-27 NOTE — FLOWSHEET NOTE
[facility]   Phone: [phone]    Fax: [phone]                                                     Physical Therapy Daily Treatment Note  Date:  2019    Patient Name:  Dena Jackson    :  1950  MRN: [id number]  Medical/Treatment Diagnosis Information:  · Diagnosis: S/p Left TKA    DOS 9/05/10     Insurance/Certification information:  PT Insurance Information: MEDICARE  Physician Information:  Referring Practitioner: Dr Yuki Felix of care signed (Y/N):     Date of Patient follow up with Physician: 19    Date Applied:  19    KOS ADLs 71%  (29% functional limitation)    PQRS: 19  Reviewed medication list with patient. No changes since last PT visit. Progress Note: []  Yes  []  No  Next due by: Visit #10       Latex Allergy:  [x]NO      []YES  Allergy to tape/adhesives  Preferred Language for Healthcare:   [x]English       []other:    Visit # Insurance Allowable   13 MEDICARE   She had 5 visits earlier this year for her left shoulder    Pain level:  0-6/10     SUBJECTIVE:   6+ weeks post op  Patient saw Dr Josefa Melo earlier this week. She was sick on Monday and Tuesday. She does state her knee has really felt good this week. She has been going out a little more with friends to eat or run some simple errands and feeling more comfortable walking outdoors.         OBJECTIVE:                   ROM PROM AROM Overpressure Comment     L R L R L R 19   Flexion 121     128     Sheet pulls   Extension -3     0            Girth  19 L R   Mid Patella 49.5 cm 48.5 cm   Suprapatellar       5cm above       15cm above             Strength L R Comment: majority deferred secondary to surgery   Quad         Hamstring         Abduction         Quad tone FAIR GOOD 19         Special Test Results/Comment: majority deferred secondary to surgery   Homans (-)         RESTRICTIONS/PRECAUTIONS: none for the LE (has significant medical prevent re-injury. -MET  2. Patient will have a decrease in pain to facilitate improvement in movement, function, and ADLs as indicated by Functional Deficits. -PROGRESSING    Long Term Goals: To be achieved in: 4-8 weeks  1. Disability index score of 15% or less for the KOS ADLs to assist with reaching prior level of function. -PROGRESSING  2. Patient will demonstrate increased AROM to 0- >120 to allow for proper joint functioning as indicated by patients Functional Deficits. -MET  (increase knee flexion to 125 as able)  3. Patient will demonstrate an increase in Strength to good proximal hip strength and control to allow for proper functional mobility as indicated by patients Functional Deficits. -PROGRESSING  4. Patient will return to prior functional activities without increased symptoms or restriction. -PROGRESSING  5. Normal gait with just use of cane at times due to balance issues. Able to alternate stairs with the use of a rail. -PROGRESSING  GOALS UPDATED 9/19/19      Progression Towards Functional goals:  [] Patient is progressing as expected towards functional goals listed. [] Progression is slowed due to complexities listed. [] Progression has been slowed due to co-morbidities. [x] Plan just implemented, too soon to assess goals progression  [] Other:     ASSESSMENT:   She was asking about returning to the gym. And we discussed starting with just some easy walking on the treadmill and recumbent biking, about 10 minutes at a time to start. We tried to do the bike here today, pulled the seat back but it was still too difficult for her to make full revolutions. We well try again next week or 2. She is maintaining her knee ROM around 120 degrees. Much easier time with SLRs.       Treatment/Activity Tolerance:  [x] Patient tolerated treatment well [] Patient limited by fatique  [] Patient limited by pain  [] Patient limited by other medical complications  [] Other:     Prognosis: [x] Good []

## 2019-09-30 RX ORDER — LISINOPRIL 10 MG/1
TABLET ORAL
Qty: 90 TABLET | Refills: 3 | Status: SHIPPED | OUTPATIENT
Start: 2019-09-30 | End: 2019-12-03 | Stop reason: SDUPTHER

## 2019-10-01 ENCOUNTER — TREATMENT (OUTPATIENT)
Dept: PHYSICAL THERAPY | Age: 69
End: 2019-10-01
Payer: MEDICARE

## 2019-10-01 DIAGNOSIS — M17.12 PRIMARY OSTEOARTHRITIS OF LEFT KNEE: ICD-10-CM

## 2019-10-01 DIAGNOSIS — M25.662 DECREASED RANGE OF MOTION (ROM) OF LEFT KNEE: ICD-10-CM

## 2019-10-01 DIAGNOSIS — G89.18 ACUTE POSTOPERATIVE PAIN OF LEFT KNEE: Primary | ICD-10-CM

## 2019-10-01 DIAGNOSIS — M25.562 ACUTE POSTOPERATIVE PAIN OF LEFT KNEE: Primary | ICD-10-CM

## 2019-10-01 PROCEDURE — 97112 NEUROMUSCULAR REEDUCATION: CPT | Performed by: PHYSICAL THERAPIST

## 2019-10-01 PROCEDURE — G8427 DOCREV CUR MEDS BY ELIG CLIN: HCPCS | Performed by: PHYSICAL THERAPIST

## 2019-10-01 PROCEDURE — 97016 VASOPNEUMATIC DEVICE THERAPY: CPT | Performed by: PHYSICAL THERAPIST

## 2019-10-01 PROCEDURE — 97140 MANUAL THERAPY 1/> REGIONS: CPT | Performed by: PHYSICAL THERAPIST

## 2019-10-01 PROCEDURE — 97110 THERAPEUTIC EXERCISES: CPT | Performed by: PHYSICAL THERAPIST

## 2019-10-03 ENCOUNTER — TREATMENT (OUTPATIENT)
Dept: PHYSICAL THERAPY | Age: 69
End: 2019-10-03
Payer: MEDICARE

## 2019-10-03 DIAGNOSIS — M25.662 DECREASED RANGE OF MOTION (ROM) OF LEFT KNEE: ICD-10-CM

## 2019-10-03 DIAGNOSIS — M17.12 PRIMARY OSTEOARTHRITIS OF LEFT KNEE: ICD-10-CM

## 2019-10-03 DIAGNOSIS — G89.18 ACUTE POSTOPERATIVE PAIN OF LEFT KNEE: Primary | ICD-10-CM

## 2019-10-03 DIAGNOSIS — M25.562 ACUTE POSTOPERATIVE PAIN OF LEFT KNEE: Primary | ICD-10-CM

## 2019-10-03 PROCEDURE — G8427 DOCREV CUR MEDS BY ELIG CLIN: HCPCS | Performed by: PHYSICAL THERAPIST

## 2019-10-03 PROCEDURE — 97110 THERAPEUTIC EXERCISES: CPT | Performed by: PHYSICAL THERAPIST

## 2019-10-03 PROCEDURE — 97140 MANUAL THERAPY 1/> REGIONS: CPT | Performed by: PHYSICAL THERAPIST

## 2019-10-03 PROCEDURE — 97016 VASOPNEUMATIC DEVICE THERAPY: CPT | Performed by: PHYSICAL THERAPIST

## 2019-10-03 PROCEDURE — 97112 NEUROMUSCULAR REEDUCATION: CPT | Performed by: PHYSICAL THERAPIST

## 2019-10-08 ENCOUNTER — TREATMENT (OUTPATIENT)
Dept: PHYSICAL THERAPY | Age: 69
End: 2019-10-08
Payer: MEDICARE

## 2019-10-08 DIAGNOSIS — M25.662 DECREASED RANGE OF MOTION (ROM) OF LEFT KNEE: ICD-10-CM

## 2019-10-08 DIAGNOSIS — G89.18 ACUTE POSTOPERATIVE PAIN OF LEFT KNEE: Primary | ICD-10-CM

## 2019-10-08 DIAGNOSIS — M17.12 PRIMARY OSTEOARTHRITIS OF LEFT KNEE: ICD-10-CM

## 2019-10-08 DIAGNOSIS — M25.562 ACUTE POSTOPERATIVE PAIN OF LEFT KNEE: Primary | ICD-10-CM

## 2019-10-08 PROCEDURE — 97140 MANUAL THERAPY 1/> REGIONS: CPT | Performed by: PHYSICAL THERAPIST

## 2019-10-08 PROCEDURE — 97016 VASOPNEUMATIC DEVICE THERAPY: CPT | Performed by: PHYSICAL THERAPIST

## 2019-10-08 PROCEDURE — 97110 THERAPEUTIC EXERCISES: CPT | Performed by: PHYSICAL THERAPIST

## 2019-10-08 PROCEDURE — G8427 DOCREV CUR MEDS BY ELIG CLIN: HCPCS | Performed by: PHYSICAL THERAPIST

## 2019-10-08 PROCEDURE — 97112 NEUROMUSCULAR REEDUCATION: CPT | Performed by: PHYSICAL THERAPIST

## 2019-10-10 ENCOUNTER — TREATMENT (OUTPATIENT)
Dept: PHYSICAL THERAPY | Age: 69
End: 2019-10-10
Payer: MEDICARE

## 2019-10-10 ENCOUNTER — NURSE ONLY (OUTPATIENT)
Dept: INTERNAL MEDICINE CLINIC | Age: 69
End: 2019-10-10
Payer: MEDICARE

## 2019-10-10 DIAGNOSIS — M25.662 DECREASED RANGE OF MOTION (ROM) OF LEFT KNEE: ICD-10-CM

## 2019-10-10 DIAGNOSIS — Z23 NEEDS FLU SHOT: Primary | ICD-10-CM

## 2019-10-10 DIAGNOSIS — M17.12 PRIMARY OSTEOARTHRITIS OF LEFT KNEE: ICD-10-CM

## 2019-10-10 DIAGNOSIS — M25.562 ACUTE POSTOPERATIVE PAIN OF LEFT KNEE: Primary | ICD-10-CM

## 2019-10-10 DIAGNOSIS — G89.18 ACUTE POSTOPERATIVE PAIN OF LEFT KNEE: Primary | ICD-10-CM

## 2019-10-10 PROCEDURE — 90653 IIV ADJUVANT VACCINE IM: CPT | Performed by: INTERNAL MEDICINE

## 2019-10-10 PROCEDURE — 97110 THERAPEUTIC EXERCISES: CPT | Performed by: PHYSICAL THERAPIST

## 2019-10-10 PROCEDURE — 97112 NEUROMUSCULAR REEDUCATION: CPT | Performed by: PHYSICAL THERAPIST

## 2019-10-10 PROCEDURE — 97016 VASOPNEUMATIC DEVICE THERAPY: CPT | Performed by: PHYSICAL THERAPIST

## 2019-10-10 PROCEDURE — G0008 ADMIN INFLUENZA VIRUS VAC: HCPCS | Performed by: INTERNAL MEDICINE

## 2019-10-10 PROCEDURE — 97140 MANUAL THERAPY 1/> REGIONS: CPT | Performed by: PHYSICAL THERAPIST

## 2019-10-22 ENCOUNTER — TREATMENT (OUTPATIENT)
Dept: PHYSICAL THERAPY | Age: 69
End: 2019-10-22
Payer: MEDICARE

## 2019-10-22 DIAGNOSIS — M25.562 ACUTE POSTOPERATIVE PAIN OF LEFT KNEE: Primary | ICD-10-CM

## 2019-10-22 DIAGNOSIS — G89.18 ACUTE POSTOPERATIVE PAIN OF LEFT KNEE: Primary | ICD-10-CM

## 2019-10-22 DIAGNOSIS — M17.12 PRIMARY OSTEOARTHRITIS OF LEFT KNEE: ICD-10-CM

## 2019-10-22 DIAGNOSIS — M25.662 DECREASED RANGE OF MOTION (ROM) OF LEFT KNEE: ICD-10-CM

## 2019-10-22 PROCEDURE — 97110 THERAPEUTIC EXERCISES: CPT | Performed by: PHYSICAL THERAPIST

## 2019-10-22 PROCEDURE — 97016 VASOPNEUMATIC DEVICE THERAPY: CPT | Performed by: PHYSICAL THERAPIST

## 2019-10-22 PROCEDURE — 97112 NEUROMUSCULAR REEDUCATION: CPT | Performed by: PHYSICAL THERAPIST

## 2019-10-22 PROCEDURE — 97140 MANUAL THERAPY 1/> REGIONS: CPT | Performed by: PHYSICAL THERAPIST

## 2019-10-22 PROCEDURE — G8427 DOCREV CUR MEDS BY ELIG CLIN: HCPCS | Performed by: PHYSICAL THERAPIST

## 2019-10-24 ENCOUNTER — TREATMENT (OUTPATIENT)
Dept: PHYSICAL THERAPY | Age: 69
End: 2019-10-24
Payer: MEDICARE

## 2019-10-24 ENCOUNTER — OFFICE VISIT (OUTPATIENT)
Dept: ORTHOPEDIC SURGERY | Age: 69
End: 2019-10-24
Payer: MEDICARE

## 2019-10-24 VITALS
SYSTOLIC BLOOD PRESSURE: 183 MMHG | HEART RATE: 70 BPM | DIASTOLIC BLOOD PRESSURE: 91 MMHG | WEIGHT: 217 LBS | BODY MASS INDEX: 37.05 KG/M2 | HEIGHT: 64 IN

## 2019-10-24 DIAGNOSIS — M75.82 ROTATOR CUFF TENDINITIS, LEFT: Primary | ICD-10-CM

## 2019-10-24 DIAGNOSIS — M75.122 COMPLETE TEAR OF LEFT ROTATOR CUFF, UNSPECIFIED WHETHER TRAUMATIC: ICD-10-CM

## 2019-10-24 DIAGNOSIS — G89.18 ACUTE POSTOPERATIVE PAIN OF LEFT KNEE: Primary | ICD-10-CM

## 2019-10-24 DIAGNOSIS — M25.662 DECREASED RANGE OF MOTION (ROM) OF LEFT KNEE: ICD-10-CM

## 2019-10-24 DIAGNOSIS — M25.562 ACUTE POSTOPERATIVE PAIN OF LEFT KNEE: Primary | ICD-10-CM

## 2019-10-24 DIAGNOSIS — M17.12 PRIMARY OSTEOARTHRITIS OF LEFT KNEE: ICD-10-CM

## 2019-10-24 PROCEDURE — 1090F PRES/ABSN URINE INCON ASSESS: CPT | Performed by: ORTHOPAEDIC SURGERY

## 2019-10-24 PROCEDURE — G8598 ASA/ANTIPLAT THER USED: HCPCS | Performed by: ORTHOPAEDIC SURGERY

## 2019-10-24 PROCEDURE — 4040F PNEUMOC VAC/ADMIN/RCVD: CPT | Performed by: ORTHOPAEDIC SURGERY

## 2019-10-24 PROCEDURE — G8482 FLU IMMUNIZE ORDER/ADMIN: HCPCS | Performed by: ORTHOPAEDIC SURGERY

## 2019-10-24 PROCEDURE — 97112 NEUROMUSCULAR REEDUCATION: CPT | Performed by: PHYSICAL THERAPIST

## 2019-10-24 PROCEDURE — 97140 MANUAL THERAPY 1/> REGIONS: CPT | Performed by: PHYSICAL THERAPIST

## 2019-10-24 PROCEDURE — 97110 THERAPEUTIC EXERCISES: CPT | Performed by: PHYSICAL THERAPIST

## 2019-10-24 PROCEDURE — G8417 CALC BMI ABV UP PARAM F/U: HCPCS | Performed by: ORTHOPAEDIC SURGERY

## 2019-10-24 PROCEDURE — G8399 PT W/DXA RESULTS DOCUMENT: HCPCS | Performed by: ORTHOPAEDIC SURGERY

## 2019-10-24 PROCEDURE — G8427 DOCREV CUR MEDS BY ELIG CLIN: HCPCS | Performed by: ORTHOPAEDIC SURGERY

## 2019-10-24 PROCEDURE — 97016 VASOPNEUMATIC DEVICE THERAPY: CPT | Performed by: PHYSICAL THERAPIST

## 2019-10-24 PROCEDURE — 99213 OFFICE O/P EST LOW 20 MIN: CPT | Performed by: ORTHOPAEDIC SURGERY

## 2019-10-24 PROCEDURE — 3017F COLORECTAL CA SCREEN DOC REV: CPT | Performed by: ORTHOPAEDIC SURGERY

## 2019-10-24 PROCEDURE — 1123F ACP DISCUSS/DSCN MKR DOCD: CPT | Performed by: ORTHOPAEDIC SURGERY

## 2019-10-24 PROCEDURE — 1036F TOBACCO NON-USER: CPT | Performed by: ORTHOPAEDIC SURGERY

## 2019-10-24 PROCEDURE — G8427 DOCREV CUR MEDS BY ELIG CLIN: HCPCS | Performed by: PHYSICAL THERAPIST

## 2019-10-29 ENCOUNTER — TREATMENT (OUTPATIENT)
Dept: PHYSICAL THERAPY | Age: 69
End: 2019-10-29
Payer: MEDICARE

## 2019-10-29 DIAGNOSIS — M17.12 PRIMARY OSTEOARTHRITIS OF LEFT KNEE: ICD-10-CM

## 2019-10-29 DIAGNOSIS — M25.662 DECREASED RANGE OF MOTION (ROM) OF LEFT KNEE: ICD-10-CM

## 2019-10-29 DIAGNOSIS — G89.18 ACUTE POSTOPERATIVE PAIN OF LEFT KNEE: Primary | ICD-10-CM

## 2019-10-29 DIAGNOSIS — M25.562 ACUTE POSTOPERATIVE PAIN OF LEFT KNEE: Primary | ICD-10-CM

## 2019-10-29 PROCEDURE — 97016 VASOPNEUMATIC DEVICE THERAPY: CPT | Performed by: PHYSICAL THERAPIST

## 2019-10-29 PROCEDURE — 97110 THERAPEUTIC EXERCISES: CPT | Performed by: PHYSICAL THERAPIST

## 2019-10-29 PROCEDURE — 97112 NEUROMUSCULAR REEDUCATION: CPT | Performed by: PHYSICAL THERAPIST

## 2019-10-29 PROCEDURE — G8427 DOCREV CUR MEDS BY ELIG CLIN: HCPCS | Performed by: PHYSICAL THERAPIST

## 2019-10-29 PROCEDURE — 97140 MANUAL THERAPY 1/> REGIONS: CPT | Performed by: PHYSICAL THERAPIST

## 2019-10-31 ENCOUNTER — TREATMENT (OUTPATIENT)
Dept: PHYSICAL THERAPY | Age: 69
End: 2019-10-31
Payer: MEDICARE

## 2019-10-31 DIAGNOSIS — M25.562 ACUTE POSTOPERATIVE PAIN OF LEFT KNEE: Primary | ICD-10-CM

## 2019-10-31 DIAGNOSIS — G89.18 ACUTE POSTOPERATIVE PAIN OF LEFT KNEE: Primary | ICD-10-CM

## 2019-10-31 DIAGNOSIS — M25.662 DECREASED RANGE OF MOTION (ROM) OF LEFT KNEE: ICD-10-CM

## 2019-10-31 DIAGNOSIS — M17.12 PRIMARY OSTEOARTHRITIS OF LEFT KNEE: ICD-10-CM

## 2019-10-31 PROCEDURE — 97110 THERAPEUTIC EXERCISES: CPT | Performed by: PHYSICAL THERAPIST

## 2019-10-31 PROCEDURE — G8427 DOCREV CUR MEDS BY ELIG CLIN: HCPCS | Performed by: PHYSICAL THERAPIST

## 2019-10-31 PROCEDURE — 97140 MANUAL THERAPY 1/> REGIONS: CPT | Performed by: PHYSICAL THERAPIST

## 2019-10-31 PROCEDURE — 97016 VASOPNEUMATIC DEVICE THERAPY: CPT | Performed by: PHYSICAL THERAPIST

## 2019-10-31 PROCEDURE — 97112 NEUROMUSCULAR REEDUCATION: CPT | Performed by: PHYSICAL THERAPIST

## 2019-11-05 ENCOUNTER — TREATMENT (OUTPATIENT)
Dept: PHYSICAL THERAPY | Age: 69
End: 2019-11-05
Payer: MEDICARE

## 2019-11-05 DIAGNOSIS — G89.18 ACUTE POSTOPERATIVE PAIN OF LEFT KNEE: Primary | ICD-10-CM

## 2019-11-05 DIAGNOSIS — M17.12 PRIMARY OSTEOARTHRITIS OF LEFT KNEE: ICD-10-CM

## 2019-11-05 DIAGNOSIS — M25.562 ACUTE POSTOPERATIVE PAIN OF LEFT KNEE: Primary | ICD-10-CM

## 2019-11-05 PROCEDURE — 97140 MANUAL THERAPY 1/> REGIONS: CPT | Performed by: PHYSICAL THERAPIST

## 2019-11-05 PROCEDURE — 97112 NEUROMUSCULAR REEDUCATION: CPT | Performed by: PHYSICAL THERAPIST

## 2019-11-05 PROCEDURE — 97110 THERAPEUTIC EXERCISES: CPT | Performed by: PHYSICAL THERAPIST

## 2019-11-05 PROCEDURE — 97016 VASOPNEUMATIC DEVICE THERAPY: CPT | Performed by: PHYSICAL THERAPIST

## 2019-11-05 PROCEDURE — G8427 DOCREV CUR MEDS BY ELIG CLIN: HCPCS | Performed by: PHYSICAL THERAPIST

## 2019-11-07 ENCOUNTER — TREATMENT (OUTPATIENT)
Dept: PHYSICAL THERAPY | Age: 69
End: 2019-11-07
Payer: MEDICARE

## 2019-11-07 DIAGNOSIS — M25.662 DECREASED RANGE OF MOTION (ROM) OF LEFT KNEE: ICD-10-CM

## 2019-11-07 DIAGNOSIS — G89.18 ACUTE POSTOPERATIVE PAIN OF LEFT KNEE: Primary | ICD-10-CM

## 2019-11-07 DIAGNOSIS — M17.12 PRIMARY OSTEOARTHRITIS OF LEFT KNEE: ICD-10-CM

## 2019-11-07 DIAGNOSIS — M25.562 ACUTE POSTOPERATIVE PAIN OF LEFT KNEE: Primary | ICD-10-CM

## 2019-11-07 PROCEDURE — G8427 DOCREV CUR MEDS BY ELIG CLIN: HCPCS | Performed by: PHYSICAL THERAPIST

## 2019-11-07 PROCEDURE — 97016 VASOPNEUMATIC DEVICE THERAPY: CPT | Performed by: PHYSICAL THERAPIST

## 2019-11-07 PROCEDURE — 97112 NEUROMUSCULAR REEDUCATION: CPT | Performed by: PHYSICAL THERAPIST

## 2019-11-07 PROCEDURE — 97140 MANUAL THERAPY 1/> REGIONS: CPT | Performed by: PHYSICAL THERAPIST

## 2019-11-07 PROCEDURE — 97110 THERAPEUTIC EXERCISES: CPT | Performed by: PHYSICAL THERAPIST

## 2019-11-11 ENCOUNTER — OFFICE VISIT (OUTPATIENT)
Dept: ORTHOPEDIC SURGERY | Age: 69
End: 2019-11-11

## 2019-11-11 VITALS
HEIGHT: 64 IN | DIASTOLIC BLOOD PRESSURE: 89 MMHG | SYSTOLIC BLOOD PRESSURE: 139 MMHG | HEART RATE: 89 BPM | BODY MASS INDEX: 37.05 KG/M2 | WEIGHT: 217 LBS

## 2019-11-11 DIAGNOSIS — Z96.652 S/P TOTAL KNEE ARTHROPLASTY, LEFT: Primary | ICD-10-CM

## 2019-11-11 PROCEDURE — 99024 POSTOP FOLLOW-UP VISIT: CPT | Performed by: ORTHOPAEDIC SURGERY

## 2019-11-14 ENCOUNTER — TREATMENT (OUTPATIENT)
Dept: PHYSICAL THERAPY | Age: 69
End: 2019-11-14
Payer: MEDICARE

## 2019-11-14 DIAGNOSIS — M25.562 ACUTE POSTOPERATIVE PAIN OF LEFT KNEE: Primary | ICD-10-CM

## 2019-11-14 DIAGNOSIS — M25.662 DECREASED RANGE OF MOTION (ROM) OF LEFT KNEE: ICD-10-CM

## 2019-11-14 DIAGNOSIS — M17.12 PRIMARY OSTEOARTHRITIS OF LEFT KNEE: ICD-10-CM

## 2019-11-14 DIAGNOSIS — G89.18 ACUTE POSTOPERATIVE PAIN OF LEFT KNEE: Primary | ICD-10-CM

## 2019-11-14 PROCEDURE — 97112 NEUROMUSCULAR REEDUCATION: CPT | Performed by: PHYSICAL THERAPIST

## 2019-11-14 PROCEDURE — 97140 MANUAL THERAPY 1/> REGIONS: CPT | Performed by: PHYSICAL THERAPIST

## 2019-11-14 PROCEDURE — G8427 DOCREV CUR MEDS BY ELIG CLIN: HCPCS | Performed by: PHYSICAL THERAPIST

## 2019-11-14 PROCEDURE — 97110 THERAPEUTIC EXERCISES: CPT | Performed by: PHYSICAL THERAPIST

## 2019-11-14 PROCEDURE — 97016 VASOPNEUMATIC DEVICE THERAPY: CPT | Performed by: PHYSICAL THERAPIST

## 2019-11-21 ENCOUNTER — TREATMENT (OUTPATIENT)
Dept: PHYSICAL THERAPY | Age: 69
End: 2019-11-21
Payer: MEDICARE

## 2019-11-21 DIAGNOSIS — M25.562 ACUTE POSTOPERATIVE PAIN OF LEFT KNEE: Primary | ICD-10-CM

## 2019-11-21 DIAGNOSIS — G89.18 ACUTE POSTOPERATIVE PAIN OF LEFT KNEE: Primary | ICD-10-CM

## 2019-11-21 DIAGNOSIS — M25.662 DECREASED RANGE OF MOTION (ROM) OF LEFT KNEE: ICD-10-CM

## 2019-11-21 DIAGNOSIS — M17.12 PRIMARY OSTEOARTHRITIS OF LEFT KNEE: ICD-10-CM

## 2019-11-21 PROCEDURE — 97140 MANUAL THERAPY 1/> REGIONS: CPT | Performed by: PHYSICAL THERAPIST

## 2019-11-21 PROCEDURE — 97016 VASOPNEUMATIC DEVICE THERAPY: CPT | Performed by: PHYSICAL THERAPIST

## 2019-11-21 PROCEDURE — 97110 THERAPEUTIC EXERCISES: CPT | Performed by: PHYSICAL THERAPIST

## 2019-11-21 PROCEDURE — 97112 NEUROMUSCULAR REEDUCATION: CPT | Performed by: PHYSICAL THERAPIST

## 2019-11-21 PROCEDURE — G8427 DOCREV CUR MEDS BY ELIG CLIN: HCPCS | Performed by: PHYSICAL THERAPIST

## 2019-11-26 ENCOUNTER — TREATMENT (OUTPATIENT)
Dept: PHYSICAL THERAPY | Age: 69
End: 2019-11-26
Payer: MEDICARE

## 2019-11-26 DIAGNOSIS — M25.562 ACUTE POSTOPERATIVE PAIN OF LEFT KNEE: Primary | ICD-10-CM

## 2019-11-26 DIAGNOSIS — M25.662 DECREASED RANGE OF MOTION (ROM) OF LEFT KNEE: ICD-10-CM

## 2019-11-26 DIAGNOSIS — M17.12 PRIMARY OSTEOARTHRITIS OF LEFT KNEE: ICD-10-CM

## 2019-11-26 DIAGNOSIS — G89.18 ACUTE POSTOPERATIVE PAIN OF LEFT KNEE: Primary | ICD-10-CM

## 2019-11-26 PROCEDURE — 97110 THERAPEUTIC EXERCISES: CPT | Performed by: PHYSICAL THERAPIST

## 2019-11-26 PROCEDURE — 97140 MANUAL THERAPY 1/> REGIONS: CPT | Performed by: PHYSICAL THERAPIST

## 2019-11-26 PROCEDURE — 97016 VASOPNEUMATIC DEVICE THERAPY: CPT | Performed by: PHYSICAL THERAPIST

## 2019-11-26 PROCEDURE — G8427 DOCREV CUR MEDS BY ELIG CLIN: HCPCS | Performed by: PHYSICAL THERAPIST

## 2019-11-26 PROCEDURE — 97112 NEUROMUSCULAR REEDUCATION: CPT | Performed by: PHYSICAL THERAPIST

## 2019-12-03 ENCOUNTER — PATIENT MESSAGE (OUTPATIENT)
Dept: INTERNAL MEDICINE CLINIC | Age: 69
End: 2019-12-03

## 2019-12-03 DIAGNOSIS — E78.2 MIXED HYPERLIPIDEMIA: ICD-10-CM

## 2019-12-03 DIAGNOSIS — F32.A DEPRESSION, UNSPECIFIED DEPRESSION TYPE: ICD-10-CM

## 2019-12-04 RX ORDER — CITALOPRAM 20 MG/1
TABLET ORAL
Qty: 90 TABLET | Refills: 0 | Status: SHIPPED | OUTPATIENT
Start: 2019-12-04

## 2019-12-04 RX ORDER — LISINOPRIL 10 MG/1
TABLET ORAL
Qty: 90 TABLET | Refills: 0 | Status: SHIPPED | OUTPATIENT
Start: 2019-12-04 | End: 2019-12-19

## 2019-12-04 RX ORDER — PRAVASTATIN SODIUM 80 MG/1
TABLET ORAL
Qty: 90 TABLET | Refills: 0 | Status: SHIPPED | OUTPATIENT
Start: 2019-12-04 | End: 2020-08-27 | Stop reason: ALTCHOICE

## 2019-12-04 RX ORDER — LISINOPRIL 20 MG/1
TABLET ORAL
Qty: 90 TABLET | Refills: 0 | Status: SHIPPED | OUTPATIENT
Start: 2019-12-04 | End: 2019-12-19 | Stop reason: SDUPTHER

## 2019-12-04 RX ORDER — DICLOFENAC SODIUM 75 MG/1
75 TABLET, DELAYED RELEASE ORAL 2 TIMES DAILY
Qty: 180 TABLET | Refills: 0 | Status: SHIPPED | OUTPATIENT
Start: 2019-12-04

## 2019-12-18 ASSESSMENT — ENCOUNTER SYMPTOMS
BACK PAIN: 0
COLOR CHANGE: 0
FACIAL SWELLING: 0
VOMITING: 0
SHORTNESS OF BREATH: 0
BLOOD IN STOOL: 0
WHEEZING: 0
CHEST TIGHTNESS: 0
COUGH: 0
ABDOMINAL DISTENTION: 0
ABDOMINAL PAIN: 0
EYE DISCHARGE: 0

## 2019-12-19 ENCOUNTER — OFFICE VISIT (OUTPATIENT)
Dept: CARDIOLOGY CLINIC | Age: 69
End: 2019-12-19
Payer: MEDICARE

## 2019-12-19 VITALS
OXYGEN SATURATION: 96 % | BODY MASS INDEX: 38.72 KG/M2 | WEIGHT: 226.8 LBS | DIASTOLIC BLOOD PRESSURE: 90 MMHG | HEIGHT: 64 IN | SYSTOLIC BLOOD PRESSURE: 164 MMHG | HEART RATE: 80 BPM

## 2019-12-19 DIAGNOSIS — E78.2 MIXED HYPERLIPIDEMIA: ICD-10-CM

## 2019-12-19 DIAGNOSIS — I10 ESSENTIAL HYPERTENSION: Primary | ICD-10-CM

## 2019-12-19 PROCEDURE — G8427 DOCREV CUR MEDS BY ELIG CLIN: HCPCS | Performed by: INTERNAL MEDICINE

## 2019-12-19 PROCEDURE — G8399 PT W/DXA RESULTS DOCUMENT: HCPCS | Performed by: INTERNAL MEDICINE

## 2019-12-19 PROCEDURE — 1123F ACP DISCUSS/DSCN MKR DOCD: CPT | Performed by: INTERNAL MEDICINE

## 2019-12-19 PROCEDURE — 4040F PNEUMOC VAC/ADMIN/RCVD: CPT | Performed by: INTERNAL MEDICINE

## 2019-12-19 PROCEDURE — G8598 ASA/ANTIPLAT THER USED: HCPCS | Performed by: INTERNAL MEDICINE

## 2019-12-19 PROCEDURE — 99213 OFFICE O/P EST LOW 20 MIN: CPT | Performed by: INTERNAL MEDICINE

## 2019-12-19 PROCEDURE — G8482 FLU IMMUNIZE ORDER/ADMIN: HCPCS | Performed by: INTERNAL MEDICINE

## 2019-12-19 PROCEDURE — G8417 CALC BMI ABV UP PARAM F/U: HCPCS | Performed by: INTERNAL MEDICINE

## 2019-12-19 PROCEDURE — 1090F PRES/ABSN URINE INCON ASSESS: CPT | Performed by: INTERNAL MEDICINE

## 2019-12-19 PROCEDURE — 3017F COLORECTAL CA SCREEN DOC REV: CPT | Performed by: INTERNAL MEDICINE

## 2019-12-19 PROCEDURE — 1036F TOBACCO NON-USER: CPT | Performed by: INTERNAL MEDICINE

## 2019-12-19 RX ORDER — LISINOPRIL 20 MG/1
20 TABLET ORAL 2 TIMES DAILY
Qty: 90 TABLET | Refills: 3 | Status: SHIPPED | OUTPATIENT
Start: 2019-12-19 | End: 2020-04-13 | Stop reason: SDUPTHER

## 2020-02-03 ENCOUNTER — CARE COORDINATION (OUTPATIENT)
Dept: CARE COORDINATION | Age: 70
End: 2020-02-03

## 2020-02-10 ENCOUNTER — OFFICE VISIT (OUTPATIENT)
Dept: ORTHOPEDIC SURGERY | Age: 70
End: 2020-02-10
Payer: MEDICARE

## 2020-02-10 VITALS — DIASTOLIC BLOOD PRESSURE: 80 MMHG | SYSTOLIC BLOOD PRESSURE: 154 MMHG | HEART RATE: 68 BPM

## 2020-02-10 PROCEDURE — 1123F ACP DISCUSS/DSCN MKR DOCD: CPT | Performed by: ORTHOPAEDIC SURGERY

## 2020-02-10 PROCEDURE — 1036F TOBACCO NON-USER: CPT | Performed by: ORTHOPAEDIC SURGERY

## 2020-02-10 PROCEDURE — G8417 CALC BMI ABV UP PARAM F/U: HCPCS | Performed by: ORTHOPAEDIC SURGERY

## 2020-02-10 PROCEDURE — 1090F PRES/ABSN URINE INCON ASSESS: CPT | Performed by: ORTHOPAEDIC SURGERY

## 2020-02-10 PROCEDURE — G9899 SCRN MAM PERF RSLTS DOC: HCPCS | Performed by: ORTHOPAEDIC SURGERY

## 2020-02-10 PROCEDURE — 3017F COLORECTAL CA SCREEN DOC REV: CPT | Performed by: ORTHOPAEDIC SURGERY

## 2020-02-10 PROCEDURE — G8427 DOCREV CUR MEDS BY ELIG CLIN: HCPCS | Performed by: ORTHOPAEDIC SURGERY

## 2020-02-10 PROCEDURE — G8399 PT W/DXA RESULTS DOCUMENT: HCPCS | Performed by: ORTHOPAEDIC SURGERY

## 2020-02-10 PROCEDURE — 4040F PNEUMOC VAC/ADMIN/RCVD: CPT | Performed by: ORTHOPAEDIC SURGERY

## 2020-02-10 PROCEDURE — 99213 OFFICE O/P EST LOW 20 MIN: CPT | Performed by: ORTHOPAEDIC SURGERY

## 2020-02-10 PROCEDURE — G8482 FLU IMMUNIZE ORDER/ADMIN: HCPCS | Performed by: ORTHOPAEDIC SURGERY

## 2020-02-10 NOTE — PROGRESS NOTES
Chief Complaint  Follow-up (left knee. s/p 5 months tkr. doing well )      History of Present Illness:  Jami Ca is a pleasant 81 y.o. female who presents today for follow up evaluation of left knee pain. She is 6 month status post left total knee arthroplasty on 8/14/2019. She has been compliant with her home exercises. She states she is doing well. No issues or concerns. She is using a cane to ambulate. No new injuries reported. Medical History:  Patient's medications, allergies, past medical, surgical, social and family histories were reviewed and updated as appropriate. Pertinent items are noted in HPI  Review of systems reviewed from Patient History Form completed today and available in the patient's chart under the Media tab. Pain Assessment  Location of Pain: Knee  Location Modifiers: Left  Severity of Pain: 0  Quality of Pain: (NONE)  Duration of Pain: (NONE)  Frequency of Pain: (NONE)  Aggravating Factors: (NONE)  Limiting Behavior: No  Relieving Factors: (NONE)  Result of Injury: No  Work-Related Injury: No  Are there other pain locations you wish to document?: No    Past Medical History:   Diagnosis Date    Arthritis     Cerebral artery occlusion with cerebral infarction (HCC)     TIA X 4    Depression     GERD (gastroesophageal reflux disease)     HTN (hypertension)     Hyperlipidemia     Overweight     Raynaud's disease     Spinal stenosis     Use of cane as ambulatory aid         Past Surgical History:   Procedure Laterality Date    APPENDECTOMY  07/17/2016    BACK SURGERY  x3 total     BLADDER REPAIR      CARDIAC CATHETERIZATION  2018    FINGER TRIGGER RELEASE  4/13; 10/13; 4/14    Multiple fingers    HAMMER TOE SURGERY      Right.     HERNIA REPAIR      ABDOMINAL INCISIONAL HERNIA REPAIR    HIP SURGERY      total hip replacement     HYSTERECTOMY  02/1991    JOINT REPLACEMENT  01/2018    right THR    KNEE SURGERY      LUMBAR FUSION  12/13    L4-L5;L5-S1    LUMBAR dictation was performed with a verbal recognition program (DRAGON) and it was checked for errors. It is possible that there are still dictated errors within this office note. If so, please bring any errors to my attention for an addendum. All efforts were made to ensure that this office note is accurate.

## 2020-02-18 ENCOUNTER — CARE COORDINATION (OUTPATIENT)
Dept: CARE COORDINATION | Age: 70
End: 2020-02-18

## 2020-02-18 NOTE — CARE COORDINATION
Spoke with patient  She has established with a [facility] PCP since DR Lara's custodial. No needs, she will inquire re CM at [facility] office.

## 2020-02-26 ENCOUNTER — OFFICE VISIT (OUTPATIENT)
Dept: CARDIOLOGY CLINIC | Age: 70
End: 2020-02-26
Payer: MEDICARE

## 2020-02-26 VITALS
DIASTOLIC BLOOD PRESSURE: 88 MMHG | HEART RATE: 75 BPM | BODY MASS INDEX: 38.28 KG/M2 | WEIGHT: 223 LBS | SYSTOLIC BLOOD PRESSURE: 140 MMHG

## 2020-02-26 PROCEDURE — 1123F ACP DISCUSS/DSCN MKR DOCD: CPT | Performed by: INTERNAL MEDICINE

## 2020-02-26 PROCEDURE — 3017F COLORECTAL CA SCREEN DOC REV: CPT | Performed by: INTERNAL MEDICINE

## 2020-02-26 PROCEDURE — G8399 PT W/DXA RESULTS DOCUMENT: HCPCS | Performed by: INTERNAL MEDICINE

## 2020-02-26 PROCEDURE — G9899 SCRN MAM PERF RSLTS DOC: HCPCS | Performed by: INTERNAL MEDICINE

## 2020-02-26 PROCEDURE — G8417 CALC BMI ABV UP PARAM F/U: HCPCS | Performed by: INTERNAL MEDICINE

## 2020-02-26 PROCEDURE — 99213 OFFICE O/P EST LOW 20 MIN: CPT | Performed by: INTERNAL MEDICINE

## 2020-02-26 PROCEDURE — 1090F PRES/ABSN URINE INCON ASSESS: CPT | Performed by: INTERNAL MEDICINE

## 2020-02-26 PROCEDURE — G8482 FLU IMMUNIZE ORDER/ADMIN: HCPCS | Performed by: INTERNAL MEDICINE

## 2020-02-26 PROCEDURE — G8427 DOCREV CUR MEDS BY ELIG CLIN: HCPCS | Performed by: INTERNAL MEDICINE

## 2020-02-26 PROCEDURE — 4040F PNEUMOC VAC/ADMIN/RCVD: CPT | Performed by: INTERNAL MEDICINE

## 2020-02-26 PROCEDURE — 1036F TOBACCO NON-USER: CPT | Performed by: INTERNAL MEDICINE

## 2020-02-26 RX ORDER — AMLODIPINE BESYLATE 5 MG/1
5 TABLET ORAL DAILY
Qty: 90 TABLET | Refills: 1 | Status: SHIPPED | OUTPATIENT
Start: 2020-02-26 | End: 2020-08-03

## 2020-02-26 RX ORDER — ROSUVASTATIN CALCIUM 20 MG/1
25 TABLET, COATED ORAL DAILY
COMMUNITY
End: 2021-08-12

## 2020-02-26 ASSESSMENT — ENCOUNTER SYMPTOMS
BLOOD IN STOOL: 0
COUGH: 0
BACK PAIN: 0
COLOR CHANGE: 0
CHEST TIGHTNESS: 0
EYE DISCHARGE: 0
FACIAL SWELLING: 0
SHORTNESS OF BREATH: 0
ABDOMINAL PAIN: 0
ABDOMINAL DISTENTION: 0
WHEEZING: 0
VOMITING: 0

## 2020-02-26 NOTE — PROGRESS NOTES
[facility]     Outpatient Cardiology         Chief Complaint   Patient presents with    Hypertension    Hyperlipidemia     HPI     Reola Gum a 81 y.o. female here for follow up for HTN/HLD. Hypertension, is not well controlled, brings log BPs elevated, taking lisinopril 20 mg twice a day. Hyperlipidemia, LDL in the 120s with Pravachol, now taking Crestor but does not like how she feels. We talked about different statins. PMH  Past Medical History:   Diagnosis Date    Arthritis     Cerebral artery occlusion with cerebral infarction ([address]     TIA X 4    Depression     GERD (gastroesophageal reflux disease)     HTN (hypertension)     Hyperlipidemia     Overweight     Raynaud's disease     Spinal stenosis     Use of cane as ambulatory aid        PSH  Past Surgical History:   Procedure Laterality Date    APPENDECTOMY  07/17/2016    BACK SURGERY  x3 total     BLADDER REPAIR      CARDIAC CATHETERIZATION  2018    FINGER TRIGGER RELEASE  4/13; 10/13; 4/14    Multiple fingers    HAMMER TOE SURGERY      Right.     HERNIA REPAIR      ABDOMINAL INCISIONAL HERNIA REPAIR    HIP SURGERY      total hip replacement     HYSTERECTOMY  02/1991    JOINT REPLACEMENT  01/2018    right THR    KNEE SURGERY      LUMBAR FUSION  12/13    L4-L5;L5-S1    LUMBAR LAMINECTOMY  11/1997 and 1981    OTHER SURGICAL HISTORY Left 07/19/2018     LEFT SHOULDER EUA, DIAGNOSTIC  ARTHROSCOPY, EXTENSIVE DEBRIDEMENT, LYSIS OF ADHESIONS, REMOVAL OF RETAINED SUTURE, BICEPS TENOTOMY, REVISION ROTATOR CUFF REPAIR (Left Shoulder)    AL SHLDR ARTHROSCOP,DIAGNOSTIC Left 7/19/2018    LEFT SHOULDER EUA, DIAGNOSTIC  ARTHROSCOPY, EXTENSIVE DEBRIDEMENT, LYSIS OF ADHESIONS, REMOVAL OF RETAINED SUTURE, BICEPS TENOTOMY, REVISION ROTATOR CUFF REPAIR performed by Brandee Real MD at [address] [facility]      Left    TOTAL KNEE ARTHROPLASTY Left 8/14/2019    LEFT TOTAL KNEE  ARTHROPLASTY performed by Rishabh Rodriguez Oneal Monroy MD at [facility] OR        Social HIstory  Social History     Tobacco Use    Smoking status: Former Smoker     Last attempt to quit: 2005     Years since quittin.6    Smokeless tobacco: Never Used   Substance Use Topics    Alcohol use: No    Drug use: No       Family History  Family History   Problem Relation Age of Onset    Alzheimer's Disease Mother     Prostate Cancer Father     Cancer Brother     Heart Attack Maternal Aunt        Allergies   Allergies   Allergen Reactions    Tape [Adhesive Lylia Alpha Other (See Comments)     blistering    Reglan [Metoclopramide] Itching and Other (See Comments)     Flushed      Oxycodone Nausea And Vomiting       Medications:     Home Medications:  Were reviewed and are listed in nursing record. and/or listed below    Prior to Admission medications    Medication Sig Start Date End Date Taking?  Authorizing Provider   rosuvastatin (CRESTOR) 20 MG tablet Take 25 mg by mouth daily   Yes Historical Provider, MD   amLODIPine (NORVASC) 5 MG tablet Take 1 tablet by mouth daily 20  Yes Melva Kaur MD   lisinopril (PRINIVIL;ZESTRIL) 20 MG tablet Take 1 tablet by mouth 2 times daily 19  Yes Melva Kaur MD   diclofenac (VOLTAREN) 75 MG EC tablet Take 1 tablet by mouth 2 times daily 19  Yes Kalpana Pinedo MD   pravastatin (PRAVACHOL) 80 MG tablet TAKE ONE TABLET EVERY DAY 19  Yes Kalpana Pinedo MD   citalopram (CELEXA) 20 MG tablet TAKE ONE TABLET EVERY DAY 19  Yes Kalpana Pinedo MD   aspirin 81 MG tablet Take 1 tablet by mouth 2 times daily 8/15/19  Yes Gloria Cayr MD   calcium carbonate (OSCAL) 500 MG TABS tablet Take 500 mg by mouth daily   Yes Historical Provider, MD   Nutritional Supplements (JUICE PLUS FIBRE PO) Take by mouth 2 times daily   Yes Historical Provider, MD   zoster recombinant adjuvanted vaccine (SHINGRIX) 50 MCG/0.5ML SUSR injection Inject 0.5 mLs into the muscle See Admin Instructions 1 dose now and Hematological: Negative for adenopathy. Does not bruise/bleed easily. Psychiatric/Behavioral: Negative for behavioral problems, confusion, decreased concentration and suicidal ideas. The patient is not nervous/anxious. Vitals:    02/26/20 1202   BP: (!) 140/88   Pulse: 75    Weight: 223 lb (101.2 kg)       Vitals:    02/26/20 1202   BP: (!) 140/88   Pulse: 75   Weight: 223 lb (101.2 kg)       BP Readings from Last 3 Encounters:   02/26/20 (!) 140/88   02/10/20 (!) 154/80   12/19/19 (!) 164/90       Wt Readings from Last 3 Encounters:   02/26/20 223 lb (101.2 kg)   12/19/19 226 lb 12.8 oz (102.9 kg)   11/11/19 217 lb (98.4 kg)       Physical Exam  Constitutional:       General: She is not in acute distress. Appearance: She is well-developed. She is not diaphoretic. HENT:      Head: Normocephalic and atraumatic. Eyes:      Pupils: Pupils are equal, round, and reactive to light. Neck:      Musculoskeletal: Normal range of motion. Thyroid: No thyromegaly. Vascular: No JVD. Cardiovascular:      Rate and Rhythm: Normal rate and regular rhythm. Chest Wall: PMI is not displaced. Heart sounds: Normal heart sounds, S1 normal and S2 normal. No murmur. No friction rub. No gallop. Pulmonary:      Effort: Pulmonary effort is normal. No respiratory distress. Breath sounds: Normal breath sounds. No stridor. No wheezing or rales. Chest:      Chest wall: No tenderness. Abdominal:      General: Bowel sounds are normal. There is no distension. Palpations: Abdomen is soft. Tenderness: There is no abdominal tenderness. There is no guarding or rebound. Musculoskeletal: Normal range of motion. General: No tenderness. Lymphadenopathy:      Cervical: No cervical adenopathy. Skin:     General: Skin is warm and dry. Findings: No erythema or rash. Neurological:      Mental Status: She is alert and oriented to person, place, and time.       Coordination: estimated ejection fraction   of 55%. Following the dobutamine infusion there was augmentation of all   segments with no areas of stress induced hypokinesis with an ejection   fraction of 65%.      Patient experienced 10 beat run V-tach during recovery that resolved. Last Cath (if available):  12/12/18  Angiographic Findings:  Right dominant system  Left Main:  Short. Normal   Left Anterior Descending:  No significant CAD   Circumflex:  No significant CAD   Right Coronary:  No significant CAD   Left Ventriculogram:  EF 60%   Hemodynamics (mm Hg):  Left Ventricular Pressure:  117/0, 2  Central Aortic Pressure:  116/60 (84)    Last TTE/COLT(if available):  11/27/18  Summary   The patient had a Dobutamine Stress Echocardiogram. Baseline echocardiogram   shows normal left ventricular function with an estimated ejection fraction   of 55%. Following the dobutamine infusion there was augmentation of all   segments with no areas of stress induced hypokinesis with an ejection   fraction of 65%. Last CMR  (if available):      Assessment / Plan:     Essential hypertension  BP elevated. Continue current dose of lisinopril. Add amlodipine 5 mg and titrate to 10 if needed. Other and unspecified hyperlipidemia  Previously on Pravachol, feels that she is having some side effects to Crestor. I think Challiyah Tereza would be a good option, she will double check with her insurance and PCP. I had the opportunity to review the clinical symptoms and presentation of [place].     Tobacco use was discussed with the patient and educated on the negative effects. I have asked the patient to not utilize these agents. All questions and concerns were addressed to the patient/family. Alternatives to my treatment were discussed. The note was completed using EMR. Every effort wasmade to ensure accuracy; however, inadvertent computerized transcription errors may be present.     Thank you for allowing me to participate in thecare or Mike Montelongo [facility] SINA Bautista MD, [facility]

## 2020-04-13 ENCOUNTER — TELEPHONE (OUTPATIENT)
Dept: CARDIOLOGY CLINIC | Age: 70
End: 2020-04-13

## 2020-04-13 RX ORDER — LISINOPRIL 20 MG/1
20 TABLET ORAL 2 TIMES DAILY
Qty: 90 TABLET | Refills: 3 | Status: SHIPPED | OUTPATIENT
Start: 2020-04-13 | End: 2020-05-13 | Stop reason: SDUPTHER

## 2020-05-13 RX ORDER — LISINOPRIL 20 MG/1
20 TABLET ORAL 2 TIMES DAILY
Qty: 180 TABLET | Refills: 3 | Status: SHIPPED | OUTPATIENT
Start: 2020-05-13 | End: 2021-05-06

## 2020-08-03 RX ORDER — AMLODIPINE BESYLATE 5 MG/1
TABLET ORAL
Qty: 90 TABLET | Refills: 3 | Status: SHIPPED | OUTPATIENT
Start: 2020-08-03 | End: 2020-08-27

## 2020-08-03 NOTE — TELEPHONE ENCOUNTER
Requested Prescriptions     Pending Prescriptions Disp Refills    amLODIPine (NORVASC) 5 MG tablet [Pharmacy Med Name: AMLODIPINE TAB 5MG] 90 tablet 3     Sig: TAKE 1 TABLET DAILY                  Last Office Visit: 2/26/2020     Next Office Visit: 8/27/2020

## 2020-08-10 ENCOUNTER — OFFICE VISIT (OUTPATIENT)
Dept: ORTHOPEDIC SURGERY | Age: 70
End: 2020-08-10
Payer: MEDICARE

## 2020-08-10 VITALS — WEIGHT: 224 LBS | HEIGHT: 63 IN | TEMPERATURE: 97.6 F | BODY MASS INDEX: 39.69 KG/M2

## 2020-08-10 PROCEDURE — 4040F PNEUMOC VAC/ADMIN/RCVD: CPT | Performed by: ORTHOPAEDIC SURGERY

## 2020-08-10 PROCEDURE — G8399 PT W/DXA RESULTS DOCUMENT: HCPCS | Performed by: ORTHOPAEDIC SURGERY

## 2020-08-10 PROCEDURE — 1090F PRES/ABSN URINE INCON ASSESS: CPT | Performed by: ORTHOPAEDIC SURGERY

## 2020-08-10 PROCEDURE — G8427 DOCREV CUR MEDS BY ELIG CLIN: HCPCS | Performed by: ORTHOPAEDIC SURGERY

## 2020-08-10 PROCEDURE — G8417 CALC BMI ABV UP PARAM F/U: HCPCS | Performed by: ORTHOPAEDIC SURGERY

## 2020-08-10 PROCEDURE — G9899 SCRN MAM PERF RSLTS DOC: HCPCS | Performed by: ORTHOPAEDIC SURGERY

## 2020-08-10 PROCEDURE — 3017F COLORECTAL CA SCREEN DOC REV: CPT | Performed by: ORTHOPAEDIC SURGERY

## 2020-08-10 PROCEDURE — 1036F TOBACCO NON-USER: CPT | Performed by: ORTHOPAEDIC SURGERY

## 2020-08-10 PROCEDURE — 1123F ACP DISCUSS/DSCN MKR DOCD: CPT | Performed by: ORTHOPAEDIC SURGERY

## 2020-08-10 PROCEDURE — 99212 OFFICE O/P EST SF 10 MIN: CPT | Performed by: ORTHOPAEDIC SURGERY

## 2020-08-10 NOTE — PROGRESS NOTES
1981    OTHER SURGICAL HISTORY Left 07/19/2018     LEFT SHOULDER EUA, DIAGNOSTIC  ARTHROSCOPY, EXTENSIVE DEBRIDEMENT, LYSIS OF ADHESIONS, REMOVAL OF RETAINED SUTURE, BICEPS TENOTOMY, REVISION ROTATOR CUFF REPAIR (Left Shoulder)    [place] SHLDR ARTHROSCOP,DIAGNOSTIC Left 7/19/2018    LEFT SHOULDER EUA, DIAGNOSTIC  ARTHROSCOPY, EXTENSIVE DEBRIDEMENT, LYSIS OF ADHESIONS, REMOVAL OF RETAINED SUTURE, BICEPS TENOTOMY, REVISION ROTATOR CUFF REPAIR performed by Jarad Berry MD at [address]      Left    TOTAL KNEE ARTHROPLASTY Left 8/14/2019    LEFT TOTAL KNEE  ARTHROPLASTY performed by Alison Max MD at [address] OR       Family History   Problem Relation Age of Onset    Alzheimer's Disease Mother     Prostate Cancer Father     Cancer Brother     Heart Attack Maternal Aunt        Social History     Socioeconomic History    Marital status:      Spouse name: None    Number of children: None    Years of education: None    Highest education level: None   Occupational History    None   Social Needs    Financial resource strain: None    Food insecurity     Worry: None     Inability: None    Transportation needs     Medical: None     Non-medical: None   Tobacco Use    Smoking status: Former Smoker     Last attempt to quit: 7/17/2005     Years since quitting: 15.0    Smokeless tobacco: Never Used   Substance and Sexual Activity    Alcohol use: No    Drug use: No    Sexual activity: None   Lifestyle    Physical activity     Days per week: None     Minutes per session: None    Stress: None   Relationships    Social connections     Talks on phone: None     Gets together: None     Attends Confucianism service: None     Active member of club or organization: None     Attends meetings of clubs or organizations: None     Relationship status: None    Intimate partner violence     Fear of current or ex partner: None     Emotionally abused: None     Physically abused: None     Forced sexual activity: None   Other Topics Concern    None   Social History Narrative    None       Current Outpatient Medications   Medication Sig Dispense Refill    amLODIPine (NORVASC) 5 MG tablet TAKE 1 TABLET DAILY 90 tablet 3    lisinopril (PRINIVIL;ZESTRIL) 20 MG tablet Take 1 tablet by mouth 2 times daily 180 tablet 3    rosuvastatin (CRESTOR) 20 MG tablet Take 25 mg by mouth daily      diclofenac (VOLTAREN) 75 MG EC tablet Take 1 tablet by mouth 2 times daily 180 tablet 0    pravastatin (PRAVACHOL) 80 MG tablet TAKE ONE TABLET EVERY DAY 90 tablet 0    citalopram (CELEXA) 20 MG tablet TAKE ONE TABLET EVERY DAY 90 tablet 0    gabapentin (NEURONTIN) 100 MG capsule Take 2 capsules by mouth 3 times daily for 180 doses. Intended supply: 90 days 180 capsule 1    gabapentin (NEURONTIN) 100 MG capsule Take 1 capsule by mouth 3 times daily for 30 days. Intended supply: 30 days 90 capsule 0    aspirin 81 MG tablet Take 1 tablet by mouth 2 times daily 60 tablet 0    calcium carbonate (OSCAL) 500 MG TABS tablet Take 500 mg by mouth daily      Nutritional Supplements (JUICE PLUS FIBRE PO) Take by mouth 2 times daily      zoster recombinant adjuvanted vaccine (SHINGRIX) 50 MCG/0.5ML SUSR injection Inject 0.5 mLs into the muscle See Admin Instructions 1 dose now and repeat in 2-6 months 1 each 0    vitamin D (CHOLECALCIFEROL) 1000 UNIT TABS tablet Take 1,000 Units by mouth daily      CHONDROITIN SULFATE A PO Take 800 tablets by mouth daily       Glucosamine Sulfate 500 MG TABS Take 2 tablets by mouth daily      Multiple Vitamins-Minerals (CENTRUM SILVER) TABS Take  by mouth.  Ascorbic Acid (VITAMIN C) 500 MG tablet Take 500 mg by mouth daily.  vitamin E 400 UNIT capsule Take 400 Units by mouth every other day        No current facility-administered medications for this visit.         Allergies   Allergen Reactions    Tape Ambrocio Flakes Tape] Other (See Comments)     blistering    Reglan [Metoclopramide] Itching imaging was obtained during today's visit. Assessment :  1 year status post left total knee replacement, doing well    Impression:  Encounter Diagnoses   Name Primary?  S/P total knee arthroplasty, left Yes    Left knee pain, unspecified chronicity        Office Procedures:  Orders Placed This Encounter   Procedures    XR KNEE RIGHT (3 VIEWS)     Standing Status:   Future     Number of Occurrences:   1     Standing Expiration Date:   8/10/2021     Order Specific Question:   Reason for exam:     Answer:   total knee         Plan: Pertinent imaging was reviewed. The etiology, natural history, and treatment options for the disorder were discussed. The roles of activity medication, antiinflammatories, injections, bracing, physical therapy, and surgical interventions were all described to the patient and questions were answered. She is doing well at this time. Continue home exercises for strengthening. I will see her back in 1 year with new x-rays, sooner if needed. Dodie Cherry is in agreement with this plan. All questions were answered to patient's satisfaction and was encouraged to call with any further questions. I, Quirino Jaimes ATC, am scribing for and in the presence of Dr. Miguel Dc. 08/11/20 9:02 AM Quirino Jaimes ATC        I personally reviewed the patient's pain scale, review of systems, family history, social history, past medical history, allergies and medications. Review of systems was collected today, reviewed and is included in the medical record. It is available under the media tab. I personally performed the services described in this documentation and scribed by Quirino Jaimes ATC. Alie Garcia MD  Sports Medicine, Arthroscopic Knee and Shoulder Surgery    This dictation was performed with a verbal recognition program [facility]) and it was checked for errors. It is possible that there are still dictated errors within this office note.   If so, please bring any errors to my attention for an addendum. All efforts were made to ensure that this office note is accurate.

## 2020-08-27 ENCOUNTER — OFFICE VISIT (OUTPATIENT)
Dept: CARDIOLOGY CLINIC | Age: 70
End: 2020-08-27
Payer: MEDICARE

## 2020-08-27 VITALS
OXYGEN SATURATION: 98 % | HEART RATE: 97 BPM | TEMPERATURE: 97.7 F | BODY MASS INDEX: 40.07 KG/M2 | WEIGHT: 226.2 LBS | DIASTOLIC BLOOD PRESSURE: 80 MMHG | SYSTOLIC BLOOD PRESSURE: 130 MMHG

## 2020-08-27 PROCEDURE — 1090F PRES/ABSN URINE INCON ASSESS: CPT | Performed by: INTERNAL MEDICINE

## 2020-08-27 PROCEDURE — 99214 OFFICE O/P EST MOD 30 MIN: CPT | Performed by: INTERNAL MEDICINE

## 2020-08-27 PROCEDURE — 1036F TOBACCO NON-USER: CPT | Performed by: INTERNAL MEDICINE

## 2020-08-27 PROCEDURE — G8427 DOCREV CUR MEDS BY ELIG CLIN: HCPCS | Performed by: INTERNAL MEDICINE

## 2020-08-27 PROCEDURE — G8417 CALC BMI ABV UP PARAM F/U: HCPCS | Performed by: INTERNAL MEDICINE

## 2020-08-27 PROCEDURE — 4040F PNEUMOC VAC/ADMIN/RCVD: CPT | Performed by: INTERNAL MEDICINE

## 2020-08-27 PROCEDURE — 3017F COLORECTAL CA SCREEN DOC REV: CPT | Performed by: INTERNAL MEDICINE

## 2020-08-27 PROCEDURE — 1123F ACP DISCUSS/DSCN MKR DOCD: CPT | Performed by: INTERNAL MEDICINE

## 2020-08-27 PROCEDURE — G8399 PT W/DXA RESULTS DOCUMENT: HCPCS | Performed by: INTERNAL MEDICINE

## 2020-08-27 PROCEDURE — G9899 SCRN MAM PERF RSLTS DOC: HCPCS | Performed by: INTERNAL MEDICINE

## 2020-08-27 RX ORDER — AMLODIPINE BESYLATE 10 MG/1
10 TABLET ORAL DAILY
Qty: 30 TABLET | Refills: 5 | Status: SHIPPED | OUTPATIENT
Start: 2020-08-27 | End: 2020-08-27 | Stop reason: SDUPTHER

## 2020-08-27 RX ORDER — AMLODIPINE BESYLATE 10 MG/1
10 TABLET ORAL DAILY
Qty: 90 TABLET | Refills: 1 | Status: SHIPPED | OUTPATIENT
Start: 2020-08-27 | End: 2020-09-21

## 2020-08-27 ASSESSMENT — ENCOUNTER SYMPTOMS
ABDOMINAL DISTENTION: 0
COUGH: 0
BACK PAIN: 0
EYE DISCHARGE: 0
CHEST TIGHTNESS: 0
SHORTNESS OF BREATH: 0
BLOOD IN STOOL: 0
WHEEZING: 0
VOMITING: 0
ABDOMINAL PAIN: 0
COLOR CHANGE: 0
FACIAL SWELLING: 0

## 2020-08-27 NOTE — ASSESSMENT & PLAN NOTE
BP is better controlled but could be better. Continue lisinopril 20 twice a day, increase amlodipine to 10.

## 2020-08-27 NOTE — PROGRESS NOTES
[facility]     Outpatient Cardiology         Chief Complaint   Patient presents with    Hypertension     HPI     Debbie Taylor a 78 y.o. female here for follow up for HTN/HLD. Hypertension, BP is slightly better but not at goal, will increase amlodipine to 10 mg. Hyperlipidemia, tolerating well Crestor, last LDL 74 (lab performed at [facility])  Overall feeling very well, happy with her blood pressure and very happy with her cholesterol. PMH  Past Medical History:   Diagnosis Date    Arthritis     Cerebral artery occlusion with cerebral infarction ([address]     TIA X 4    Depression     GERD (gastroesophageal reflux disease)     HTN (hypertension)     Hyperlipidemia     Overweight     Raynaud's disease     Spinal stenosis     Use of cane as ambulatory aid        PSH  Past Surgical History:   Procedure Laterality Date    APPENDECTOMY  07/17/2016    BACK SURGERY  x3 total     BLADDER REPAIR      CARDIAC CATHETERIZATION  2018    FINGER TRIGGER RELEASE  4/13; 10/13; 4/14    Multiple fingers    HAMMER TOE SURGERY      Right.     HERNIA REPAIR      ABDOMINAL INCISIONAL HERNIA REPAIR    HIP SURGERY      total hip replacement     HYSTERECTOMY  02/1991    JOINT REPLACEMENT  01/2018    right THR    KNEE SURGERY      LUMBAR FUSION  12/13    L4-L5;L5-S1    LUMBAR LAMINECTOMY  11/1997 and 1981    OTHER SURGICAL HISTORY Left 07/19/2018     LEFT SHOULDER EUA, DIAGNOSTIC  ARTHROSCOPY, EXTENSIVE DEBRIDEMENT, LYSIS OF ADHESIONS, REMOVAL OF RETAINED SUTURE, BICEPS TENOTOMY, REVISION ROTATOR CUFF REPAIR (Left Shoulder)    [place] SHLDR ARTHROSCOP,DIAGNOSTIC Left 7/19/2018    LEFT SHOULDER EUA, DIAGNOSTIC  ARTHROSCOPY, EXTENSIVE DEBRIDEMENT, LYSIS OF ADHESIONS, REMOVAL OF RETAINED SUTURE, BICEPS TENOTOMY, REVISION ROTATOR CUFF REPAIR performed by Ever Hernandez MD at [address] [place] [address]      Left    TOTAL KNEE ARTHROPLASTY Left 8/14/2019    LEFT TOTAL KNEE  ARTHROPLASTY performed by Sammie Olivares MD at [address] [facility]  Social History     Tobacco Use    Smoking status: Former Smoker     Last attempt to quit: 7/17/2005     Years since quitting: 15.1    Smokeless tobacco: Never Used   Substance Use Topics    Alcohol use: No    Drug use: No       Family History  Family History   Problem Relation Age of Onset    Alzheimer's Disease Mother     Prostate Cancer Father     Cancer Brother     Heart Attack Maternal Aunt        Allergies   Allergies   Allergen Reactions    Tape [Adhesive Loura Guevara Other (See Comments)     blistering    Reglan [Metoclopramide] Itching and Other (See Comments)     Flushed      Oxycodone Nausea And Vomiting       Medications:     Home Medications:  Were reviewed and are listed in nursing record. and/or listed below    Prior to Admission medications    Medication Sig Start Date End Date Taking?  Authorizing Provider   pneumococcal 13-valent conjugate (PREVNAR) SUSP inj Inject 0.5 mLs into the muscle once   Yes Historical Provider, MD   amLODIPine (NORVASC) 10 MG tablet Take 1 tablet by mouth daily 8/27/20  Yes Daniel Betts MD   lisinopril (PRINIVIL;ZESTRIL) 20 MG tablet Take 1 tablet by mouth 2 times daily 5/13/20  Yes Daniel Betts MD   rosuvastatin (CRESTOR) 20 MG tablet Take 25 mg by mouth daily   Yes Historical Provider, MD   diclofenac (VOLTAREN) 75 MG EC tablet Take 1 tablet by mouth 2 times daily 12/4/19  Yes Pito Castro MD   citalopram (CELEXA) 20 MG tablet TAKE ONE TABLET EVERY DAY 12/4/19  Yes Pito Castro MD   aspirin 81 MG tablet Take 1 tablet by mouth 2 times daily 8/15/19  Yes Milla Lowery MD   calcium carbonate (OSCAL) 500 MG TABS tablet Take 500 mg by mouth daily   Yes Historical Provider, MD   Nutritional Supplements (JUICE PLUS FIBRE PO) Take by mouth 2 times daily   Yes Historical Provider, MD   zoster recombinant adjuvanted vaccine (SHINGRIX) 50 MCG/0.5ML SUSR injection Inject 0.5 mLs into the muscle See Admin Instructions 1 dose now and repeat in 2-6 months 1/7/19  Yes Arturo Arciniega MD   vitamin D (CHOLECALCIFEROL) 1000 UNIT TABS tablet Take 1,000 Units by mouth daily   Yes Historical Provider, MD   CHONDROITIN SULFATE A PO Take 800 tablets by mouth daily    Yes Historical Provider, MD   Glucosamine Sulfate 500 MG TABS Take 2 tablets by mouth daily   Yes Historical Provider, MD   Multiple Vitamins-Minerals (CENTRUM SILVER) TABS Take  by mouth. Yes Historical Provider, MD   Ascorbic Acid (VITAMIN C) 500 MG tablet Take 500 mg by mouth daily. Yes Historical Provider, MD   vitamin E 400 UNIT capsule Take 400 Units by mouth every other day    Yes Historical Provider, MD   gabapentin (NEURONTIN) 100 MG capsule Take 2 capsules by mouth 3 times daily for 180 doses. Intended supply: 90 days 9/10/19 11/9/19  Abhijeet Baker MD   gabapentin (NEURONTIN) 100 MG capsule Take 1 capsule by mouth 3 times daily for 30 days. Intended supply: 30 days 8/23/19 9/22/19  Sophie Simpson MD        Review of Systems   Constitutional: Negative for activity change, appetite change, diaphoresis, fatigue, fever and unexpected weight change. HENT: Negative for congestion, facial swelling, mouth sores and nosebleeds. Eyes: Negative for discharge and visual disturbance. Respiratory: Negative for cough, chest tightness, shortness of breath and wheezing. Cardiovascular: Negative for chest pain, palpitations and leg swelling. Gastrointestinal: Negative for abdominal distention, abdominal pain, blood in stool and vomiting. Endocrine: Negative for cold intolerance, heat intolerance and polyuria. Genitourinary: Negative for difficulty urinating, dysuria, frequency and hematuria. Musculoskeletal: Negative for back pain, joint swelling, myalgias and neck pain. Skin: Negative for color change, pallor and rash. Allergic/Immunologic: Negative for immunocompromised state.    Neurological: Negative for dizziness, Findings: No erythema or rash. Neurological:      Mental Status: She is alert and oriented to person, place, and time. Coordination: Coordination normal.   Psychiatric:         Behavior: Behavior normal.         Thought Content: Thought content normal.         Judgment: Judgment normal.         Labs:       Lab Results   Component Value Date    WBC 7.3 08/02/2019    HGB 10.7 (L) 08/15/2019    HCT 31.8 (L) 08/15/2019    MCV 97.2 08/02/2019     08/02/2019     Lab Results   Component Value Date     08/02/2019    K 4.0 08/02/2019     08/02/2019    CO2 29 08/02/2019    BUN 10 08/02/2019    CREATININE 0.6 08/02/2019    GLUCOSE 84 08/02/2019    CALCIUM 9.5 08/02/2019    PROT 7.0 12/12/2018    LABALBU 4.1 12/12/2018    BILITOT <0.2 12/12/2018    ALKPHOS 88 12/12/2018    AST 25 12/12/2018    ALT 20 12/12/2018    LABGLOM >60 08/02/2019    GFRAA >60 08/02/2019    AGRATIO 1.4 12/12/2018    GLOB 2.9 12/12/2018         Lab Results   Component Value Date    CHOL 166 01/08/2019    CHOL 185 06/21/2016    CHOL 173 12/16/2015     Lab Results   Component Value Date    TRIG 239 (H) 01/08/2019    TRIG 133 06/21/2016    TRIG 136 12/16/2015     Lab Results   Component Value Date    HDL 51 01/08/2019    HDL 50 06/21/2016    HDL 57 12/16/2015     Lab Results   Component Value Date    LDLCALC 67 01/08/2019    LDLCALC 108 (H) 06/21/2016    LDLCALC 89 12/16/2015     Lab Results   Component Value Date    LABVLDL 48 01/08/2019    LABVLDL 27 06/21/2016    LABVLDL 27 12/16/2015     Lab Results   Component Value Date    CHOLHDLRATIO 3.4 05/25/2011       Lab Results   Component Value Date    INR 1.00 08/02/2019    INR 1.08 12/12/2018    PROTIME 11.4 08/02/2019    PROTIME 12.3 12/12/2018       The ASCVD Risk score (Micky Singletary et al., 2013) failed to calculate for the following reasons:     The patient has a prior MI or stroke diagnosis      Imaging:       Last ECG (if available):  Normal sinus rhythm    Last Stress (if patient/family. Alternatives to my treatment were discussed. The note was completed using EMR. Every effort wasmade to ensure accuracy; however, inadvertent computerized transcription errors may be present. Thank you for allowing me to participate in thecare or [address]  Daina Cisse MD, [facility] [place]

## 2020-09-21 ENCOUNTER — TELEPHONE (OUTPATIENT)
Dept: CARDIOLOGY CLINIC | Age: 70
End: 2020-09-21

## 2020-09-21 RX ORDER — AMLODIPINE BESYLATE 5 MG/1
5 TABLET ORAL 2 TIMES DAILY
Qty: 180 TABLET | Refills: 1 | Status: SHIPPED | OUTPATIENT
Start: 2020-09-21 | End: 2020-09-22 | Stop reason: ALTCHOICE

## 2020-09-21 NOTE — TELEPHONE ENCOUNTER
The patient called stating that Dr. Oneida Keene increased her amlodipine on 08/27/20 and it is causing her to have sharp stomach pains. Please call the patient back at [phone] to advise.

## 2020-09-21 NOTE — TELEPHONE ENCOUNTER
Spoke with patient, let her know stomach pain isn't typically a side effect of Norvasc. She could try cutting back to 5 mg daily and continue monitoring BP, if BP is elevated then Dr. Shilpa Hardy would add a water pill. Patient would like to try taking Norvasc 5 mg twice a day and see if that helps. If stomach pain persists may also follow up with PCP.

## 2020-09-22 RX ORDER — LABETALOL 100 MG/1
50 TABLET, FILM COATED ORAL 2 TIMES DAILY
Qty: 60 TABLET | Refills: 2 | Status: SHIPPED | OUTPATIENT
Start: 2020-09-22 | End: 2021-06-24

## 2020-09-22 NOTE — TELEPHONE ENCOUNTER
Patient called back, still having stomach pain and would like to stop taking Norvasc and try something else. Spoke with Dr. Ching Jeffrey and he was okay with stopping norvasc and try taking labetalol 50 mg BID. Patient is going to try this and continue to monitor BP and symptoms.

## 2020-09-23 RX ORDER — AMLODIPINE BESYLATE 5 MG/1
5 TABLET ORAL 2 TIMES DAILY
Qty: 90 TABLET | Refills: 1 | Status: SHIPPED | OUTPATIENT
Start: 2020-09-23 | End: 2021-03-08

## 2020-09-23 NOTE — TELEPHONE ENCOUNTER
, I spoke with Susi Lopez and she states that the stomach pain is probably cause by her past hx: lymphocytic colitis so she would like to stay with Amlolodipine 5 mg BID , will reorder pending your signature

## 2020-09-23 NOTE — TELEPHONE ENCOUNTER
Patient said ciarra will be calling today to address the new medication. But patient wants to leave everything the way it was. Patient in bed with stomach pain. Patient said Dr. Rubio Ku is right. Please leave amlodipine the way it is. at 5 mg.  #[id number]    Please call patient at [phone]

## 2021-03-04 ENCOUNTER — OFFICE VISIT (OUTPATIENT)
Dept: CARDIOLOGY CLINIC | Age: 71
End: 2021-03-04
Payer: MEDICARE

## 2021-03-04 VITALS
SYSTOLIC BLOOD PRESSURE: 110 MMHG | TEMPERATURE: 97.3 F | WEIGHT: 224.4 LBS | BODY MASS INDEX: 39.75 KG/M2 | DIASTOLIC BLOOD PRESSURE: 60 MMHG | HEART RATE: 96 BPM | OXYGEN SATURATION: 94 %

## 2021-03-04 DIAGNOSIS — E78.2 MIXED HYPERLIPIDEMIA: ICD-10-CM

## 2021-03-04 DIAGNOSIS — I10 ESSENTIAL HYPERTENSION: ICD-10-CM

## 2021-03-04 PROCEDURE — 1123F ACP DISCUSS/DSCN MKR DOCD: CPT | Performed by: INTERNAL MEDICINE

## 2021-03-04 PROCEDURE — G9899 SCRN MAM PERF RSLTS DOC: HCPCS | Performed by: INTERNAL MEDICINE

## 2021-03-04 PROCEDURE — 1090F PRES/ABSN URINE INCON ASSESS: CPT | Performed by: INTERNAL MEDICINE

## 2021-03-04 PROCEDURE — 99214 OFFICE O/P EST MOD 30 MIN: CPT | Performed by: INTERNAL MEDICINE

## 2021-03-04 PROCEDURE — G8417 CALC BMI ABV UP PARAM F/U: HCPCS | Performed by: INTERNAL MEDICINE

## 2021-03-04 PROCEDURE — 3017F COLORECTAL CA SCREEN DOC REV: CPT | Performed by: INTERNAL MEDICINE

## 2021-03-04 PROCEDURE — G8427 DOCREV CUR MEDS BY ELIG CLIN: HCPCS | Performed by: INTERNAL MEDICINE

## 2021-03-04 PROCEDURE — G8484 FLU IMMUNIZE NO ADMIN: HCPCS | Performed by: INTERNAL MEDICINE

## 2021-03-04 PROCEDURE — G8399 PT W/DXA RESULTS DOCUMENT: HCPCS | Performed by: INTERNAL MEDICINE

## 2021-03-04 PROCEDURE — 4040F PNEUMOC VAC/ADMIN/RCVD: CPT | Performed by: INTERNAL MEDICINE

## 2021-03-04 PROCEDURE — 1036F TOBACCO NON-USER: CPT | Performed by: INTERNAL MEDICINE

## 2021-03-04 ASSESSMENT — ENCOUNTER SYMPTOMS
CHEST TIGHTNESS: 0
ABDOMINAL PAIN: 0
SHORTNESS OF BREATH: 0
WHEEZING: 0
BLOOD IN STOOL: 0
ABDOMINAL DISTENTION: 0
COUGH: 0
VOMITING: 0
BACK PAIN: 0
EYE DISCHARGE: 0
FACIAL SWELLING: 0
COLOR CHANGE: 0

## 2021-03-04 NOTE — PROGRESS NOTES
CUFF REPAIR performed by Chandan Louis MD at [address] [place]      Left    TOTAL KNEE ARTHROPLASTY Left 8/14/2019    LEFT TOTAL KNEE  ARTHROPLASTY performed by Vicenta Guevara MD at [facility] OR        Social HIstory  Social History     Tobacco Use    Smoking status: Former Smoker     Quit date: 7/17/2005     Years since quitting: 15.6    Smokeless tobacco: Never Used   Substance Use Topics    Alcohol use: No    Drug use: No       Family History  Family History   Problem Relation Age of Onset    Alzheimer's Disease Mother     Prostate Cancer Father     Cancer Brother     Heart Attack Maternal Aunt        Allergies   Allergies   Allergen Reactions    Tape [Adhesive Severiano Jefferson Other (See Comments)     blistering    Reglan [Metoclopramide] Itching and Other (See Comments)     Flushed      Oxycodone Nausea And Vomiting       Medications:     Home Medications:  Were reviewed and are listed in nursing record. and/or listed below    Prior to Admission medications    Medication Sig Start Date End Date Taking?  Authorizing Provider   amLODIPine (NORVASC) 5 MG tablet Take 1 tablet by mouth 2 times daily 9/23/20  Yes Robert Dillon MD   pneumococcal 13-valent conjugate (PREVNAR) SUSP inj Inject 0.5 mLs into the muscle once   Yes Historical Provider, MD   lisinopril (PRINIVIL;ZESTRIL) 20 MG tablet Take 1 tablet by mouth 2 times daily 5/13/20  Yes Robert Dillon MD   rosuvastatin (CRESTOR) 20 MG tablet Take 25 mg by mouth daily   Yes Historical Provider, MD   diclofenac (VOLTAREN) 75 MG EC tablet Take 1 tablet by mouth 2 times daily 12/4/19  Yes Jef Rivera MD   citalopram (CELEXA) 20 MG tablet TAKE ONE TABLET EVERY DAY 12/4/19  Yes Jef Rivera MD   aspirin 81 MG tablet Take 1 tablet by mouth 2 times daily 8/15/19  Yes Paula Dougherty MD   calcium carbonate (OSCAL) 500 MG TABS tablet Take 500 mg by mouth daily   Yes Historical Provider, MD   Nutritional Supplements (JUICE PLUS FIBRE PO) Take by mouth 2 times daily   Yes Historical Provider, MD   zoster recombinant adjuvanted vaccine [facility]) 50 MCG/0.5ML SUSR injection Inject 0.5 mLs into the muscle See Admin Instructions 1 dose now and repeat in 2-6 months 1/7/19  Yes Snatos Hernandez MD   vitamin D (CHOLECALCIFEROL) 1000 UNIT TABS tablet Take 1,000 Units by mouth daily   Yes Historical Provider, MD   CHONDROITIN SULFATE A PO Take 800 tablets by mouth daily    Yes Historical Provider, MD   Glucosamine Sulfate 500 MG TABS Take 2 tablets by mouth daily   Yes Historical Provider, MD   Multiple Vitamins-Minerals (CENTRUM SILVER) TABS Take  by mouth. Yes Historical Provider, MD   Ascorbic Acid (VITAMIN C) 500 MG tablet Take 500 mg by mouth daily. Yes Historical Provider, MD   vitamin E 400 UNIT capsule Take 400 Units by mouth every other day    Yes Historical Provider, MD   labetalol (NORMODYNE) 100 MG tablet Take 0.5 tablets by mouth 2 times daily  Patient not taking: Reported on 3/4/2021 9/22/20   Rakesh Ocampo MD   gabapentin (NEURONTIN) 100 MG capsule Take 2 capsules by mouth 3 times daily for 180 doses. Intended supply: 90 days 9/10/19 11/9/19  Adonis Lau MD   gabapentin (NEURONTIN) 100 MG capsule Take 1 capsule by mouth 3 times daily for 30 days. Intended supply: 30 days 8/23/19 9/22/19  Samara Zayas MD        Review of Systems   Constitutional: Negative for activity change, appetite change, diaphoresis, fatigue, fever and unexpected weight change. HENT: Negative for congestion, facial swelling, mouth sores and nosebleeds. Eyes: Negative for discharge and visual disturbance. Respiratory: Negative for cough, chest tightness, shortness of breath and wheezing. Cardiovascular: Negative for chest pain, palpitations and leg swelling. Gastrointestinal: Negative for abdominal distention, abdominal pain, blood in stool and vomiting. Endocrine: Negative for cold intolerance, heat intolerance and polyuria.    Genitourinary: Negative for difficulty urinating, dysuria, frequency and hematuria. Musculoskeletal: Negative for back pain, joint swelling, myalgias and neck pain. Skin: Negative for color change, pallor and rash. Allergic/Immunologic: Negative for immunocompromised state. Neurological: Negative for dizziness, syncope, weakness, light-headedness, numbness and headaches. Hematological: Negative for adenopathy. Does not bruise/bleed easily. Psychiatric/Behavioral: Negative for behavioral problems, confusion, decreased concentration and suicidal ideas. The patient is not nervous/anxious. Vitals:    03/04/21 1351   BP: 110/60   Pulse: 96   Temp: 97.3 °F (36.3 °C)   SpO2: 94%    Weight: 224 lb 6.4 oz (101.8 kg)       Vitals:    03/04/21 1351   BP: 110/60   Pulse: 96   Temp: 97.3 °F (36.3 °C)   SpO2: 94%   Weight: 224 lb 6.4 oz (101.8 kg)       BP Readings from Last 3 Encounters:   03/04/21 110/60   08/27/20 130/80   02/26/20 (!) 140/88       Wt Readings from Last 3 Encounters:   03/04/21 224 lb 6.4 oz (101.8 kg)   08/27/20 226 lb 3.2 oz (102.6 kg)   08/10/20 224 lb (101.6 kg)       Physical Exam  Constitutional:       General: She is not in acute distress. Appearance: She is well-developed. She is not diaphoretic. HENT:      Head: Normocephalic and atraumatic. Eyes:      Pupils: Pupils are equal, round, and reactive to light. Neck:      Musculoskeletal: Normal range of motion. Thyroid: No thyromegaly. Vascular: No JVD. Cardiovascular:      Rate and Rhythm: Normal rate and regular rhythm. Chest Wall: PMI is not displaced. Heart sounds: Normal heart sounds, S1 normal and S2 normal. No murmur. No friction rub. No gallop. Pulmonary:      Effort: Pulmonary effort is normal. No respiratory distress. Breath sounds: Normal breath sounds. No stridor. No wheezing or rales. Chest:      Chest wall: No tenderness. Abdominal:      General: Bowel sounds are normal. There is no distension. Palpations: Abdomen is soft. Tenderness: There is no abdominal tenderness. There is no guarding or rebound. Musculoskeletal: Normal range of motion. General: No tenderness. Lymphadenopathy:      Cervical: No cervical adenopathy. Skin:     General: Skin is warm and dry. Findings: No erythema or rash. Neurological:      Mental Status: She is alert and oriented to person, place, and time. Coordination: Coordination normal.   Psychiatric:         Behavior: Behavior normal.         Thought Content:  Thought content normal.         Judgment: Judgment normal.         Labs:       Lab Results   Component Value Date    WBC 7.3 08/02/2019    HGB 10.7 (L) 08/15/2019    HCT 31.8 (L) 08/15/2019    MCV 97.2 08/02/2019     08/02/2019     Lab Results   Component Value Date     08/02/2019    K 4.0 08/02/2019     08/02/2019    CO2 29 08/02/2019    BUN 10 08/02/2019    CREATININE 0.6 08/02/2019    GLUCOSE 84 08/02/2019    CALCIUM 9.5 08/02/2019    PROT 7.0 12/12/2018    LABALBU 4.1 12/12/2018    BILITOT <0.2 12/12/2018    ALKPHOS 88 12/12/2018    AST 25 12/12/2018    ALT 20 12/12/2018    LABGLOM >60 08/02/2019    GFRAA >60 08/02/2019    AGRATIO 1.4 12/12/2018    GLOB 2.9 12/12/2018         Lab Results   Component Value Date    CHOL 166 01/08/2019    CHOL 185 06/21/2016    CHOL 173 12/16/2015     Lab Results   Component Value Date    TRIG 239 (H) 01/08/2019    TRIG 133 06/21/2016    TRIG 136 12/16/2015     Lab Results   Component Value Date    HDL 51 01/08/2019    HDL 50 06/21/2016    HDL 57 12/16/2015     Lab Results   Component Value Date    LDLCALC 67 01/08/2019    LDLCALC 108 (H) 06/21/2016    LDLCALC 89 12/16/2015     Lab Results   Component Value Date    LABVLDL 48 01/08/2019    LABVLDL 27 06/21/2016    LABVLDL 27 12/16/2015     Lab Results   Component Value Date    CHOLHDLRATIO 3.4 05/25/2011       Lab Results   Component Value Date    INR 1.00 08/02/2019    INR 1.08 12/12/2018 PROTIME 11.4 08/02/2019    PROTIME 12.3 12/12/2018       The ASCVD Risk score (Raisa Boyer et al., 2013) failed to calculate for the following reasons: The patient has a prior MI or stroke diagnosis      Imaging:       Last ECG (if available):  Normal sinus rhythm    Last Stress (if available): The patient had a Dobutamine Stress Echocardiogram. Baseline echocardiogram   shows normal left ventricular function with an estimated ejection fraction   of 55%. Following the dobutamine infusion there was augmentation of all   segments with no areas of stress induced hypokinesis with an ejection   fraction of 65%.      Patient experienced 10 beat run V-tach during recovery that resolved. Last Cath (if available):  12/12/18  Angiographic Findings:  Right dominant system  Left Main:  Short. Normal   Left Anterior Descending:  No significant CAD   Circumflex:  No significant CAD   Right Coronary:  No significant CAD   Left Ventriculogram:  EF 60%   Hemodynamics (mm Hg):  Left Ventricular Pressure:  117/0, 2  Central Aortic Pressure:  116/60 (84)    Last TTE/COLT(if available):  11/27/18  Summary   The patient had a Dobutamine Stress Echocardiogram. Baseline echocardiogram   shows normal left ventricular function with an estimated ejection fraction   of 55%. Following the dobutamine infusion there was augmentation of all   segments with no areas of stress induced hypokinesis with an ejection   fraction of 65%. Last CMR  (if available):      Assessment / Plan:     Mixed hyperlipidemia  Continue high intensity statin, no side effects    Essential hypertension  Overall well-controlled current medications. We will continue    Coronary calcification seen on CT, cath without disease, continue statin, no need for further work-up. NSVT, no recurrence, normal coronaries. Follow up in 6 months.     I had the opportunity to review the clinical symptoms and presentation of Karin Sosa.     Patient's allergies and medications were reviewed and updated. Patient's past medical, surgical, social and family history were reviewed and updated. Patient's testing including laboratory, ECGs, monitor, imaging (TTE,COLT,CMR,cath) were reviewed. Tobacco use was discussed with the patient and educated on the negative effects. I have asked the patient to not utilize these agents. All questions and concerns were addressed to the patient/family. Alternatives to my treatment were discussed. The note was completed using EMR. Every effort wasmade to ensure accuracy; however, inadvertent computerized transcription errors may be present. Thank you for allowing me to participate in thecare or [address]  Willam Forrester MD, [facility] [place]

## 2021-03-08 RX ORDER — AMLODIPINE BESYLATE 5 MG/1
TABLET ORAL
Qty: 180 TABLET | Refills: 3 | Status: SHIPPED | OUTPATIENT
Start: 2021-03-08 | End: 2021-09-28 | Stop reason: SDUPTHER

## 2021-03-08 NOTE — TELEPHONE ENCOUNTER
Requested Prescriptions     Pending Prescriptions Disp Refills    amLODIPine (NORVASC) 5 MG tablet [Pharmacy Med Name: AMLODIPINE TAB 5MG] 180 tablet 1     Sig: TAKE 1 TABLET TWICE A DAY          Number: 180    Refills: 3    Last Office Visit: 3/4/2021     Next Office Visit: 9.30.2021     Last Labs: 8.2.2019

## 2021-05-06 RX ORDER — LISINOPRIL 20 MG/1
TABLET ORAL
Qty: 180 TABLET | Refills: 3 | Status: SHIPPED | OUTPATIENT
Start: 2021-05-06 | End: 2021-09-28 | Stop reason: SDUPTHER

## 2021-05-06 NOTE — TELEPHONE ENCOUNTER
Requested Prescriptions     Pending Prescriptions Disp Refills    lisinopril (PRINIVIL;ZESTRIL) 20 MG tablet [Pharmacy Med Name: LISINOPRIL TAB 20MG] 180 tablet 3     Sig: TAKE 1 TABLET TWICE A DAY                Last Office Visit: 3/4/2021     Next Office Visit: 9/30/2021

## 2021-06-24 ENCOUNTER — OFFICE VISIT (OUTPATIENT)
Dept: ORTHOPEDIC SURGERY | Age: 71
End: 2021-06-24
Payer: MEDICARE

## 2021-06-24 VITALS — HEIGHT: 63 IN | WEIGHT: 224 LBS | BODY MASS INDEX: 39.69 KG/M2

## 2021-06-24 DIAGNOSIS — M75.121 NONTRAUMATIC COMPLETE TEAR OF RIGHT ROTATOR CUFF: Primary | ICD-10-CM

## 2021-06-24 DIAGNOSIS — M25.511 ACUTE PAIN OF RIGHT SHOULDER: ICD-10-CM

## 2021-06-24 DIAGNOSIS — M25.511 CHRONIC RIGHT SHOULDER PAIN: ICD-10-CM

## 2021-06-24 DIAGNOSIS — G89.29 CHRONIC RIGHT SHOULDER PAIN: ICD-10-CM

## 2021-06-24 PROCEDURE — 20610 DRAIN/INJ JOINT/BURSA W/O US: CPT | Performed by: ORTHOPAEDIC SURGERY

## 2021-06-24 PROCEDURE — 1123F ACP DISCUSS/DSCN MKR DOCD: CPT | Performed by: ORTHOPAEDIC SURGERY

## 2021-06-24 PROCEDURE — 3017F COLORECTAL CA SCREEN DOC REV: CPT | Performed by: ORTHOPAEDIC SURGERY

## 2021-06-24 PROCEDURE — 1036F TOBACCO NON-USER: CPT | Performed by: ORTHOPAEDIC SURGERY

## 2021-06-24 PROCEDURE — 1090F PRES/ABSN URINE INCON ASSESS: CPT | Performed by: ORTHOPAEDIC SURGERY

## 2021-06-24 PROCEDURE — 4040F PNEUMOC VAC/ADMIN/RCVD: CPT | Performed by: ORTHOPAEDIC SURGERY

## 2021-06-24 PROCEDURE — G8417 CALC BMI ABV UP PARAM F/U: HCPCS | Performed by: ORTHOPAEDIC SURGERY

## 2021-06-24 PROCEDURE — 99214 OFFICE O/P EST MOD 30 MIN: CPT | Performed by: ORTHOPAEDIC SURGERY

## 2021-06-24 PROCEDURE — G9899 SCRN MAM PERF RSLTS DOC: HCPCS | Performed by: ORTHOPAEDIC SURGERY

## 2021-06-24 PROCEDURE — G8399 PT W/DXA RESULTS DOCUMENT: HCPCS | Performed by: ORTHOPAEDIC SURGERY

## 2021-06-24 PROCEDURE — G8427 DOCREV CUR MEDS BY ELIG CLIN: HCPCS | Performed by: ORTHOPAEDIC SURGERY

## 2021-06-24 RX ORDER — ROSUVASTATIN CALCIUM 5 MG/1
TABLET, COATED ORAL
COMMUNITY
Start: 2021-04-13

## 2021-06-24 RX ORDER — BUDESONIDE 3 MG/1
CAPSULE, COATED PELLETS ORAL
COMMUNITY
Start: 2020-10-01 | End: 2021-06-24

## 2021-06-24 RX ORDER — TURMERIC ROOT EXTRACT 500 MG
TABLET ORAL 2 TIMES DAILY
COMMUNITY
End: 2021-06-24

## 2021-06-24 RX ORDER — GABAPENTIN 100 MG/1
CAPSULE ORAL
COMMUNITY
End: 2021-06-24

## 2021-06-24 RX ORDER — PREGABALIN 75 MG/1
CAPSULE ORAL
COMMUNITY
Start: 2021-06-18

## 2021-06-24 NOTE — LETTER
Shoulder Elbow Rehabilitation Referral    Patient Name: Tima Larsen      YOB: 1950    Diagnosis: Right rotator cuff tear, chronic  Precautions: None  Date of Prescription: 6/24/2021    [x] Evaluate and Treat    Post Op Instructions:  [] Continuous passive motion (CPM)  [] Elbow range of motion  [] Exercise in plane of scapula   []  Strengthening     [] Pulley and instruction    [] Home exercise program (copy to patient)   [] Sling when arm at risk  [] Sling or brace at all times   [] AAROM: Forward elevation to              [] AAROM: External rotation  To      [] Isometric external rotator strengthening [] AAROM: internal rotation: up the back  [] Isometric abductor strengthening  [] AAROM: Internal abduction     [] Isometric internal rotator strengthening [] AAROM: cross-body adduction             Stretching:     Strengthening:  [x] Four quadrant (FE, ER, IR, CBA)  [] Sleeper stretch    [x] Rotator cuff (ER, IR, Abd)  [] Forward Elevation    [] External Rotators     [] External Rotation    [] Internal Rotators  [] Internal Rotation: up/back   [] Abductors     [] Internal Rotation: supine in abduction  [] Flexors  [] Cross-body abduction    [] Extensors  [] Pendulum (FE, Abd/Add, cw/ccw)  [x] Scapular Stabilizers   [] Wall-walking (FE, Abd)    [] Shoulder shrugs     [] Table slides      [] Rhomboid pinch  [x] Elbow (flex, ext, pron, sup)    [] Lat.  Pull downs     [] Medial epicondylitis program    [] Forward punch   [] Lateral epicondylitis program    [] Internal rotators     [] Progressive resistive exercises  [] Bench Press        [] Bench press plus  Activities:     [] Lateral pull-downs  [] Rowing     [] Progressive two-hand supine press  [] Stepper/Exercise bike   [x] Biceps: curls/supination  [] Swimming  [] Water exercises    Modalities:     Return to Sport:  [] Ultrasound     [] Plyometrics  [] Iontophoresis     [] Rhythmic stabilization  [] Moist heat     [] Core strengthening   [] Massage     [] Sports specific program:      [] Cryotherapy      [] Electrical stimulation     [] Paraffin  [] Whirlpool  [] TENS    [] Home exercise program (copy to patient).    Perform exercises for:        minutes          times/day  [] Supervised physical therapy  Frequency: []  1x week  [x] 2x week  [] 3x week  [] Other:   Duration: [] 2 weeks   [] 4 weeks  [x] 6 weeks  [] Other:     Additional Instructions:

## 2021-06-24 NOTE — PROGRESS NOTES
Chief Complaint  Chief Complaint   Patient presents with    Shoulder Pain     OP/NP RIGHT ARM/SHOULDER       History of Present Illness:  Henrietta Bass is a 73 y.o. female old patient new problem here for the right shoulder. Patient states that she had right shoulder pain for the last 3 months. She denies any traumatic onset. She states that this is very limiting disabling pain and is stopping her from doing what she wants to do. She is also unable to admit due to the extreme shoulder pain. She is having difficulty sleeping at night as well. She has tried diclofenac which does seem to take the edge off. She denies any numbness or tingling. She does have a history of rotator cuff tendinitis of the right shoulder which was worked up with an MRI back in 2017. She was treated successfully with injections and physical therapy. Medical History:  Patient's medications, allergies, past medical, surgical, social and family histories were reviewed and updated as appropriate. Pertinent items are noted in HPI  Review of systems reviewed from Patient History Form dated on 6/24/2021 and available in the patient's chart under the Media tab. Vital Signs: There were no vitals filed for this visit. Neuro: Alert & oriented x 3,  normal,  no focal deficits noted. Normal affect. Eyes: sclera clear  Ears: Normal external ear  Mouth:  No perioral lesions  Pulm: Respirations unlabored and regular  Pulse: Regular rate and rhythm   Skin: Warm, well perfused      Constitutional: The physical examination finds the patient to be well-developed and well-nourished. The patient is alert and oriented x3 and was cooperative throughout the visit. Right shoulder exam    Inspection:  Held in a normal posture. Normal contour at the acromioclavicular joint. No swelling, ecchymosis, or erythema about the shoulder. No atrophy appreciated. No scapular winging.      Palpation: Tenderness to palpation over the greater right shoulder     Chronic right shoulder pain     Nontraumatic complete tear of right rotator cuff Yes       Office Procedures:  Orders Placed This Encounter   Procedures    XR SHOULDER RIGHT (MIN 2 VIEWS)     Standing Status:   Future     Number of Occurrences:   1     Standing Expiration Date:   6/23/2022   Andres Brownlee PT [facility] Physical Therapy     Referral Priority:   Routine     Referral Type:   Eval and Treat     Referral Reason:   Specialty Services Required     Requested Specialty:   Physical Therapy     Number of Visits Requested:   1    AR ARTHROCENTESIS ASPIR&/INJ MAJOR JT/BURSA W/O US     80 mg Depomedrol with 8 cc 1% Lidocaine in 10cc syringe with 25G (22G) needle       Plan: We had a good discussion with Anjelica De La Rosa today regarding her right shoulder pain. We feel that this is due to a large chronic rotator cuff tear. We did discuss possible treatment options including conservative measures with cortisone injection and physical therapy versus an MRI. After explaining the options the patient elected to go with a cortisone injection and physical therapy. We did provide this for her and submitted a physical therapy referral form for the [facility] office. We will see her back as needed. Yousif Zepeda is in agreement with this plan. All questions were answered to patient's satisfaction and was encouraged to call with any further questions. Procedure: Right GH joint and  Subacromial  injection  After discussion of the risks, benefits, alternatives to injection, patient agreeable. The skin was cleansed and prepped. Using a 25 gauge needle an injection with 2 mL of 40 mg/ml Depo-Medrol and 8 mL of 1% lidocaine was injected without difficulty into the subacromial space and GH joint from a posterior approach. Sterile dressing was applied. The patient tolerated the procedure well.          Felecia Costa, [facility]     06/24/21  3:36 PM  The encounter with Jasmyne Chaudhry was supervised by Dr Inocente Ricci who personally examined the patient and reviewed the plan. This dictation was performed with a verbal recognition program (DRAGON) and it was checked for errors. It is possible that there are still dictated errors within this office note. If so, please bring any errors to my attention for an addendum. All efforts were made to ensure that this office note is accurate.   _________________  I was physically present and personally supervised the Orthopaedic Sports Medicine Fellow in the evaluation and development of a treatment plan for this patient. I personally interviewed the patient and performed a physical examination. In addition, I discussed the patient's condition and treatment options with them. I have also reviewed and agree with the past medical, family and social history unless otherwise noted. All of the patient's questions were answered. Sharad Ricci MD, PhD  6/24/2021

## 2021-06-25 RX ORDER — METHYLPREDNISOLONE ACETATE 40 MG/ML
80 INJECTION, SUSPENSION INTRA-ARTICULAR; INTRALESIONAL; INTRAMUSCULAR; SOFT TISSUE ONCE
Status: COMPLETED | OUTPATIENT
Start: 2021-06-25 | End: 2021-06-25

## 2021-06-25 RX ORDER — LIDOCAINE HYDROCHLORIDE 10 MG/ML
8 INJECTION, SOLUTION INFILTRATION; PERINEURAL ONCE
Status: COMPLETED | OUTPATIENT
Start: 2021-06-25 | End: 2021-06-25

## 2021-06-25 RX ADMIN — METHYLPREDNISOLONE ACETATE 80 MG: 40 INJECTION, SUSPENSION INTRA-ARTICULAR; INTRALESIONAL; INTRAMUSCULAR; SOFT TISSUE at 16:53

## 2021-06-25 RX ADMIN — LIDOCAINE HYDROCHLORIDE 8 ML: 10 INJECTION, SOLUTION INFILTRATION; PERINEURAL at 16:52

## 2021-07-06 ENCOUNTER — EVALUATION (OUTPATIENT)
Dept: PHYSICAL THERAPY | Age: 71
End: 2021-07-06
Payer: MEDICARE

## 2021-07-06 DIAGNOSIS — M75.101 NONTRAUMATIC TEAR OF RIGHT ROTATOR CUFF, UNSPECIFIED TEAR EXTENT: ICD-10-CM

## 2021-07-06 DIAGNOSIS — M25.511 CHRONIC RIGHT SHOULDER PAIN: Primary | ICD-10-CM

## 2021-07-06 DIAGNOSIS — G89.29 CHRONIC RIGHT SHOULDER PAIN: Primary | ICD-10-CM

## 2021-07-06 PROCEDURE — G8427 DOCREV CUR MEDS BY ELIG CLIN: HCPCS | Performed by: PHYSICAL THERAPIST

## 2021-07-06 PROCEDURE — 97110 THERAPEUTIC EXERCISES: CPT | Performed by: PHYSICAL THERAPIST

## 2021-07-06 PROCEDURE — 97161 PT EVAL LOW COMPLEX 20 MIN: CPT | Performed by: PHYSICAL THERAPIST

## 2021-07-06 PROCEDURE — G8539 DOC FUNCT AND CARE PLAN: HCPCS | Performed by: PHYSICAL THERAPIST

## 2021-07-06 PROCEDURE — 97530 THERAPEUTIC ACTIVITIES: CPT | Performed by: PHYSICAL THERAPIST

## 2021-07-06 PROCEDURE — 1101F PT FALLS ASSESS-DOCD LE1/YR: CPT | Performed by: PHYSICAL THERAPIST

## 2021-07-06 NOTE — PROGRESS NOTES
[facility]   Phone: [phone]    Fax: [phone]      Physical Therapy Treatment Note/ Progress Report:           Date:  2021    Patient Name:  Víctor Graves    :  1950  MRN: <[id number]>  Restrictions/Precautions:    Medical/Treatment Diagnosis Information:  · Diagnosis: Right shoulde chronic RTC tear  ·    Insurance/Certification information:  PT Insurance Information: Medicare  Physician Information:  Referring Practitioner: Dr Scott Cole  Has the plan of care been signed (Y/N):        []  Yes  [x]  No     Date of Patient follow up with Physician: none made at this time      Is this a Progress Report:     []  Yes  [x]  No        If Yes:  Date Range for reporting period:  Beginning  2021  Ending 2021    Progress report will be due (10 Rx or 30 days whichever is less): 7/3/4570       Recertification will be due (POC Duration  / 90 days whichever is less): 2021     PQRS: 2021  Reviewed medication list with patient. No changes since last PT visit.         Visit # Insurance Allowable Auth Required   1 Medicare []  Yes []  No        Functional Scale: Quick Dash 36%   Date assessed:  2021      Latex Allergy:  [x]NO      []YES  Preferred Language for Healthcare:   [x]English       []other:      Pain level:  5-6/10     SUBJECTIVE:  See eval    OBJECTIVE:     ROM PROM AROM  Comment    L R L R 2021   Flexion   140 150    Abduction        ER@ 90   83 87    IR        Other        Other             Strength L R Comment   Flexion 4+/5 4/5* 2021   Abduction 4+/5 4/5*    ER 5/5 4/5*    IR 5/5 4+/5    Supraspinatus      Upper Trap      Lower Trap      Mid Trap      Rhomboids      Biceps      Triceps      Horizontal Abduction      Horizontal Adduction      Lats          Special Tests  2021 Results/Comment   Cassidy (-)   Neers    Speeds    OBriens    Empty can (+) pain and weakness   Neurologic Signs    Functional Reach OH/ER  Left:  T2 Right: T4  BB/IR  Left:    T10                Right: T110               RESTRICTIONS/PRECAUTIONS: none    Exercises/Interventions:     Therapeutic Ex (88822) Sets/sec Reps Notes/CUES   AD UE BIKE            STRETCHING/ROM      Pulleys x4'     Table Slides      Wand-inclined flexion x5\" x15    UE [place]      Pendulum      Doorway      Wall Slides x5\" x15 With assist as needed   CBA      TP BB      Sleeper       Elbow PROM/AROM                  ISOMETRICS      Gripping      Retraction      Abduction      Flexion      Internal Rotation      External Rotation            STRENGTHENING-PREs      Flexion      Abduction      Internal Rotation      External Rotation      Shrugs      Biceps      Triceps      Retraction      Extension      Horizontal Abduction in ER      Serratus                        THERABANDS/CABLE COLUMN      Rows x30 blue    Lats      Extension      Internal Rotation      External Rotation      Biceps      Triceps      PNF                                    Manual Intervention (89290)      Scar Massage      STM      Hawkgrips      GH joint mobilizations      Foamroll                  NMR re-education (46749)   CUES NEEDED   Plyoback      Therabar oscillations      Body Blade       Rhythmic Stabilization x20  4 ways   Ball on the wall                              Therapeutic Activity (05092)      Core training      Swiss ball activities      Education x10'  Reviewed anatomy and pathology; discussed precautions with overhead activity; expectations                           Therapeutic Exercise and NMR EXR  [x] (02038) Provided verbal/tactile cueing for activities related to strengthening, flexibility, endurance, ROM  for improvements in scapular, scapulothoracic and UE control with self care, reaching, carrying, lifting, house/yardwork, driving/computer work.    [] (81682) Provided verbal/tactile cueing for activities related to improving balance, coordination, kinesthetic sense, posture, motor skill, proprioception  to assist with  scapular, scapulothoracic and UE control with self care, reaching, carrying, lifting, house/yardwork, driving/computer work. Therapeutic Activities:    [x] (60826 or 17228) Provided verbal/tactile cueing for activities related to improving balance, coordination, kinesthetic sense, posture, motor skill, proprioception and motor activation to allow for proper function of scapular, scapulothoracic and UE control with self care, carrying, lifting, driving/computer work. Home Exercise Program:    [x] (19564) Reviewed/Progressed HEP activities related to strengthening, flexibility, endurance, ROM of scapular, scapulothoracic and UE control with self care, reaching, carrying, lifting, house/yardwork, driving/computer work  [] (64227) Reviewed/Progressed HEP activities related to improving balance, coordination, kinesthetic sense, posture, motor skill, proprioception of scapular, scapulothoracic and UE control with self care, reaching, carrying, lifting, house/yardwork, driving/computer work      Manual Treatments:  PROM / STM / Oscillations-Mobs:  G-I, II, III, IV (PA's, Inf., Post.)  [] (12867) Provided manual therapy to mobilize soft tissue/joints of cervical/CT, scapular GHJ and UE for the purpose of modulating pain, promoting relaxation,  increasing ROM, reducing/eliminating soft tissue swelling/inflammation/restriction, improving soft tissue extensibility and allowing for proper ROM for normal function with self care, reaching, carrying, lifting, house/yardwork, driving/computer work    Modalities:  CP x15'   [] GAME READY (VASO)- for significant edema, swelling, pain control.     Charges:  Timed Code Treatment Minutes: 40   Total Treatment Minutes: 55      [x] EVAL (LOW) 82594 (typically 20 minutes face-to-face)  [] EVAL (MOD) 78928 (typically 30 minutes face-to-face)  [] EVAL (HIGH) 51455 (typically 45 minutes face-to-face)  [] RE-EVAL     [x] RA(39500) x 1 (x15')    [] IONTO  [] NMR (17328) x     [] VASO  [] Manual (82578) x     [] Other:  [x] TA x 1 (x10')     [] Mech Traction (13983)  [] ES(attended) (14849)      [] ES (un) (37910):         ASSESSMENT:  See eval      GOALS:  Patient stated goal: decrease pain and able to sleep  [] Progressing: [] Met: [] Not Met: [] Adjusted    Therapist goals for Patient:   Short Term Goals: To be achieved in: 2 weeks  1. Independent in HEP and progression per patient tolerance, in order to prevent re-injury. [] Progressing: [] Met: [] Not Met: [] Adjusted  2. Patient will have a decrease in pain to facilitate improvement in movement, function, and ADLs as indicated by Functional Deficits. [] Progressing: [] Met: [] Not Met: [] Adjusted    Long Term Goals: To be achieved in: 4 weeks  1. Disability index score of 20% or less for the DASH to assist with reaching prior level of function. [] Progressing: [] Met: [] Not Met: [] Adjusted  2. Patient will demonstrate increased AROM to 80-90% of normal to allow for proper joint functioning as indicated by patients Functional Deficits. [] Progressing: [] Met: [] Not Met: [] Adjusted  3. Patient will demonstrate an increase in Strength by 1/2 grade to allow for proper functional mobility as indicated by patients Functional Deficits. [] Progressing: [] Met: [] Not Met: [] Adjusted  4. Patient will return to prior functional activities without increased symptoms or restriction. [] Progressing: [] Met: [] Not Met: [] Adjusted  5. Able to resume some of her gym activities/possibly part time work as a . [] Progressing: [] Met: [] Not Met: [] Adjusted       Overall Progression Towards Functional goals/ Treatment Progress Update:  [] Patient is progressing as expected towards functional goals listed. [] Progression is slowed due to complexities/Impairments listed. [] Progression has been slowed due to co-morbidities.   [x] Plan just implemented, too soon to assess goals progression <30days   [] Goals require adjustment due to lack of progress  [] Patient is not progressing as expected and requires additional follow up with physician  [] Other    Prognosis for POC: [x] Good [] Fair  [] Poor      Patient requires continued skilled intervention: [x] Yes  [] No    Treatment/Activity Tolerance:  [x] Patient able to complete treatment  [] Patient limited by fatigue  [] Patient limited by pain    [] Patient limited by other medical complications  [] Other:           PLAN: See eval  [] Continue per plan of care [] Alter current plan   [x] Plan of care initiated [] Hold pending MD visit [] Discharge      Electronically signed by:      Jignesh Sweet, PT     [place] PT #[id number]      Note: If patient does not return for scheduled/ recommended follow up visits, this note will serve as a discharge from care along with most recent update on progress.

## 2021-07-06 NOTE — PROGRESS NOTES
Kamran Marrero [facility]   Phone: [phone]    Fax: [phone]     Physical Therapy Certification    Dear Referring Practitioner: Dr Zehra Childs,    We had the pleasure of evaluating the following patient for physical therapy services at [address] [place] [address]. A summary of our findings can be found in the initial assessment below. This includes our plan of care. If you have any questions or concerns regarding these findings, please do not hesitate to contact me at the office phone number checked above. Thank you for the referral.       Physician Signature:_______________________________Date:__________________  By signing above (or electronic signature), therapists plan is approved by physician      Patient: Odilia Dumont   : 1950   MRN: <[id number]>  Referring Physician: Referring Practitioner: Dr Zehra Childs      Evaluation Date: 2021      Medical Diagnosis Information:  Diagnosis: Right shoulde chronic RTC tear                                             Insurance information: PT Insurance Information: Medicare    Precautions/ Contra-indications: none  Latex Allergy:  [x]NO      []YES    C-SSRS Triggered by Intake questionnaire (Past 2 wk assessment):   [x] No, Questionnaire did not trigger screening.   [] Yes, Patient intake triggered further evaluation      [] C-SSRS Screening completed  [] PCP notified via Plan of Care  [] Emergency services notified     Preferred Language for Healthcare:   [x]English       []other:      SUBJECTIVE:   Patient has had a long history of shoulder issues. Most recently her right shoulder starting bothering her again about 2 months ago. She last had an MRI in ~2017 at which time showed some slight RTC tears. She did receive a cortisone injection on 2021. She states she doesn't really notice significant improvement as of yet. Complains of pain with reaching at shoulder level and above and also discomfort.     She has been taking Diclofenac. Relevant Medical History: significant history (see attached in her chart), left RTC repair 2018  Functional Disability Index: Quick Dash  36% disability    Pain Scale: 5-6/10  Easing factors: rest  Provocative factors: sleeping, reaching    Type: [x]Constant   []Intermittent  []Radiating []Localized []other:     Numbness/Tingling: none    Occupation/School: retired    Living Status/Prior Level of Function: Independent with ADLs and IADLs, some overhead ADLs take more time    OBJECTIVE:     ROM PROM AROM  Comment    L R L R 7/6/2021   Flexion   140 150    Abduction        ER@ 90   83 87    IR        Other        Other             Strength L R Comment   Flexion 4+/5 4/5* 7/6/2021   Abduction 4+/5 4/5*    ER 5/5 4/5*    IR 5/5 4+/5    Supraspinatus      Upper Trap      Lower Trap      Mid Trap      Rhomboids      Biceps      Triceps      Horizontal Abduction      Horizontal Adduction      Lats          Special Tests  7/6/2021 Results/Comment   Cassidy (-)   Neers    Speeds    OBriens    Empty can (+) pain and weakness   Neurologic Signs    Functional Reach OH/ER  Left:  T2                 Right: T4  BB/IR  Left:    T10                Right: T110       Reflexes/Sensation:    [x]Dermatomes/Myotomes intact    []Reflexes equal and normal bilaterally   []Other:    Joint mobility:    [x]Normal    []Hypo   []Hyper    Palpation: mild TTP anterior shoulder and RTC footprint    Functional Mobility/Transfers: independent with all, slow at times    Posture: moderated forward head and forward shoulder    Bandages/Dressings/Incisions: NA    Gait: (include devices/WB status): WNL    Orthopedic Special Tests: see above                       [x] Patient history, allergies, meds reviewed. Medical chart reviewed. See intake form. Review Of Systems (ROS):  [x]Performed Review of systems (Integumentary, CardioPulmonary, Neurological) by intake and observation.  Intake form has been scanned into medical record. Patient has been instructed to contact their primary care physician regarding ROS issues if not already being addressed at this time. Co-morbidities/Complexities (which will affect course of rehabilitation):   []None           Arthritic conditions   []Rheumatoid arthritis (M05.9)  [x]Osteoarthritis (M19.91)   Cardiovascular conditions   [x]Hypertension (I10)  []Hyperlipidemia (E78.5)  []Angina pectoris (I20)  []Atherosclerosis (I70)   Musculoskeletal conditions   []Disc pathology   []Congenital spine pathologies   []Prior surgical intervention  []Osteoporosis (M81.8)  []Osteopenia (M85.8)   Endocrine conditions   []Hypothyroid (E03.9)  []Hyperthyroid Gastrointestinal conditions   []Constipation (M55.68)   Metabolic conditions   []Morbid obesity (E66.01)  []Diabetes type 1(E10.65) or 2 (E11.65)   []Neuropathy (G60.9)     Pulmonary conditions   []Asthma (J45)  []Coughing   []COPD (J44.9)   Psychological Disorders  []Anxiety (F41.9)  []Depression (F32.9)   []Other:   [x]Other: stroke/TIA    CV/spine issues       Barriers to/and or personal factors that will affect rehab potential:              []Age  []Sex              []Motivation/Lack of Motivation                        [x]Co-Morbidities              []Cognitive Function, education/learning barriers              []Environmental, home barriers              []profession/work barriers  [x]past PT/medical experience  []other:  Justification:      Falls Risk Assessment (30 days):   [x] Falls Risk assessed and no intervention required. [] Falls Risk assessed and Patient requires intervention due to being higher risk   TUG score (>12s at risk):     [] Falls education provided, including          Pain  [x]  Pain diagram was completed, pain was present and a follow up plan to address the pain is in the plan of care.  ()   []  Pain diagram was completed and there was no pain present and therefore no follow up plan to address pain in the plan of care. ()   []  Pain diagram was not completed and the reason for not completing the pain diagram is in the medical chart ()  []  Patient refused to participate   []  Severe mental or physical capacity, patient is in urgent emergent situation and to complete the questionnaire would jeopardize the patient's health status        Functional Questionnaire  [x]  Patient completed the functional outcome questionnaire and deficiencies were addressed in the plan of care. ()   []  Patient completed the outcome questionnaire and there were no functional deficits identified and therefore no plan of care is required. ()   []  Patient did not complete the functional outcome questionnaire and the reason why is documented in the medical chart. ()  []  Patient refused to participate   []  Patient unable to complete the questionnaire   []   A functional outcome assessment has been reported on within the last 30 days        Falls  [x]  The patient has had no falls or 1 fall without injury in the past year. (1101F)   []  There is no documentation of fall in the medical chart (1101F,8P)     [] The patient has had 2 or more falls or one fall with injury in the past year that is documented in the medical chart. (1100F)  []  A falls risk assessment was completed and documented in the medical chart. (0760E)   []  Falls were addressed in the plan of care. (6054H)   []  A falls risk assessment was not completed and documented in the medical chart (3306Q,6S)   []   Falls were not addressed in the plan of care and reason was documented in the plan of care. (2536A 1P)        Medication     [x] A list of current medications (including prescription, over the counter, herbal supplements, vitamin supplements, mineral supplements, dietary supplements) was reviewed with the patient and documented in the medical chart.  ()      ASSESSMENT:   Functional Impairments   []Noted spinal or UE joint hypomobility   []Noted spinal or UE joint hypermobility   [x]Decreased UE functional ROM   [x]Decreased UE functional strength   []Abnormal reflexes/sensation/myotomal/dermatomal deficits   [x]Decreased RC/scapular/core strength and neuromuscular control   []other:      Functional Activity Limitations (from functional questionnaire and intake)   []Reduced ability to tolerate prolonged functional positions   [x]Reduced ability or difficulty with changes of positions or transfers between positions   []Reduced ability to maintain good posture and demonstrate good body mechanics with sitting, bending, and lifting   [] Reduced ability or tolerance with driving and/or computer work   [x]Reduced ability to sleep   []Reduced ability to perform lifting, reaching, carrying tasks   []Reduced ability to tolerate impact through UE   [x]Reduced ability to reach behind back   []Reduced ability to  or hold objects   []Reduced ability to throw or toss an object   []other:    Participation Restrictions   [x]Reduced participation in self care activities   [x]Reduced participation in home management activities   []Reduced participation in work activities   []Reduced participation in social activities. []Reduced participation in sport/recreation activities. Classification:   []Signs/symptoms consistent with post-surgical status including decreased ROM, strength and function.   []Signs/symptoms consistent with joint sprain/strain   []Signs/symptoms consistent with shoulder impingement   [x]Signs/symptoms consistent with shoulder/elbow/wrist tendinopathy   [x]Signs/symptoms consistent with Rotator cuff tear   []Signs/symptoms consistent with labral tear   []Signs/symptoms consistent with postural dysfunction    []Signs/symptoms consistent with Glenohumeral IR Deficit - <45 degrees   []Signs/symptoms consistent with facet dysfunction of cervical/thoracic spine    []Signs/symptoms consistent with pathology which may benefit from Dry needling     []other: Prognosis/Rehab Potential:      []Excellent   []Good    [x]Fair   []Poor    Tolerance of evaluation/treatment:    []Excellent   [x]Good    []Fair   []Poor    Physical Therapy Evaluation Complexity Justification  [x] A history of present problem with:  [] no personal factors and/or comorbidities that impact the plan of care;  [x]1-2 personal factors and/or comorbidities that impact the plan of care  []3 personal factors and/or comorbidities that impact the plan of care  [x] An examination of body systems using standardized tests and measures addressing any of the following: body structures and functions (impairments), activity limitations, and/or participation restrictions;:  [] a total of 1-2 or more elements   [x] a total of 3 or more elements   [] a total of 4 or more elements   [x] A clinical presentation with:  [x] stable and/or uncomplicated characteristics   [] evolving clinical presentation with changing characteristics  [] unstable and unpredictable characteristics;   [x] Clinical decision making of [x] low, [] moderate, [] high complexity using standardized patient assessment instrument and/or measurable assessment of functional outcome. [x] EVAL (LOW) 37336 (typically 20 minutes face-to-face)  [] EVAL (MOD) 96732 (typically 30 minutes face-to-face)  [] EVAL (HIGH) 21723 (typically 45 minutes face-to-face)  [] RE-EVAL     PLAN:  Frequency/Duration: 1-2 days per week for 4-6 Weeks:  INTERVENTIONS:  [x] Therapeutic exercise including: strength training, ROM, for Upper extremity and core   [x]  NMR activation and proprioception for UE, scap and Core   [x] Manual therapy as indicated for shoulder, scapula and spine to include: Dry Needling/IASTM, STM, PROM, Gr I-IV mobilizations, manipulation.    [x] Modalities as needed that may include: thermal agents, E-stim, Biofeedback, US, iontophoresis as indicated  [x] Patient education on joint protection, postural re-education, activity modification, progression of HEP. HEP instruction: Patient returned proper demonstration of HEP. Issued patient written program clearly stating sets/reps/sessions per day. Written program was scanned into media section of medical record. GOALS:  Patient stated goal: decrease pain and able to sleep  [] Progressing: [] Met: [] Not Met: [] Adjusted    Therapist goals for Patient:   Short Term Goals: To be achieved in: 2 weeks  1. Independent in HEP and progression per patient tolerance, in order to prevent re-injury. [] Progressing: [] Met: [] Not Met: [] Adjusted  2. Patient will have a decrease in pain to facilitate improvement in movement, function, and ADLs as indicated by Functional Deficits. [] Progressing: [] Met: [] Not Met: [] Adjusted    Long Term Goals: To be achieved in: 4 weeks  1. Disability index score of 20% or less for the DASH to assist with reaching prior level of function. [] Progressing: [] Met: [] Not Met: [] Adjusted  2. Patient will demonstrate increased AROM to 80-90% of normal to allow for proper joint functioning as indicated by patients Functional Deficits. [] Progressing: [] Met: [] Not Met: [] Adjusted  3. Patient will demonstrate an increase in Strength by 1/2 grade to allow for proper functional mobility as indicated by patients Functional Deficits. [] Progressing: [] Met: [] Not Met: [] Adjusted  4. Patient will return to prior functional activities without increased symptoms or restriction. [] Progressing: [] Met: [] Not Met: [] Adjusted  5. Able to resume some of her gym activities/possibly part time work as a .        [] Progressing: [] Met: [] Not Met: [] Adjusted     Electronically signed by:      Kamran Arellano #[id number]

## 2021-07-13 ENCOUNTER — TREATMENT (OUTPATIENT)
Dept: PHYSICAL THERAPY | Age: 71
End: 2021-07-13
Payer: MEDICARE

## 2021-07-13 DIAGNOSIS — M25.511 CHRONIC RIGHT SHOULDER PAIN: Primary | ICD-10-CM

## 2021-07-13 DIAGNOSIS — M75.101 NONTRAUMATIC TEAR OF RIGHT ROTATOR CUFF, UNSPECIFIED TEAR EXTENT: ICD-10-CM

## 2021-07-13 DIAGNOSIS — G89.29 CHRONIC RIGHT SHOULDER PAIN: Primary | ICD-10-CM

## 2021-07-13 PROCEDURE — G8427 DOCREV CUR MEDS BY ELIG CLIN: HCPCS | Performed by: PHYSICAL THERAPIST

## 2021-07-13 PROCEDURE — 97530 THERAPEUTIC ACTIVITIES: CPT | Performed by: PHYSICAL THERAPIST

## 2021-07-13 PROCEDURE — 97110 THERAPEUTIC EXERCISES: CPT | Performed by: PHYSICAL THERAPIST

## 2021-07-13 PROCEDURE — 97112 NEUROMUSCULAR REEDUCATION: CPT | Performed by: PHYSICAL THERAPIST

## 2021-07-13 NOTE — PROGRESS NOTES
Trap      Mid Trap      Rhomboids      Biceps      Triceps      Horizontal Abduction      Horizontal Adduction      Lats          Special Tests  7/6/2021 Results/Comment   Cassidy (-)   Neers    Speeds    OBriens    Empty can (+) pain and weakness   Neurologic Signs    Functional Reach OH/ER  Left:  T2                 Right: T4  BB/IR  Left:    T10                Right: T110               RESTRICTIONS/PRECAUTIONS: none    Exercises/Interventions:     Therapeutic Ex (74846) Sets/sec Reps Notes/CUES   AD UE BIKE            STRETCHING/ROM      Pulleys x5'     Table Slides      Wand-inclined flexion x5\" x15    UE Jones      Pendulum      Doorway      Wall Slides-flexion  Wall slides-scaption x5\"  x5\" x15  x15    CBA      TP BB      Sleeper       Elbow PROM/AROM                  ISOMETRICS      Gripping      Retraction      Abduction      Flexion      Internal Rotation      External Rotation            STRENGTHENING-PREs      Flexion      Abduction      Internal Rotation      External Rotation      Shrugs      Biceps      Triceps      Retraction      Extension      Horizontal Abduction in ER      Serratus                        THERABANDS/CABLE COLUMN      Rows x30 blue    Lats      Extension      Internal Rotation x30 green    External Rotation x30 yellow    Biceps      Triceps      PNF                                    Manual Intervention (57743)      Scar Massage      STM      Hawkgrips      GH joint mobilizations      Foamroll                  NMR re-education (90352)   CUES NEEDED   Plyoback      Therabar oscillations      Body Blade       Rhythmic Stabilization x20  Supine UE at 90 4 ways   Ball on the wall                              Therapeutic Activity (79920)      Core training      Swiss ball activities      Education x10'  Reviewed anatomy and pathology; discussed precautions with overhead activity; expectations                           Therapeutic Exercise and NMR EXR  [x] (32685) Provided verbal/tactile cueing for activities related to strengthening, flexibility, endurance, ROM  for improvements in scapular, scapulothoracic and UE control with self care, reaching, carrying, lifting, house/yardwork, driving/computer work.    [] (03638) Provided verbal/tactile cueing for activities related to improving balance, coordination, kinesthetic sense, posture, motor skill, proprioception  to assist with  scapular, scapulothoracic and UE control with self care, reaching, carrying, lifting, house/yardwork, driving/computer work. Therapeutic Activities:    [x] (71809 or 86288) Provided verbal/tactile cueing for activities related to improving balance, coordination, kinesthetic sense, posture, motor skill, proprioception and motor activation to allow for proper function of scapular, scapulothoracic and UE control with self care, carrying, lifting, driving/computer work.      Home Exercise Program:    [x] (48336) Reviewed/Progressed HEP activities related to strengthening, flexibility, endurance, ROM of scapular, scapulothoracic and UE control with self care, reaching, carrying, lifting, house/yardwork, driving/computer work  [] (88056) Reviewed/Progressed HEP activities related to improving balance, coordination, kinesthetic sense, posture, motor skill, proprioception of scapular, scapulothoracic and UE control with self care, reaching, carrying, lifting, house/yardwork, driving/computer work      Manual Treatments:  PROM / STM / Oscillations-Mobs:  G-I, II, III, IV (PA's, Inf., Post.)  [] (45070) Provided manual therapy to mobilize soft tissue/joints of cervical/CT, scapular GHJ and UE for the purpose of modulating pain, promoting relaxation,  increasing ROM, reducing/eliminating soft tissue swelling/inflammation/restriction, improving soft tissue extensibility and allowing for proper ROM for normal function with self care, reaching, carrying, lifting, house/yardwork, driving/computer work    Modalities:  CP x15'   [] GAME READY (VASO)- for significant edema, swelling, pain control. Charges:  Timed Code Treatment Minutes: 40   Total Treatment Minutes: 55      [] EVAL (LOW) 62712 (typically 20 minutes face-to-face)  [] EVAL (MOD) 60359 (typically 30 minutes face-to-face)  [] EVAL (HIGH) 28729 (typically 45 minutes face-to-face)  [] RE-EVAL     [x] DE(78870) x 1 (x15')    [] IONTO  [x] NMR (65935) x 1 (x15')    [] VASO  [] Manual (70819) x     [] Other:  [x] TA x 1 (x10')     [] Mech Traction (59467)  [] ES(attended) (19318)      [] ES (un) (20478):         ASSESSMENT:    We reviewed her HEP and she has good knowledge. She did not require assistance with wall slides like she did last week. She did bring in a bag of her old therabands and we went through them to determine which were still usable with her exercises that she is doing for her shoulder. Had some crepitus with resisted IR, this was minimized when keeping better scapular retraction/posture. No crepitus with resisted ER, just more fatigue. GOALS:  Patient stated goal: decrease pain and able to sleep  [] Progressing: [] Met: [] Not Met: [] Adjusted    Therapist goals for Patient:   Short Term Goals: To be achieved in: 2 weeks  1. Independent in HEP and progression per patient tolerance, in order to prevent re-injury. [] Progressing: [] Met: [] Not Met: [] Adjusted  2. Patient will have a decrease in pain to facilitate improvement in movement, function, and ADLs as indicated by Functional Deficits. [] Progressing: [] Met: [] Not Met: [] Adjusted    Long Term Goals: To be achieved in: 4 weeks  1. Disability index score of 20% or less for the DASH to assist with reaching prior level of function. [] Progressing: [] Met: [] Not Met: [] Adjusted  2. Patient will demonstrate increased AROM to 80-90% of normal to allow for proper joint functioning as indicated by patients Functional Deficits. [] Progressing: [] Met: [] Not Met: [] Adjusted  3. Patient will demonstrate an increase in Strength by 1/2 grade to allow for proper functional mobility as indicated by patients Functional Deficits. [] Progressing: [] Met: [] Not Met: [] Adjusted  4. Patient will return to prior functional activities without increased symptoms or restriction. [] Progressing: [] Met: [] Not Met: [] Adjusted  5. Able to resume some of her gym activities/possibly part time work as a . [] Progressing: [] Met: [] Not Met: [] Adjusted       Overall Progression Towards Functional goals/ Treatment Progress Update:  [] Patient is progressing as expected towards functional goals listed. [] Progression is slowed due to complexities/Impairments listed. [] Progression has been slowed due to co-morbidities. [x] Plan just implemented, too soon to assess goals progression <30days   [] Goals require adjustment due to lack of progress  [] Patient is not progressing as expected and requires additional follow up with physician  [] Other    Prognosis for POC: [x] Good [] Fair  [] Poor      Patient requires continued skilled intervention: [x] Yes  [] No    Treatment/Activity Tolerance:  [x] Patient able to complete treatment  [] Patient limited by fatigue  [] Patient limited by pain    [] Patient limited by other medical complications  [] Other:           PLAN: See eval  Plan to schedule knee pre op eval.   Do one day a week for her knee and one day a week for her shoulder  [] Continue per plan of care [] Alter current plan   [x] Plan of care initiated [] Hold pending MD visit [] Discharge      Electronically signed by:      Dejah Oconnell, PT     [place] [facility] #[id number]      Note: If patient does not return for scheduled/ recommended follow up visits, this note will serve as a discharge from care along with most recent update on progress.

## 2021-07-16 ENCOUNTER — EVALUATION (OUTPATIENT)
Dept: PHYSICAL THERAPY | Age: 71
End: 2021-07-16
Payer: MEDICARE

## 2021-07-16 DIAGNOSIS — G89.29 CHRONIC PAIN OF RIGHT KNEE: Primary | ICD-10-CM

## 2021-07-16 DIAGNOSIS — M17.11 PRIMARY OSTEOARTHRITIS OF RIGHT KNEE: ICD-10-CM

## 2021-07-16 DIAGNOSIS — M25.561 CHRONIC PAIN OF RIGHT KNEE: Primary | ICD-10-CM

## 2021-07-16 PROCEDURE — G8427 DOCREV CUR MEDS BY ELIG CLIN: HCPCS | Performed by: PHYSICAL THERAPIST

## 2021-07-16 PROCEDURE — 97530 THERAPEUTIC ACTIVITIES: CPT | Performed by: PHYSICAL THERAPIST

## 2021-07-16 PROCEDURE — 97110 THERAPEUTIC EXERCISES: CPT | Performed by: PHYSICAL THERAPIST

## 2021-07-16 PROCEDURE — 97161 PT EVAL LOW COMPLEX 20 MIN: CPT | Performed by: PHYSICAL THERAPIST

## 2021-07-16 PROCEDURE — 97016 VASOPNEUMATIC DEVICE THERAPY: CPT | Performed by: PHYSICAL THERAPIST

## 2021-07-16 PROCEDURE — G8539 DOC FUNCT AND CARE PLAN: HCPCS | Performed by: PHYSICAL THERAPIST

## 2021-07-16 PROCEDURE — 1101F PT FALLS ASSESS-DOCD LE1/YR: CPT | Performed by: PHYSICAL THERAPIST

## 2021-07-16 NOTE — PROGRESS NOTES
[facility]   Phone: [phone]    Fax: [phone]     Physical Therapy Certification    Dear Referring Practitioner: Dr Roberto Olivarez,    We had the pleasure of evaluating the following patient for physical therapy services at [address] [place] [address]. A summary of our findings can be found in the initial assessment below. This includes our plan of care. If you have any questions or concerns regarding these findings, please do not hesitate to contact me at the office phone number checked above. Thank you for the referral.       Physician Signature:_______________________________Date:__________________  By signing above (or electronic signature), therapists plan is approved by physician      Patient: Isabelle Jones   : 1950   MRN: <[id number]>  Referring Physician: Referring Practitioner: Dr Roberto Olivarez      Evaluation Date: 2021      Medical Diagnosis Information:  Diagnosis: Right knee OA/surgical optimization                                             Insurance information: PT Insurance Information: Medicare     Precautions/ Contra-indications: balance at times is an issue  Latex Allergy:  [x]NO      []YES    C-SSRS Triggered by Intake questionnaire (Past 2 wk assessment):   [x] No, Questionnaire did not trigger screening.   [] Yes, Patient intake triggered further evaluation      [] C-SSRS Screening completed  [] PCP notified via Plan of Care  [] Emergency services notified       Preferred Language for Healthcare:   [x]English       []other:      SUBJECTIVE:   Chronic issues with her right knee. Has had left TKA in Aug 2019, post op therapy here and she has made a successful recovery. Advanced OA, she is scheduled for right TKA 2021 with Dr Roberto Olivarez. He wanted her to have a few pre op PT visits to optimize ROM and strength and function going in to surgery. Her knee has really been worsening over the past 2 months.    Increased pain and popping with activities. Relevant Medical History:   significant history (see attached in her chart), left RTC repair 2018, left TKA Aug 2019, Right DILAN 2018  Functional Scale/Score:  KOS ADLs  25/70=64% functional limitation    Pain Scale: 6-7/10  Easing factors: rest, ice  Provocative factors: walking or any WB     Type: [x]Constant   []Intermittent  []Radiating []Localized []other:     Numbness/Tingling: none noted    Occupation/School:  retired    Living Status/Prior Level of Function: Independent with ADLs and IADLs, lives alone, 6 stairs to get in her house. Bedroom/bathroom on main floor, has a basement where she does laundry. Likes to be active and do light workouts in the gym. OBJECTIVE:     Flexibility L R Comment   Hamstring WNL Slight restriction 7/16/2021   Gastroc      ITB      Quad                ROM PROM AROM-supine Overpressure Comment    L R L R L R 7/16/2021   Flexion   124 124      Extension   0 0                              Strength L R Comment   Quad 5/5 4/5* 7/16/2021   Hamstring 5/5 4+/5    Hip abd 4-5 4-/5                  Girth  7/16/2021 L R   Mid Patella 46.4 49.5   Suprapatellar     5cm above     15cm above         Special Test  7/16/2021 Results/Comment   Meniscal Click    Crepitus Mild on right   Flexion Test    Valgus Laxity    Varus Laxity    Lachmans    Drop Back            Reflexes/Sensation:    [x]Dermatomes/Myotomes intact    []Reflexes equal and normal bilaterally   []Other:    Joint mobility:    [x]Normal    []Hypo   []Hyper    Palpation: increased TTP medial joint line    Functional Mobility/Transfers: independent with all    Posture: NA    Bandages/Dressings/Incisions: NA    Gait: (include devices/WB status) ambulates with SPC, has balance issues    Orthopedic Special Tests: see above                       [x] Patient history, allergies, meds reviewed. Medical chart reviewed. See intake form.      Review Of Systems (ROS):  [x]Performed Review of systems (Integumentary, CardioPulmonary, Neurological) by intake and observation. Intake form has been scanned into medical record. Patient has been instructed to contact their primary care physician regarding ROS issues if not already being addressed at this time. Co-morbidities/Complexities (which will affect course of rehabilitation):   []None           Arthritic conditions   []Rheumatoid arthritis (M05.9)  [x]Osteoarthritis (M19.91)   Cardiovascular conditions   [x]Hypertension (I10)  []Hyperlipidemia (E78.5)  []Angina pectoris (I20)  []Atherosclerosis (I70)  []CVA Musculoskeletal conditions   []Disc pathology   []Congenital spine pathologies   []Prior surgical intervention  []Osteoporosis (M81.8)  []Osteopenia (M85.8)   Endocrine conditions   []Hypothyroid (E03.9)  []Hyperthyroid Gastrointestinal conditions   []Constipation (K70.68)   Metabolic conditions   []Morbid obesity (E66.01)  []Diabetes type 1(E10.65) or 2 (E11.65)   []Neuropathy (G60.9)     Pulmonary conditions   []Asthma (J45)  []Coughing   []COPD (J44.9)   Psychological Disorders  []Anxiety (F41.9)  []Depression (F32.9)   []Other:   [x]Other:   stroke/TIA    CV/spine issues       Barriers to/and or personal factors that will affect rehab potential:              []Age  []Sex    []Smoker              []Motivation/Lack of Motivation                        [x]Co-Morbidities              []Cognitive Function, education/learning barriers              []Environmental, home barriers              []profession/work barriers  [x]past PT/medical experience  []other:  Justification:     Falls Risk Assessment (30 days):   [x] Falls Risk assessed and no intervention required.   [] Falls Risk assessed and Patient requires intervention due to being higher risk   TUG score (>12s at risk):     [] Falls education provided, including       Sit to stand test 30 seconds  8 reps    Timed Get Up and Go Test  Measures mobility in people who are able to walk on their own (assistive device permitted)        Name: Jus Modi  Date: 7/16/2021    Instructions: The person may wear their usual footwear and can use any assistive device they normally use. 1. Have the person sit in the chair with their back to the chair and their arms resting on the back rests  2. Ask the person to stand up from a standard chair and walk a distance of 10 ft. (3 m). 3. Have the person turn around, walk back to the chair and sit down again. Timing begins when the person starts to rise from the chair and ends when he or she returns to the chair and sits down. Time to Complete:  (17 seconds)      Predictive Results:    Seconds Rating    <10  Freely mobile    <20  Mostly independent    20-29  Variable mobility    >20  Impaired mobility       Source: Sumi Israel S. The timed 'Up and Go' Test: a Test of Basic Functional Mobility for Frail Elderly Persons. Journal of [address] [place] [address]. 1991;39:142-148. G-Code Crosswalk:  TUG time (onds) Disability Index CMS Modifier   12 or faster 0% []CH   13-14 1-19% []CI   15-16 20-39% []CJ   17-18 40-59% []CK   19-20  60-79% []CL   21-22  80-99% []CM   23 or slower 100% []CN         Pain  [x]  Pain diagram was completed, pain was present and a follow up plan to address the pain is in the plan of care. ()   []  Pain diagram was completed and there was no pain present and therefore no follow up plan to address pain in the plan of care. ()   []  Pain diagram was not completed and the reason for not completing the pain diagram is in the medical chart ()  []  Patient refused to participate   []  Severe mental or physical capacity, patient is in urgent emergent situation and to complete the questionnaire would jeopardize the patient's health status        Functional Questionnaire  [x]  Patient completed the functional outcome questionnaire and deficiencies were addressed in the plan of care.  ()   []  Patient completed the outcome questionnaire and there were no functional deficits identified and therefore no plan of care is required. ()   []  Patient did not complete the functional outcome questionnaire and the reason why is documented in the medical chart. ()  []  Patient refused to participate   []  Patient unable to complete the questionnaire   []   A functional outcome assessment has been reported on within the last 30 days        Falls  [x]  The patient has had no falls or 1 fall without injury in the past year. (1101F)   []  There is no documentation of fall in the medical chart (1101F,8P)     [] The patient has had 2 or more falls or one fall with injury in the past year that is documented in the medical chart. (1100F)  []  A falls risk assessment was completed and documented in the medical chart. (0926Y)   []  Falls were addressed in the plan of care. (3884Y)   []  A falls risk assessment was not completed and documented in the medical chart (5183V,1P)   []   Falls were not addressed in the plan of care and reason was documented in the plan of care. (8561D 1P)        Medication     [x] A list of current medications (including prescription, over the counter, herbal supplements, vitamin supplements, mineral supplements, dietary supplements) was reviewed with the patient and documented in the medical chart.  ()      ASSESSMENT:   Functional Impairments:     []Noted lumbar/proximal hip/LE hypomobility   []Decreased LE functional ROM   [x]Decreased core/proximal hip strength and neuromuscular control   [x]Decreased LE functional strength   [x]Reduced balance/proprioceptive control   []other:      Functional Activity Limitations (from functional questionnaire and intake)   []Reduced ability to tolerate prolonged functional positions   [x]Reduced ability or difficulty with changes of positions or transfers between positions   [x]Reduced ability to maintain good posture and demonstrate good body mechanics with sitting, bending, and lifting   []Reduced ability to sleep   [] Reduced ability or tolerance with driving and/or computer work   [x]Reduced ability to perform lifting, carrying tasks   [x]Reduced ability to squat   []Reduced ability to forward bend   [x]Reduced ability to ambulate prolonged functional periods/distances/surfaces   [x]Reduced ability to ascend/descend stairs   []Reduced ability to run, hop or jump   []other:     Participation Restrictions   [x]Reduced participation in self care activities   [x]Reduced participation in home management activities   []Reduced participation in work activities   [x]Reduced participation in social activities. []Reduced participation in sport activities. Classification :    []Signs/symptoms consistent with post-surgical status including decreased ROM, strength and function.    []Signs/symptoms consistent with joint sprain/strain   []Signs/symptoms consistent with patella-femoral syndrome   [x]Signs/symptoms consistent with knee OA/hip OA   []Signs/symptoms consistent with internal derangement of knee/Hip   []Signs/symptoms consistent with functional hip weakness/NMR control      []Signs/symptoms consistent with tendinitis/tendinosis    []signs/symptoms consistent with pathology which may benefit from Dry needling      []other:      Prognosis/Rehab Potential:      []Excellent   [x]Good    []Fair   []Poor    Tolerance of evaluation/treatment:    []Excellent   [x]Good    []Fair   []Poor    Physical Therapy Evaluation Complexity Justification  [x] A history of present problem with:  [] no personal factors and/or comorbidities that impact the plan of care;  []1-2 personal factors and/or comorbidities that impact the plan of care  [x]3 personal factors and/or comorbidities that impact the plan of care  [x] An examination of body systems using standardized tests and measures addressing any of the following: body structures and functions (impairments), activity limitations, and/or participation restrictions;:  [x] a total of 1-2 or more elements   [] a total of 3 or more elements   [] a total of 4 or more elements   [x] A clinical presentation with:  [x] stable and/or uncomplicated characteristics   [] evolving clinical presentation with changing characteristics  [] unstable and unpredictable characteristics;   [x] Clinical decision making of [x] low, [] moderate, [] high complexity using standardized patient assessment instrument and/or measurable assessment of functional outcome. [x] EVAL (LOW) 35126 (typically 20 minutes face-to-face)  [] EVAL (MOD) 03533 (typically 30 minutes face-to-face)  [] EVAL (HIGH) 59473 (typically 45 minutes face-to-face)  [] RE-EVAL     PLAN:  Frequency/Duration:  1 days per week for 4-6 Weeks:  Interventions:  [x]  Therapeutic exercise including: strength training, ROM, for Lower extremity and core   [x]  NMR activation and proprioception for LE, Glutes and Core   [x]  Manual therapy as indicated for LE, Hip and spine to include: Dry Needling/IASTM, STM, PROM, Gr I-IV mobilizations, manipulation. [x] Modalities as needed that may include: thermal agents, E-stim, Biofeedback, US, iontophoresis as indicated  [x] Patient education on joint protection, postural re-education, activity modification, progression of HEP. HEP instruction: Patient returned proper demonstration of HEP. Issued patient written program clearly stating sets/reps/sessions per day. Written program was scanned into media section of medical record. GOALS:  Patient stated goal: strengthen LE for surgery  [] Progressing: [] Met: [] Not Met: [] Adjusted    Therapist goals for Patient:   Short Term Goals: To be achieved in: 2 weeks  1. Independent in HEP and progression per patient tolerance, in order to prevent re-injury. [] Progressing: [] Met: [] Not Met: [] Adjusted  2.  Patient will have a decrease in pain to facilitate improvement in movement, function, and ADLs as indicated by Functional Deficits. [] Progressing: [] Met: [] Not Met: [] Adjusted    Long Term Goals: To be achieved in: 4-6 weeks  1. Disability index score of 20% or less for the KOS to assist with reaching prior level of function. [] Progressing: [] Met: [] Not Met: [] Adjusted  2. Patient will demonstrate increased AROM to 0-125 to allow for proper joint functioning as indicated by patients Functional Deficits. [] Progressing: [] Met: [] Not Met: [] Adjusted  3. Patient will demonstrate an increase in Strength by 1/2 grade to allow for proper functional mobility as indicated by patients Functional Deficits. [] Progressing: [] Met: [] Not Met: [] Adjusted  4. Patient will return to low level prior functional activities without increased symptoms or restriction. [] Progressing: [] Met: [] Not Met: [] Adjusted  5.  Fully prepared for surgery    [] Progressing: [] Met: [] Not Met: [] Adjusted     Electronically signed by:      Nikos Geiger, [address] #[id number]  [place] PT #[id number]'

## 2021-07-16 NOTE — PROGRESS NOTES
[facility]   Phone: [phone]    Fax: [phone]      Physical Therapy Treatment Note/ Progress Report:           Date:  2021    Patient Name:  Angel Sewell    :  1950  MRN: <[id number]>  Restrictions/Precautions:    Medical/Treatment Diagnosis Information:  · Diagnosis: Right knee OA/surgical optimization  ·    Insurance/Certification information:  PT Insurance Information: Medicare  Physician Information:  Referring Practitioner: Dr Memo Espino  Has the plan of care been signed (Y/N):        []  Yes  [x]  No     Date of Patient follow up with Physician: DOS for right TKA  2021      Is this a Progress Report:     []  Yes  [x]  No        If Yes:  Date Range for reporting period:  Beginning 2021  Ending  2021    Progress report will be due (10 Rx or 30 days whichever is less):       Recertification will be due (POC Duration  / 90 days whichever is less): 2021        Visit # Insurance Allowable Auth Required   3 MEDICARE ([address] [place] [address]) []  Yes []  No    Has had 2 visits for shoulder (still being see currently for shoulder as well)    Functional Scale: KOS ADLs  64% disability   Date assessed:  2021      Latex Allergy:  [x]NO      []YES  Preferred Language for Healthcare:   [x]English       []other:    PQRS:  2021  Reviewed medication list with patient. No changes since last PT visit.         Pain level:  6-7/10     SUBJECTIVE:  See eval    OBJECTIVE:     Flexibility L R Comment   Hamstring WNL Slight restriction 2021   Gastroc      ITB      Quad                ROM PROM AROM-supine Overpressure Comment    L R L R L R 2021   Flexion   124 124      Extension   0 0                              Strength L R Comment   Quad 5/5 4/5* 2021   Hamstring 5/5 4+/5    Hip abd 4-5 4-/5                  Girth  2021 L R   Mid Patella 46.4 49.5   Suprapatellar     5cm above     15cm above         Special Test  2021 Results/Comment Meniscal Click    Crepitus Mild on right   Flexion Test    Valgus Laxity    Varus Laxity    Lachmans    Drop Back              RESTRICTIONS/PRECAUTIONS: balance at times is an issue    Exercises/Interventions:     Therapeutic Ex (92411) Sets/sec Reps Notes/CUES   BIKE      TREADMILL            STRETCHING      Towel Pull Calf      Inclined Calf      Hamstrings 30\" x5 seated   Quads      Hip Flexors      ITB      Adductors            ROM      Passive      Active      Weight Hangs      Sheet Pulls      Ankle Pumps      ERMI            STRENGTHENING      Quad Sets      Ham Sets      Hip ADD Sets BS 5\" x30    SLR Flexion 2 x8    SLR Abduction      SLR Prone      SLR Adduction      Hooklying clams 1 x30 Yellow loop   SDLY clams- each side 1 x30 Yellow loop   PRE Machines      Knee Extension      Knee Flexion      Leg Press            CKC      Calf Raises      Mini Squats      Wall Sits      Step ups      TKE                  Manual Intervention (11110)      Patellar Mobs      Femoral Tibial mobs      STM      Scar Massage      Foam Roll            NMR re-education (47419)   CUES NEEDED   Biodex Balance      Tandem Balance      SLB      Rebounder      BOSU                              Therapeutic Activity (68801)      Core Training      [employer] Activities      Monster Walks      TRX training      education x10'  Discussed anatomy and pathology with patient, reviewed [address] [place] [address] program, reviewed OA precautions               Therapeutic Exercise and NMR EXR  [x] (40666) Provided verbal/tactile cueing for activities related to strengthening, flexibility, endurance, ROM for improvements in LE, proximal hip, and core control with self care, mobility, lifting, ambulation.   [x] (81371) Provided verbal/tactile cueing for activities related to improving balance, coordination, kinesthetic sense, posture, motor skill, proprioception to assist with LE, proximal hip, and core control in self-care, mobility, lifting, ambulation and eccentric single leg control. NMR and Therapeutic Activities:    [x] (36224 or 78611) Provided verbal/tactile cueing for activities related to improving balance, coordination, kinesthetic sense, posture, motor skill, proprioception and motor activation to allow for proper function of core, proximal hip and LE with self-care and ADLs and functional mobility.   [] (30916) Gait Re-education- Provided training and instruction to the patient for proper LE, core and proximal hip recruitment and positioning and eccentric body weight control with ambulation re-education including up and down stairs     Home Exercise Program:    [x] (82350) Reviewed/Progressed HEP activities related to strengthening, flexibility, endurance, ROM of core, proximal hip and LE for functional self-care, mobility, lifting and ambulation/stair navigation   [] (73294) Reviewed/Progressed HEP activities related to improving balance, coordination, kinesthetic sense, posture, motor skill, proprioception of core, proximal hip and LE for self-care, mobility, lifting, and ambulation/stair navigation      Manual Treatments:  PROM / STM / Oscillations-Mobs:  G-I, II, III, IV (PA's, Inf., Post.)  [] (92042) Provided manual therapy to mobilize LE, proximal hip and/or LS spine soft tissue/joints for the purpose of modulating pain, promoting relaxation, increasing ROM, reducing/eliminating soft tissue swelling/inflammation/restriction, improving soft tissue extensibility and allowing for proper ROM for normal function with self-care, mobility, lifting and ambulation. Modalities:     [x] GAME READY (VASO)- for significant edema, swelling, pain control.      Charges:  Timed Code Treatment Minutes: 40   Total Treatment Minutes: 65      [x] EVAL (LOW) 88713 (typically 20 minutes face-to-face)  [] EVAL (MOD) 97336 (typically 30 minutes face-to-face)  [] EVAL (HIGH) 43284 (typically 45 minutes face-to-face)  [] RE-EVAL     [x] TY(47277) x1 (x15')     [] IONTO  [] NMR (71431) x     [x] VASO Right knee x15' medium pressure  [] Manual (98958) x     [] Other:  [x] TA x  1 (x10')    [] Mech Traction (72435)  [] ES(attended) (87129)      [] ES (un) (04735):         ASSESSMENT:  See eval      GOALS:  Patient stated goal: strengthen LE for surgery  [] Progressing: [] Met: [] Not Met: [] Adjusted    Therapist goals for Patient:   Short Term Goals: To be achieved in: 2 weeks  1. Independent in HEP and progression per patient tolerance, in order to prevent re-injury. [] Progressing: [] Met: [] Not Met: [] Adjusted  2. Patient will have a decrease in pain to facilitate improvement in movement, function, and ADLs as indicated by Functional Deficits. [] Progressing: [] Met: [] Not Met: [] Adjusted    Long Term Goals: To be achieved in: 4-6 weeks  1. Disability index score of 20% or less for the KOS to assist with reaching prior level of function. [] Progressing: [] Met: [] Not Met: [] Adjusted  2. Patient will demonstrate increased AROM to 0-125 to allow for proper joint functioning as indicated by patients Functional Deficits. [] Progressing: [] Met: [] Not Met: [] Adjusted  3. Patient will demonstrate an increase in Strength by 1/2 grade to allow for proper functional mobility as indicated by patients Functional Deficits. [] Progressing: [] Met: [] Not Met: [] Adjusted  4. Patient will return to low level prior functional activities without increased symptoms or restriction. [] Progressing: [] Met: [] Not Met: [] Adjusted  5. Fully prepared for surgery    [] Progressing: [] Met: [] Not Met: [] Adjusted       Overall Progression Towards Functional goals/ Treatment Progress Update:  [] Patient is progressing as expected towards functional goals listed. [] Progression is slowed due to complexities/Impairments listed. [] Progression has been slowed due to co-morbidities.   [x] Plan just implemented, too soon to assess goals progression <30days   [] Goals require adjustment due to lack of progress  [] Patient is not progressing as expected and requires additional follow up with physician  [] Other    Prognosis for POC: [x] Good [] Fair  [] Poor      Patient requires continued skilled intervention: [x] Yes  [] No    Treatment/Activity Tolerance:  [x] Patient able to complete treatment  [] Patient limited by fatigue  [] Patient limited by pain    [] Patient limited by other medical complications  [] Other:         PLAN: See eval   Cont 1x/week up until surgery which is 8/11/2021  [] Continue per plan of care [] Alter current plan   [x] Plan of care initiated [] Hold pending MD visit [] Discharge      Electronically signed by:      Annita Robertson  PT #[id number]  [facility] PT #[id number]      Note: If patient does not return for scheduled/ recommended follow up visits, this note will serve as a discharge from care along with most recent update on progress.

## 2021-07-20 ENCOUNTER — TREATMENT (OUTPATIENT)
Dept: PHYSICAL THERAPY | Age: 71
End: 2021-07-20
Payer: MEDICARE

## 2021-07-20 DIAGNOSIS — G89.29 CHRONIC RIGHT SHOULDER PAIN: Primary | ICD-10-CM

## 2021-07-20 DIAGNOSIS — M75.101 NONTRAUMATIC TEAR OF RIGHT ROTATOR CUFF, UNSPECIFIED TEAR EXTENT: ICD-10-CM

## 2021-07-20 DIAGNOSIS — M25.511 CHRONIC RIGHT SHOULDER PAIN: Primary | ICD-10-CM

## 2021-07-20 PROCEDURE — 97110 THERAPEUTIC EXERCISES: CPT | Performed by: PHYSICAL THERAPIST

## 2021-07-20 PROCEDURE — 97112 NEUROMUSCULAR REEDUCATION: CPT | Performed by: PHYSICAL THERAPIST

## 2021-07-20 PROCEDURE — 97530 THERAPEUTIC ACTIVITIES: CPT | Performed by: PHYSICAL THERAPIST

## 2021-07-20 PROCEDURE — G8427 DOCREV CUR MEDS BY ELIG CLIN: HCPCS | Performed by: PHYSICAL THERAPIST

## 2021-07-20 NOTE — PROGRESS NOTES
Kamran Agarwal [facility]   Phone: [phone]    Fax: [phone]      Physical Therapy Treatment Note/ Progress Report:           Date:  2021    Patient Name:  Dany Tolentino    :  1950  MRN: <[id number]>  Restrictions/Precautions:    Medical/Treatment Diagnosis Information:  · Diagnosis: Right shoulde chronic RTC tear     Insurance/Certification information:  PT Insurance Information: Medicare  Physician Information:  Referring Practitioner: Dr Ellie Barrios  Has the plan of care been signed (Y/N):        []  Yes  [x]  No     Date of Patient follow up with Physician: 8/10/2021      Is this a Progress Report:     []  Yes  [x]  No        If Yes:  Date Range for reporting period:  Beginning  2021  Ending 2021    Progress report will be due (10 Rx or 30 days whichever is less): 4771       Recertification will be due (POC Duration  / 90 days whichever is less): 2021     PQRS: 2021  Reviewed medication list with patient. No changes since last PT visit. Visit # Insurance Allowable Auth Required   4 Medicare []  Yes []  No        Functional Scale: Quick Dash 36%   Date assessed:  2021      Latex Allergy:  [x]NO      []YES  Preferred Language for Healthcare:   [x]English       []other:      Pain level:  5-6/10     SUBJECTIVE:    She reports that she had an ok weekend. She is not sure what she did but has had increased right shoulder soreness yesterday into last night. She does remember spraying some weeds yesterday with a pump sprayer, she thought she mostly used her left arm. She is trying to get her ice pack to cooperate, she doesn't have one like we use here.           OBJECTIVE:     ROM PROM AROM  Comment    L R L R 2021   Flexion   140 150    Abduction        ER@ 90   83 87    IR        Other        Other             Strength L R Comment   Flexion 4+/5 4/5* 2021   Abduction 4+/5 4/5*    ER 5/5 4/5*    IR 5/5 4+/5    Supraspinatus      Upper Trap      Lower Trap      Mid Trap      Rhomboids      Biceps      Triceps      Horizontal Abduction      Horizontal Adduction      Lats          Special Tests  7/6/2021 Results/Comment   Cassidy (-)   Neers    Speeds    OBriens    Empty can (+) pain and weakness   Neurologic Signs    Functional Reach OH/ER  Left:  T2                 Right: T4  BB/IR  Left:    T10                Right: T110               RESTRICTIONS/PRECAUTIONS: none    Exercises/Interventions:     Therapeutic Ex (17038) Sets/sec Reps Notes/CUES   AD UE BIKE            STRETCHING/ROM      Pulleys x5'     Table Slides      Wand-inclined flexion x5\" x15    UE [place]      Pendulum      Doorway      Wall Slides-flexion  Wall slides-scaption x5\"  x5\" x20  x20    CBA      TP BB      Sleeper       Elbow PROM/AROM                  ISOMETRICS      Gripping      Retraction      Abduction      Flexion      Internal Rotation      External Rotation            STRENGTHENING-PREs      Flexion      Abduction      Internal Rotation      External Rotation      Shrugs      Biceps      Triceps      Retraction      Extension      Horizontal Abduction in ER      Serratus                        THERABANDS/CABLE COLUMN      Rows x30 blue    Lats      Extension      Internal Rotation x30 green    External Rotation x30 yellow    Biceps      Triceps      PNF                                    Manual Intervention (03272)      Scar Massage      STM      Hawkgrips      GH joint mobilizations      Foamroll                  NMR re-education (42863)   CUES NEEDED   Plyoback      Therabar oscillations      Body Blade       Rhythmic Stabilization x20  Supine UE at 90 4 ways   Ball on the wall x15  gymball 4 ways                           Therapeutic Activity (67761)      Core training      Swiss ball activities      Education x10'  Reviewed anatomy and pathology; discussed precautions with overhead activity; expectations                           Therapeutic Exercise and NMR EXR  [x] (87135) Provided verbal/tactile cueing for activities related to strengthening, flexibility, endurance, ROM  for improvements in scapular, scapulothoracic and UE control with self care, reaching, carrying, lifting, house/yardwork, driving/computer work.    [] (85557) Provided verbal/tactile cueing for activities related to improving balance, coordination, kinesthetic sense, posture, motor skill, proprioception  to assist with  scapular, scapulothoracic and UE control with self care, reaching, carrying, lifting, house/yardwork, driving/computer work. Therapeutic Activities:    [x] (06789 or 93208) Provided verbal/tactile cueing for activities related to improving balance, coordination, kinesthetic sense, posture, motor skill, proprioception and motor activation to allow for proper function of scapular, scapulothoracic and UE control with self care, carrying, lifting, driving/computer work.      Home Exercise Program:    [x] (61327) Reviewed/Progressed HEP activities related to strengthening, flexibility, endurance, ROM of scapular, scapulothoracic and UE control with self care, reaching, carrying, lifting, house/yardwork, driving/computer work  [] (91888) Reviewed/Progressed HEP activities related to improving balance, coordination, kinesthetic sense, posture, motor skill, proprioception of scapular, scapulothoracic and UE control with self care, reaching, carrying, lifting, house/yardwork, driving/computer work      Manual Treatments:  PROM / STM / Oscillations-Mobs:  G-I, II, III, IV (PA's, Inf., Post.)  [] (64421) Provided manual therapy to mobilize soft tissue/joints of cervical/CT, scapular GHJ and UE for the purpose of modulating pain, promoting relaxation,  increasing ROM, reducing/eliminating soft tissue swelling/inflammation/restriction, improving soft tissue extensibility and allowing for proper ROM for normal function with self care, reaching, carrying, lifting, house/yardwork, driving/computer work    Modalities:  CP x15'   [] GAME READY (VASO)- for significant edema, swelling, pain control. Charges:  Timed Code Treatment Minutes: 40   Total Treatment Minutes: 55      [] EVAL (LOW) 98034 (typically 20 minutes face-to-face)  [] EVAL (MOD) 38562 (typically 30 minutes face-to-face)  [] EVAL (HIGH) 85037 (typically 45 minutes face-to-face)  [] RE-EVAL     [x] WR(21945) x 1 (x15')    [] IONTO  [x] NMR (83733) x 1 (x15')    [] VASO  [] Manual (78320) x     [] Other:  [x] TA x 1 (x10')     [] Mech Traction (52258)  [] ES(attended) (50036)      [] ES (un) (84596):         ASSESSMENT:    She performs all well. We added in ball on the wall and she had good control without any increase in pain. The one exercise she did struggle with was resisted ER. She had increased discomfort, so we only had her go out about 50% of the motion. GOALS:  Patient stated goal: decrease pain and able to sleep  [] Progressing: [] Met: [] Not Met: [] Adjusted    Therapist goals for Patient:   Short Term Goals: To be achieved in: 2 weeks  1. Independent in HEP and progression per patient tolerance, in order to prevent re-injury. [] Progressing: [] Met: [] Not Met: [] Adjusted  2. Patient will have a decrease in pain to facilitate improvement in movement, function, and ADLs as indicated by Functional Deficits. [] Progressing: [] Met: [] Not Met: [] Adjusted    Long Term Goals: To be achieved in: 4 weeks  1. Disability index score of 20% or less for the DASH to assist with reaching prior level of function. [] Progressing: [] Met: [] Not Met: [] Adjusted  2. Patient will demonstrate increased AROM to 80-90% of normal to allow for proper joint functioning as indicated by patients Functional Deficits. [] Progressing: [] Met: [] Not Met: [] Adjusted  3. Patient will demonstrate an increase in Strength by 1/2 grade to allow for proper functional mobility as indicated by patients Functional Deficits. [] Progressing: [] Met: [] Not Met: [] Adjusted  4. Patient will return to prior functional activities without increased symptoms or restriction. [] Progressing: [] Met: [] Not Met: [] Adjusted  5. Able to resume some of her gym activities/possibly part time work as a . [] Progressing: [] Met: [] Not Met: [] Adjusted       Overall Progression Towards Functional goals/ Treatment Progress Update:  [] Patient is progressing as expected towards functional goals listed. [] Progression is slowed due to complexities/Impairments listed. [] Progression has been slowed due to co-morbidities. [x] Plan just implemented, too soon to assess goals progression <30days   [] Goals require adjustment due to lack of progress  [] Patient is not progressing as expected and requires additional follow up with physician  [] Other    Prognosis for POC: [x] Good [] Fair  [] Poor      Patient requires continued skilled intervention: [x] Yes  [] No    Treatment/Activity Tolerance:  [x] Patient able to complete treatment  [] Patient limited by fatigue  [] Patient limited by pain    [] Patient limited by other medical complications  [] Other:           PLAN: See eval  Plan to schedule knee pre op eval.   Do one day a week for her knee and one day a week for her shoulder  [] Continue per plan of care [] Alter current plan   [x] Plan of care initiated [] Hold pending MD visit [] Discharge      Electronically signed by:      Rajani Ratliff PT     [place] PT #[id number]      Note: If patient does not return for scheduled/ recommended follow up visits, this note will serve as a discharge from care along with most recent update on progress.

## 2021-07-21 ENCOUNTER — TREATMENT (OUTPATIENT)
Dept: PHYSICAL THERAPY | Age: 71
End: 2021-07-21
Payer: MEDICARE

## 2021-07-21 DIAGNOSIS — M17.11 PRIMARY OSTEOARTHRITIS OF RIGHT KNEE: ICD-10-CM

## 2021-07-21 DIAGNOSIS — G89.29 CHRONIC PAIN OF RIGHT KNEE: Primary | ICD-10-CM

## 2021-07-21 DIAGNOSIS — M25.561 CHRONIC PAIN OF RIGHT KNEE: Primary | ICD-10-CM

## 2021-07-21 PROCEDURE — 97112 NEUROMUSCULAR REEDUCATION: CPT | Performed by: PHYSICAL THERAPIST

## 2021-07-21 PROCEDURE — 97110 THERAPEUTIC EXERCISES: CPT | Performed by: PHYSICAL THERAPIST

## 2021-07-21 PROCEDURE — G8427 DOCREV CUR MEDS BY ELIG CLIN: HCPCS | Performed by: PHYSICAL THERAPIST

## 2021-07-21 PROCEDURE — 97016 VASOPNEUMATIC DEVICE THERAPY: CPT | Performed by: PHYSICAL THERAPIST

## 2021-07-21 PROCEDURE — 97530 THERAPEUTIC ACTIVITIES: CPT | Performed by: PHYSICAL THERAPIST

## 2021-07-21 NOTE — PROGRESS NOTES
[facility]   Phone: [phone]    Fax: [phone]      Physical Therapy Treatment Note/ Progress Report:           Date:  2021    Patient Name:  Shayy Millard    :  1950  MRN: <[id number]>  Restrictions/Precautions:    Medical/Treatment Diagnosis Information:  · Diagnosis: Right knee OA/surgical optimization     Insurance/Certification information:  PT Insurance Information: Medicare  Physician Information:  Referring Practitioner: Dr Mickey Mcneal  Has the plan of care been signed (Y/N):        []  Yes  [x]  No     Date of Patient follow up with Physician: DOS for right TKA  2021      Is this a Progress Report:     []  Yes  [x]  No        If Yes:  Date Range for reporting period:  Beginning 2021  Ending  2021    Progress report will be due (10 Rx or 30 days whichever is less):       Recertification will be due (POC Duration  / 90 days whichever is less): 2021        Visit # Insurance Allowable Auth Required   5 MEDICARE ([address] [place] [address]) []  Yes []  No    Has had 2 visits for shoulder (still being see currently for shoulder as well)    Functional Scale: KOS ADLs  64% disability   Date assessed:  2021      Latex Allergy:  [x]NO      []YES  Preferred Language for Healthcare:   [x]English       []other:    PQRS:  2021  Reviewed medication list with patient. No changes since last PT visit. Pain level:  6-7/10     SUBJECTIVE:   She reports that her knee is not too painful. Really only feels the discomfort if she pushes on it. She does notice some \"snapping\" in her knee at times when walking.               OBJECTIVE:     Flexibility L R Comment   Hamstring WNL Slight restriction 2021   Gastroc      ITB      Quad                ROM PROM AROM-supine Overpressure Comment    L R L R L R 2021   Flexion   124 124      Extension   0 0                              Strength L R Comment   Quad 5/5 4/5* 2021   Hamstring 5/5 4+/5 Hip abd 4-5 4-/5                  Girth  7/16/2021 L R   Mid Patella 46.4 49.5   Suprapatellar     5cm above     15cm above         Special Test  7/16/2021 Results/Comment   Meniscal Click    Crepitus Mild on right   Flexion Test    Valgus Laxity    Varus Laxity    Lachmans    Drop Back              RESTRICTIONS/PRECAUTIONS: balance at times is an issue    Exercises/Interventions:     Therapeutic Ex (62734) Sets/sec Reps Notes/CUES   BIKE      TREADMILL            STRETCHING      Towel Pull Calf      Inclined Calf 30\" x5    Hamstrings 30\" x5 seated   Quads      Hip Flexors      ITB      Adductors            ROM      Passive      Active      Weight Hangs      Sheet Pulls      Ankle Pumps      ERMI            STRENGTHENING      Quad Sets      Ham Sets      Hip ADD Sets BS 5\" x30    SLR Flexion 3 x10    SLR Abduction npv     SLR Prone      SLR Adduction      Hooklying clams 1 x30 Green loop   SDLY clams- each side 1 x30 Green loop   PRE Machines      Knee Extension      Knee Flexion      Leg Press            CKC      Calf Raises      Mini Squats      Wall Sits      Step ups      TKE                  Manual Intervention (88478)      Patellar Mobs      Femoral Tibial mobs      STM      Scar Massage      Foam Roll            NMR re-education (09733)   CUES NEEDED   Biodex Balance WS L10 fw/bw and sd/sd  LOS L10 small  x2' each      x3 trials    Tandem Balance      SLB      Rebounder      BOSU                              Therapeutic Activity (00009)      Core Training      [employer] Activities      Monster Walks      TRX training      education x10'  Discussed anatomy and pathology with patient, reviewed [address] [place] [address] program, reviewed OA precautions               Therapeutic Exercise and NMR EXR  [x] (69762) Provided verbal/tactile cueing for activities related to strengthening, flexibility, endurance, ROM for improvements in LE, proximal hip, and core control with self care, mobility, lifting, ambulation.   [x] (46742) Provided verbal/tactile cueing for activities related to improving balance, coordination, kinesthetic sense, posture, motor skill, proprioception to assist with LE, proximal hip, and core control in self-care, mobility, lifting, ambulation and eccentric single leg control. NMR and Therapeutic Activities:    [x] (83986 or 49603) Provided verbal/tactile cueing for activities related to improving balance, coordination, kinesthetic sense, posture, motor skill, proprioception and motor activation to allow for proper function of core, proximal hip and LE with self-care and ADLs and functional mobility.   [] (88022) Gait Re-education- Provided training and instruction to the patient for proper LE, core and proximal hip recruitment and positioning and eccentric body weight control with ambulation re-education including up and down stairs     Home Exercise Program:    [x] (75324) Reviewed/Progressed HEP activities related to strengthening, flexibility, endurance, ROM of core, proximal hip and LE for functional self-care, mobility, lifting and ambulation/stair navigation   [] (40191) Reviewed/Progressed HEP activities related to improving balance, coordination, kinesthetic sense, posture, motor skill, proprioception of core, proximal hip and LE for self-care, mobility, lifting, and ambulation/stair navigation      Manual Treatments:  PROM / STM / Oscillations-Mobs:  G-I, II, III, IV (PA's, Inf., Post.)  [] (95250) Provided manual therapy to mobilize LE, proximal hip and/or LS spine soft tissue/joints for the purpose of modulating pain, promoting relaxation, increasing ROM, reducing/eliminating soft tissue swelling/inflammation/restriction, improving soft tissue extensibility and allowing for proper ROM for normal function with self-care, mobility, lifting and ambulation. Modalities:     [x] GAME READY (VASO)- for significant edema, swelling, pain control.      Charges:  Timed Code Treatment Minutes: 40   Total Treatment Minutes: 65      [] EVAL (LOW) 96567 (typically 20 minutes face-to-face)  [] EVAL (MOD) 31781 (typically 30 minutes face-to-face)  [] EVAL (HIGH) 52384 (typically 45 minutes face-to-face)  [] RE-EVAL     [x] KI(07042) x1 (x15')     [] IONTO  [x] NMR (04694) x (x15')     [x] VASO Right knee x15' medium pressure  [] Manual (33904) x     [] Other:  [x] TA x  1 (x10')    [] Mech Traction (88027)  [] ES(attended) (61528)      [] ES (un) (21865):         ASSESSMENT:    We reviewed her HEP today and she has good recollection. She was able to do more reps of SLR today into flexion. Both of her hips are weak as demonstrated by fatigue with clams. We added in Biodex for balance activities. She was challenged at first but her balance did improve with reps and time. She really likes the game ready/vasopneumatic cold device, feels like she gets good relief with this. GOALS:  Patient stated goal: strengthen LE for surgery  [] Progressing: [] Met: [] Not Met: [] Adjusted    Therapist goals for Patient:   Short Term Goals: To be achieved in: 2 weeks  1. Independent in HEP and progression per patient tolerance, in order to prevent re-injury. [] Progressing: [] Met: [] Not Met: [] Adjusted  2. Patient will have a decrease in pain to facilitate improvement in movement, function, and ADLs as indicated by Functional Deficits. [] Progressing: [] Met: [] Not Met: [] Adjusted    Long Term Goals: To be achieved in: 4-6 weeks  1. Disability index score of 20% or less for the KOS to assist with reaching prior level of function. [] Progressing: [] Met: [] Not Met: [] Adjusted  2. Patient will demonstrate increased AROM to 0-125 to allow for proper joint functioning as indicated by patients Functional Deficits. [] Progressing: [] Met: [] Not Met: [] Adjusted  3. Patient will demonstrate an increase in Strength by 1/2 grade to allow for proper functional mobility as indicated by patients Functional Deficits. [] Progressing: [] Met: [] Not Met: [] Adjusted  4. Patient will return to low level prior functional activities without increased symptoms or restriction. [] Progressing: [] Met: [] Not Met: [] Adjusted  5. Fully prepared for surgery    [] Progressing: [] Met: [] Not Met: [] Adjusted       Overall Progression Towards Functional goals/ Treatment Progress Update:  [] Patient is progressing as expected towards functional goals listed. [] Progression is slowed due to complexities/Impairments listed. [] Progression has been slowed due to co-morbidities. [x] Plan just implemented, too soon to assess goals progression <30days   [] Goals require adjustment due to lack of progress  [] Patient is not progressing as expected and requires additional follow up with physician  [] Other    Prognosis for POC: [x] Good [] Fair  [] Poor      Patient requires continued skilled intervention: [x] Yes  [] No    Treatment/Activity Tolerance:  [x] Patient able to complete treatment  [] Patient limited by fatigue  [] Patient limited by pain    [] Patient limited by other medical complications  [] Other:         PLAN: See eval   Cont 1x/week up until surgery which is 8/11/2021  [] Continue per plan of care [] Alter current plan   [x] Plan of care initiated [] Hold pending MD visit [] Discharge      Electronically signed by:      Annita Sneed  PT #[id number]  [place] PT #[id number]      Note: If patient does not return for scheduled/ recommended follow up visits, this note will serve as a discharge from care along with most recent update on progress.

## 2021-07-27 ENCOUNTER — TREATMENT (OUTPATIENT)
Dept: PHYSICAL THERAPY | Age: 71
End: 2021-07-27
Payer: MEDICARE

## 2021-07-27 DIAGNOSIS — M25.511 CHRONIC RIGHT SHOULDER PAIN: Primary | ICD-10-CM

## 2021-07-27 DIAGNOSIS — M75.101 NONTRAUMATIC TEAR OF RIGHT ROTATOR CUFF, UNSPECIFIED TEAR EXTENT: ICD-10-CM

## 2021-07-27 DIAGNOSIS — G89.29 CHRONIC RIGHT SHOULDER PAIN: Primary | ICD-10-CM

## 2021-07-27 PROCEDURE — 97110 THERAPEUTIC EXERCISES: CPT | Performed by: PHYSICAL THERAPIST

## 2021-07-27 PROCEDURE — 97112 NEUROMUSCULAR REEDUCATION: CPT | Performed by: PHYSICAL THERAPIST

## 2021-07-27 PROCEDURE — G8427 DOCREV CUR MEDS BY ELIG CLIN: HCPCS | Performed by: PHYSICAL THERAPIST

## 2021-07-27 PROCEDURE — 97530 THERAPEUTIC ACTIVITIES: CPT | Performed by: PHYSICAL THERAPIST

## 2021-07-27 NOTE — PROGRESS NOTES
[facility]   Phone: [phone]    Fax: [phone]      Physical Therapy Treatment Note/ Progress Report:           Date:  2021    Patient Name:  Richelle Cao    :  1950  MRN: <[id number]>  Restrictions/Precautions:    Medical/Treatment Diagnosis Information:  · Diagnosis: Right shoulder chronic RTC tear     Insurance/Certification information:  PT Insurance Information: Medicare  Physician Information:  Referring Practitioner: Dr Mickey Miguel  Has the plan of care been signed (Y/N):        []  Yes  [x]  No     Date of Patient follow up with Physician: 8/10/2021      Is this a Progress Report:     []  Yes  [x]  No        If Yes:  Date Range for reporting period:  Beginning  2021  Ending 2021    Progress report will be due (10 Rx or 30 days whichever is less):        Recertification will be due (POC Duration  / 90 days whichever is less): 2021     PQRS: 2021  Reviewed medication list with patient. No changes since last PT visit. Visit # Insurance Allowable Auth Required   6 Medicare []  Yes []  No        Functional Scale: Quick Dash 36%   Date assessed:  2021      Latex Allergy:  [x]NO      []YES  Preferred Language for Healthcare:   [x]English       []other:      Pain level:  5-6/10     SUBJECTIVE:    She states she has been having a rough couple of days. Has had a recent flare up of colitis. And her shoulder has been bothering her more lately too. She has been sleeping well and just feels really run down.           OBJECTIVE:     ROM PROM AROM  Comment    L R L R 2021   Flexion   140 150    Abduction        ER@ 90   83 87    IR        Other        Other             Strength L R Comment   Flexion 4+/5 4/5* 2021   Abduction 4+/5 4/5*    ER 5/5 4/5*    IR 5/5 4+/5    Supraspinatus      Upper Trap      Lower Trap      Mid Trap      Rhomboids      Biceps      Triceps      Horizontal Abduction      Horizontal Adduction Lats          Special Tests  7/6/2021 Results/Comment   Cassidy (-)   Neers    Speeds    OBriens    Empty can (+) pain and weakness   Neurologic Signs    Functional Reach OH/ER  Left:  T2                 Right: T4  BB/IR  Left:    T10                Right: T110               RESTRICTIONS/PRECAUTIONS: none    Exercises/Interventions:     Therapeutic Ex (34823) Sets/sec Reps Notes/CUES   AD UE BIKE            STRETCHING/ROM      Pulleys x5'     Table Slides      Wand-inclined flexion x5\" x15    UE [place]      Pendulum      Doorway      Wall Slides-flexion  Wall slides-scaption x5\"  x5\" x15  x15    CBA      TP BB      Sleeper       Elbow PROM/AROM                  ISOMETRICS      Gripping      Retraction      Abduction      Flexion      Internal Rotation      External Rotation            STRENGTHENING-PREs      Flexion      Abduction      Internal Rotation      External Rotation      Shrugs      Biceps      Triceps      Retraction      Extension      Horizontal Abduction in ER      Serratus                        THERABANDS/CABLE COLUMN      Rows x30 blue    Lats      Extension      Internal Rotation x30 green    External Rotation x30 yellow    Biceps      Triceps      PNF                                    Manual Intervention (22937)      Scar Massage      STM      Hawkgrips      GH joint mobilizations      Foamroll                  NMR re-education (48964)   CUES NEEDED   Plyoback      Therabar oscillations      Body Blade       Rhythmic Stabilization x20  Supine UE at 90 4 ways   Ball on the wall x20  gymball 4 ways                           Therapeutic Activity (52536)      Core training      Swiss ball activities      Education x10'  Reviewed anatomy and pathology; discussed precautions with overhead activity; expectations                           Therapeutic Exercise and NMR EXR  [x] (63109) Provided verbal/tactile cueing for activities related to strengthening, flexibility, endurance, ROM  for improvements in scapular, scapulothoracic and UE control with self care, reaching, carrying, lifting, house/yardwork, driving/computer work.    [] (95866) Provided verbal/tactile cueing for activities related to improving balance, coordination, kinesthetic sense, posture, motor skill, proprioception  to assist with  scapular, scapulothoracic and UE control with self care, reaching, carrying, lifting, house/yardwork, driving/computer work. Therapeutic Activities:    [x] (07547 or 95999) Provided verbal/tactile cueing for activities related to improving balance, coordination, kinesthetic sense, posture, motor skill, proprioception and motor activation to allow for proper function of scapular, scapulothoracic and UE control with self care, carrying, lifting, driving/computer work. Home Exercise Program:    [x] (24246) Reviewed/Progressed HEP activities related to strengthening, flexibility, endurance, ROM of scapular, scapulothoracic and UE control with self care, reaching, carrying, lifting, house/yardwork, driving/computer work  [] (24989) Reviewed/Progressed HEP activities related to improving balance, coordination, kinesthetic sense, posture, motor skill, proprioception of scapular, scapulothoracic and UE control with self care, reaching, carrying, lifting, house/yardwork, driving/computer work      Manual Treatments:  PROM / STM / Oscillations-Mobs:  G-I, II, III, IV (PA's, Inf., Post.)  [] (20261) Provided manual therapy to mobilize soft tissue/joints of cervical/CT, scapular GHJ and UE for the purpose of modulating pain, promoting relaxation,  increasing ROM, reducing/eliminating soft tissue swelling/inflammation/restriction, improving soft tissue extensibility and allowing for proper ROM for normal function with self care, reaching, carrying, lifting, house/yardwork, driving/computer work    Modalities:  CP x15'   [] GAME READY (VASO)- for significant edema, swelling, pain control.     Charges:  Timed Code Treatment Minutes: 40   Total Treatment Minutes: 55      [] EVAL (LOW) 69839 (typically 20 minutes face-to-face)  [] EVAL (MOD) 31611 (typically 30 minutes face-to-face)  [] EVAL (HIGH) 26764 (typically 45 minutes face-to-face)  [] RE-EVAL     [x] DL(53207) x 1 (x15')    [] IONTO  [x] NMR (05997) x 1 (x15')    [] VASO  [] Manual (55055) x     [] Other:  [x] TA x 1 (x10')     [] Mech Traction (24592)  [] ES(attended) (20808)      [] ES (un) (44772): Access Code: [email or web address]    ASSESSMENT:    She performs most exercises very well. Again, most challenged with resisted ER. Only going out to neutral.    Also limited the range in how far she rolls the ball on the wall. Some intermittent popping noted in her shoulder. Most of her discomfort is localized to her upper arm area. She is maintaining good AAROM throughout all planes. Advised her to be sure she is giving her arm a rest throughout the day and using her ice multiple times throughout the day. GOALS:  Patient stated goal: decrease pain and able to sleep  [] Progressing: [] Met: [] Not Met: [] Adjusted    Therapist goals for Patient:   Short Term Goals: To be achieved in: 2 weeks  1. Independent in HEP and progression per patient tolerance, in order to prevent re-injury. [] Progressing: [] Met: [] Not Met: [] Adjusted  2. Patient will have a decrease in pain to facilitate improvement in movement, function, and ADLs as indicated by Functional Deficits. [] Progressing: [] Met: [] Not Met: [] Adjusted    Long Term Goals: To be achieved in: 4 weeks  1. Disability index score of 20% or less for the DASH to assist with reaching prior level of function. [] Progressing: [] Met: [] Not Met: [] Adjusted  2. Patient will demonstrate increased AROM to 80-90% of normal to allow for proper joint functioning as indicated by patients Functional Deficits. [] Progressing: [] Met: [] Not Met: [] Adjusted  3.  Patient will demonstrate an increase in Strength by 1/2 grade to allow for proper functional mobility as indicated by patients Functional Deficits. [] Progressing: [] Met: [] Not Met: [] Adjusted  4. Patient will return to prior functional activities without increased symptoms or restriction. [] Progressing: [] Met: [] Not Met: [] Adjusted  5. Able to resume some of her gym activities/possibly part time work as a . [] Progressing: [] Met: [] Not Met: [] Adjusted       Overall Progression Towards Functional goals/ Treatment Progress Update:  [] Patient is progressing as expected towards functional goals listed. [] Progression is slowed due to complexities/Impairments listed. [] Progression has been slowed due to co-morbidities. [x] Plan just implemented, too soon to assess goals progression <30days   [] Goals require adjustment due to lack of progress  [] Patient is not progressing as expected and requires additional follow up with physician  [] Other    Prognosis for POC: [x] Good [] Fair  [] Poor      Patient requires continued skilled intervention: [x] Yes  [] No    Treatment/Activity Tolerance:  [x] Patient able to complete treatment  [] Patient limited by fatigue  [] Patient limited by pain    [] Patient limited by other medical complications  [] Other:           PLAN: See eval  Plan to schedule knee pre op eval.   Do one day a week for her knee and one day a week for her shoulder  [] Continue per plan of care [] Alter current plan   [x] Plan of care initiated [] Hold pending MD visit [] Discharge      Electronically signed by:      Deya Chavez, PT     [place] PT #[id number]      Note: If patient does not return for scheduled/ recommended follow up visits, this note will serve as a discharge from care along with most recent update on progress.

## 2021-07-28 ENCOUNTER — TREATMENT (OUTPATIENT)
Dept: PHYSICAL THERAPY | Age: 71
End: 2021-07-28
Payer: MEDICARE

## 2021-07-28 DIAGNOSIS — M17.11 PRIMARY OSTEOARTHRITIS OF RIGHT KNEE: ICD-10-CM

## 2021-07-28 DIAGNOSIS — M25.561 CHRONIC PAIN OF RIGHT KNEE: Primary | ICD-10-CM

## 2021-07-28 DIAGNOSIS — G89.29 CHRONIC PAIN OF RIGHT KNEE: Primary | ICD-10-CM

## 2021-07-28 PROCEDURE — 97530 THERAPEUTIC ACTIVITIES: CPT | Performed by: PHYSICAL THERAPIST

## 2021-07-28 PROCEDURE — 97016 VASOPNEUMATIC DEVICE THERAPY: CPT | Performed by: PHYSICAL THERAPIST

## 2021-07-28 PROCEDURE — 97110 THERAPEUTIC EXERCISES: CPT | Performed by: PHYSICAL THERAPIST

## 2021-07-28 PROCEDURE — 97112 NEUROMUSCULAR REEDUCATION: CPT | Performed by: PHYSICAL THERAPIST

## 2021-07-28 PROCEDURE — G8427 DOCREV CUR MEDS BY ELIG CLIN: HCPCS | Performed by: PHYSICAL THERAPIST

## 2021-07-28 NOTE — PROGRESS NOTES
[facility]   Phone: [phone]    Fax: [phone]      Physical Therapy Treatment Note/ Progress Report:           Date:  2021    Patient Name:  Sandy Byers    :  1950  MRN: <[id number]>  Restrictions/Precautions:    Medical/Treatment Diagnosis Information:  · Diagnosis: Right knee OA/surgical optimization     Insurance/Certification information:  PT Insurance Information: Medicare  Physician Information:  Referring Practitioner: Dr Roxane Landry  Has the plan of care been signed (Y/N):        []  Yes  [x]  No     Date of Patient follow up with Physician: DOS for right TKA  2021      Is this a Progress Report:     []  Yes  [x]  No        If Yes:  Date Range for reporting period:  Beginning 2021  Ending  2021    Progress report will be due (10 Rx or 30 days whichever is less):       Recertification will be due (POC Duration  / 90 days whichever is less): 2021        Visit # Insurance Allowable Auth Required   7 MEDICARE ([address] [place] [address]) []  Yes []  No    Has had 2 visits for shoulder (still being see currently for shoulder as well)    Functional Scale: KOS ADLs  64% disability   Date assessed:  2021      Latex Allergy:  [x]NO      []YES  Preferred Language for Healthcare:   [x]English       []other:    PQRS:  2021  Reviewed medication list with patient. No changes since last PT visit. Pain level:  6-7/10     SUBJECTIVE:   She is planning to see Dr Roxane Landry tomorrow for her pre op visit. She has been having more discomfort in the front of her right thigh. She is not sure if this is from her knee or from her back. She has had radiculopathy in the past.    This causes her difficulty when lifting her right LE.             OBJECTIVE:     Flexibility L R Comment   Hamstring WNL Slight restriction 2021   Gastroc      ITB      Quad                ROM PROM AROM-supine Overpressure Comment    L R L R L R 2021   Flexion   124 124 Extension   0 0                              Strength L R Comment   Quad 5/5 4/5* 7/16/2021   Hamstring 5/5 4+/5    Hip abd 4-5 4-/5                  Girth  7/16/2021 L R   Mid Patella 46.4 49.5   Suprapatellar     5cm above     15cm above         Special Test  7/16/2021 Results/Comment   Meniscal Click    Crepitus Mild on right   Flexion Test    Valgus Laxity    Varus Laxity    Lachmans    Drop Back              RESTRICTIONS/PRECAUTIONS: balance at times is an issue    Exercises/Interventions:     Therapeutic Ex (19843) Sets/sec Reps Notes/CUES   BIKE      TREADMILL            STRETCHING      Towel Pull Calf      Inclined Calf 30\" x5    Hamstrings 30\" x5 seated   Quads      Hip Flexors      ITB      Adductors            ROM      Passive      Active      Weight Hangs      Sheet Pulls      Ankle Pumps      ERMI            STRENGTHENING      Quad Sets 5\" x15 bilat pushing into towel roll   Ham Sets      Hip ADD Sets BS 5\" x30    SLR Flexion 3 x10    SLR Abduction npv     SLR Prone      SLR Adduction      Hooklying clams 1 x30 Green loop   SDLY clams- each side 1 x30 Green loop   PRE Machines      Knee Extension      Knee Flexion      Leg Press            CKC      Calf Raises      Mini Squats      Wall Sits      Step ups      TKE                  Manual Intervention (47289)      Patellar Mobs      Femoral Tibial mobs      STM      Scar Massage      Foam Roll x5'  To right anterior thigh         NMR re-education (65421)   CUES NEEDED   Biodex Balance WS L10 fw/bw and sd/sd  LOS L10 small  x2' each      x3 trials    Tandem Balance      SLB      Rebounder      BOSU                              Therapeutic Activity (12181)      Core Training      [employer] Activities      Monster Walks      TRX training      education x10'  Discussed anatomy and pathology with patient, reviewed [address] [place] [address] program, reviewed OA precautions               Therapeutic Exercise and NMR EXR  [x] (72257) Provided verbal/tactile cueing for activities related to strengthening, flexibility, endurance, ROM for improvements in LE, proximal hip, and core control with self care, mobility, lifting, ambulation. [x] (46204) Provided verbal/tactile cueing for activities related to improving balance, coordination, kinesthetic sense, posture, motor skill, proprioception to assist with LE, proximal hip, and core control in self-care, mobility, lifting, ambulation and eccentric single leg control.      NMR and Therapeutic Activities:    [x] (39879 or 54519) Provided verbal/tactile cueing for activities related to improving balance, coordination, kinesthetic sense, posture, motor skill, proprioception and motor activation to allow for proper function of core, proximal hip and LE with self-care and ADLs and functional mobility.   [] (94180) Gait Re-education- Provided training and instruction to the patient for proper LE, core and proximal hip recruitment and positioning and eccentric body weight control with ambulation re-education including up and down stairs     Home Exercise Program:    [x] (97602) Reviewed/Progressed HEP activities related to strengthening, flexibility, endurance, ROM of core, proximal hip and LE for functional self-care, mobility, lifting and ambulation/stair navigation   [] (69627) Reviewed/Progressed HEP activities related to improving balance, coordination, kinesthetic sense, posture, motor skill, proprioception of core, proximal hip and LE for self-care, mobility, lifting, and ambulation/stair navigation      Manual Treatments:  PROM / STM / Oscillations-Mobs:  G-I, II, III, IV (PA's, Inf., Post.)  [] (90617) Provided manual therapy to mobilize LE, proximal hip and/or LS spine soft tissue/joints for the purpose of modulating pain, promoting relaxation, increasing ROM, reducing/eliminating soft tissue swelling/inflammation/restriction, improving soft tissue extensibility and allowing for proper ROM for normal function with self-care, mobility, lifting and ambulation. Modalities:     [x] GAME READY (VASO)- for significant edema, swelling, pain control. Charges:  Timed Code Treatment Minutes: 40   Total Treatment Minutes: 65      [] EVAL (LOW) 55811 (typically 20 minutes face-to-face)  [] EVAL (MOD) 78018 (typically 30 minutes face-to-face)  [] EVAL (HIGH) 70697 (typically 45 minutes face-to-face)  [] RE-EVAL     [x] PJ(54111) x1 (x15')     [] IONTO  [x] NMR (86750) x (x15')     [x] VASO Right knee x15' medium pressure  [] Manual (97744) x     [] Other:  [x] TA x  1 (x10')    [] Mech Traction (70492)  [] ES(attended) (44319)      [] ES (un) (35240):         ASSESSMENT:    Reminded her to ask Dr Samreen Bowman tomorrow how soon he wants her to get in to start PT back with us when she is post op. She really struggled with SLR today. Could only do a few reps and we did seated knee extension to supplement. She was able to complete all activities on the Biodex today with light assist from her hands. There may be some involvement from her lumbar spine as she has had radiculopathy in the past.          GOALS:  Patient stated goal: strengthen LE for surgery  [] Progressing: [] Met: [] Not Met: [] Adjusted    Therapist goals for Patient:   Short Term Goals: To be achieved in: 2 weeks  1. Independent in HEP and progression per patient tolerance, in order to prevent re-injury. [] Progressing: [] Met: [] Not Met: [] Adjusted  2. Patient will have a decrease in pain to facilitate improvement in movement, function, and ADLs as indicated by Functional Deficits. [] Progressing: [] Met: [] Not Met: [] Adjusted    Long Term Goals: To be achieved in: 4-6 weeks  1. Disability index score of 20% or less for the KOS to assist with reaching prior level of function. [] Progressing: [] Met: [] Not Met: [] Adjusted  2. Patient will demonstrate increased AROM to 0-125 to allow for proper joint functioning as indicated by patients Functional Deficits.    [] Progressing: [] Met: [] Not Met: [] Adjusted  3. Patient will demonstrate an increase in Strength by 1/2 grade to allow for proper functional mobility as indicated by patients Functional Deficits. [] Progressing: [] Met: [] Not Met: [] Adjusted  4. Patient will return to low level prior functional activities without increased symptoms or restriction. [] Progressing: [] Met: [] Not Met: [] Adjusted  5. Fully prepared for surgery    [] Progressing: [] Met: [] Not Met: [] Adjusted       Overall Progression Towards Functional goals/ Treatment Progress Update:  [] Patient is progressing as expected towards functional goals listed. [] Progression is slowed due to complexities/Impairments listed. [] Progression has been slowed due to co-morbidities. [x] Plan just implemented, too soon to assess goals progression <30days   [] Goals require adjustment due to lack of progress  [] Patient is not progressing as expected and requires additional follow up with physician  [] Other    Prognosis for POC: [x] Good [] Fair  [] Poor      Patient requires continued skilled intervention: [x] Yes  [] No    Treatment/Activity Tolerance:  [x] Patient able to complete treatment  [] Patient limited by fatigue  [] Patient limited by pain    [] Patient limited by other medical complications  [] Other:         PLAN: See eval   Cont 1x/week up until surgery which is 8/11/2021  [] Continue per plan of care [] Alter current plan   [x] Plan of care initiated [] Hold pending MD visit [] Discharge      Electronically signed by:      Annita Norton  PT #[id number]  [place] PT #[id number]      Note: If patient does not return for scheduled/ recommended follow up visits, this note will serve as a discharge from care along with most recent update on progress.

## 2021-08-02 ENCOUNTER — TREATMENT (OUTPATIENT)
Dept: PHYSICAL THERAPY | Age: 71
End: 2021-08-02
Payer: MEDICARE

## 2021-08-02 DIAGNOSIS — M25.511 CHRONIC RIGHT SHOULDER PAIN: Primary | ICD-10-CM

## 2021-08-02 DIAGNOSIS — G89.29 CHRONIC RIGHT SHOULDER PAIN: Primary | ICD-10-CM

## 2021-08-02 DIAGNOSIS — M75.101 NONTRAUMATIC TEAR OF RIGHT ROTATOR CUFF, UNSPECIFIED TEAR EXTENT: ICD-10-CM

## 2021-08-02 PROCEDURE — G8427 DOCREV CUR MEDS BY ELIG CLIN: HCPCS | Performed by: PHYSICAL THERAPIST

## 2021-08-02 PROCEDURE — 97112 NEUROMUSCULAR REEDUCATION: CPT | Performed by: PHYSICAL THERAPIST

## 2021-08-02 PROCEDURE — 97530 THERAPEUTIC ACTIVITIES: CPT | Performed by: PHYSICAL THERAPIST

## 2021-08-02 PROCEDURE — 97110 THERAPEUTIC EXERCISES: CPT | Performed by: PHYSICAL THERAPIST

## 2021-08-02 NOTE — PROGRESS NOTES
[facility]ran Agarwal [facility]   Phone: [phone]    Fax: [phone]      Physical Therapy Treatment Note/ Progress Report:           Date:  2021    Patient Name:  Leelee Troy    :  1950  MRN: <[id number]>  Restrictions/Precautions:    Medical/Treatment Diagnosis Information:  · Diagnosis: Right shoulder chronic RTC tear     Insurance/Certification information:  PT Insurance Information: Medicare  Physician Information:  Referring Practitioner: Dr Rosalind Flores  Has the plan of care been signed (Y/N):        []  Yes  [x]  No     Date of Patient follow up with Physician: 8/10/2021      Is this a Progress Report:     []  Yes  [x]  No        If Yes:  Date Range for reporting period:  Beginning  2021  Ending 2021    Progress report will be due (10 Rx or 30 days whichever is less): 5565       Recertification will be due (POC Duration  / 90 days whichever is less): 2021     PQRS: 2021  Reviewed medication list with patient. No changes since last PT visit. Visit # Insurance Allowable Auth Required   8 Medicare []  Yes []  No        Functional Scale: Quick Dash 36%   Date assessed:  2021      Jodi Roth 2021  64% disability    Latex Allergy:  [x]NO      []YES  Preferred Language for Healthcare:   [x]English       []other:      Pain level:  5-6/10     SUBJECTIVE:    She has continued to have discomfort with her right shoulder. Feels like she is \"spinning her wheels\". Does feel a bit better this week. She has gotten back on Lyrica and that has seemed to help some. She cannot reach up and lift a coffee cup out of her kitchen cabinets with her right arm. Also difficulty with washing and grooming her hair, has to make modifications and compensate.               OBJECTIVE:     ROM PROM AROM  Comment    RUBA IBARRA 2021   Flexion   140 150*    Abduction    *    ER@ 90   83 87    IR        Other     * pain with mvmt   Other             Strength L R Comment   Flexion 4+/5 4/5* 8/2/2021   Abduction 4+/5 4/5*    ER 5/5 4/5* *denotes pain with resistance   IR 5/5 4+/5    Supraspinatus      Upper Trap      Lower Trap      Mid Trap      Rhomboids      Biceps      Triceps      Horizontal Abduction      Horizontal Adduction      Lats          Special Tests  7/6/2021 Results/Comment   Cassidy (-)   Neers    Speeds    OBriens    Empty can (+) pain and weakness   Neurologic Signs    Functional Reach OH/ER  Left:  T2                 Right: T4  BB/IR  Left:    T10                Right: T10               RESTRICTIONS/PRECAUTIONS: none    Exercises/Interventions:     Therapeutic Ex (30105) Sets/sec Reps Notes/CUES   AD UE BIKE            STRETCHING/ROM      Pulleys x5'     Table Slides      Wand-inclined flexion x5\" x15    UE [place]      Pendulum      Doorway      Wall Slides-flexion  Wall slides-scaption x5\"  x5\" x15  x15    CBA      TP BB      Sleeper       Elbow PROM/AROM                  ISOMETRICS      Gripping      Retraction      Abduction      Flexion      Internal Rotation      External Rotation            STRENGTHENING-PREs      Flexion      Abduction-SDLY x30  AROM   Internal Rotation      External Rotation-SDLY x30  AROM   Shrugs      Biceps      Triceps      Retraction      Extension      Horizontal Abduction in ER      Serratus                        THERABANDS/CABLE COLUMN      Rows x30 blue    Lats      Extension      Internal Rotation x30 green    External Rotation x30 yellow    Biceps      Triceps      PNF                                    Manual Intervention (65742)      Scar Massage      STM      Hawkgrips      GH joint mobilizations      Foamroll                  NMR re-education (11419)   CUES NEEDED   Plyoback      Therabar oscillations      Body Blade       Rhythmic Stabilization x30  Slightly inclined UE at 90 4 ways   Ball on the wall x30  gymball 4 ways                           Therapeutic Activity (97735)      Core training      [place]ozambican swelling/inflammation/restriction, improving soft tissue extensibility and allowing for proper ROM for normal function with self care, reaching, carrying, lifting, house/yardwork, driving/computer work    Modalities:  CP x15'   [] GAME READY (VASO)- for significant edema, swelling, pain control. Charges:  Timed Code Treatment Minutes: 40   Total Treatment Minutes: 55      [] EVAL (LOW) 36703 (typically 20 minutes face-to-face)  [] EVAL (MOD) 34785 (typically 30 minutes face-to-face)  [] EVAL (HIGH) 39603 (typically 45 minutes face-to-face)  [] RE-EVAL     [x] QO(68376) x 1 (x15')    [] IONTO  [x] NMR (01905) x 1 (x15')    [] VASO  [] Manual (34230) x     [] Other:  [x] TA x 1 (x10')     [] Mech Traction (45102)  [] ES(attended) (65537)      [] ES (un) (73857): Access Code: [email or web address]    ASSESSMENT:    Her functional outcome score has worsened today compared to about a month ago. Her active ranges are about the same but she has been with reaching out of the framework of her body. She has full and painfree PROM in all planes. Painful with resistance into all directions. She was more comfortable doing AROM in the sidelying position. Plan to hold off any further shoulder treatment until after her knee replacement. GOALS:  Patient stated goal: decrease pain and able to sleep  [x] Progressing: [] Met: [] Not Met: [] Adjusted    Therapist goals for Patient:   Short Term Goals: To be achieved in: 2 weeks  1. Independent in HEP and progression per patient tolerance, in order to prevent re-injury. [] Progressing: [x] Met: [] Not Met: [] Adjusted  2. Patient will have a decrease in pain to facilitate improvement in movement, function, and ADLs as indicated by Functional Deficits. [x] Progressing: [] Met: [] Not Met: [] Adjusted    Long Term Goals: To be achieved in: 4 weeks  1. Disability index score of 20% or less for the DASH to assist with reaching prior level of function.    [x] Progressing: [] Met: [] Not Met: [] Adjusted  2. Patient will demonstrate increased AROM to 80-90% of normal to allow for proper joint functioning as indicated by patients Functional Deficits. [x] Progressing: [] Met: [] Not Met: [] Adjusted  3. Patient will demonstrate an increase in Strength by 1/2 grade to allow for proper functional mobility as indicated by patients Functional Deficits. [x] Progressing: [] Met: [] Not Met: [] Adjusted  4. Patient will return to prior functional activities without increased symptoms or restriction. [x] Progressing: [] Met: [] Not Met: [] Adjusted  5. Able to resume some of her gym activities/possibly part time work as a . [x] Progressing: [] Met: [] Not Met: [] Adjusted       Overall Progression Towards Functional goals/ Treatment Progress Update:  [] Patient is progressing as expected towards functional goals listed. [] Progression is slowed due to complexities/Impairments listed. [] Progression has been slowed due to co-morbidities. [x] Plan just implemented, too soon to assess goals progression <30days   [] Goals require adjustment due to lack of progress  [] Patient is not progressing as expected and requires additional follow up with physician  [] Other    Prognosis for POC: [x] Good [] Fair  [] Poor      Patient requires continued skilled intervention: [x] Yes  [] No    Treatment/Activity Tolerance:  [x] Patient able to complete treatment  [] Patient limited by fatigue  [x] Patient limited by pain    [] Patient limited by other medical complications  [] Other:           PLAN: Plan to hold off therapy at this point until after she deals with her knee replacement. She will discuss more with Dr Bartolo Cruz next week.      [] Continue per plan of care [] Alter current plan   [] Plan of care initiated [] Hold pending MD visit [x] Discharge      Electronically signed by:      Annita Lee 40 PT #[id number]      Note: If patient does not return for scheduled/ recommended follow up visits, this note will serve as a discharge from care along with most recent update on progress.

## 2021-08-05 ENCOUNTER — TREATMENT (OUTPATIENT)
Dept: PHYSICAL THERAPY | Age: 71
End: 2021-08-05
Payer: MEDICARE

## 2021-08-05 DIAGNOSIS — M25.561 CHRONIC PAIN OF RIGHT KNEE: Primary | ICD-10-CM

## 2021-08-05 DIAGNOSIS — M17.11 PRIMARY OSTEOARTHRITIS OF RIGHT KNEE: ICD-10-CM

## 2021-08-05 DIAGNOSIS — G89.29 CHRONIC PAIN OF RIGHT KNEE: Primary | ICD-10-CM

## 2021-08-05 PROCEDURE — 97016 VASOPNEUMATIC DEVICE THERAPY: CPT | Performed by: PHYSICAL THERAPIST

## 2021-08-05 PROCEDURE — 97530 THERAPEUTIC ACTIVITIES: CPT | Performed by: PHYSICAL THERAPIST

## 2021-08-05 PROCEDURE — G8427 DOCREV CUR MEDS BY ELIG CLIN: HCPCS | Performed by: PHYSICAL THERAPIST

## 2021-08-05 PROCEDURE — 97110 THERAPEUTIC EXERCISES: CPT | Performed by: PHYSICAL THERAPIST

## 2021-08-05 PROCEDURE — 97112 NEUROMUSCULAR REEDUCATION: CPT | Performed by: PHYSICAL THERAPIST

## 2021-08-05 NOTE — PROGRESS NOTES
[facility] Agar[facility]   Phone: [phone]    Fax: [phone]      Physical Therapy Treatment Note/ Progress Report:           Date:  2021    Patient Name:  Dany Tolentino    :  1950  MRN: <[id number]>  Restrictions/Precautions:    Medical/Treatment Diagnosis Information:  · Diagnosis: Right knee OA/surgical optimization     Insurance/Certification information:  PT Insurance Information: Medicare  Physician Information:  Referring Practitioner: Dr Adam Ross  Has the plan of care been signed (Y/N):        []  Yes  [x]  No     Date of Patient follow up with Physician: DOS for right TKA  2021      Is this a Progress Report:     []  Yes  [x]  No        If Yes:  Date Range for reporting period:  Beginning 2021   Update npv  Ending  2021    Progress report will be due (10 Rx or 30 days whichever is less):       Recertification will be due (POC Duration  / 90 days whichever is less): 2021        Visit # Insurance Allowable Auth Required   9 MEDICARE ([address] [place] [address]) []  Yes []  No    Has had 2 visits for shoulder (still being see currently for shoulder as well)    Functional Scale: KOS ADLs  64% disability   Date assessed:  2021      Latex Allergy:  [x]NO      []YES  Preferred Language for Healthcare:   [x]English       []other:    PQRS:  2021  Reviewed medication list with patient. No changes since last PT visit. Pain level:  6-7/10     SUBJECTIVE:   She had her pre op visit with Dr Adam Ross last week and is all set for surgery on 2021 and she will be back here on that Friday. She is feeling good and is ready to get the surgery done. Her thigh is feeling ok today. Has good and bad days with the radicular pain.             OBJECTIVE:     Flexibility L R Comment   Hamstring WNL WNL 2021   Gastroc      ITB      Quad                ROM PROM AROM-supine Overpressure Comment    L R L R L R 2021   Flexion   124 124  126    Extension   0 0 ambulation. Modalities:     [x] GAME READY (VASO)- for significant edema, swelling, pain control. Charges:  Timed Code Treatment Minutes: 40   Total Treatment Minutes: 65      [] EVAL (LOW) 44356 (typically 20 minutes face-to-face)  [] EVAL (MOD) 96386 (typically 30 minutes face-to-face)  [] EVAL (HIGH) 05879 (typically 45 minutes face-to-face)  [] RE-EVAL     [x] PN(52351) x1 (x15')     [] IONTO  [x] NMR (55901) x (x15')     [x] VASO Right knee x15' medium pressure  [] Manual (89984) x     [] Other:  [x] TA x  1 (x10')    [] Mech Traction (63038)  [] ES(attended) (16357)      [] ES (un) (93129):         ASSESSMENT:    We reviewed what she can expect immediately post op and reviewed pertinent HEP for that time. Including LE compression stockings and frequent ankle pumps. She states she does have an intermittent calf compression machine that she can use to help prevent DVTs. Her most difficult activity is supine SLR due to weakness and pain in her thigh, which may be related to her lumbar radiculopathy. Her right hip is also a bit weak, had right DILAN in 2018 and never fully recovered full strength from that. She needs her cane to ambulate long distances, does not require the cane for short distances or safe environments like inside her house. GOALS:  Patient stated goal: strengthen LE for surgery  [] Progressing: [] Met: [] Not Met: [] Adjusted    Therapist goals for Patient:   Short Term Goals: To be achieved in: 2 weeks  1. Independent in HEP and progression per patient tolerance, in order to prevent re-injury. [] Progressing: [] Met: [] Not Met: [] Adjusted  2. Patient will have a decrease in pain to facilitate improvement in movement, function, and ADLs as indicated by Functional Deficits. [] Progressing: [] Met: [] Not Met: [] Adjusted    Long Term Goals: To be achieved in: 4-6 weeks  1.  Disability index score of 20% or less for the KOS to assist with reaching prior level of

## 2021-08-10 ENCOUNTER — OFFICE VISIT (OUTPATIENT)
Dept: ORTHOPEDIC SURGERY | Age: 71
End: 2021-08-10
Payer: MEDICARE

## 2021-08-10 VITALS — BODY MASS INDEX: 39.69 KG/M2 | HEIGHT: 63 IN | WEIGHT: 224 LBS

## 2021-08-10 DIAGNOSIS — M75.121 NONTRAUMATIC COMPLETE TEAR OF RIGHT ROTATOR CUFF: ICD-10-CM

## 2021-08-10 DIAGNOSIS — M25.511 RIGHT SHOULDER PAIN, UNSPECIFIED CHRONICITY: Primary | ICD-10-CM

## 2021-08-10 PROCEDURE — 1123F ACP DISCUSS/DSCN MKR DOCD: CPT | Performed by: ORTHOPAEDIC SURGERY

## 2021-08-10 PROCEDURE — G8399 PT W/DXA RESULTS DOCUMENT: HCPCS | Performed by: ORTHOPAEDIC SURGERY

## 2021-08-10 PROCEDURE — 4040F PNEUMOC VAC/ADMIN/RCVD: CPT | Performed by: ORTHOPAEDIC SURGERY

## 2021-08-10 PROCEDURE — G9899 SCRN MAM PERF RSLTS DOC: HCPCS | Performed by: ORTHOPAEDIC SURGERY

## 2021-08-10 PROCEDURE — G8417 CALC BMI ABV UP PARAM F/U: HCPCS | Performed by: ORTHOPAEDIC SURGERY

## 2021-08-10 PROCEDURE — 1036F TOBACCO NON-USER: CPT | Performed by: ORTHOPAEDIC SURGERY

## 2021-08-10 PROCEDURE — 3017F COLORECTAL CA SCREEN DOC REV: CPT | Performed by: ORTHOPAEDIC SURGERY

## 2021-08-10 PROCEDURE — G8427 DOCREV CUR MEDS BY ELIG CLIN: HCPCS | Performed by: ORTHOPAEDIC SURGERY

## 2021-08-10 PROCEDURE — 1090F PRES/ABSN URINE INCON ASSESS: CPT | Performed by: ORTHOPAEDIC SURGERY

## 2021-08-10 PROCEDURE — 99213 OFFICE O/P EST LOW 20 MIN: CPT | Performed by: ORTHOPAEDIC SURGERY

## 2021-08-10 NOTE — PROGRESS NOTES
Chief Complaint    Follow-up (F/U RT SHOULDER)      History of Present Illness:  Ronda Phillips is a 73 y.o. female old patient  here for follow-up for the right shoulder. Patient states that she had right shoulder pain for the last 3 months. She denies any traumatic onset. She states that this is very limiting disabling pain and is stopping her from doing what she wants to do. She is also unable to admit due to the extreme shoulder pain. She is having difficulty sleeping at night as well. She has tried diclofenac which does seem to take the edge off. She denies any numbness or tingling. She does have a history of rotator cuff tendinitis of the right shoulder which was worked up with an MRI back in 2017. She was treated successfully with injections and physical therapy. At her last appointment she was given an injection which was unable to relieve her pain. We expected she had a large chronic rotator cuff tear but she wished to try injections at that point. She is now here for further evaluation    Of note she is undergoing total knee replacement by Dr. Agnes Mcleod tomorrow       Pain Assessment  Location of Pain: Shoulder  Location Modifiers: Right  Severity of Pain: 4  Quality of Pain: Throbbing, Sharp, Dull, Aching  Duration of Pain: Persistent  Frequency of Pain: Constant  Aggravating Factors: Other (Comment) (GENERAL USE)  Limiting Behavior: Yes  Relieving Factors: Rest, Ice, Nsaids, Exercise  Result of Injury: No  Work-Related Injury: No  Are there other pain locations you wish to document?: No      Medical History:  Patient's medications, allergies, past medical, surgical, social and family histories were reviewed and updated as appropriate. Patient has had no medical changes since last evaluated        Review of Systems  A 14 point review of systems was completed by the patient on 6/24/2021 and is available in the media section of the scanned medical record and was reviewed on 8/10/2021.   The review is negative with the exception of those things mentioned in the HPI and Past Medical History    Vital Signs: There were no vitals filed for this visit. General/Appearance: Alert and oriented and in no apparent distress. Right shoulder exam     Inspection:  Held in a normal posture. Normal contour at the acromioclavicular joint. No swelling, ecchymosis, or erythema about the shoulder. No atrophy appreciated. No scapular winging.      Palpation: Tenderness to palpation over the greater tuberosity and bicipital groove. No tenderness [facility]     Range of Motion: Active and passive range of motion to 140 degrees of forward elevation and abduction, external rotation to 45 degrees and internal rotation to T10     Strength: 4-/5 supraspinatus, 4/5 infraspinatus, and 5/5 subscapularis muscle strength.     Stability: No anterior instability. No posterior instability.     Special Tests: Positive Hussain's, positive champagne test     Other findings: The skin is warm dry and well perfused. 2+ radial pulse. Sensation is intact to light touch over the deltoid.        Left comparison shoulder exam     Inspection:  Held in a normal posture. Normal contour at the acromioclavicular joint. No swelling, ecchymosis, or erythema about the shoulder. No atrophy appreciated. No scapular winging.      Palpation:  No subacromial crepitus. No tenderness of the AC joint. No greater tuberosity tenderness. No tenderness in the bicipital groove.     Range of Motion: 140 degrees of elevation and abduction, 45 degrees of external rotation and internal rotation to T10 and active ROM. Normal scapulothoracic rhythm.     Strength: 4/5 supraspinatus, 4/5 infraspinatus, and 5/5 subscapularis muscle strength.     Stability: No anterior instability. No posterior instability.     Special Tests: Positive Hussain and [place] toast  Other findings: The skin is warm dry and well perfused. 2+ radial pulse.  Sensation is intact to light touch over the deltoid.     Radiology:     No new XR obtained at this time. Assessment :  Ms. Isabelle Jones is a pleasant, 73 y.o. patient who is continuing to have right shoulder pain likely from a chronic rotator cuff tear      Impression:  Encounter Diagnoses   Name Primary?  Right shoulder pain, unspecified chronicity Yes    Nontraumatic complete tear of right rotator cuff        Office Procedures:  Orders Placed This Encounter   Procedures    MRI SHOULDER RIGHT WO CONTRAST     Standing Status:   Future     Standing Expiration Date:   8/10/2022     Scheduling Instructions:      Mary Anne Cartagena     Order Specific Question:   Reason for exam:     Answer:   EVAL FOR RCT       Treatment Plan: We recommend that Isabelle Jones have a MRI of her right shoulder for evaluation. She will follow up after the MRI. All questions were answered to patient's satisfaction and She was encouraged to call with any further questions or concerns. Isabelle Jones is in agreement with this plan. 8/10/2021  5:17 PM    Maty Mo DO  [facility] Shoulder Elbow fellow    During this examination, IMaty functioned as a scribe for Dr. Nava Rivers. The history taking and physical examination were performed by Dr. Jose Falcon. All counseling during the appointment was performed between the patient and Dr. Jose Falcon. 8/10/21  ___________  I was physically present and personally supervised the Orthopaedic Sports Medicine Fellow in the evaluation and development of a treatment plan for this patient. I personally interviewed the patient and performed a physical examination. In addition, I discussed the patient's condition and treatment options with them. I have also reviewed and agree with the past medical, family and social history unless otherwise noted. All of the patient's questions were answered. Sharad Falcon MD, PhD  8/10/2021

## 2021-08-13 ENCOUNTER — EVALUATION (OUTPATIENT)
Dept: PHYSICAL THERAPY | Age: 71
End: 2021-08-13
Payer: MEDICARE

## 2021-08-13 DIAGNOSIS — G89.18 ACUTE POSTOPERATIVE PAIN OF RIGHT KNEE: ICD-10-CM

## 2021-08-13 DIAGNOSIS — M25.561 ACUTE POSTOPERATIVE PAIN OF RIGHT KNEE: ICD-10-CM

## 2021-08-13 DIAGNOSIS — M17.11 PRIMARY OSTEOARTHRITIS OF RIGHT KNEE: Primary | ICD-10-CM

## 2021-08-13 DIAGNOSIS — R26.2 DIFFICULTY WALKING: ICD-10-CM

## 2021-08-13 PROCEDURE — G8427 DOCREV CUR MEDS BY ELIG CLIN: HCPCS | Performed by: PHYSICAL THERAPIST

## 2021-08-13 PROCEDURE — 97110 THERAPEUTIC EXERCISES: CPT | Performed by: PHYSICAL THERAPIST

## 2021-08-13 PROCEDURE — 97016 VASOPNEUMATIC DEVICE THERAPY: CPT | Performed by: PHYSICAL THERAPIST

## 2021-08-13 PROCEDURE — 97530 THERAPEUTIC ACTIVITIES: CPT | Performed by: PHYSICAL THERAPIST

## 2021-08-13 PROCEDURE — 97164 PT RE-EVAL EST PLAN CARE: CPT | Performed by: PHYSICAL THERAPIST

## 2021-08-13 NOTE — PROGRESS NOTES
[facility]   Phone: [phone]    Fax: [phone]   Physical Therapy Re-Certification Plan of Care    Dear Dr Aleta Martinez  ,    We had the pleasure of treating the following patient for physical therapy services at [address] [place] [address]. A summary of our findings can be found in the updated assessment below. This includes our plan of care. If you have any questions or concerns regarding these findings, please do not hesitate to contact me at the office phone number checked above.   Thank you for the referral.     Physician Signature:________________________________Date:__________________  By signing above (or electronic signature), therapists plan is approved by physician      Overall Response to Treatment:   [x]Patient is responding well to treatment and improvement is noted with regards  to goals   [x]Patient should continue to improve in reasonable time if they continue HEP   []Patient has plateaued and is no longer responding to skilled PT intervention    []Patient is getting worse and would benefit from return to referring MD   []Patient unable to adhere to initial POC   []Other:         Physical Therapy Treatment Note/ Progress Report:           Date:  2021    Patient Name:  Brian Olivarez    :  1950  MRN: <[id number]>  Restrictions/Precautions:    Medical/Treatment Diagnosis Information:  · Diagnosis: Right knee OA/surgical optimization     Insurance/Certification information:  PT Insurance Information: Medicare  Physician Information:  Referring Practitioner: Dr Aleta Martinez  Has the plan of care been signed (Y/N):        []  Yes  [x]  No     Date of Patient follow up with Physician: 2021      Is this a Progress Report:     [x]  Yes  []  No        If Yes:  Date Range for reporting period:  Beginning 2021  Ending  2021    Progress report will be due (10 Rx or 30 days whichever is less):       Recertification will be due (POC Duration  / 90 days whichever is less): 9/13/2021        Visit # Insurance Allowable Auth Required   10 MEDICARE (BMN) []  Yes []  No        Functional Scale: KOS ADLs  49% functional limitation    Date assessed:  8/13/2021      Latex Allergy:  [x]NO      []YES  Preferred Language for Healthcare:   [x]English       []other:    PQRS:  8/13/2021  Reviewed medication list with patient. No changes since last PT visit. Pain level:  3/10     SUBJECTIVE:   Chronic issues with her right knee. Her knee has really been worsening over the past 2 months. Increased pain and popping with activities. Patient returns now being s/p right TKA on 8/11/2021. She reports no complications from surgery or waking from anesthesia. She was home later that afternoon/early evening. She got the ok to remove her post op bandage today and got in the shower. She is taking pain meds but not regularly. She reports that she hardly has used her walker, mostly using a cane. Feeling pretty good. Relevant Medical History:   significant history (see attached in her chart), left RTC repair 2018, left TKA Aug 2019, Right DILAN 2018    Easing factors: rest, ice  Provocative factors: walking or any WB     Numbness/Tingling: none noted     Occupation/School:  retired     Living Status/Prior Level of Function: Independent with ADLs and IADLs, lives alone, 6 stairs to get in her house. Not doing stairs. Bedroom/bathroom on main floor, has a basement where she does laundry. Likes to be active and do light workouts in the gym. OBJECTIVE:   Moderate bruising noted through her right LE/medial knee and into her calf  She is ambulating independently into clinic with use of her straight cane.     ROM PROM AROM Overpressure Comment    L R L R L R 8/13/2021   Flexion   125 90      Extension   -1 -5        Girth  8/13/2021 L R   Mid Patella 50 cm 51.5 cm   Suprapatellar     5cm above     15cm above         Strength L R Comment: majority deferred secondary to surgery   Quad      Hamstring      Abduction      Quad tone [facility] 8/13/2021       Special Test  8/13/2021 Results/Comment: majority deferred secondary to surgery   Homans (-)     Clinical Presentation   Possible Score   Clients Score     Active cancer (within 6 months of diagnosis or receiving palliative care)  1  0   Paralysis, paresis, or recent immobilization of lower extremity  1   0   Bedridden for more than 3 days or major surgery in the last 4 weeks  1   1   Localized tenderness in the center of the posterior calf, the popliteal space, or along the femoral vein in the anterior thigh/groin  1   0   Entire lower extremity swelling  1   0   Unilateral calf swelling (more than 3 cm larger than uninvolved side)  1   0   Unilateral pitting edema  1   0   Collateral superficial veins (nonvaricose)  1   0   An alternative diagnosis is as likely (or more likely) than DVT (e.g., cellulitis, postoperative swelling, calf strain)  -2  -1   Total Points    0      Key  · -2 to 0 Low probability of DVT 3% (95% confidence interval [CI] 1.7%5.9%)  · 1 to 2 Moderate probability of DVT 17% (95% confidence interval [CI] 12%23%)  · 3 or more High probability of DVT 75% (95% confidence interval [CI] 63%84%)    Medical consultation is advised in the presence of low probability  Medical referral is required with moderate or high score. Lopez PS, Robby GOMEZ, Rafif J, et al: Value of assessment of pretest probability of deep-vein thrombosis in clinical management, Lancet 806:0890-0441, 1997. Timed Get Up and Go Test  Measures mobility in people who are able to walk on their own (assistive device permitted)        Name: Bob Noble  Date: 8/13/2021    Instructions: The person may wear their usual footwear and can use any assistive device they normally use. 1. Have the person sit in the chair with their back to the chair and their arms resting on the back rests  2.  Ask the person to stand up from a standard chair and walk a distance of 10 ft. (3 m). 3. Have the person turn around, walk back to the chair and sit down again. Timing begins when the person starts to rise from the chair and ends when he or she returns to the chair and sits down. Time to Complete:  (21 seconds)      Predictive Results:    Seconds Rating    <10  Freely mobile    <20  Mostly independent    20-29  Variable mobility    >20  Impaired mobility       Source: Sarah Israel S. The timed 'Up and Go' Test: a Test of Basic Functional Mobility for Frail Elderly Persons. Journal of [facility]. 1991;39:142-148. G-Code Crosswalk:  TUG time (seonds) Disability Index CMS Modifier   12 or faster 0% []CH   13-14 1-19% []CI   15-16 20-39% []CJ   17-18 40-59% []CK   19-20  60-79% [x]CL   21-22  80-99% []CM   23 or slower 100% []CN           Co-morbidities/Complexities (which will affect course of rehabilitation):   []? None              Arthritic conditions   []? Rheumatoid arthritis (M05.9)  [x]? Osteoarthritis (M19.91)    Cardiovascular conditions   [x]? Hypertension (I10)  []? Hyperlipidemia (E78.5)  []? Angina pectoris (I20)  []? Atherosclerosis (I70)  []? CVA Musculoskeletal conditions   []? Disc pathology   []? Congenital spine pathologies   []? Prior surgical intervention  []? Osteoporosis (M81.8)  []? Osteopenia (M85.8)   Endocrine conditions   []? Hypothyroid (E03.9)  []? Hyperthyroid Gastrointestinal conditions   []? Constipation (P20.31)    Metabolic conditions   []? Morbid obesity (E66.01)  []? Diabetes type 1(E10.65) or 2 (E11.65)   []? Neuropathy (G60.9)      Pulmonary conditions   []? Asthma (J45)  []? Coughing   []? COPD (J44.9)    Psychological Disorders  []? Anxiety (F41.9)  []? Depression (F32.9)   []? Other:    [x]? Other:   stroke/TIA    CV/spine issues         Barriers to/and or personal factors that will affect rehab potential:              []? Age  []? Sex               []?Smoker              []?Motivation/Lack of Motivation                        [x]? Co-Morbidities              []? Cognitive Function, education/learning barriers              []? Environmental, home barriers              []? profession/work barriers  [x]? past PT/medical experience  []? other:  Justification:      Falls Risk Assessment (30 days):   [x]? Falls Risk assessed and no intervention required. []? Falls Risk assessed and Patient requires intervention due to being higher risk   TUG score (>12s at risk):     []?  Falls education provided, including           RESTRICTIONS/PRECAUTIONS: balance at times is an issue    Exercises/Interventions:     Therapeutic Ex (55569) Sets/sec Reps Notes/CUES   BIKE      TREADMILL            STRETCHING      Towel Pull Calf 30\" x5    Inclined Calf       Hamstrings       Quads      Hip Flexors      ITB      Adductors            ROM      Passive 10\" x10 Seated EOB   Active      Weight Hangs      Sheet Pulls      Ankle Pumps x30     ERMI            STRENGTHENING      Quad Sets 5\" x20 bilat     Ham Sets      Hip ADD Sets BS 5\" x20    SLR Flexion 2 x8    SLR Abduction       SLR Prone      SLR Adduction      Hooklying clams      SDLY clams- each side      PRE Machines      Knee Extension      Knee Flexion      Leg Press            CKC      Calf Raises      Mini Squats      Wall Sits      Step ups      TKE                  Manual Intervention (23201)      Patellar Mobs      Femoral Tibial mobs      STM      Scar Massage      Foam Roll             NMR re-education (58683)   CUES NEEDED   Biodex Balance       Tandem Balance      SLB      Rebounder      BOSU                              Therapeutic Activity (45673)      Core Training      [address] Activities      Monster Walks      TRX training      education x10'  Discussed anatomy and pathology with patient, reviewed [address] [place] [address] program, reviewed post op precautions and expectations               Therapeutic Exercise and NMR EXR  [x] (52584) Provided verbal/tactile cueing for activities related to strengthening, flexibility, endurance, ROM for improvements in LE, proximal hip, and core control with self care, mobility, lifting, ambulation. [x] (79807) Provided verbal/tactile cueing for activities related to improving balance, coordination, kinesthetic sense, posture, motor skill, proprioception to assist with LE, proximal hip, and core control in self-care, mobility, lifting, ambulation and eccentric single leg control.      NMR and Therapeutic Activities:    [x] (77192 or 79124) Provided verbal/tactile cueing for activities related to improving balance, coordination, kinesthetic sense, posture, motor skill, proprioception and motor activation to allow for proper function of core, proximal hip and LE with self-care and ADLs and functional mobility.   [] (70534) Gait Re-education- Provided training and instruction to the patient for proper LE, core and proximal hip recruitment and positioning and eccentric body weight control with ambulation re-education including up and down stairs     Home Exercise Program:    [x] (75573) Reviewed/Progressed HEP activities related to strengthening, flexibility, endurance, ROM of core, proximal hip and LE for functional self-care, mobility, lifting and ambulation/stair navigation   [] (67678) Reviewed/Progressed HEP activities related to improving balance, coordination, kinesthetic sense, posture, motor skill, proprioception of core, proximal hip and LE for self-care, mobility, lifting, and ambulation/stair navigation      Manual Treatments:  PROM / STM / Oscillations-Mobs:  G-I, II, III, IV (PA's, Inf., Post.)  [] (75681) Provided manual therapy to mobilize LE, proximal hip and/or LS spine soft tissue/joints for the purpose of modulating pain, promoting relaxation, increasing ROM, reducing/eliminating soft tissue swelling/inflammation/restriction, improving soft tissue extensibility and allowing for proper ROM for normal function with self-care, mobility, lifting and ambulation. Modalities:     [x] GAME READY (VASO)- for significant edema, swelling, pain control. Charges:  Timed Code Treatment Minutes: 40   Total Treatment Minutes: 65      [] EVAL (LOW) 40282 (typically 20 minutes face-to-face)  [] EVAL (MOD) 53190 (typically 30 minutes face-to-face)  [] EVAL (HIGH) 69479 (typically 45 minutes face-to-face)  [x] RE-EVAL     [x] RA(03394) x1 (x15')     [] IONTO  [] NMR (60897) x (x15')     [x] VASO Right knee x15' medium pressure with elevation  [] Manual (99711) x     [] Other:  [x] TA x  1 (x10')    [] Mech Traction (59654)  [] ES(attended) (71810)      [] ES (un) (35974):        ASSESSMENT:  72 y/o female who is now s/p R TKA on 8/11/2021. She presents to out patient physical therapy with increased pain and swelling, decreased ROM, decreased strength and limited function on her feet. Physical therapy is medically necessary in order for her to have a favorable outcome. Functional Impairments:                []?Noted lumbar/proximal hip/LE hypomobility              [x]? Decreased LE functional ROM              [x]? Decreased core/proximal hip strength and neuromuscular control              [x]? Decreased LE functional strength   [x]? Reduced balance/proprioceptive control              []?other:       Functional Activity Limitations (from functional questionnaire and intake)              [x]? Reduced ability to tolerate prolonged functional positions              [x]? Reduced ability or difficulty with changes of positions or transfers between positions              [x]? Reduced ability to maintain good posture and demonstrate good body mechanics with sitting, bending, and lifting              [x]? Reduced ability to sleep              [x]? Reduced ability or tolerance with driving and/or computer work              [x]? Reduced ability to perform lifting, carrying tasks              [x]? Reduced ability to squat []?Reduced ability to forward bend              [x]? Reduced ability to ambulate prolonged functional periods/distances/surfaces              [x]? Reduced ability to ascend/descend stairs              []?Reduced ability to run, hop or jump              []?other:     Participation Restrictions              [x]? Reduced participation in self care activities              [x]? Reduced participation in home management activities              []?Reduced participation in work activities              [x]? Reduced participation in social activities. []?Reduced participation in sport activities.     Classification :               [x]? Signs/symptoms consistent with post-surgical status including decreased ROM, strength and function.              []?Signs/symptoms consistent with joint sprain/strain              []?Signs/symptoms consistent with patella-femoral syndrome              []?Signs/symptoms consistent with knee OA/hip OA              []?Signs/symptoms consistent with internal derangement of knee/Hip              []?Signs/symptoms consistent with functional hip weakness/NMR control                 []?Signs/symptoms consistent with tendinitis/tendinosis                      []?signs/symptoms consistent with pathology which may benefit from Dry needling                       []?other:       Prognosis/Rehab Potential:                                       []?Excellent              [x]? Good                         []?Fair              []?Poor     Tolerance of evaluation/treatment:               []?Excellent              [x]? Good                         []?Fair              []?Poor     Physical Therapy Evaluation Complexity Justification  [x]? A history of present problem with:  []? no personal factors and/or comorbidities that impact the plan of care;  []?1-2 personal factors and/or comorbidities that impact the plan of care  [x]? 3 personal factors and/or comorbidities that impact the plan of care  [x]?  An examination of body systems using standardized tests and measures addressing any of the following: body structures and functions (impairments), activity limitations, and/or participation restrictions;:  []? a total of 1-2 or more elements   [x]? a total of 3 or more elements   []? a total of 4 or more elements   [x]? A clinical presentation with:  [x]? stable and/or uncomplicated characteristics   []? evolving clinical presentation with changing characteristics  []? unstable and unpredictable characteristics;   [x]? Clinical decision making of [x]? low, []? moderate, []? high complexity using standardized patient assessment instrument and/or measurable assessment of functional outcome.     []? EVAL (LOW) 58118 (typically 20 minutes face-to-face)  []? EVAL (MOD) 69270 (typically 30 minutes face-to-face)  []? EVAL (HIGH) 14389 (typically 45 minutes face-to-face)  [x]? RE-EVAL      PLAN:  Frequency/Duration:  2 days per week for 6-8 Weeks:  Interventions:  [x]? Therapeutic exercise including: strength training, ROM, for Lower extremity and core   [x]? NMR activation and proprioception for LE, Glutes and Core   [x]? Manual therapy as indicated for LE, Hip and spine to include: Dry Needling/IASTM, STM, PROM, Gr I-IV mobilizations, manipulation. [x]? Modalities as needed that may include: thermal agents, E-stim, Biofeedback, US, iontophoresis as indicated  [x]? Patient education on joint protection, postural re-education, activity modification, progression of HEP.     HEP instruction: Patient returned proper demonstration of HEP. Issued patient written program clearly stating sets/reps/sessions per day. Written program was scanned into media section of medical record. GOALS:  Patient stated goal: restore prior level of function  [] Progressing: [] Met: [] Not Met: [] Adjusted    Therapist goals for Patient:   Short Term Goals: To be achieved in: 2 weeks  1.  Independent in HEP and progression per patient tolerance, in order to prevent re-injury. [] Progressing: [] Met: [] Not Met: [] Adjusted  2. Patient will have a decrease in pain to facilitate improvement in movement, function, and ADLs as indicated by Functional Deficits. [] Progressing: [] Met: [] Not Met: [] Adjusted    Long Term Goals: To be achieved in: 4-6 weeks  1. Disability index score of 20% or less for the KOS to assist with reaching prior level of function. [] Progressing: [] Met: [] Not Met: [] Adjusted  2. Patient will demonstrate increased AROM to 0-125 to allow for proper joint functioning as indicated by patients Functional Deficits. [] Progressing: [] Met: [] Not Met: [] Adjusted  3. Patient will demonstrate an increase in Strength by 1/2 grade to allow for proper functional mobility as indicated by patients Functional Deficits. [] Progressing: [] Met: [] Not Met: [] Adjusted  4. Patient will return to low level prior functional activities without increased symptoms or restriction. [] Progressing: [] Met: [] Not Met: [] Adjusted  5. Normalize gait on levels and stairs   [] Progressing: [] Met: [] Not Met: [] Adjusted   GOALS UPDATED 8/13/2021    Overall Progression Towards Functional goals/ Treatment Progress Update:  [] Patient is progressing as expected towards functional goals listed. [] Progression is slowed due to complexities/Impairments listed. [] Progression has been slowed due to co-morbidities.   [x] Plan just implemented, too soon to assess goals progression <30days   [] Goals require adjustment due to lack of progress  [] Patient is not progressing as expected and requires additional follow up with physician  [] Other    Prognosis for POC: [x] Good [] Fair  [] Poor      Patient requires continued skilled intervention: [x] Yes  [] No    Treatment/Activity Tolerance:  [x] Patient able to complete treatment  [] Patient limited by fatigue  [] Patient limited by pain    [] Patient limited by other medical

## 2021-08-20 ENCOUNTER — TREATMENT (OUTPATIENT)
Dept: PHYSICAL THERAPY | Age: 71
End: 2021-08-20
Payer: MEDICARE

## 2021-08-20 DIAGNOSIS — G89.18 ACUTE POSTOPERATIVE PAIN OF RIGHT KNEE: ICD-10-CM

## 2021-08-20 DIAGNOSIS — R26.2 DIFFICULTY WALKING: ICD-10-CM

## 2021-08-20 DIAGNOSIS — M25.561 ACUTE POSTOPERATIVE PAIN OF RIGHT KNEE: ICD-10-CM

## 2021-08-20 DIAGNOSIS — M17.11 PRIMARY OSTEOARTHRITIS OF RIGHT KNEE: Primary | ICD-10-CM

## 2021-08-20 PROCEDURE — 97016 VASOPNEUMATIC DEVICE THERAPY: CPT | Performed by: PHYSICAL THERAPIST

## 2021-08-20 PROCEDURE — G8427 DOCREV CUR MEDS BY ELIG CLIN: HCPCS | Performed by: PHYSICAL THERAPIST

## 2021-08-20 PROCEDURE — 97110 THERAPEUTIC EXERCISES: CPT | Performed by: PHYSICAL THERAPIST

## 2021-08-20 PROCEDURE — 97112 NEUROMUSCULAR REEDUCATION: CPT | Performed by: PHYSICAL THERAPIST

## 2021-08-20 PROCEDURE — 97530 THERAPEUTIC ACTIVITIES: CPT | Performed by: PHYSICAL THERAPIST

## 2021-08-20 NOTE — PROGRESS NOTES
[facility]   Phone: [phone]    Fax: [phone]         Physical Therapy Treatment Note/ Progress Report:           Date:  2021    Patient Name:  Mesha Hinds    :  1950  MRN: <[id number]>  Restrictions/Precautions:    Medical/Treatment Diagnosis Information:  · Diagnosis: Right knee OA/surgical optimization     Insurance/Certification information:  PT Insurance Information: Medicare  Physician Information:  Referring Practitioner: Dr Mickey Olszewski  Has the plan of care been signed (Y/N):        [x]  Yes  []  No     Date of Patient follow up with Physician: 2021      Is this a Progress Report:     []  Yes  [x]  No        If Yes:  Date Range for reporting period:  Beginning 2021  Ending  2021    Progress report will be due (10 Rx or 30 days whichever is less):       Recertification will be due (POC Duration  / 90 days whichever is less): 2021        Visit # Insurance Allowable Auth Required   11 MEDICARE (BMN) []  Yes []  No        Functional Scale: KOS ADLs  49% functional limitation    Date assessed:  2021      Latex Allergy:  [x]NO      []YES  Preferred Language for Healthcare:   [x]English       []other:    PQRS:  2021  Reviewed medication list with patient. Prednisone increased to 2x/day for 5 days then dropped to 1x/day, and on antibiotic for cellulitis. Pain level:  3/10     SUBJECTIVE:     Patient reports that Saturday afternoon she noticed severe redness in her medial R thigh and along her incision as well as tons of itching. She notes that she ended up being admitted to the hospital for cellulitis. She reports that she is on antibiotics for another week still, but she now has a great deal of itching as she was allergic to the adhesives. She notes that she has minimal pain and was cleared to drive by physician.  She reports that her prednisone prescription was increased to 2x/day for 5 days and then back to 1x/day. Relevant Medical History:   significant history (see attached in her chart), left RTC repair 2018, left TKA Aug 2019, Right DILAN 2018    Easing factors: rest, ice  Provocative factors: walking or any WB     Numbness/Tingling: none noted     Occupation/School:  retired     Living Status/Prior Level of Function: Independent with ADLs and IADLs, lives alone, 6 stairs to get in her house. Not doing stairs. Bedroom/bathroom on main floor, has a basement where she does laundry. Likes to be active and do light workouts in the gym. OBJECTIVE:   Moderate bruising noted through her right LE/medial knee and into her calf  She is ambulating independently into clinic with use of her straight cane.     ROM PROM AROM Overpressure Comment    L R L R L R 8/20/2021   Flexion   125 100      Extension   -1 -5        Girth  8/13/2021 L R   Mid Patella 50 cm 51.5 cm   Suprapatellar     5cm above     15cm above         Strength L R Comment: majority deferred secondary to surgery   Quad      Hamstring      Abduction      Quad tone [facility] 8/13/2021       Special Test  8/13/2021 Results/Comment: majority deferred secondary to surgery   Homans (-)     Clinical Presentation   Possible Score   Clients Score     Active cancer (within 6 months of diagnosis or receiving palliative care)  1  0   Paralysis, paresis, or recent immobilization of lower extremity  1   0   Bedridden for more than 3 days or major surgery in the last 4 weeks  1   1   Localized tenderness in the center of the posterior calf, the popliteal space, or along the femoral vein in the anterior thigh/groin  1   0   Entire lower extremity swelling  1   0   Unilateral calf swelling (more than 3 cm larger than uninvolved side)  1   0   Unilateral pitting edema  1   0   Collateral superficial veins (nonvaricose)  1   0   An alternative diagnosis is as likely (or more likely) than DVT (e.g., cellulitis, postoperative swelling, calf strain)  -2  -1 Total Points    0      Key  · -2 to 0 Low probability of DVT 3% (95% confidence interval [CI] 1.7%5.9%)  · 1 to 2 Moderate probability of DVT 17% (95% confidence interval [CI] 12%23%)  · 3 or more High probability of DVT 75% (95% confidence interval [CI] 63%84%)    Medical consultation is advised in the presence of low probability  Medical referral is required with moderate or high score. Lopez PS, Robby GOMEZ, Raffi J, et al: Value of assessment of pretest probability of deep-vein thrombosis in clinical management, Lancet 381:0301-3025, 1997. Timed Get Up and Go Test  Measures mobility in people who are able to walk on their own (assistive device permitted)        Name: [facility]  Date: 8/20/2021    Instructions: The person may wear their usual footwear and can use any assistive device they normally use. 1. Have the person sit in the chair with their back to the chair and their arms resting on the back rests  2. Ask the person to stand up from a standard chair and walk a distance of 10 ft. (3 m). 3. Have the person turn around, walk back to the chair and sit down again. Timing begins when the person starts to rise from the chair and ends when he or she returns to the chair and sits down. Time to Complete:  (21 seconds)      Predictive Results:    Seconds Rating    <10  Freely mobile    <20  Mostly independent    20-29  Variable mobility    >20  Impaired mobility       Source: Jone Israel S. The timed 'Up and Go' Test: a Test of Basic Functional Mobility for Frail Elderly Persons. Journal of [facility]. 1991;39:142-148. G-Code Crosswalk:  TUG time (velma) Disability Index CMS Modifier   12 or faster 0% []CH   13-14 1-19% []CI   15-16 20-39% []CJ   17-18 40-59% []CK   19-20  60-79% [x]CL   21-22  80-99% []CM   23 or slower 100% []CN           Co-morbidities/Complexities (which will affect course of rehabilitation):   []? None            Arthritic conditions   []? Rheumatoid arthritis (M05.9)  [x]? Osteoarthritis (M19.91)    Cardiovascular conditions   [x]? Hypertension (I10)  []? Hyperlipidemia (E78.5)  []? Angina pectoris (I20)  []? Atherosclerosis (I70)  []? CVA Musculoskeletal conditions   []? Disc pathology   []? Congenital spine pathologies   []? Prior surgical intervention  []? Osteoporosis (M81.8)  []? Osteopenia (M85.8)   Endocrine conditions   []? Hypothyroid (E03.9)  []? Hyperthyroid Gastrointestinal conditions   []? Constipation (Q62.17)    Metabolic conditions   []? Morbid obesity (E66.01)  []? Diabetes type 1(E10.65) or 2 (E11.65)   []? Neuropathy (G60.9)      Pulmonary conditions   []? Asthma (J45)  []? Coughing   []? COPD (J44.9)    Psychological Disorders  []? Anxiety (F41.9)  []? Depression (F32.9)   []? Other:    [x]? Other:   stroke/TIA    CV/spine issues         Barriers to/and or personal factors that will affect rehab potential:              []? Age  []? Sex               []?Smoker              []? Motivation/Lack of Motivation                        [x]? Co-Morbidities              []? Cognitive Function, education/learning barriers              []? Environmental, home barriers              []? profession/work barriers  [x]? past PT/medical experience  []? other:  Justification:      Falls Risk Assessment (30 days):   [x]? Falls Risk assessed and no intervention required. []? Falls Risk assessed and Patient requires intervention due to being higher risk   TUG score (>12s at risk):     []?  Falls education provided, including           RESTRICTIONS/PRECAUTIONS: balance at times is an issue    Exercises/Interventions:     Therapeutic Ex (27120) Sets/sec Reps Notes/CUES   BIKE      TREADMILL            STRETCHING      Towel Pull Calf 30\" x5    Inclined Calf       Hamstrings       Quads      Hip Flexors      ITB      Adductors            ROM      Passive 10\" x10 Seated EOB   Active      Weight Hangs      Sheet Pulls      Ankle Pumps x30     ERMI STRENGTHENING      Quad Sets 5\" x20 bilat     Ham Sets      Hip ADD Sets BS 5\" x20    SLR Flexion 2 x10    SLR Abduction       SLR Prone      SLR Adduction      Hooklying clams      SDLY clams 2 x10 R only                     PRE Machines      Knee Extension      Knee Flexion      Leg Press            CKC      Calf Raises 2 x10    Mini Squats      Wall Sits      Step ups lvl 1, 2 x10    TKE                  Manual Intervention (88344)      Patellar Mobs      Femoral Tibial mobs      STM      Scar Massage      Foam Roll             NMR re-education (47594)   CUES NEEDED   Biodex Balance       Tandem Balance      SLB      Rebounder      BOSU                              Therapeutic Activity (74207)      Core Training      Swiss Oswaldo Piano Activities      Monster Walks      TRX training      education x10'  Discussed anatomy and pathology with patient, reviewed [address] [place] [address] program, reviewed post op precautions and expectations               Therapeutic Exercise and NMR EXR  [x] (28900) Provided verbal/tactile cueing for activities related to strengthening, flexibility, endurance, ROM for improvements in LE, proximal hip, and core control with self care, mobility, lifting, ambulation. [x] (78530) Provided verbal/tactile cueing for activities related to improving balance, coordination, kinesthetic sense, posture, motor skill, proprioception to assist with LE, proximal hip, and core control in self-care, mobility, lifting, ambulation and eccentric single leg control.      NMR and Therapeutic Activities:    [x] (86644 or 97409) Provided verbal/tactile cueing for activities related to improving balance, coordination, kinesthetic sense, posture, motor skill, proprioception and motor activation to allow for proper function of core, proximal hip and LE with self-care and ADLs and functional mobility.   [] (55904) Gait Re-education- Provided training and instruction to the patient for proper LE, core and proximal hip recruitment and positioning and eccentric body weight control with ambulation re-education including up and down stairs     Home Exercise Program:    [x] (39203) Reviewed/Progressed HEP activities related to strengthening, flexibility, endurance, ROM of core, proximal hip and LE for functional self-care, mobility, lifting and ambulation/stair navigation   [] (99989) Reviewed/Progressed HEP activities related to improving balance, coordination, kinesthetic sense, posture, motor skill, proprioception of core, proximal hip and LE for self-care, mobility, lifting, and ambulation/stair navigation      Manual Treatments:  PROM / STM / Oscillations-Mobs:  G-I, II, III, IV (PA's, Inf., Post.)  [] (69499) Provided manual therapy to mobilize LE, proximal hip and/or LS spine soft tissue/joints for the purpose of modulating pain, promoting relaxation, increasing ROM, reducing/eliminating soft tissue swelling/inflammation/restriction, improving soft tissue extensibility and allowing for proper ROM for normal function with self-care, mobility, lifting and ambulation. Modalities:     [x] GAME READY (VASO)- for significant edema, swelling, pain control. Charges:  Timed Code Treatment Minutes: 45   Total Treatment Minutes: 65      [] EVAL (LOW) 60764 (typically 20 minutes face-to-face)  [] EVAL (MOD) 79490 (typically 30 minutes face-to-face)  [] EVAL (HIGH) 24007 (typically 45 minutes face-to-face)  [] RE-EVAL     [x] AZ(53305) x1 (x18')     [] IONTO  [x] NMR (68790) x1 (x15')     [x] VASO Right knee x15' medium pressure with elevation  [] Manual (07022) x     [] Other:  [x] TA x  1 (x12')    [] Mech Traction (66384)  [] ES(attended) (43613)      [] ES (un) (84713):        ASSESSMENT:  Patient presents today with significant visible redness throughout R LE from thigh to mid shin. She states that the redness is lessening since being on her antibiotics. She is able to complete full program without reports of increased pain in R knee.  She requires minimal cuing to transition between exercises and to initiate new exercises today. She is able to complete step up to lvl 1 step with good form and no pain. Patient's HEP was updated to reflect newly added exercises today. PT also educated patien to monitor redness as antibiotics end to assure infection is resolving. Functional Impairments:                []?Noted lumbar/proximal hip/LE hypomobility              [x]? Decreased LE functional ROM              [x]? Decreased core/proximal hip strength and neuromuscular control              [x]? Decreased LE functional strength   [x]? Reduced balance/proprioceptive control              []?other:       Functional Activity Limitations (from functional questionnaire and intake)              [x]? Reduced ability to tolerate prolonged functional positions              [x]? Reduced ability or difficulty with changes of positions or transfers between positions              [x]? Reduced ability to maintain good posture and demonstrate good body mechanics with sitting, bending, and lifting              [x]? Reduced ability to sleep              [x]? Reduced ability or tolerance with driving and/or computer work              [x]? Reduced ability to perform lifting, carrying tasks              [x]? Reduced ability to squat              []?Reduced ability to forward bend              [x]? Reduced ability to ambulate prolonged functional periods/distances/surfaces              [x]? Reduced ability to ascend/descend stairs              []?Reduced ability to run, hop or jump              []?other:     Participation Restrictions              [x]? Reduced participation in self care activities              [x]? Reduced participation in home management activities              []?Reduced participation in work activities              [x]? Reduced participation in social activities.               []?Reduced participation in sport activities.     Classification : [x]?Signs/symptoms consistent with post-surgical status including decreased ROM, strength and function.              []?Signs/symptoms consistent with joint sprain/strain              []?Signs/symptoms consistent with patella-femoral syndrome              []?Signs/symptoms consistent with knee OA/hip OA              []?Signs/symptoms consistent with internal derangement of knee/Hip              []?Signs/symptoms consistent with functional hip weakness/NMR control                 []?Signs/symptoms consistent with tendinitis/tendinosis                      []?signs/symptoms consistent with pathology which may benefit from Dry needling                       []?other:       Prognosis/Rehab Potential:                                       []?Excellent              [x]? Good                         []?Fair              []?Poor     Tolerance of evaluation/treatment:               []?Excellent              [x]? Good                         []?Fair              []?Poor     Physical Therapy Evaluation Complexity Justification  [x]? A history of present problem with:  []? no personal factors and/or comorbidities that impact the plan of care;  []?1-2 personal factors and/or comorbidities that impact the plan of care  [x]? 3 personal factors and/or comorbidities that impact the plan of care  [x]? An examination of body systems using standardized tests and measures addressing any of the following: body structures and functions (impairments), activity limitations, and/or participation restrictions;:  []? a total of 1-2 or more elements   [x]? a total of 3 or more elements   []? a total of 4 or more elements   [x]? A clinical presentation with:  [x]? stable and/or uncomplicated characteristics   []? evolving clinical presentation with changing characteristics  []? unstable and unpredictable characteristics;   [x]?  Clinical decision making of [x]? low, []? moderate, []? high complexity using standardized patient assessment instrument and/or measurable assessment of functional outcome.     []? EVAL (LOW) 92111 (typically 20 minutes face-to-face)  []? EVAL (MOD) 11314 (typically 30 minutes face-to-face)  []? EVAL (HIGH) 87279 (typically 45 minutes face-to-face)  [x]? RE-EVAL      PLAN:  Frequency/Duration:  2 days per week for 6-8 Weeks:  Interventions:  [x]? Therapeutic exercise including: strength training, ROM, for Lower extremity and core   [x]? NMR activation and proprioception for LE, Glutes and Core   [x]? Manual therapy as indicated for LE, Hip and spine to include: Dry Needling/IASTM, STM, PROM, Gr I-IV mobilizations, manipulation. [x]? Modalities as needed that may include: thermal agents, E-stim, Biofeedback, US, iontophoresis as indicated  [x]? Patient education on joint protection, postural re-education, activity modification, progression of HEP.     HEP instruction: Patient returned proper demonstration of HEP. Issued patient written program clearly stating sets/reps/sessions per day. Written program was scanned into media section of medical record. GOALS:  Patient stated goal: restore prior level of function  [] Progressing: [] Met: [] Not Met: [] Adjusted    Therapist goals for Patient:   Short Term Goals: To be achieved in: 2 weeks  1. Independent in HEP and progression per patient tolerance, in order to prevent re-injury. [] Progressing: [] Met: [] Not Met: [] Adjusted  2. Patient will have a decrease in pain to facilitate improvement in movement, function, and ADLs as indicated by Functional Deficits. [] Progressing: [] Met: [] Not Met: [] Adjusted    Long Term Goals: To be achieved in: 4-6 weeks  1. Disability index score of 20% or less for the KOS to assist with reaching prior level of function. [] Progressing: [] Met: [] Not Met: [] Adjusted  2. Patient will demonstrate increased AROM to 0-125 to allow for proper joint functioning as indicated by patients Functional Deficits.    [] Progressing: [] Met: [] Not Met: [] Adjusted  3. Patient will demonstrate an increase in Strength by 1/2 grade to allow for proper functional mobility as indicated by patients Functional Deficits. [] Progressing: [] Met: [] Not Met: [] Adjusted  4. Patient will return to low level prior functional activities without increased symptoms or restriction. [] Progressing: [] Met: [] Not Met: [] Adjusted  5. Normalize gait on levels and stairs   [] Progressing: [] Met: [] Not Met: [] Adjusted   GOALS UPDATED 8/13/2021    Overall Progression Towards Functional goals/ Treatment Progress Update:  [] Patient is progressing as expected towards functional goals listed. [] Progression is slowed due to complexities/Impairments listed. [] Progression has been slowed due to co-morbidities. [x] Plan just implemented, too soon to assess goals progression <30days   [] Goals require adjustment due to lack of progress  [] Patient is not progressing as expected and requires additional follow up with physician  [] Other    Prognosis for POC: [x] Good [] Fair  [] Poor      Patient requires continued skilled intervention: [x] Yes  [] No    Treatment/Activity Tolerance:  [x] Patient able to complete treatment  [] Patient limited by fatigue  [] Patient limited by pain    [] Patient limited by other medical complications  [] Other:         PLAN:    Cont 2x/week for post op TKA protocol  [] Continue per plan of care [] Alter current plan   [x] Plan of care initiated [] Hold pending MD visit [] Discharge      Electronically signed by:      Chace Finley, PT, DPT, OCS, OMT-C       Board-Certified Orthopaedic Clinical Specialist    [place] PT license: [id number]  [facility] PT license: [id number]        Note: If patient does not return for scheduled/ recommended follow up visits, this note will serve as a discharge from care along with most recent update on progress.

## 2021-08-24 ENCOUNTER — TREATMENT (OUTPATIENT)
Dept: PHYSICAL THERAPY | Age: 71
End: 2021-08-24
Payer: MEDICARE

## 2021-08-24 DIAGNOSIS — M25.561 ACUTE POSTOPERATIVE PAIN OF RIGHT KNEE: ICD-10-CM

## 2021-08-24 DIAGNOSIS — R26.2 DIFFICULTY WALKING: ICD-10-CM

## 2021-08-24 DIAGNOSIS — M17.11 PRIMARY OSTEOARTHRITIS OF RIGHT KNEE: Primary | ICD-10-CM

## 2021-08-24 DIAGNOSIS — G89.18 ACUTE POSTOPERATIVE PAIN OF RIGHT KNEE: ICD-10-CM

## 2021-08-24 PROCEDURE — 97016 VASOPNEUMATIC DEVICE THERAPY: CPT | Performed by: PHYSICAL THERAPIST

## 2021-08-24 PROCEDURE — 97530 THERAPEUTIC ACTIVITIES: CPT | Performed by: PHYSICAL THERAPIST

## 2021-08-24 PROCEDURE — 97112 NEUROMUSCULAR REEDUCATION: CPT | Performed by: PHYSICAL THERAPIST

## 2021-08-24 PROCEDURE — 97110 THERAPEUTIC EXERCISES: CPT | Performed by: PHYSICAL THERAPIST

## 2021-08-24 PROCEDURE — G8427 DOCREV CUR MEDS BY ELIG CLIN: HCPCS | Performed by: PHYSICAL THERAPIST

## 2021-08-24 NOTE — PROGRESS NOTES
[facility]   Phone: [phone]    Fax: [phone]         Physical Therapy Treatment Note/ Progress Report:           Date:  2021    Patient Name:  Mesha Hinds    :  1950  MRN: <[id number]>  Restrictions/Precautions:    Medical/Treatment Diagnosis Information:  · Diagnosis: Right knee OA/surgical optimization   s/p R TKA     DOS 8959  Insurance/Certification information:  PT Insurance Information: Medicare  Physician Information:  Referring Practitioner: Dr Mickey Olszewski  Has the plan of care been signed (Y/N):        [x]  Yes  []  No     Date of Patient follow up with Physician: 2021      Is this a Progress Report:     []  Yes  [x]  No        If Yes:  Date Range for reporting period:  Beginning 2021  Ending  2021    Progress report will be due (10 Rx or 30 days whichever is less):       Recertification will be due (POC Duration  / 90 days whichever is less): 2021        Visit # Insurance Allowable Auth Required   12 MEDICARE (BMN) []  Yes []  No        Functional Scale: KOS ADLs  49% functional limitation    Date assessed:  2021      Latex Allergy:  [x]NO      []YES  Preferred Language for Healthcare:   [x]English       []other:    PQRS:  2021  Reviewed medication list with patient. Prednisone increased to 2x/day for 5 days then dropped to 1x/day, and on antibiotic for cellulitis. Pain level:  3/10     SUBJECTIVE:   ~2 weeks post op  She was able to get some compressive socks on this morning. Swelling is getting better. She has one more day of antibiotics and is still on the Prednisone. But she is feeling better overall, trying to elevate her LE as much as she can.         Relevant Medical History:   significant history (see attached in her chart), left RTC repair , left TKA Aug 2019, Right DILAN     Easing factors: rest, ice  Provocative factors: walking or any WB     Numbness/Tingling: none noted     Occupation/School:  retired     Living Status/Prior Level of Function: Independent with ADLs and IADLs, lives alone, 6 stairs to get in her house. Not doing stairs. Bedroom/bathroom on main floor, has a basement where she does laundry. Likes to be active and do light workouts in the gym. OBJECTIVE:   Moderate bruising noted through her right LE/medial knee and into her calf  She is ambulating independently into clinic with use of her straight cane.     ROM PROM AROM Overpressure Comment    L R L R L R 8/24/2021   Flexion   125 115   Heel slide   Extension   -1 -1        Girth  8/13/2021 L R   Mid Patella 50 cm 51.5 cm   Suprapatellar     5cm above     15cm above         Strength L R Comment: majority deferred secondary to surgery   Quad      Hamstring      Abduction      Quad tone [facility] 8/13/2021       Special Test  8/13/2021 Results/Comment: majority deferred secondary to surgery   Homans (-)     Clinical Presentation   Possible Score   Clients Score     Active cancer (within 6 months of diagnosis or receiving palliative care)  1  0   Paralysis, paresis, or recent immobilization of lower extremity  1   0   Bedridden for more than 3 days or major surgery in the last 4 weeks  1   1   Localized tenderness in the center of the posterior calf, the popliteal space, or along the femoral vein in the anterior thigh/groin  1   0   Entire lower extremity swelling  1   0   Unilateral calf swelling (more than 3 cm larger than uninvolved side)  1   0   Unilateral pitting edema  1   0   Collateral superficial veins (nonvaricose)  1   0   An alternative diagnosis is as likely (or more likely) than DVT (e.g., cellulitis, postoperative swelling, calf strain)  -2  -1   Total Points    0      Key  · -2 to 0 Low probability of DVT 3% (95% confidence interval [CI] 1.7%5.9%)  · 1 to 2 Moderate probability of DVT 17% (95% confidence interval [CI] 12%23%)  · 3 or more High probability of DVT 75% (95% confidence interval [CI] 63%84%)    Medical consultation is advised in the presence of low probability  Medical referral is required with moderate or high score. Lopez PS, Robby DR, Raffi J, et al: Value of assessment of pretest probability of deep-vein thrombosis in clinical management, Lancet 044:2882-2646, 1997. Timed Get Up and Go Test  Measures mobility in people who are able to walk on their own (assistive device permitted)        Name: Julioncmaría Juanis  Date: 8/24/2021    Instructions: The person may wear their usual footwear and can use any assistive device they normally use. 1. Have the person sit in the chair with their back to the chair and their arms resting on the back rests  2. Ask the person to stand up from a standard chair and walk a distance of 10 ft. (3 m). 3. Have the person turn around, walk back to the chair and sit down again. Timing begins when the person starts to rise from the chair and ends when he or she returns to the chair and sits down. Time to Complete:  (21 seconds)      Predictive Results:    Seconds Rating    <10  Freely mobile    <20  Mostly independent    20-29  Variable mobility    >20  Impaired mobility       Source: Eddy Israel S. The timed 'Up and Go' Test: a Test of Basic Functional Mobility for Frail Elderly Persons. Journal of [address] [place] [address]. 1991;39:142-148. G-Code Crosswalk:  TUG time (velma) Disability Index CMS Modifier   12 or faster 0% []CH   13-14 1-19% []CI   15-16 20-39% []CJ   17-18 40-59% []CK   19-20  60-79% [x]CL   21-22  80-99% []CM   23 or slower 100% []CN           Co-morbidities/Complexities (which will affect course of rehabilitation):   []? None              Arthritic conditions   []? Rheumatoid arthritis (M05.9)  [x]? Osteoarthritis (M19.91)    Cardiovascular conditions   [x]? Hypertension (I10)  []? Hyperlipidemia (E78.5)  []? Angina pectoris (I20)  []? Atherosclerosis (I70)  []? CVA PRE Machines      Knee Extension      Knee Flexion      Leg Press            CKC      Calf Raises 3 x10    Mini Squats      Wall Sits      Step ups lvl 2 x10 Forward step ups and lateral up/over   TKE                  Manual Intervention (45982)      Patellar Mobs x5'   All directions   Femoral Tibial mobs      STM x5'     Scar Massage      Foam Roll             NMR re-education (21916)   CUES NEEDED   Biodex Balance       Tandem Balance x30 sec x2 each way    SLB      Rebounder      BOSU                              Therapeutic Activity (41540)      Core Training      [employer] Activities      Monster Walks      TRX training      education x10'  Discussed anatomy and pathology with patient, reviewed [address] [place] [address] program, reviewed post op precautions and expectations               Therapeutic Exercise and NMR EXR  [x] (84874) Provided verbal/tactile cueing for activities related to strengthening, flexibility, endurance, ROM for improvements in LE, proximal hip, and core control with self care, mobility, lifting, ambulation. [x] (22451) Provided verbal/tactile cueing for activities related to improving balance, coordination, kinesthetic sense, posture, motor skill, proprioception to assist with LE, proximal hip, and core control in self-care, mobility, lifting, ambulation and eccentric single leg control.      NMR and Therapeutic Activities:    [x] (38515 or 50329) Provided verbal/tactile cueing for activities related to improving balance, coordination, kinesthetic sense, posture, motor skill, proprioception and motor activation to allow for proper function of core, proximal hip and LE with self-care and ADLs and functional mobility.   [] (04129) Gait Re-education- Provided training and instruction to the patient for proper LE, core and proximal hip recruitment and positioning and eccentric body weight control with ambulation re-education including up and down stairs     Home Exercise Program:    [x] (95663) Reviewed/Progressed HEP activities related to strengthening, flexibility, endurance, ROM of core, proximal hip and LE for functional self-care, mobility, lifting and ambulation/stair navigation   [] (17277) Reviewed/Progressed HEP activities related to improving balance, coordination, kinesthetic sense, posture, motor skill, proprioception of core, proximal hip and LE for self-care, mobility, lifting, and ambulation/stair navigation      Manual Treatments:  PROM / STM / Oscillations-Mobs:  G-I, II, III, IV (PA's, Inf., Post.)  [] (20754) Provided manual therapy to mobilize LE, proximal hip and/or LS spine soft tissue/joints for the purpose of modulating pain, promoting relaxation, increasing ROM, reducing/eliminating soft tissue swelling/inflammation/restriction, improving soft tissue extensibility and allowing for proper ROM for normal function with self-care, mobility, lifting and ambulation. Modalities:     [x] GAME READY (VASO)- for significant edema, swelling, pain control. Charges:  Timed Code Treatment Minutes: 45   Total Treatment Minutes: 65      [] EVAL (LOW) 69390 (typically 20 minutes face-to-face)  [] EVAL (MOD) 44188 (typically 30 minutes face-to-face)  [] EVAL (HIGH) 48806 (typically 45 minutes face-to-face)  [] RE-EVAL     [x] FV(28199) x1 (x18')     [] IONTO  [x] NMR (20189) x1 (x15')     [x] VASO Right knee x15' medium pressure with elevation  [] Manual (93942) x     [] Other:  [x] TA x  1 (x12')    [] [facility] (12108)  [] ES(attended) (76920)      [] ES (un) (89435):        ASSESSMENT:  She still has some redness but not as intense as last week. Worked on patellar mobs and STM to her incision. Thickened area under her incision proximally. She did well holding tandem balance without using her arms at all. She is able to do level 2 steps ups with fairly decent control. Her ROM is looking good. Continue to remind her not to over do it with activity on her feet.     And be sure to lay down and elevate her LE on and off throughout the day. GOALS:  Patient stated goal: restore prior level of function  [] Progressing: [] Met: [] Not Met: [] Adjusted    Therapist goals for Patient:   Short Term Goals: To be achieved in: 2 weeks  1. Independent in HEP and progression per patient tolerance, in order to prevent re-injury. [] Progressing: [] Met: [] Not Met: [] Adjusted  2. Patient will have a decrease in pain to facilitate improvement in movement, function, and ADLs as indicated by Functional Deficits. [] Progressing: [] Met: [] Not Met: [] Adjusted    Long Term Goals: To be achieved in: 4-6 weeks  1. Disability index score of 20% or less for the KOS to assist with reaching prior level of function. [] Progressing: [] Met: [] Not Met: [] Adjusted  2. Patient will demonstrate increased AROM to 0-125 to allow for proper joint functioning as indicated by patients Functional Deficits. [] Progressing: [] Met: [] Not Met: [] Adjusted  3. Patient will demonstrate an increase in Strength by 1/2 grade to allow for proper functional mobility as indicated by patients Functional Deficits. [] Progressing: [] Met: [] Not Met: [] Adjusted  4. Patient will return to low level prior functional activities without increased symptoms or restriction. [] Progressing: [] Met: [] Not Met: [] Adjusted  5. Normalize gait on levels and stairs   [] Progressing: [] Met: [] Not Met: [] Adjusted   GOALS UPDATED 8/13/2021    Overall Progression Towards Functional goals/ Treatment Progress Update:  [] Patient is progressing as expected towards functional goals listed. [] Progression is slowed due to complexities/Impairments listed. [] Progression has been slowed due to co-morbidities.   [x] Plan just implemented, too soon to assess goals progression <30days   [] Goals require adjustment due to lack of progress  [] Patient is not progressing as expected and requires additional follow up with physician  [] Other    Prognosis for POC: [x] Good [] Fair  [] Poor      Patient requires continued skilled intervention: [x] Yes  [] No    Treatment/Activity Tolerance:  [x] Patient able to complete treatment  [] Patient limited by fatigue  [] Patient limited by pain    [] Patient limited by other medical complications  [] Other:         PLAN:    Cont 2x/week for post op TKA protocol  [] Continue per plan of care [] Alter current plan   [x] Plan of care initiated [] Hold pending MD visit [] Discharge      Electronically signed by:      Annita Morgan  PT #[id number]  [place] PT #[id number]      Note: If patient does not return for scheduled/ recommended follow up visits, this note will serve as a discharge from care along with most recent update on progress.

## 2021-08-27 ENCOUNTER — TREATMENT (OUTPATIENT)
Dept: PHYSICAL THERAPY | Age: 71
End: 2021-08-27

## 2021-08-27 DIAGNOSIS — M17.11 PRIMARY OSTEOARTHRITIS OF RIGHT KNEE: Primary | ICD-10-CM

## 2021-08-27 DIAGNOSIS — R26.2 DIFFICULTY WALKING: ICD-10-CM

## 2021-08-27 DIAGNOSIS — G89.18 ACUTE POSTOPERATIVE PAIN OF RIGHT KNEE: ICD-10-CM

## 2021-08-27 DIAGNOSIS — M25.561 ACUTE POSTOPERATIVE PAIN OF RIGHT KNEE: ICD-10-CM

## 2021-08-27 NOTE — PROGRESS NOTES
[facility]wal [facility]   Phone: [phone]    Fax: [phone]      Physical Therapy  Cancellation/No-show Note  Patient Name:  Jus Modi  :  1950   Date:  2021    Cancelled visits to date: 1  No-shows to date: 0    For today's appointment patient:  [x]  Cancelled  []  Rescheduled appointment  []  No-show     Reason given by patient:  []  Patient ill  []  Conflicting appointment  []  No transportation    []  Conflict with work  []  No reason given  [x]  Other:     Comments:  Patient called to cancel said \"something came up\"    Phone call information:   []  Phone call made today to patient at _ time at number provided:      []  Patient answered, conversation as follows:    []  Patient did not answer, message left as follows:  []  Phone call not made today  [x]  Phone call not needed - pt contacted us to cancel and provided reason for cancellation.      Electronically signed by:      Nicole Maldonado, [address] #[id number]  [phone] #[id number]

## 2021-08-31 ENCOUNTER — TREATMENT (OUTPATIENT)
Dept: PHYSICAL THERAPY | Age: 71
End: 2021-08-31
Payer: MEDICARE

## 2021-08-31 DIAGNOSIS — G89.18 ACUTE POSTOPERATIVE PAIN OF RIGHT KNEE: ICD-10-CM

## 2021-08-31 DIAGNOSIS — M25.561 ACUTE POSTOPERATIVE PAIN OF RIGHT KNEE: ICD-10-CM

## 2021-08-31 DIAGNOSIS — M17.11 PRIMARY OSTEOARTHRITIS OF RIGHT KNEE: Primary | ICD-10-CM

## 2021-08-31 DIAGNOSIS — R26.2 DIFFICULTY WALKING: ICD-10-CM

## 2021-08-31 PROCEDURE — 97016 VASOPNEUMATIC DEVICE THERAPY: CPT | Performed by: PHYSICAL THERAPIST

## 2021-08-31 PROCEDURE — G8427 DOCREV CUR MEDS BY ELIG CLIN: HCPCS | Performed by: PHYSICAL THERAPIST

## 2021-08-31 PROCEDURE — 97110 THERAPEUTIC EXERCISES: CPT | Performed by: PHYSICAL THERAPIST

## 2021-08-31 PROCEDURE — 97112 NEUROMUSCULAR REEDUCATION: CPT | Performed by: PHYSICAL THERAPIST

## 2021-08-31 PROCEDURE — 97530 THERAPEUTIC ACTIVITIES: CPT | Performed by: PHYSICAL THERAPIST

## 2021-08-31 NOTE — PROGRESS NOTES
[facility]   Phone: [phone]    Fax: [phone]         Physical Therapy Treatment Note/ Progress Report:           Date:  2021    Patient Name:  Isabelle Jones    :  1950  MRN: <[id number]>  Restrictions/Precautions:    Medical/Treatment Diagnosis Information:  · Diagnosis: Right knee OA/surgical optimization   s/p R TKA     DOS 3/22/0626  Insurance/Certification information:  PT Insurance Information: Medicare  Physician Information:  Referring Practitioner: Dr Roberto Olivarez  Has the plan of care been signed (Y/N):        [x]  Yes  []  No     Date of Patient follow up with Physician: 10/1/2021      Is this a Progress Report:     []  Yes  [x]  No        If Yes:  Date Range for reporting period:  Beginning 2021  Ending  2021    Progress report will be due (10 Rx or 30 days whichever is less):       Recertification will be due (POC Duration  / 90 days whichever is less): 2021        Visit # Insurance Allowable Auth Required   13 MEDICARE (BMN) []  Yes []  No        Functional Scale: KOS ADLs  49% functional limitation    Date assessed:  2021      Latex Allergy:  [x]NO      []YES  Preferred Language for Healthcare:   [x]English       []other:    PQRS:  2021  Reviewed medication list with patient. Prednisone increased to 2x/day for 5 days then dropped to 1x/day, and on antibiotic for cellulitis. Pain level:  3/10     SUBJECTIVE:   ~3 weeks post op  She has finished up with her meds for the cellulitis and the prednisone. Ever since yesterday, she feels more stiff and sore in general in both legs. She did take some Tylenol before coming into therapy today. She also states she has been more active lately. Has gotten out of her house both Saturday and .            Relevant Medical History:   significant history (see attached in her chart), left RTC repair , left TKA Aug 2019, Right DILAN     Easing factors: rest, interval [CI] 12%23%)  · 3 or more High probability of DVT 75% (95% confidence interval [CI] 63%84%)    Medical consultation is advised in the presence of low probability  Medical referral is required with moderate or high score. Lopez PS, Robby GOMEZ, Raffi J, et al: Value of assessment of pretest probability of deep-vein thrombosis in clinical management, Lancet 488:3028-9472, 1997. Timed Get Up and Go Test  Measures mobility in people who are able to walk on their own (assistive device permitted)        Name: Dany Tolentino  Date: 8/31/2021    Instructions: The person may wear their usual footwear and can use any assistive device they normally use. 1. Have the person sit in the chair with their back to the chair and their arms resting on the back rests  2. Ask the person to stand up from a standard chair and walk a distance of 10 ft. (3 m). 3. Have the person turn around, walk back to the chair and sit down again. Timing begins when the person starts to rise from the chair and ends when he or she returns to the chair and sits down. Time to Complete:  (21 seconds)      Predictive Results:    Seconds Rating    <10  Freely mobile    <20  Mostly independent    20-29  Variable mobility    >20  Impaired mobility       Source: Mayito Israel S. The timed 'Up and Go' Test: a Test of Basic Functional Mobility for Frail Elderly Persons. Journal of [facility]. 1991;39:142-148. G-Code Crosswalk:  TUG time (onds) Disability Index CMS Modifier   12 or faster 0% []CH   13-14 1-19% []CI   15-16 20-39% []CJ   17-18 40-59% []CK   19-20  60-79% [x]CL   21-22  80-99% []CM   23 or slower 100% []CN           Co-morbidities/Complexities (which will affect course of rehabilitation):   []? None              Arthritic conditions   []? Rheumatoid arthritis (M05.9)  [x]? Osteoarthritis (M19.91)    Cardiovascular conditions   [x]? Hypertension (I10)  []? Hyperlipidemia (E78.5)  []? Angina pectoris (I20)  []? Atherosclerosis (I70)  []? CVA Musculoskeletal conditions   []? Disc pathology   []? Congenital spine pathologies   []? Prior surgical intervention  []? Osteoporosis (M81.8)  []? Osteopenia (M85.8)   Endocrine conditions   []? Hypothyroid (E03.9)  []? Hyperthyroid Gastrointestinal conditions   []? Constipation (J99.41)    Metabolic conditions   []? Morbid obesity (E66.01)  []? Diabetes type 1(E10.65) or 2 (E11.65)   []? Neuropathy (G60.9)      Pulmonary conditions   []? Asthma (J45)  []? Coughing   []? COPD (J44.9)    Psychological Disorders  []? Anxiety (F41.9)  []? Depression (F32.9)   []? Other:    [x]? Other:   stroke/TIA    CV/spine issues         Barriers to/and or personal factors that will affect rehab potential:              []? Age  []? Sex               []?Smoker              []? Motivation/Lack of Motivation                        [x]? Co-Morbidities              []? Cognitive Function, education/learning barriers              []? Environmental, home barriers              []? profession/work barriers  [x]? past PT/medical experience  []? other:  Justification:      Falls Risk Assessment (30 days):   [x]? Falls Risk assessed and no intervention required. []? Falls Risk assessed and Patient requires intervention due to being higher risk   TUG score (>12s at risk):     []?  Falls education provided, including           RESTRICTIONS/PRECAUTIONS: balance at times is an issue    Exercises/Interventions:     Therapeutic Ex (96709) Sets/sec Reps Notes/CUES   BIKE      TREADMILL            STRETCHING      Towel Pull Calf 30\" x5    Inclined Calf       Hamstrings       Quads      Hip Flexors      ITB      Adductors            ROM      Passive 10\" x10 Seated EOB   Active      Weight Hangs      Sheet Pulls 10\" x10    Ankle Pumps x30     ERMI            STRENGTHENING      Quad Sets 5\" x20 bilat   Pushing down into towel roll    Ham Sets      Hip ADD Sets BS 5\" x20    SLR Flexion 2 x10    SLR Abduction SLR Prone      SLR Adduction      Hooklying clams      SDLY clams 3 x10 R only                     PRE Machines      Knee Extension      Knee Flexion 2 x10 Standing KF   Leg Press            CKC      Calf Raises 3 x10    Mini Squats      Wall Sits      Step ups lvl 2 x10 Forward step ups and lateral up/over   TKE                  Manual Intervention (76270)      Patellar Mobs x5'   All directions   Femoral Tibial mobs      STM x5'     Scar Massage      Foam Roll             NMR re-education (68523)   CUES NEEDED   Biodex Balance       Tandem Balance x30 sec x2 each way    SLB      Rebounder      BOSU                              Therapeutic Activity (06385)      Core Training      [email or web address] Activities      Monster Walks      TRX training      education x10'  Discussed anatomy and pathology with patient, reviewed [address] [place] [address] program, reviewed post op precautions and expectations               Therapeutic Exercise and NMR EXR  [x] (82428) Provided verbal/tactile cueing for activities related to strengthening, flexibility, endurance, ROM for improvements in LE, proximal hip, and core control with self care, mobility, lifting, ambulation. [x] (51710) Provided verbal/tactile cueing for activities related to improving balance, coordination, kinesthetic sense, posture, motor skill, proprioception to assist with LE, proximal hip, and core control in self-care, mobility, lifting, ambulation and eccentric single leg control.      NMR and Therapeutic Activities:    [x] (98425 or 73384) Provided verbal/tactile cueing for activities related to improving balance, coordination, kinesthetic sense, posture, motor skill, proprioception and motor activation to allow for proper function of core, proximal hip and LE with self-care and ADLs and functional mobility.   [] (52196) Gait Re-education- Provided training and instruction to the patient for proper LE, core and proximal hip recruitment and positioning and eccentric body weight control with ambulation re-education including up and down stairs     Home Exercise Program:    [x] (77501) Reviewed/Progressed HEP activities related to strengthening, flexibility, endurance, ROM of core, proximal hip and LE for functional self-care, mobility, lifting and ambulation/stair navigation   [] (87132) Reviewed/Progressed HEP activities related to improving balance, coordination, kinesthetic sense, posture, motor skill, proprioception of core, proximal hip and LE for self-care, mobility, lifting, and ambulation/stair navigation      Manual Treatments:  PROM / STM / Oscillations-Mobs:  G-I, II, III, IV (PA's, Inf., Post.)  [] (96987) Provided manual therapy to mobilize LE, proximal hip and/or LS spine soft tissue/joints for the purpose of modulating pain, promoting relaxation, increasing ROM, reducing/eliminating soft tissue swelling/inflammation/restriction, improving soft tissue extensibility and allowing for proper ROM for normal function with self-care, mobility, lifting and ambulation. Modalities:     [x] GAME READY (VASO)- for significant edema, swelling, pain control. Charges:  Timed Code Treatment Minutes: 45   Total Treatment Minutes: 65      [] EVAL (LOW) 89722 (typically 20 minutes face-to-face)  [] EVAL (MOD) 56857 (typically 30 minutes face-to-face)  [] EVAL (HIGH) 75762 (typically 45 minutes face-to-face)  [] RE-EVAL     [x] AW(40758) x1 (x18')     [] IONTO  [x] NMR (07293) x1 (x15')     [x] VASO Right knee x15' medium pressure with elevation  [] Manual (50279) x     [] Other:  [x] TA x  1 (x12')    [] Mech Traction (22505)  [] ES(attended) (65155)      [] ES (un) (73748):        ASSESSMENT:  She has some superficial layers of skin that are coming off with scar massage. Advised her to work on scar massage at home. Mild pinkness persists in her right LE compared to her left. She continues to work hard in therapy and is motivated to maximize her strength and outcomes.      She was a little stiffer today with knee flexion, but it did loosen up with reps and time. Improved ROM measurement noted today after stretching. GOALS:  Patient stated goal: restore prior level of function  [] Progressing: [] Met: [] Not Met: [] Adjusted    Therapist goals for Patient:   Short Term Goals: To be achieved in: 2 weeks  1. Independent in HEP and progression per patient tolerance, in order to prevent re-injury. [] Progressing: [] Met: [] Not Met: [] Adjusted  2. Patient will have a decrease in pain to facilitate improvement in movement, function, and ADLs as indicated by Functional Deficits. [] Progressing: [] Met: [] Not Met: [] Adjusted    Long Term Goals: To be achieved in: 4-6 weeks  1. Disability index score of 20% or less for the KOS to assist with reaching prior level of function. [] Progressing: [] Met: [] Not Met: [] Adjusted  2. Patient will demonstrate increased AROM to 0-125 to allow for proper joint functioning as indicated by patients Functional Deficits. [] Progressing: [] Met: [] Not Met: [] Adjusted  3. Patient will demonstrate an increase in Strength by 1/2 grade to allow for proper functional mobility as indicated by patients Functional Deficits. [] Progressing: [] Met: [] Not Met: [] Adjusted  4. Patient will return to low level prior functional activities without increased symptoms or restriction. [] Progressing: [] Met: [] Not Met: [] Adjusted  5. Normalize gait on levels and stairs   [] Progressing: [] Met: [] Not Met: [] Adjusted   GOALS UPDATED 8/13/2021    Overall Progression Towards Functional goals/ Treatment Progress Update:  [] Patient is progressing as expected towards functional goals listed. [] Progression is slowed due to complexities/Impairments listed. [] Progression has been slowed due to co-morbidities.   [x] Plan just implemented, too soon to assess goals progression <30days   [] Goals require adjustment due to lack of progress  [] Patient is not progressing as expected and requires additional follow up with physician  [] Other    Prognosis for POC: [x] Good [] Fair  [] Poor      Patient requires continued skilled intervention: [x] Yes  [] No    Treatment/Activity Tolerance:  [x] Patient able to complete treatment  [] Patient limited by fatigue  [] Patient limited by pain    [] Patient limited by other medical complications  [] Other:         PLAN:    Cont 2x/week for post op TKA protocol  [] Continue per plan of care [] Alter current plan   [x] Plan of care initiated [] Hold pending MD visit [] Discharge      Electronically signed by:      Annita Muhammad  PT #[id number]  [place] [facility] #[id number]      Note: If patient does not return for scheduled/ recommended follow up visits, this note will serve as a discharge from care along with most recent update on progress.

## 2021-09-03 ENCOUNTER — TREATMENT (OUTPATIENT)
Dept: PHYSICAL THERAPY | Age: 71
End: 2021-09-03
Payer: MEDICARE

## 2021-09-03 DIAGNOSIS — M25.561 ACUTE POSTOPERATIVE PAIN OF RIGHT KNEE: ICD-10-CM

## 2021-09-03 DIAGNOSIS — M17.11 PRIMARY OSTEOARTHRITIS OF RIGHT KNEE: Primary | ICD-10-CM

## 2021-09-03 DIAGNOSIS — R26.2 DIFFICULTY WALKING: ICD-10-CM

## 2021-09-03 DIAGNOSIS — G89.18 ACUTE POSTOPERATIVE PAIN OF RIGHT KNEE: ICD-10-CM

## 2021-09-03 PROCEDURE — 97530 THERAPEUTIC ACTIVITIES: CPT | Performed by: PHYSICAL THERAPIST

## 2021-09-03 PROCEDURE — 97110 THERAPEUTIC EXERCISES: CPT | Performed by: PHYSICAL THERAPIST

## 2021-09-03 PROCEDURE — 97016 VASOPNEUMATIC DEVICE THERAPY: CPT | Performed by: PHYSICAL THERAPIST

## 2021-09-03 PROCEDURE — G8427 DOCREV CUR MEDS BY ELIG CLIN: HCPCS | Performed by: PHYSICAL THERAPIST

## 2021-09-03 PROCEDURE — 97112 NEUROMUSCULAR REEDUCATION: CPT | Performed by: PHYSICAL THERAPIST

## 2021-09-03 NOTE — PROGRESS NOTES
[facility]   Phone: [phone]    Fax: [phone]         Physical Therapy Treatment Note/ Progress Report:           Date:  9/3/2021    Patient Name:  Víctor Graves    :  1950  MRN: <[id number]>  Restrictions/Precautions:    Medical/Treatment Diagnosis Information:  · Diagnosis: Right knee OA/surgical optimization   s/p R TKA     DOS   Insurance/Certification information:  PT Insurance Information: Medicare  Physician Information:  Referring Practitioner: Dr Flor Lucas  Has the plan of care been signed (Y/N):        [x]  Yes  []  No     Date of Patient follow up with Physician: 10/1/2021      Is this a Progress Report:     []  Yes  [x]  No        If Yes:  Date Range for reporting period:  Beginning 2021  Ending  2021    Progress report will be due (10 Rx or 30 days whichever is less): 3/05/4191      Recertification will be due (POC Duration  / 90 days whichever is less): 2021        Visit # Insurance Allowable Auth Required   14 MEDICARE (BMN) []  Yes []  No        Functional Scale: KOS ADLs  49% functional limitation    Date assessed:  2021      Latex Allergy:  [x]NO      []YES  Preferred Language for Healthcare:   [x]English       []other:    PQRS:  9/3/2021  Reviewed medication list with patient. Pain level:  3/10     SUBJECTIVE:   3+ weeks post op  She reports that she hasn't been feeling good this week. She feels more soreness in both of her thighs, left > right. She called Dr Muna Pepe office. And he put her back on some Prednisone. She states she has been sleeping ok, just not feeling great in general.     She thinks she came off the last script of Prednisone too fast.       She also reports that she scheduled an MRI for her right shoulder next week.     Relevant Medical History:   significant history (see attached in her chart), left RTC repair , left TKA Aug 2019, Right DILAN     Easing factors: rest, ice  Provocative factors: walking or any WB     Numbness/Tingling: none noted     Occupation/School:  retired     Living Status/Prior Level of Function: Independent with ADLs and IADLs, lives alone, 6 stairs to get in her house. Not doing stairs. Bedroom/bathroom on main floor, has a basement where she does laundry. Likes to be active and do light workouts in the gym. OBJECTIVE:   Moderate bruising noted through her right LE/medial knee and into her calf  She is ambulating independently into clinic with use of her straight cane.     ROM PROM AROM Overpressure Comment    L R L R L R 8/31/2021   Flexion   125 122   Heel slide   Extension   -1 -1        Girth  8/13/2021 L R   Mid Patella 50 cm 51.5 cm   Suprapatellar     5cm above     15cm above         Strength L R Comment: majority deferred secondary to surgery   Quad      Hamstring      Abduction      Quad tone [facility] 8/13/2021       Special Test  8/13/2021 Results/Comment: majority deferred secondary to surgery   Homans (-)     Clinical Presentation   Possible Score   Clients Score     Active cancer (within 6 months of diagnosis or receiving palliative care)  1  0   Paralysis, paresis, or recent immobilization of lower extremity  1   0   Bedridden for more than 3 days or major surgery in the last 4 weeks  1   1   Localized tenderness in the center of the posterior calf, the popliteal space, or along the femoral vein in the anterior thigh/groin  1   0   Entire lower extremity swelling  1   0   Unilateral calf swelling (more than 3 cm larger than uninvolved side)  1   0   Unilateral pitting edema  1   0   Collateral superficial veins (nonvaricose)  1   0   An alternative diagnosis is as likely (or more likely) than DVT (e.g., cellulitis, postoperative swelling, calf strain)  -2  -1   Total Points    0      Key  · -2 to 0 Low probability of DVT 3% (95% confidence interval [CI] 1.7%5.9%)  · 1 to 2 Moderate probability of DVT 17% (95% confidence interval [CI] 12%23%)  · 3 or more High probability of DVT 75% (95% confidence interval [CI] 63%84%)    Medical consultation is advised in the presence of low probability  Medical referral is required with moderate or high score. Lopez PS, Robby GOMEZ, Raffi J, et al: Value of assessment of pretest probability of deep-vein thrombosis in clinical management, Lancet 933:1721-1780, 1997. Timed Get Up and Go Test  Measures mobility in people who are able to walk on their own (assistive device permitted)        Name: Richelle Cao  Date: 9/3/2021    Instructions: The person may wear their usual footwear and can use any assistive device they normally use. 1. Have the person sit in the chair with their back to the chair and their arms resting on the back rests  2. Ask the person to stand up from a standard chair and walk a distance of 10 ft. (3 m). 3. Have the person turn around, walk back to the chair and sit down again. Timing begins when the person starts to rise from the chair and ends when he or she returns to the chair and sits down. Time to Complete:  (21 seconds)      Predictive Results:    Seconds Rating    <10  Freely mobile    <20  Mostly independent    20-29  Variable mobility    >20  Impaired mobility       Source: Chyna Israel S. The timed 'Up and Go' Test: a Test of Basic Functional Mobility for Frail Elderly Persons. Journal of [address] [place] [address]. 1991;39:142-148. G-Code Crosswalk:  TUG time (onds) Disability Index CMS Modifier   12 or faster 0% []CH   13-14 1-19% []CI   15-16 20-39% []CJ   17-18 40-59% []CK   19-20  60-79% [x]CL   21-22  80-99% []CM   23 or slower 100% []CN           Co-morbidities/Complexities (which will affect course of rehabilitation):   []? None              Arthritic conditions   []? Rheumatoid arthritis (M05.9)  [x]? Osteoarthritis (M19.91)    Cardiovascular conditions   [x]? Hypertension (I10)  []? Hyperlipidemia (E78.5)  []? Angina pectoris (I20)  []? Atherosclerosis (I70)  []? CVA Musculoskeletal conditions   []? Disc pathology   []? Congenital spine pathologies   []? Prior surgical intervention  []? Osteoporosis (M81.8)  []? Osteopenia (M85.8)   Endocrine conditions   []? Hypothyroid (E03.9)  []? Hyperthyroid Gastrointestinal conditions   []? Constipation (C22.74)    Metabolic conditions   []? Morbid obesity (E66.01)  []? Diabetes type 1(E10.65) or 2 (E11.65)   []? Neuropathy (G60.9)      Pulmonary conditions   []? Asthma (J45)  []? Coughing   []? COPD (J44.9)    Psychological Disorders  []? Anxiety (F41.9)  []? Depression (F32.9)   []? Other:    [x]? Other:   stroke/TIA    CV/spine issues         Barriers to/and or personal factors that will affect rehab potential:              []? Age  []? Sex               []?Smoker              []? Motivation/Lack of Motivation                        [x]? Co-Morbidities              []? Cognitive Function, education/learning barriers              []? Environmental, home barriers              []? profession/work barriers  [x]? past PT/medical experience  []? other:  Justification:      Falls Risk Assessment (30 days):   [x]? Falls Risk assessed and no intervention required. []? Falls Risk assessed and Patient requires intervention due to being higher risk   TUG score (>12s at risk):     []?  Falls education provided, including           RESTRICTIONS/PRECAUTIONS: balance at times is an issue    Exercises/Interventions:     Therapeutic Ex (42759) Sets/sec Reps Notes/CUES   BIKE      TREADMILL            STRETCHING      Towel Pull Calf 30\" x5    Inclined Calf       Hamstrings       Quads      Hip Flexors      ITB      Adductors            ROM      Passive 10\" x10 Seated EOB   Active 5\" x30 Supine feet on Green SB rolls   Weight Hangs      Sheet Pulls 10\" x10    Ankle Pumps x30     ERMI            STRENGTHENING      Quad Sets 5\" x20 bilat   Pushing down into towel roll    Ham Sets      Hip ADD Sets BS 5\" x20    SLR Flexion 2 x10    SLR Abduction       SLR Prone      SLR Adduction      Hooklying clams      SDLY clams 3 x10 R only                     PRE Machines      Knee Extension      Knee Flexion 2 x10 Standing KF   Leg Press            CKC      Calf Raises 3 x10    Mini Squats      Wall Sits      x10 Forward step ups and lateral up/over   TKE                  Manual Intervention (78975)      Patellar Mobs x5'   All directions   Femoral Tibial mobs      STM x5'     Scar Massage      Foam Roll             NMR re-education (33684)   CUES NEEDED   Biodex Balance       Tandem Balance x30 sec x2 each way    SLB      Rebounder      BOSU                              Therapeutic Activity (01967)      Core Training      [employer] Activities      Monster Walks      TRX training      education x10'  Discussed anatomy and pathology with patient, reviewed [address] [place] [address] program, reviewed post op precautions and expectations               Therapeutic Exercise and NMR EXR  [x] (77645) Provided verbal/tactile cueing for activities related to strengthening, flexibility, endurance, ROM for improvements in LE, proximal hip, and core control with self care, mobility, lifting, ambulation. [x] (87857) Provided verbal/tactile cueing for activities related to improving balance, coordination, kinesthetic sense, posture, motor skill, proprioception to assist with LE, proximal hip, and core control in self-care, mobility, lifting, ambulation and eccentric single leg control.      NMR and Therapeutic Activities:    [x] (87657 or 90695) Provided verbal/tactile cueing for activities related to improving balance, coordination, kinesthetic sense, posture, motor skill, proprioception and motor activation to allow for proper function of core, proximal hip and LE with self-care and ADLs and functional mobility.   [] (09894) Gait Re-education- Provided training and instruction to the patient for proper LE, core and proximal hip recruitment and positioning and eccentric body weight control with ambulation re-education including up and down stairs     Home Exercise Program:    [x] (03270) Reviewed/Progressed HEP activities related to strengthening, flexibility, endurance, ROM of core, proximal hip and LE for functional self-care, mobility, lifting and ambulation/stair navigation   [] (80262) Reviewed/Progressed HEP activities related to improving balance, coordination, kinesthetic sense, posture, motor skill, proprioception of core, proximal hip and LE for self-care, mobility, lifting, and ambulation/stair navigation      Manual Treatments:  PROM / STM / Oscillations-Mobs:  G-I, II, III, IV (PA's, Inf., Post.)  [] (97057) Provided manual therapy to mobilize LE, proximal hip and/or LS spine soft tissue/joints for the purpose of modulating pain, promoting relaxation, increasing ROM, reducing/eliminating soft tissue swelling/inflammation/restriction, improving soft tissue extensibility and allowing for proper ROM for normal function with self-care, mobility, lifting and ambulation. Modalities:     [x] GAME READY (VASO)- for significant edema, swelling, pain control. Charges:  Timed Code Treatment Minutes: 45   Total Treatment Minutes: 65      [] EVAL (LOW) 73963 (typically 20 minutes face-to-face)  [] EVAL (MOD) 65042 (typically 30 minutes face-to-face)  [] EVAL (HIGH) 60798 (typically 45 minutes face-to-face)  [] RE-EVAL     [x] IV(71901) x1 (x18')     [] IONTO  [x] NMR (42894) x1 (x15')     [x] VASO Right knee x15' medium pressure with elevation  [] Manual (52603) x     [] Other:  [x] TA x  1 (x12')    [] Mech Traction (98923)  [] ES(attended) (60444)      [] ES (un) (62143):        ASSESSMENT:  Her right leg is looking better. The color is getting very close to her uninvolved. Continue to work on [email or web address] for her scar mobility. We did hold off on the step ups today due to her legs being more sore in general.     We also decreased some of her reps a bit today. Her quad tone remains good today. She complained of more discomfort in general today. GOALS:  Patient stated goal: restore prior level of function  [] Progressing: [] Met: [] Not Met: [] Adjusted    Therapist goals for Patient:   Short Term Goals: To be achieved in: 2 weeks  1. Independent in HEP and progression per patient tolerance, in order to prevent re-injury. [] Progressing: [] Met: [] Not Met: [] Adjusted  2. Patient will have a decrease in pain to facilitate improvement in movement, function, and ADLs as indicated by Functional Deficits. [] Progressing: [] Met: [] Not Met: [] Adjusted    Long Term Goals: To be achieved in: 4-6 weeks  1. Disability index score of 20% or less for the KOS to assist with reaching prior level of function. [] Progressing: [] Met: [] Not Met: [] Adjusted  2. Patient will demonstrate increased AROM to 0-125 to allow for proper joint functioning as indicated by patients Functional Deficits. [] Progressing: [] Met: [] Not Met: [] Adjusted  3. Patient will demonstrate an increase in Strength by 1/2 grade to allow for proper functional mobility as indicated by patients Functional Deficits. [] Progressing: [] Met: [] Not Met: [] Adjusted  4. Patient will return to low level prior functional activities without increased symptoms or restriction. [] Progressing: [] Met: [] Not Met: [] Adjusted  5. Normalize gait on levels and stairs   [] Progressing: [] Met: [] Not Met: [] Adjusted   GOALS UPDATED 8/13/2021    Overall Progression Towards Functional goals/ Treatment Progress Update:  [] Patient is progressing as expected towards functional goals listed. [] Progression is slowed due to complexities/Impairments listed. [] Progression has been slowed due to co-morbidities.   [x] Plan just implemented, too soon to assess goals progression <30days   [] Goals require adjustment due to lack of progress  [] Patient is not progressing as expected and requires additional follow up with physician  [] Other    Prognosis for POC: [x] Good [] Fair  [] Poor      Patient requires continued skilled intervention: [x] Yes  [] No    Treatment/Activity Tolerance:  [x] Patient able to complete treatment  [] Patient limited by fatigue  [] Patient limited by pain    [] Patient limited by other medical complications  [] Other:         PLAN:    Cont 2x/week for post op TKA protocol  [] Continue per plan of care [] Alter current plan   [x] Plan of care initiated [] Hold pending MD visit [] Discharge      Electronically signed by:      Annita Muhammad  PT #[id number]  [place] [facility] #[id number]      Note: If patient does not return for scheduled/ recommended follow up visits, this note will serve as a discharge from care along with most recent update on progress.

## 2021-09-07 ENCOUNTER — TREATMENT (OUTPATIENT)
Dept: PHYSICAL THERAPY | Age: 71
End: 2021-09-07
Payer: MEDICARE

## 2021-09-07 DIAGNOSIS — G89.29 CHRONIC PAIN OF RIGHT KNEE: ICD-10-CM

## 2021-09-07 DIAGNOSIS — M17.11 PRIMARY OSTEOARTHRITIS OF RIGHT KNEE: Primary | ICD-10-CM

## 2021-09-07 DIAGNOSIS — M25.561 CHRONIC PAIN OF RIGHT KNEE: ICD-10-CM

## 2021-09-07 DIAGNOSIS — R26.2 DIFFICULTY WALKING: ICD-10-CM

## 2021-09-07 DIAGNOSIS — M25.561 ACUTE POSTOPERATIVE PAIN OF RIGHT KNEE: ICD-10-CM

## 2021-09-07 DIAGNOSIS — G89.18 ACUTE POSTOPERATIVE PAIN OF RIGHT KNEE: ICD-10-CM

## 2021-09-07 PROCEDURE — 97112 NEUROMUSCULAR REEDUCATION: CPT | Performed by: PHYSICAL THERAPIST

## 2021-09-07 PROCEDURE — 97530 THERAPEUTIC ACTIVITIES: CPT | Performed by: PHYSICAL THERAPIST

## 2021-09-07 PROCEDURE — 97110 THERAPEUTIC EXERCISES: CPT | Performed by: PHYSICAL THERAPIST

## 2021-09-07 PROCEDURE — 97016 VASOPNEUMATIC DEVICE THERAPY: CPT | Performed by: PHYSICAL THERAPIST

## 2021-09-07 NOTE — PROGRESS NOTES
[facility]   Phone: [phone]    Fax: [phone]         Physical Therapy Treatment Note/ Progress Report:           Date:  2021    Patient Name:  Bob Noble    :  1950  MRN: <[id number]>  Restrictions/Precautions:    Medical/Treatment Diagnosis Information:  · Diagnosis: Right knee OA/surgical optimization   s/p R TKA     DOS 3474  Insurance/Certification information:  PT Insurance Information: Medicare  Physician Information:  Referring Practitioner: Dr Braxton Teixeira  Has the plan of care been signed (Y/N):        [x]  Yes  []  No     Date of Patient follow up with Physician: 2021      Is this a Progress Report:     []  Yes  [x]  No        If Yes:  Date Range for reporting period:  Beginning 2021  Ending  2021    Progress report will be due (10 Rx or 30 days whichever is less): 3/43/0207      Recertification will be due (POC Duration  / 90 days whichever is less): 2021        Visit # Insurance Allowable Auth Required   15 MEDICARE (BMN) []  Yes []  No        Functional Scale: KOS ADLs  49% functional limitation    Date assessed:  2021      Latex Allergy:  [x]NO      []YES  Preferred Language for Healthcare:   [x]English       []other:    PQRS:  9/3/2021  Reviewed medication list with patient. Prednisone increased to 2x/day for 5 days then dropped to 1x/day, and on antibiotic for cellulitis. Pain level:  3/10     SUBJECTIVE:   4+ weeks post op  She reports that her hips are hurting and L knee more at this time. Pt did take 1 pain pill prior to PT today. L hip OA good. :Pt is pleased that she can go up steps step over step now which she could not do in years. She is limited in drying toes on her L foot after shower. Pt wants to return to her job of being a  for 4hr a shift at the end of the month. She also reports that she scheduled an MRI for her right shoulder this Thursday.     Relevant Medical History:   significant history (see attached in her chart), left RTC repair 2018, left TKA Aug 2019, Right DILAN 2018    Easing factors: rest, ice  Provocative factors: walking or any WB     Numbness/Tingling: none noted     Occupation/School:  retired     Living Status/Prior Level of Function: Independent with ADLs and IADLs, lives alone, 6 stairs to get in her house. Not doing stairs. Bedroom/bathroom on main floor, has a basement where she does laundry. Likes to be active and do light workouts in the gym. OBJECTIVE:   Moderate bruising w/ general redness noted in knee and thigh region; noted through her right LE/medial knee and into her calf  She is ambulating independently into clinic with use of her straight cane.     ROM PROM AROM Overpressure Comment    L R L R L R 8/31/2021   Flexion   125 122   Heel slide   Extension   -1 -1        Girth  8/13/2021 L R   Mid Patella 50 cm 51.5 cm   Suprapatellar     5cm above     15cm above         Strength L R Comment: majority deferred secondary to surgery   Quad      Hamstring      Abduction      Quad tone [facility]+ 9/7/2021       Special Test  8/13/2021 Results/Comment: majority deferred secondary to surgery   Homans (-)     Clinical Presentation   Possible Score   Clients Score     Active cancer (within 6 months of diagnosis or receiving palliative care)  1  0   Paralysis, paresis, or recent immobilization of lower extremity  1   0   Bedridden for more than 3 days or major surgery in the last 4 weeks  1   1   Localized tenderness in the center of the posterior calf, the popliteal space, or along the femoral vein in the anterior thigh/groin  1   0   Entire lower extremity swelling  1   0   Unilateral calf swelling (more than 3 cm larger than uninvolved side)  1   0   Unilateral pitting edema  1   0   Collateral superficial veins (nonvaricose)  1   0   An alternative diagnosis is as likely (or more likely) than DVT (e.g., cellulitis, postoperative swelling, calf strain)  -2  -1 Total Points    0      Key  · -2 to 0 Low probability of DVT 3% (95% confidence interval [CI] 1.7%5.9%)  · 1 to 2 Moderate probability of DVT 17% (95% confidence interval [CI] 12%23%)  · 3 or more High probability of DVT 75% (95% confidence interval [CI] 63%84%)    Medical consultation is advised in the presence of low probability  Medical referral is required with moderate or high score. Lopez PS, Robby GOMEZ, Raffi J, et al: Value of assessment of pretest probability of deep-vein thrombosis in clinical management, Lancet 863:4341-1893, 1997. Timed Get Up and Go Test  Measures mobility in people who are able to walk on their own (assistive device permitted)        Name: Star Lopez  Date: 9/7/2021    Instructions: The person may wear their usual footwear and can use any assistive device they normally use. 1. Have the person sit in the chair with their back to the chair and their arms resting on the back rests  2. Ask the person to stand up from a standard chair and walk a distance of 10 ft. (3 m). 3. Have the person turn around, walk back to the chair and sit down again. Timing begins when the person starts to rise from the chair and ends when he or she returns to the chair and sits down. Time to Complete:  (21 seconds)    Predictive Results:    Seconds Rating    <10  Freely mobile    <20  Mostly independent    20-29  Variable mobility    >20  Impaired mobility       Source: Herbert Israel S. The timed 'Up and Go' Test: a Test of Basic Functional Mobility for Frail Elderly Persons. Journal of [facility]. 1991;39:142-148. G-Code Crosswalk:  TUG time (velma) Disability Index CMS Modifier   12 or faster 0% []CH   13-14 1-19% []CI   15-16 20-39% []CJ   17-18 40-59% []CK   19-20  60-79% [x]CL   21-22  80-99% []CM   23 or slower 100% []CN           Co-morbidities/Complexities (which will affect course of rehabilitation):   []? None              Arthritic conditions   []? Rheumatoid arthritis (M05.9)  [x]? Osteoarthritis (M19.91)    Cardiovascular conditions   [x]? Hypertension (I10)  []? Hyperlipidemia (E78.5)  []? Angina pectoris (I20)  []? Atherosclerosis (I70)  []? CVA Musculoskeletal conditions   []? Disc pathology   []? Congenital spine pathologies   []? Prior surgical intervention  []? Osteoporosis (M81.8)  []? Osteopenia (M85.8)   Endocrine conditions   []? Hypothyroid (E03.9)  []? Hyperthyroid Gastrointestinal conditions   []? Constipation (D30.14)    Metabolic conditions   []? Morbid obesity (E66.01)  []? Diabetes type 1(E10.65) or 2 (E11.65)   []? Neuropathy (G60.9)      Pulmonary conditions   []? Asthma (J45)  []? Coughing   []? COPD (J44.9)    Psychological Disorders  []? Anxiety (F41.9)  []? Depression (F32.9)   []? Other:    [x]? Other:   stroke/TIA    CV/spine issues         Barriers to/and or personal factors that will affect rehab potential:              []? Age  []? Sex               []?Smoker              []? Motivation/Lack of Motivation                        [x]? Co-Morbidities              []? Cognitive Function, education/learning barriers              []? Environmental, home barriers              []? profession/work barriers  [x]? past PT/medical experience  []? other:  Justification:      Falls Risk Assessment (30 days):   [x]? Falls Risk assessed and no intervention required. []? Falls Risk assessed and Patient requires intervention due to being higher risk   TUG score (>12s at risk):     []?  Falls education provided, including       RESTRICTIONS/PRECAUTIONS: balance at times is an issue    Exercises/Interventions:     Therapeutic Ex (98300) Sets/sec Reps Notes/CUES   BIKE      TREADMILL            STRETCHING      Towel Pull Calf 30\" x5    Inclined Calf       Hamstrings  30\" x5 ea side Back straight and head up   Quads      Hip Flexors      ITB      Adductors            ROM      Passive 10\" x10 Seated EOB   Active 5\" x30 Supine feet on Green SB rolls   Weight Hangs Sheet Pulls 10\" x10    Ankle Pumps      ERMI            STRENGTHENING      Quad Sets 5\" x20 bilat   Pushing down into towel roll    Ham Sets      Hip ADD Sets BS 5\" x20    SLR Flexion 2 x10    SLR Abduction       SLR Prone      SLR Adduction      Hooklying clams      SDLY clams 3 x10 R only                     PRE Machines      Knee Extension      Knee Flexion 2 x10 Standing KF   Leg Press            CKC      Calf Raises 3 x10    Mini Squats      Wall Sits      Step ups lvl 2 x10 Forward step ups and lateral up/over- only 10 reps   TKE                  Manual Intervention (25386)      Patellar Mobs x5'   All directions   Femoral Tibial mobs      STM x5'  Flyby for L hip flexor tightness   Scar Massage      Foam Roll             NMR re-education (68157)   CUES NEEDED   Biodex Balance       Tandem Balance x30 sec x2 each way Eo, ec   SLB      Rebounder      BOSU                              Therapeutic Activity (58320)      Core Training      Swiss Priscilla Harp Activities      Monster Walks      TRX training      education x10'  Discussed anatomy and pathology with patient, reviewed Emily Southmayd program, reviewed post op precautions and expectations               Therapeutic Exercise and NMR EXR  [x] (88621) Provided verbal/tactile cueing for activities related to strengthening, flexibility, endurance, ROM for improvements in LE, proximal hip, and core control with self care, mobility, lifting, ambulation. [x] (35499) Provided verbal/tactile cueing for activities related to improving balance, coordination, kinesthetic sense, posture, motor skill, proprioception to assist with LE, proximal hip, and core control in self-care, mobility, lifting, ambulation and eccentric single leg control.      NMR and Therapeutic Activities:    [x] (05917 or 95579) Provided verbal/tactile cueing for activities related to improving balance, coordination, kinesthetic sense, posture, motor skill, proprioception and motor activation to allow for proper function of core, proximal hip and LE with self-care and ADLs and functional mobility.   [] (35462) Gait Re-education- Provided training and instruction to the patient for proper LE, core and proximal hip recruitment and positioning and eccentric body weight control with ambulation re-education including up and down stairs     Home Exercise Program:    [x] (37727) Reviewed/Progressed HEP activities related to strengthening, flexibility, endurance, ROM of core, proximal hip and LE for functional self-care, mobility, lifting and ambulation/stair navigation   [] (92775) Reviewed/Progressed HEP activities related to improving balance, coordination, kinesthetic sense, posture, motor skill, proprioception of core, proximal hip and LE for self-care, mobility, lifting, and ambulation/stair navigation      Manual Treatments:  PROM / STM / Oscillations-Mobs:  G-I, II, III, IV (PA's, Inf., Post.)  [x] (05693) Provided manual therapy to mobilize LE, proximal hip and/or LS spine soft tissue/joints for the purpose of modulating pain, promoting relaxation, increasing ROM, reducing/eliminating soft tissue swelling/inflammation/restriction, improving soft tissue extensibility and allowing for proper ROM for normal function with self-care, mobility, lifting and ambulation. Modalities:  CP to L hip   [x] GAME READY (VASO)- for significant edema, swelling, pain control.      Charges:  Timed Code Treatment Minutes: 45   Total Treatment Minutes: 65      [] EVAL (LOW) 71358 (typically 20 minutes face-to-face)  [] EVAL (MOD) 24774 (typically 30 minutes face-to-face)  [] EVAL (HIGH) 19069 (typically 45 minutes face-to-face)  [] RE-EVAL     [x] SE(61833) x1 (x18')     [] IONTO  [x] NMR (36658) x1 (x15')     [x] VASO Right knee x15' medium pressure   [] Manual (07463) x     [] Other:  [x] TA x  1 (x12')    [] Mech Traction (82168)  [] ES(attended) (16804)      [] ES (un) (65096):        ASSESSMENT: ROM in R knee progressing w/ min tightness. Quad contraction on R knee improving w/ L quad delayed for quad sets. Pt did have mod tightness and tenderness in L hip flexors that did respond well to manual tech. Tolerated program well w/ min fatigue. Pt had some symptoms w/ lateral step ups so minimized activity that went well. GOALS:  Patient stated goal: restore prior level of function  [] Progressing: [] Met: [] Not Met: [] Adjusted    Therapist goals for Patient:   Short Term Goals: To be achieved in: 2 weeks  1. Independent in HEP and progression per patient tolerance, in order to prevent re-injury. [] Progressing: [] Met: [] Not Met: [] Adjusted  2. Patient will have a decrease in pain to facilitate improvement in movement, function, and ADLs as indicated by Functional Deficits. [] Progressing: [] Met: [] Not Met: [] Adjusted    Long Term Goals: To be achieved in: 4-6 weeks  1. Disability index score of 20% or less for the KOS to assist with reaching prior level of function. [] Progressing: [] Met: [] Not Met: [] Adjusted  2. Patient will demonstrate increased AROM to 0-125 to allow for proper joint functioning as indicated by patients Functional Deficits. [] Progressing: [] Met: [] Not Met: [] Adjusted  3. Patient will demonstrate an increase in Strength by 1/2 grade to allow for proper functional mobility as indicated by patients Functional Deficits. [] Progressing: [] Met: [] Not Met: [] Adjusted  4. Patient will return to low level prior functional activities without increased symptoms or restriction. [] Progressing: [] Met: [] Not Met: [] Adjusted  5. Normalize gait on levels and stairs   [] Progressing: [] Met: [] Not Met: [] Adjusted   GOALS UPDATED 8/13/2021    Overall Progression Towards Functional goals/ Treatment Progress Update:  [] Patient is progressing as expected towards functional goals listed. [] Progression is slowed due to complexities/Impairments listed.   [] Progression has been slowed due to co-morbidities. [x] Plan just implemented, too soon to assess goals progression <30days   [] Goals require adjustment due to lack of progress  [] Patient is not progressing as expected and requires additional follow up with physician  [] Other    Prognosis for POC: [x] Good [] Fair  [] Poor      Patient requires continued skilled intervention: [x] Yes  [] No    Treatment/Activity Tolerance:  [x] Patient able to complete treatment  [] Patient limited by fatigue  [] Patient limited by pain    [] Patient limited by other medical complications  [] Other:         PLAN:    Cont 2x/week for post op TKA protocol  [] Continue per plan of care [] Alter current plan   [x] Plan of care initiated [] Hold pending MD visit [] Discharge      Electronically signed by:      Annita Berger  PT #[id number]  [place] PT #[id number]      Note: If patient does not return for scheduled/ recommended follow up visits, this note will serve as a discharge from care along with most recent update on progress.

## 2021-09-14 ENCOUNTER — TREATMENT (OUTPATIENT)
Dept: PHYSICAL THERAPY | Age: 71
End: 2021-09-14
Payer: MEDICARE

## 2021-09-14 DIAGNOSIS — R26.2 DIFFICULTY WALKING: ICD-10-CM

## 2021-09-14 DIAGNOSIS — M25.561 ACUTE POSTOPERATIVE PAIN OF RIGHT KNEE: ICD-10-CM

## 2021-09-14 DIAGNOSIS — G89.18 ACUTE POSTOPERATIVE PAIN OF RIGHT KNEE: ICD-10-CM

## 2021-09-14 DIAGNOSIS — M17.11 PRIMARY OSTEOARTHRITIS OF RIGHT KNEE: Primary | ICD-10-CM

## 2021-09-14 PROCEDURE — 97110 THERAPEUTIC EXERCISES: CPT | Performed by: PHYSICAL THERAPIST

## 2021-09-14 PROCEDURE — 97016 VASOPNEUMATIC DEVICE THERAPY: CPT | Performed by: PHYSICAL THERAPIST

## 2021-09-14 PROCEDURE — 97112 NEUROMUSCULAR REEDUCATION: CPT | Performed by: PHYSICAL THERAPIST

## 2021-09-14 PROCEDURE — 97530 THERAPEUTIC ACTIVITIES: CPT | Performed by: PHYSICAL THERAPIST

## 2021-09-14 PROCEDURE — G8427 DOCREV CUR MEDS BY ELIG CLIN: HCPCS | Performed by: PHYSICAL THERAPIST

## 2021-09-14 NOTE — PROGRESS NOTES
[facility]   Phone: [phone]    Fax: [phone]         Physical Therapy Treatment Note/ Progress Report:           Date:  2021    Patient Name:  Brian Olivarez    :  1950  MRN: <[id number]>  Restrictions/Precautions:    Medical/Treatment Diagnosis Information:  · Diagnosis: Right knee OA/surgical optimization   s/p R TKA     DOS   Insurance/Certification information:  PT Insurance Information: Medicare  Physician Information:  Referring Practitioner: Dr Aleta Martinez  Has the plan of care been signed (Y/N):        [x]  Yes  []  No     Date of Patient follow up with Physician: 2021      Is this a Progress Report:     []  Yes  [x]  No        If Yes:  Date Range for reporting period:  Beginning 2021  Ending  2021    Progress report will be due (10 Rx or 30 days whichever is less): 3/03/9637      Recertification will be due (POC Duration  / 90 days whichever is less): 2021        Visit # Insurance Allowable Auth Required   17 MEDICARE ([address] [place] [address]) []  Yes []  No        Functional Scale: KOS ADLs  49% functional limitation    Date assessed:  2021      Latex Allergy:  [x]NO      []YES  Preferred Language for Healthcare:   [x]English       []other:    PQRS:  2021  Reviewed medication list with patient. Prednisone increased to 2x/day for 5 days then dropped to 1x/day, and on antibiotic for cellulitis. Pain level:  3/10     SUBJECTIVE:   4+ weeks post op  She has continued to have issues with her left leg. She wound up seeing her doctor and xrays of her lumbar and left hip were taken. Moderate DJD at L1-2 noted and moderated joint space loss in her left hip noted as well. She states her right knee and leg are feeling \"great\". She has to take it a little more slowly with stairs.           Relevant Medical History:   significant history (see attached in her chart), left RTC repair , left TKA Aug 2019, Right DILAN     Easing factors: rest, ice  Provocative factors: walking or any WB     Numbness/Tingling: none noted     Occupation/School:  retired     Living Status/Prior Level of Function: Independent with ADLs and IADLs, lives alone, 6 stairs to get in her house. Not doing stairs. Bedroom/bathroom on main floor, has a basement where she does laundry. Likes to be active and do light workouts in the gym. OBJECTIVE:   Moderate bruising w/ general redness noted in knee and thigh region; noted through her right LE/medial knee and into her calf  She is ambulating independently into clinic with use of her straight cane.     ROM PROM AROM Overpressure Comment    L R L R L R 9/14/2021   Flexion   125 129   Heel slide   Extension   -1 -1        Girth  8/13/2021 L R   Mid Patella 50 cm 51.5 cm   Suprapatellar     5cm above     15cm above         Strength L R Comment: majority deferred secondary to surgery   Quad      Hamstring      Abduction      Quad tone [facility]+ 9/7/2021       Special Test  8/13/2021 Results/Comment: majority deferred secondary to surgery   Homans (-)     Clinical Presentation   Possible Score   Clients Score     Active cancer (within 6 months of diagnosis or receiving palliative care)  1  0   Paralysis, paresis, or recent immobilization of lower extremity  1   0   Bedridden for more than 3 days or major surgery in the last 4 weeks  1   1   Localized tenderness in the center of the posterior calf, the popliteal space, or along the femoral vein in the anterior thigh/groin  1   0   Entire lower extremity swelling  1   0   Unilateral calf swelling (more than 3 cm larger than uninvolved side)  1   0   Unilateral pitting edema  1   0   Collateral superficial veins (nonvaricose)  1   0   An alternative diagnosis is as likely (or more likely) than DVT (e.g., cellulitis, postoperative swelling, calf strain)  -2  -1   Total Points    0      Key  · -2 to 0 Low probability of DVT 3% (95% confidence interval [CI] 1. 7%5.9%)  · 1 to 2 Moderate probability of DVT 17% (95% confidence interval [CI] 12%23%)  · 3 or more High probability of DVT 75% (95% confidence interval [CI] 63%84%)    Medical consultation is advised in the presence of low probability  Medical referral is required with moderate or high score. Lopez PS, Robby GOMEZ, Raffi J, et al: Value of assessment of pretest probability of deep-vein thrombosis in clinical management, Lancet 535:3002-4317, 1997. Timed Get Up and Go Test  Measures mobility in people who are able to walk on their own (assistive device permitted)        Name: Erich Menon  Date: 9/14/2021    Instructions: The person may wear their usual footwear and can use any assistive device they normally use. 1. Have the person sit in the chair with their back to the chair and their arms resting on the back rests  2. Ask the person to stand up from a standard chair and walk a distance of 10 ft. (3 m). 3. Have the person turn around, walk back to the chair and sit down again. Timing begins when the person starts to rise from the chair and ends when he or she returns to the chair and sits down. Time to Complete:  (21 seconds)    Predictive Results:    Seconds Rating    <10  Freely mobile    <20  Mostly independent    20-29  Variable mobility    >20  Impaired mobility       Source: Kavita Israel S. The timed 'Up and Go' Test: a Test of Basic Functional Mobility for Frail Elderly Persons. Journal of [address] [place] [address]. 1991;39:142-148. G-Code Crosswalk:  TUG time (velma) Disability Index CMS Modifier   12 or faster 0% []CH   13-14 1-19% []CI   15-16 20-39% []CJ   17-18 40-59% []CK   19-20  60-79% [x]CL   21-22  80-99% []CM   23 or slower 100% []CN           Co-morbidities/Complexities (which will affect course of rehabilitation):   []? None              Arthritic conditions   []? Rheumatoid arthritis (M05.9)  [x]? Osteoarthritis (M19.91)    Cardiovascular conditions   [x]? Hypertension (I10)  []? Hyperlipidemia (E78.5)  []? Angina pectoris (I20)  []? Atherosclerosis (I70)  []? CVA Musculoskeletal conditions   []? Disc pathology   []? Congenital spine pathologies   []? Prior surgical intervention  []? Osteoporosis (M81.8)  []? Osteopenia (M85.8)   Endocrine conditions   []? Hypothyroid (E03.9)  []? Hyperthyroid Gastrointestinal conditions   []? Constipation (D48.85)    Metabolic conditions   []? Morbid obesity (E66.01)  []? Diabetes type 1(E10.65) or 2 (E11.65)   []? Neuropathy (G60.9)      Pulmonary conditions   []? Asthma (J45)  []? Coughing   []? COPD (J44.9)    Psychological Disorders  []? Anxiety (F41.9)  []? Depression (F32.9)   []? Other:    [x]? Other:   stroke/TIA    CV/spine issues         Barriers to/and or personal factors that will affect rehab potential:              []? Age  []? Sex               []?Smoker              []? Motivation/Lack of Motivation                        [x]? Co-Morbidities              []? Cognitive Function, education/learning barriers              []? Environmental, home barriers              []? profession/work barriers  [x]? past PT/medical experience  []? other:  Justification:      Falls Risk Assessment (30 days):   [x]? Falls Risk assessed and no intervention required. []? Falls Risk assessed and Patient requires intervention due to being higher risk   TUG score (>12s at risk):     []?  Falls education provided, including       RESTRICTIONS/PRECAUTIONS: balance at times is an issue    Exercises/Interventions:     Therapeutic Ex (85414) Sets/sec Reps Notes/CUES   BIKE      TREADMILL            STRETCHING      Towel Pull Calf 30\" x5    Inclined Calf       Hamstrings  30\" x5 ea side Back straight and head up   Quads      Hip Flexors 30\" x3 Manual tamiko test position for left   ITB      Adductors            ROM      Passive 10\" x10 Seated EOB   Active 5\" x30 Supine feet on Green SB rolls   Weight Hangs      Sheet Pulls 10\" x10    Ankle Pumps      ERMI mobility.   [] (25907) Gait Re-education- Provided training and instruction to the patient for proper LE, core and proximal hip recruitment and positioning and eccentric body weight control with ambulation re-education including up and down stairs     Home Exercise Program:    [x] (28806) Reviewed/Progressed HEP activities related to strengthening, flexibility, endurance, ROM of core, proximal hip and LE for functional self-care, mobility, lifting and ambulation/stair navigation   [] (24378) Reviewed/Progressed HEP activities related to improving balance, coordination, kinesthetic sense, posture, motor skill, proprioception of core, proximal hip and LE for self-care, mobility, lifting, and ambulation/stair navigation      Manual Treatments:  PROM / STM / Oscillations-Mobs:  G-I, II, III, IV (PA's, Inf., Post.)  [x] (24172) Provided manual therapy to mobilize LE, proximal hip and/or LS spine soft tissue/joints for the purpose of modulating pain, promoting relaxation, increasing ROM, reducing/eliminating soft tissue swelling/inflammation/restriction, improving soft tissue extensibility and allowing for proper ROM for normal function with self-care, mobility, lifting and ambulation. Modalities:  CP to L hip   [x] GAME READY (VASO)- for significant edema, swelling, pain control. Charges:  Timed Code Treatment Minutes: 45   Total Treatment Minutes: 65      [] EVAL (LOW) 12782 (typically 20 minutes face-to-face)  [] EVAL (MOD) 18663 (typically 30 minutes face-to-face)  [] EVAL (HIGH) 31988 (typically 45 minutes face-to-face)  [] RE-EVAL     [x] LQ(79747) x1 (x18')     [] IONTO  [x] NMR (34202) x1 (x15')     [x] VASO Right knee x15' medium pressure   [] Manual (16753) x     [] Other:  [x] TA x  1 (x12')    [] Mech Traction (78011)  [] ES(attended) (32421)      [] ES (un) (46702):        ASSESSMENT:   We returned to step ups, leading with the right LE only. Not doing anything that would strain her left hip/LE. Her right hip abductors are on the weak side, only able to get about 5 reps at a time with sidelying hip abduction. Her knee ROM continues to look very good, close to 130°. Needs work on overall functional strength and standing/walking endurance. GOALS:  Patient stated goal: restore prior level of function  [] Progressing: [] Met: [] Not Met: [] Adjusted    Therapist goals for Patient:   Short Term Goals: To be achieved in: 2 weeks  1. Independent in HEP and progression per patient tolerance, in order to prevent re-injury. [] Progressing: [] Met: [] Not Met: [] Adjusted  2. Patient will have a decrease in pain to facilitate improvement in movement, function, and ADLs as indicated by Functional Deficits. [] Progressing: [] Met: [] Not Met: [] Adjusted    Long Term Goals: To be achieved in: 4-6 weeks  1. Disability index score of 20% or less for the KOS to assist with reaching prior level of function. [] Progressing: [] Met: [] Not Met: [] Adjusted  2. Patient will demonstrate increased AROM to 0-125 to allow for proper joint functioning as indicated by patients Functional Deficits. [] Progressing: [] Met: [] Not Met: [] Adjusted  3. Patient will demonstrate an increase in Strength by 1/2 grade to allow for proper functional mobility as indicated by patients Functional Deficits. [] Progressing: [] Met: [] Not Met: [] Adjusted  4. Patient will return to low level prior functional activities without increased symptoms or restriction. [] Progressing: [] Met: [] Not Met: [] Adjusted  5. Normalize gait on levels and stairs   [] Progressing: [] Met: [] Not Met: [] Adjusted   GOALS UPDATED 8/13/2021    Overall Progression Towards Functional goals/ Treatment Progress Update:  [] Patient is progressing as expected towards functional goals listed. [] Progression is slowed due to complexities/Impairments listed. [] Progression has been slowed due to co-morbidities.   [x] Plan just implemented, too soon to assess goals progression <30days   [] Goals require adjustment due to lack of progress  [] Patient is not progressing as expected and requires additional follow up with physician  [] Other    Prognosis for POC: [x] Good [] Fair  [] Poor      Patient requires continued skilled intervention: [x] Yes  [] No    Treatment/Activity Tolerance:  [x] Patient able to complete treatment  [] Patient limited by fatigue  [] Patient limited by pain    [] Patient limited by other medical complications  [] Other:         PLAN:    Cont 2x/week for post op TKA protocol  [] Continue per plan of care [] Alter current plan   [x] Plan of care initiated [] Hold pending MD visit [] Discharge      Electronically signed by:      Annita Muhammad  PT #[id number]  [place] [facility] #[id number]      Note: If patient does not return for scheduled/ recommended follow up visits, this note will serve as a discharge from care along with most recent update on progress.

## 2021-09-16 ENCOUNTER — TELEPHONE (OUTPATIENT)
Dept: CARDIOLOGY CLINIC | Age: 71
End: 2021-09-16

## 2021-09-16 ENCOUNTER — TREATMENT (OUTPATIENT)
Dept: PHYSICAL THERAPY | Age: 71
End: 2021-09-16
Payer: MEDICARE

## 2021-09-16 DIAGNOSIS — M17.11 PRIMARY OSTEOARTHRITIS OF RIGHT KNEE: Primary | ICD-10-CM

## 2021-09-16 DIAGNOSIS — R26.2 DIFFICULTY WALKING: ICD-10-CM

## 2021-09-16 DIAGNOSIS — M25.561 ACUTE POSTOPERATIVE PAIN OF RIGHT KNEE: ICD-10-CM

## 2021-09-16 DIAGNOSIS — G89.18 ACUTE POSTOPERATIVE PAIN OF RIGHT KNEE: ICD-10-CM

## 2021-09-16 PROCEDURE — 97110 THERAPEUTIC EXERCISES: CPT | Performed by: PHYSICAL THERAPIST

## 2021-09-16 PROCEDURE — 97016 VASOPNEUMATIC DEVICE THERAPY: CPT | Performed by: PHYSICAL THERAPIST

## 2021-09-16 PROCEDURE — G8427 DOCREV CUR MEDS BY ELIG CLIN: HCPCS | Performed by: PHYSICAL THERAPIST

## 2021-09-16 PROCEDURE — 97112 NEUROMUSCULAR REEDUCATION: CPT | Performed by: PHYSICAL THERAPIST

## 2021-09-16 PROCEDURE — 97530 THERAPEUTIC ACTIVITIES: CPT | Performed by: PHYSICAL THERAPIST

## 2021-09-16 NOTE — PROGRESS NOTES
[facility]   Phone: [phone]    Fax: [phone]         Physical Therapy Treatment Note/ Progress Report:           Date:  2021    Patient Name:  Kay Alejandre    :  1950  MRN: <[id number]>  Restrictions/Precautions:    Medical/Treatment Diagnosis Information:  · Diagnosis: Right knee OA/surgical optimization   s/p R TKA     DOS 7113  Insurance/Certification information:  PT Insurance Information: Medicare  Physician Information:  Referring Practitioner: Dr Christie Smith  Has the plan of care been signed (Y/N):        [x]  Yes  []  No     Date of Patient follow up with Physician: 2021      Is this a Progress Report:     []  Yes  [x]  No        If Yes:  Date Range for reporting period:  Beginning 2021  Ending  2021    Progress report will be due (10 Rx or 30 days whichever is less): 4294      Recertification will be due (POC Duration  / 90 days whichever is less): 2021        Visit # Insurance Allowable Auth Required   18 MEDICARE ([address] [place] [address]) []  Yes []  No        Functional Scale: KOS ADLs  49% functional limitation    Date assessed:  2021      Latex Allergy:  [x]NO      []YES  Preferred Language for Healthcare:   [x]English       []other:    PQRS:  2021  Reviewed medication list with patient. Prednisone increased to 2x/day for 5 days then dropped to 1x/day, and on antibiotic for cellulitis. Pain level:  3/10     SUBJECTIVE:   5+ weeks post op  She saw her hip surgeon, Dr Kimmie Larson, yesterday and they are scheduling her left DILAN on 10/4/2021. She would like to avoid [facility] therapy and just start with OP therapy. She took a Tramadol today to help with pain.          Relevant Medical History:   significant history (see attached in her chart), left RTC repair , left TKA Aug 2019, Right DILAN     Easing factors: rest, ice  Provocative factors: walking or any WB     Numbness/Tingling: none noted     Occupation/School: retired     Living Status/Prior Level of Function: Independent with ADLs and IADLs, lives alone, 6 stairs to get in her house. Not doing stairs. Bedroom/bathroom on main floor, has a basement where she does laundry. Likes to be active and do light workouts in the gym. OBJECTIVE:   Moderate bruising w/ general redness noted in knee and thigh region; noted through her right LE/medial knee and into her calf  She is ambulating independently into clinic with use of her straight cane.     ROM PROM AROM Overpressure Comment    L R L R L R 9/14/2021   Flexion   125 129   Heel slide   Extension   -1 -1        Girth  8/13/2021 L R   Mid Patella 50 cm 51.5 cm   Suprapatellar     5cm above     15cm above         Strength L R Comment: majority deferred secondary to surgery   Quad      Hamstring      Abduction      Quad tone [facility]+ 9/7/2021       Special Test  8/13/2021 Results/Comment: majority deferred secondary to surgery   Homans (-)     Clinical Presentation   Possible Score   Clients Score     Active cancer (within 6 months of diagnosis or receiving palliative care)  1  0   Paralysis, paresis, or recent immobilization of lower extremity  1   0   Bedridden for more than 3 days or major surgery in the last 4 weeks  1   1   Localized tenderness in the center of the posterior calf, the popliteal space, or along the femoral vein in the anterior thigh/groin  1   0   Entire lower extremity swelling  1   0   Unilateral calf swelling (more than 3 cm larger than uninvolved side)  1   0   Unilateral pitting edema  1   0   Collateral superficial veins (nonvaricose)  1   0   An alternative diagnosis is as likely (or more likely) than DVT (e.g., cellulitis, postoperative swelling, calf strain)  -2  -1   Total Points    0      Key  · -2 to 0 Low probability of DVT 3% (95% confidence interval [CI] 1.7%5.9%)  · 1 to 2 Moderate probability of DVT 17% (95% confidence interval [CI] 12%23%)  · 3 or more High probability of DVT 75% (95% confidence interval [CI] 63%84%)    Medical consultation is advised in the presence of low probability  Medical referral is required with moderate or high score. Lopez PS, Robby DR, Raffi J, et al: Value of assessment of pretest probability of deep-vein thrombosis in clinical management, Lancet 469:4755-9500, 1997. Timed Get Up and Go Test  Measures mobility in people who are able to walk on their own (assistive device permitted)        Name: Dara Walters  Date: 9/16/2021    Instructions: The person may wear their usual footwear and can use any assistive device they normally use. 1. Have the person sit in the chair with their back to the chair and their arms resting on the back rests  2. Ask the person to stand up from a standard chair and walk a distance of 10 ft. (3 m). 3. Have the person turn around, walk back to the chair and sit down again. Timing begins when the person starts to rise from the chair and ends when he or she returns to the chair and sits down. Time to Complete:  (21 seconds)    Predictive Results:    Seconds Rating    <10  Freely mobile    <20  Mostly independent    20-29  Variable mobility    >20  Impaired mobility       Source: Krystal Israel S. The timed 'Up and Go' Test: a Test of Basic Functional Mobility for Frail Elderly Persons. Journal of [address] [place] [address]. 1991;39:142-148. G-Code Crosswalk:  TUG time (velma) Disability Index CMS Modifier   12 or faster 0% []CH   13-14 1-19% []CI   15-16 20-39% []CJ   17-18 40-59% []CK   19-20  60-79% [x]CL   21-22  80-99% []CM   23 or slower 100% []CN           Co-morbidities/Complexities (which will affect course of rehabilitation):   []? None              Arthritic conditions   []? Rheumatoid arthritis (M05.9)  [x]? Osteoarthritis (M19.91)    Cardiovascular conditions   [x]? Hypertension (I10)  []? Hyperlipidemia (E78.5)  []? Angina pectoris (I20)  []? Atherosclerosis (I70)  []? CVA Musculoskeletal conditions   []? Disc pathology   []? Congenital spine pathologies   []? Prior surgical intervention  []? Osteoporosis (M81.8)  []? Osteopenia (M85.8)   Endocrine conditions   []? Hypothyroid (E03.9)  []? Hyperthyroid Gastrointestinal conditions   []? Constipation (W87.55)    Metabolic conditions   []? Morbid obesity (E66.01)  []? Diabetes type 1(E10.65) or 2 (E11.65)   []? Neuropathy (G60.9)      Pulmonary conditions   []? Asthma (J45)  []? Coughing   []? COPD (J44.9)    Psychological Disorders  []? Anxiety (F41.9)  []? Depression (F32.9)   []? Other:    [x]? Other:   stroke/TIA    CV/spine issues         Barriers to/and or personal factors that will affect rehab potential:              []? Age  []? Sex               []?Smoker              []? Motivation/Lack of Motivation                        [x]? Co-Morbidities              []? Cognitive Function, education/learning barriers              []? Environmental, home barriers              []? profession/work barriers  [x]? past PT/medical experience  []? other:  Justification:      Falls Risk Assessment (30 days):   [x]? Falls Risk assessed and no intervention required. []? Falls Risk assessed and Patient requires intervention due to being higher risk   TUG score (>12s at risk):     []?  Falls education provided, including       RESTRICTIONS/PRECAUTIONS: balance at times is an issue    Exercises/Interventions:     Therapeutic Ex (82478) Sets/sec Reps Notes/CUES   BIKE x5'  AD seat #3   TREADMILL            STRETCHING      Towel Pull Calf 30\" x5    Inclined Calf       Hamstrings  30\" x5 ea side Back straight and head up   Quads      ITB      Adductors            ROM      Passive 10\" x10 Seated EOB   Weight Hangs      Sheet Pulls 10\" x10    Ankle Pumps      ERMI            STRENGTHENING      Quad Sets 5\" x20 bilat   Pushing down into towel roll    Ham Sets      Hip ADD Sets BS 5\" x20    SLR Flexion 2 x10    SLR Abduction  2 x5    SLR Prone      SLR Adduction      Hooklying clams SDLY clams 3 x10 R only                     PRE Machines   Try machines at 6 weeks      Knee Extension 3# 3 x10 seated   Knee Flexion 3# 3 x10 Standing KF   Leg Press            CKC      Calf Raises 3 x10    Mini Squats      Wall Sits      Step ups lvl 2 3x10 Forward step ups    TKE 3 x10 5 plates               Manual Intervention (72906)      Patellar Mobs x5'   All directions   Femoral Tibial mobs      STM x5'    Scar Massage      Foam Roll             NMR re-education (30731)   CUES NEEDED   Biodex Balance       Tandem Balance x30 sec x2 each way Eo, ec   SLB      Rebounder      BOSU                              Therapeutic Activity (99489)      Core Training      [employer] Activities      Monster Walks      TRX training      education x10'  Discussed anatomy and pathology with patient, reviewed [address] [place] [address] program, reviewed post op precautions and expectations               Therapeutic Exercise and NMR EXR  [x] (69160) Provided verbal/tactile cueing for activities related to strengthening, flexibility, endurance, ROM for improvements in LE, proximal hip, and core control with self care, mobility, lifting, ambulation. [x] (80166) Provided verbal/tactile cueing for activities related to improving balance, coordination, kinesthetic sense, posture, motor skill, proprioception to assist with LE, proximal hip, and core control in self-care, mobility, lifting, ambulation and eccentric single leg control.      NMR and Therapeutic Activities:    [x] (65664 or 52162) Provided verbal/tactile cueing for activities related to improving balance, coordination, kinesthetic sense, posture, motor skill, proprioception and motor activation to allow for proper function of core, proximal hip and LE with self-care and ADLs and functional mobility.   [] (95301) Gait Re-education- Provided training and instruction to the patient for proper LE, core and proximal hip recruitment and positioning and eccentric body weight control with ambulation re-education including up and down stairs     Home Exercise Program:    [x] (29396) Reviewed/Progressed HEP activities related to strengthening, flexibility, endurance, ROM of core, proximal hip and LE for functional self-care, mobility, lifting and ambulation/stair navigation   [] (02814) Reviewed/Progressed HEP activities related to improving balance, coordination, kinesthetic sense, posture, motor skill, proprioception of core, proximal hip and LE for self-care, mobility, lifting, and ambulation/stair navigation      Manual Treatments:  PROM / STM / Oscillations-Mobs:  G-I, II, III, IV (PA's, Inf., Post.)  [x] (08299) Provided manual therapy to mobilize LE, proximal hip and/or LS spine soft tissue/joints for the purpose of modulating pain, promoting relaxation, increasing ROM, reducing/eliminating soft tissue swelling/inflammation/restriction, improving soft tissue extensibility and allowing for proper ROM for normal function with self-care, mobility, lifting and ambulation. Modalities:  CP to L hip   [x] GAME READY (VASO)- for significant edema, swelling, pain control. Charges:  Timed Code Treatment Minutes: 45   Total Treatment Minutes: 65      [] EVAL (LOW) 71858 (typically 20 minutes face-to-face)  [] EVAL (MOD) 19847 (typically 30 minutes face-to-face)  [] EVAL (HIGH) 78194 (typically 45 minutes face-to-face)  [] RE-EVAL     [x] CI(74293) x1 (x18')     [] IONTO  [x] NMR (59540) x1 (x15')     [x] VASO Right knee x15' medium pressure   [] Manual (38958) x     [] Other:  [x] TA x  1 (x12')    [] Mech Traction (73255)  [] ES(attended) (22275)      [] ES (un) (54012):        ASSESSMENT:   She is still able to work through all her exercises with her right LE. She has 3#-5# cuff weights to use at home. Also advised her to do some basic exercises for her left LE to keep the strength up going into surgery.     She struggles with tandem balance with eyes closed, needs contact guard assist.    She does still have some redness below her knee, about 2\" diameter. Advised her to keep an eye on this and call the doctor's office if it expands. She was able to make full revolutions on the bike today. Stated her left knee felt a bit tight like the right one. GOALS:  Patient stated goal: restore prior level of function  [] Progressing: [] Met: [] Not Met: [] Adjusted    Therapist goals for Patient:   Short Term Goals: To be achieved in: 2 weeks  1. Independent in HEP and progression per patient tolerance, in order to prevent re-injury. [] Progressing: [] Met: [] Not Met: [] Adjusted  2. Patient will have a decrease in pain to facilitate improvement in movement, function, and ADLs as indicated by Functional Deficits. [] Progressing: [] Met: [] Not Met: [] Adjusted    Long Term Goals: To be achieved in: 4-6 weeks  1. Disability index score of 20% or less for the KOS to assist with reaching prior level of function. [] Progressing: [] Met: [] Not Met: [] Adjusted  2. Patient will demonstrate increased AROM to 0-125 to allow for proper joint functioning as indicated by patients Functional Deficits. [] Progressing: [] Met: [] Not Met: [] Adjusted  3. Patient will demonstrate an increase in Strength by 1/2 grade to allow for proper functional mobility as indicated by patients Functional Deficits. [] Progressing: [] Met: [] Not Met: [] Adjusted  4. Patient will return to low level prior functional activities without increased symptoms or restriction. [] Progressing: [] Met: [] Not Met: [] Adjusted  5. Normalize gait on levels and stairs   [] Progressing: [] Met: [] Not Met: [] Adjusted   GOALS UPDATED 8/13/2021    Overall Progression Towards Functional goals/ Treatment Progress Update:  [] Patient is progressing as expected towards functional goals listed. [] Progression is slowed due to complexities/Impairments listed. [] Progression has been slowed due to co-morbidities.   [x] Plan just implemented, too soon to assess goals progression <30days   [] Goals require adjustment due to lack of progress  [] Patient is not progressing as expected and requires additional follow up with physician  [] Other    Prognosis for POC: [x] Good [] Fair  [] Poor      Patient requires continued skilled intervention: [x] Yes  [] No    Treatment/Activity Tolerance:  [x] Patient able to complete treatment  [] Patient limited by fatigue  [] Patient limited by pain    [] Patient limited by other medical complications  [] Other:         PLAN:    Cont 2x/week for post op TKA protocol  [] Continue per plan of care [] Alter current plan   [x] Plan of care initiated [] Hold pending MD visit [] Discharge      Electronically signed by:      Annita Escobar  PT #[id number]  [place] PT #[id number]      Note: If patient does not return for scheduled/ recommended follow up visits, this note will serve as a discharge from care along with most recent update on progress.

## 2021-09-16 NOTE — TELEPHONE ENCOUNTER
Patient called stating she's having hip surgery with Dr. Ewign Ours [phone] on 10. 4.21 and needs cardiac clearance.  Patient would like a call once she has been cleared

## 2021-09-16 NOTE — TELEPHONE ENCOUNTER
Patient would like to change phone # for dr office to send clearance letter and gave a fax # also.   New # for doctor office   [phone]  Fax # [phone]

## 2021-09-21 ENCOUNTER — TREATMENT (OUTPATIENT)
Dept: PHYSICAL THERAPY | Age: 71
End: 2021-09-21

## 2021-09-21 DIAGNOSIS — M25.561 ACUTE POSTOPERATIVE PAIN OF RIGHT KNEE: ICD-10-CM

## 2021-09-21 DIAGNOSIS — M17.11 PRIMARY OSTEOARTHRITIS OF RIGHT KNEE: Primary | ICD-10-CM

## 2021-09-21 DIAGNOSIS — G89.18 ACUTE POSTOPERATIVE PAIN OF RIGHT KNEE: ICD-10-CM

## 2021-09-21 DIAGNOSIS — R26.2 DIFFICULTY WALKING: ICD-10-CM

## 2021-09-21 NOTE — PROGRESS NOTES
[facility]ar[facility]   Phone: [phone]    Fax: [phone]      Physical Therapy  Cancellation/No-show Note  Patient Name:  Noemi Skiff  :  1950   Date:  2021    Cancelled visits to date: 2  No-shows to date: 0    For today's appointment patient:  [x]  Cancelled  []  Rescheduled appointment  []  No-show     Reason given by patient:  []  Patient ill  []  Conflicting appointment  []  No transportation    []  Conflict with work  []  No reason given  [x]  Other:  Patient's hip is very painful today and wants to see [facility] for discharge for knee later this week. Comments:  Patient called to cancel providing above reason    Phone call information:   []  Phone call made today to patient at _ time at number provided:      []  Patient answered, conversation as follows:    []  Patient did not answer, message left as follows:  []  Phone call not made today  [x]  Phone call not needed - pt contacted us to cancel and provided reason for cancellation.      Electronically signed by:      Renetta Chisholm, PT, DPT, OCS, OMT-C     Board-Certified Orthopaedic Clinical Specialist    [place] PT license: [id number]  [place] PT license: [id number]

## 2021-09-23 ENCOUNTER — OFFICE VISIT (OUTPATIENT)
Dept: ORTHOPEDIC SURGERY | Age: 71
End: 2021-09-23
Payer: MEDICARE

## 2021-09-23 VITALS — HEIGHT: 63 IN | WEIGHT: 224 LBS | BODY MASS INDEX: 39.69 KG/M2

## 2021-09-23 DIAGNOSIS — M75.121 NONTRAUMATIC COMPLETE TEAR OF RIGHT ROTATOR CUFF: ICD-10-CM

## 2021-09-23 DIAGNOSIS — M25.511 RIGHT SHOULDER PAIN, UNSPECIFIED CHRONICITY: Primary | ICD-10-CM

## 2021-09-23 PROCEDURE — 99213 OFFICE O/P EST LOW 20 MIN: CPT | Performed by: ORTHOPAEDIC SURGERY

## 2021-09-23 PROCEDURE — G9899 SCRN MAM PERF RSLTS DOC: HCPCS | Performed by: ORTHOPAEDIC SURGERY

## 2021-09-23 PROCEDURE — G8417 CALC BMI ABV UP PARAM F/U: HCPCS | Performed by: ORTHOPAEDIC SURGERY

## 2021-09-23 PROCEDURE — 1123F ACP DISCUSS/DSCN MKR DOCD: CPT | Performed by: ORTHOPAEDIC SURGERY

## 2021-09-23 PROCEDURE — 1090F PRES/ABSN URINE INCON ASSESS: CPT | Performed by: ORTHOPAEDIC SURGERY

## 2021-09-23 PROCEDURE — G8399 PT W/DXA RESULTS DOCUMENT: HCPCS | Performed by: ORTHOPAEDIC SURGERY

## 2021-09-23 PROCEDURE — 3017F COLORECTAL CA SCREEN DOC REV: CPT | Performed by: ORTHOPAEDIC SURGERY

## 2021-09-23 PROCEDURE — 1036F TOBACCO NON-USER: CPT | Performed by: ORTHOPAEDIC SURGERY

## 2021-09-23 PROCEDURE — 4040F PNEUMOC VAC/ADMIN/RCVD: CPT | Performed by: ORTHOPAEDIC SURGERY

## 2021-09-23 PROCEDURE — G8427 DOCREV CUR MEDS BY ELIG CLIN: HCPCS | Performed by: ORTHOPAEDIC SURGERY

## 2021-09-23 NOTE — PROGRESS NOTES
[facility]  History and Physical  Shoulder Pain    Date:  2021    Name:  Noemi Skiff  Address:  [address] [id number]    :  1950      Age:   73 y.o.    SSN:  [id number]      Medical Record Number:  <[id number]>    Reason for Visit:    Follow-up (F/U RT SHOULDER)      HPI:   Noemi Skiff is a 73 y.o. female who presents to our office today for follow up of the right shoulder pain. This patient had failed conservative management of her right shoulder including physical therapy and corticosteroid injection. She continues have persistent pain and weakness in the right shoulder. She has a well-known large chronic rotator cuff tear that has been managed conservatively for some time and has always responded to that until recently. Patient was asked to get an updated MRI of her right shoulder and presents today review the results. She denies any new injuries to the shoulder. Pain Assessment  Location of Pain: Shoulder  Location Modifiers: Right  Severity of Pain: 2  Quality of Pain: Aching  Duration of Pain: Persistent  Frequency of Pain: Constant  Aggravating Factors: Other (Comment) (GENERAL USE)  Limiting Behavior: Yes  Relieving Factors: Rest, Ice  Result of Injury: No  Work-Related Injury: No  Are there other pain locations you wish to document?: No    Patient has had no medical changes since last evaluated          Review of Systems:  A 14 point review of systems available in the scanned medical record as documented by the patient. The review is negative with the exception of those things mentioned in the History of Present Illness and Past Medical History. Past Medical History:  Patient's medications, allergies, past medical, surgical, social and family histories were reviewed and updated as appropriate. Allergies:   Allergies   Allergen Reactions    Tape [facility] Tape] Other (See Comments)     blistering    Reglan [Metoclopramide] Itching and Other (See Comments)     Flushed      Oxycodone Nausea And Vomiting       Physical Exam:  There were no vitals filed for this visit. General: Joie Maria is a healthy and well appearing 73 y.o. female who is sitting comfortably in our office in acute distress. Skin:  There are no skin lesions, cellulitis, or extreme edema. The patient has warm and well-perfused Bilateral upper extremities with brisk capillary refill. Eyes: Extra-ocular muscles intact  Mouth: Oral mucosa moist. No perioral lesions  Pulm: Respirations unlabored and regular. Neuro: Alert and oriented x3    RIGHT Shoulder Exam:  Inspection:  No gross deformities, no signs of infection. Palpation:  Tenderness to palpation over the greater tuberosity and bicipital groove.  No tenderness over the [facility] joint    Active Range of Motion: Forward elevation of 140, external rotation with elbow at the side 45, internal rotation to the back is t10    Passive Range of Motion: DEFERRED    Strength: External rotation with resistance with elbow at the side 4/5, internal rotation with resistance with elbow at the side 5/5, supraspinatus testing -4/5    Special Tests:  No Herson muscle deformity. Neurovascular: Sensation to light touch is intact, no motor deficits, palpable radial pulses 2+    Additional Examinations:    Examination of the contralateral extremity does not show any tenderness, deformity or injury. Range of motion is unremarkable. There is no gross instability. There are no rashes, ulcerations or lesions.  Strength and tone are normal.      Radiographic:  MRI RIGHT SHOULDER 9/12/2021     FINDINGS:  Interval complete supraspinatus tendon tear retracted 3.8 cm.  Grade 3 muscle    fibrofatty infiltration.       Interval full thickness superior fiber of the infraspinatus retracted up to 3 cm.   No atrophy.       Progressive biceps pulley pathology with ruptured long biceps.  Atrophic remnant in the    intratrabecular groove.       High-grade subscapularis tendon tear retracted up to 1.5 cm.  Grade 2 muscle fibrofatty    infiltration.       Chronic SLAP 2 tear.       Moderate glenohumeral joint osteoarthritis.       Capsulitis and bursitis.       Filling defects representing hypertrophic synovitis, though small bodies may be intermixed and    indistinguishable.   Small bodies likely in the subcoracoid recess.       Teres minor tendon is intact.       Moderate AC joint arthritis.  Spur of the acromion contributes to lateral outlet stenosis.     Coracoid is unremarkable.       Remaining muscle bulk is unremarkable.       Remaining tendons are unremarkable.       No acute OCD.  No acute fracture.  No AVN.  No large loose body.               CONCLUSION:   1. Interval complete supraspinatus tendon tear retracted 3.8 cm.  Grade 3 muscle fibrofatty    infiltration. 2. Interval full thickness superior fiber of the infraspinatus retracted up to 3 cm.   No    atrophy. 3. Progressive biceps pulley pathology with ruptured long biceps.  Atrophic remnant in the    intratrabecular groove. 4. High-grade subscapularis tendon tear retracted up to 1.5 cm.  Grade 2 muscle fibrofatty    infiltration. 5. Chronic SLAP 2 tear. 6. Moderate glenohumeral joint osteoarthritis. 7. Capsulitis and bursitis. Assessment:  Geraldine Walsh is a 73 y.o. female with a massive irreparable chronic rotator cuff tear. She has various nonoperative and operative strategies to choose from. Impression:  Encounter Diagnoses   Name Primary?  Right shoulder pain, unspecified chronicity Yes    Nontraumatic complete tear of right rotator cuff        Office Procedures:  No orders of the defined types were placed in this encounter. Plan: We reviewed the MRI with the right shoulder with Geraldine Walsh.  She does have a massive irreparable rotator cuff tear. We discussed both the balloon arthroplasty and the reverse total shoulder arthroplasty.   We feel her movement is too good to consider a reverse shoulder replacement. She may do better with the subacromial balloon spacer. We discussed the surgery in full detail and certainly consider it after her hip replacement. We will see her back in November to discuss it further. All her questions were fully answered. 9/23/2021  3:40 PM      Fred Yin PA-C  Orthopaedic Sports Medicine Physician Assistant    During this examination, IFred PA-C, functioned as a scribe for Dr. Lalo Jacob. This dictation was performed with a verbal recognition program (DRAGON) and it was checked for errors. It is possible that there are still dictated errors within this office note. If so, please bring any errors to my attention for an addendum. All efforts were made to ensure that this office note is accurate.  _____________  I, Dr. Lalo Jacob, personally performed the services described in this documentation as described by Fred Yin PA-C in my presence, and it is both accurate and complete. Sharad Orosco MD, PhD  9/23/2021

## 2021-09-24 ENCOUNTER — TREATMENT (OUTPATIENT)
Dept: PHYSICAL THERAPY | Age: 71
End: 2021-09-24
Payer: MEDICARE

## 2021-09-24 DIAGNOSIS — M25.561 ACUTE POSTOPERATIVE PAIN OF RIGHT KNEE: ICD-10-CM

## 2021-09-24 DIAGNOSIS — M17.11 PRIMARY OSTEOARTHRITIS OF RIGHT KNEE: Primary | ICD-10-CM

## 2021-09-24 DIAGNOSIS — G89.18 ACUTE POSTOPERATIVE PAIN OF RIGHT KNEE: ICD-10-CM

## 2021-09-24 DIAGNOSIS — R26.2 DIFFICULTY WALKING: ICD-10-CM

## 2021-09-24 PROCEDURE — 97110 THERAPEUTIC EXERCISES: CPT | Performed by: PHYSICAL THERAPIST

## 2021-09-24 PROCEDURE — 97112 NEUROMUSCULAR REEDUCATION: CPT | Performed by: PHYSICAL THERAPIST

## 2021-09-24 PROCEDURE — 97530 THERAPEUTIC ACTIVITIES: CPT | Performed by: PHYSICAL THERAPIST

## 2021-09-24 PROCEDURE — G8427 DOCREV CUR MEDS BY ELIG CLIN: HCPCS | Performed by: PHYSICAL THERAPIST

## 2021-09-24 PROCEDURE — 97016 VASOPNEUMATIC DEVICE THERAPY: CPT | Performed by: PHYSICAL THERAPIST

## 2021-09-24 NOTE — PROGRESS NOTES
Kamran Marrero [facility]   Phone: [phone]    Fax: [phone]      Physical Therapy Discharge Summary    Dear Dr Aleta Martinez  ,    We had the pleasure of treating the following patient for physical therapy services at [address] [place] [address]. A summary of our findings can be found in the discharge summary below. If you have any questions or concerns regarding these findings, please do not hesitate to contact me at the office phone number checked above.   Thank you for the referral.     Physician Signature:________________________________Date:__________________  By signing above (or electronic signature), therapists plan is approved by physician      Overall Response to Treatment:   [x]Patient is responding well to treatment and improvement is noted with regards  to goals   [x]Patient should continue to improve in reasonable time if they continue HEP   []Patient has plateaued and is no longer responding to skilled PT intervention    []Patient is getting worse and would benefit from return to referring MD   []Patient unable to adhere to initial POC   []Other:     Date range of Visits: 2021-2021    Total Visits: 19        Physical Therapy Treatment Note/ Progress Report:           Date:  2021    Patient Name:  Brian Olivarez    :  1950  MRN: <[id number]>  Restrictions/Precautions:    Medical/Treatment Diagnosis Information:  · Diagnosis: Right knee OA/surgical optimization   s/p R TKA     DOS 3/21/7505  Insurance/Certification information:  PT Insurance Information: Medicare  Physician Information:  Referring Practitioner: Dr Aleta Martinez  Has the plan of care been signed (Y/N):        [x]  Yes  []  No     Date of Patient follow up with Physician: 2021      Is this a Progress Report:     []  Yes  [x]  No        If Yes:  Date Range for reporting period:  Beginning 2021  Ending  2021    Progress report will be due (10 Rx or 30 days whichever is less): 9/48/4988      Recertification will be due (POC Duration  / 90 days whichever is less): 9/13/2021        Visit # Insurance Allowable Auth Required   19 MEDICARE (BMN) []  Yes []  No        Functional Scale: KOS ADLs  49% functional limitation    Date assessed:  8/13/2021  KOS ADLs 9/24/2021 13% functional limitation      Latex Allergy:  [x]NO      []YES  Preferred Language for Healthcare:   [x]English       []other:    PQRS:  9/24/2021  Reviewed medication list with patient. Prednisone increased to 2x/day for 5 days then dropped to 1x/day, and on antibiotic for cellulitis. Pain level:  0/10     SUBJECTIVE:   6+ weeks post op  Patient reports that she has really been struggling with her left hip and thigh. Increased pain and stiffness in her whole leg. Her right knee continues to feel good. She is very happy with her progress. Scheduled for left DILAN           Relevant Medical History:   significant history (see attached in her chart), left RTC repair 2018, left TKA Aug 2019, Right DILAN 2018    Easing factors: rest, ice  Provocative factors: walking or any WB     Numbness/Tingling: none noted     Occupation/School:  retired     Living Status/Prior Level of Function: Independent with ADLs and IADLs, lives alone, 6 stairs to get in her house. Not doing stairs. Bedroom/bathroom on main floor, has a basement where she does laundry. Likes to be active and do light workouts in the gym. OBJECTIVE:   Moderate bruising w/ general redness noted in knee and thigh region; noted through her right LE/medial knee and into her calf  She is ambulating independently into clinic with use of her straight cane.     ROM PROM AROM Overpressure Comment    L R L R L R 9/24/2021   Flexion   125 129   Heel slide   Extension   -1 -1        Girth  8/13/2021 L R   Mid Patella 50 cm 51.5 cm   Suprapatellar     5cm above     15cm above         Strength L R Comment: majority deferred secondary to surgery   Quad Hamstring      Abduction      Quad tone [facility]+ 9/7/2021       Special Test  8/13/2021 Results/Comment: majority deferred secondary to surgery   Homans (-)     Clinical Presentation   Possible Score   Clients Score     Active cancer (within 6 months of diagnosis or receiving palliative care)  1  0   Paralysis, paresis, or recent immobilization of lower extremity  1   0   Bedridden for more than 3 days or major surgery in the last 4 weeks  1   1   Localized tenderness in the center of the posterior calf, the popliteal space, or along the femoral vein in the anterior thigh/groin  1   0   Entire lower extremity swelling  1   0   Unilateral calf swelling (more than 3 cm larger than uninvolved side)  1   0   Unilateral pitting edema  1   0   Collateral superficial veins (nonvaricose)  1   0   An alternative diagnosis is as likely (or more likely) than DVT (e.g., cellulitis, postoperative swelling, calf strain)  -2  -1   Total Points    0      Key  · -2 to 0 Low probability of DVT 3% (95% confidence interval [CI] 1.7%5.9%)  · 1 to 2 Moderate probability of DVT 17% (95% confidence interval [CI] 12%23%)  · 3 or more High probability of DVT 75% (95% confidence interval [CI] 63%84%)    Medical consultation is advised in the presence of low probability  Medical referral is required with moderate or high score. Lopez PS, Robby DR, Raffi J, et al: Value of assessment of pretest probability of deep-vein thrombosis in clinical management, Lancet 114:8910-5276, 1997. Timed Get Up and Go Test  Measures mobility in people who are able to walk on their own (assistive device permitted)        Name: Bj Simon  Date: 9/24/2021    Instructions: The person may wear their usual footwear and can use any assistive device they normally use. 1. Have the person sit in the chair with their back to the chair and their arms resting on the back rests  2.  Ask the person to stand up from a standard chair and walk a distance of 10 ft. (3 m). 3. Have the person turn around, walk back to the chair and sit down again. Timing begins when the person starts to rise from the chair and ends when he or she returns to the chair and sits down. Time to Complete:  (17 seconds)    Predictive Results:    Seconds Rating    <10  Freely mobile    <20  Mostly independent    20-29  Variable mobility    >20  Impaired mobility       Source: Driss Israel S. The timed 'Up and Go' Test: a Test of Basic Functional Mobility for Frail Elderly Persons. Journal of [facility]. 1991;39:142-148. G-Code Crosswalk:  TUG time (seonds) Disability Index CMS Modifier   12 or faster 0% []CH   13-14 1-19% []CI   15-16 20-39% []CJ   17-18 40-59% []CK   19-20  60-79% [x]CL   21-22  80-99% []CM   23 or slower 100% []CN           Co-morbidities/Complexities (which will affect course of rehabilitation):   []? None              Arthritic conditions   []? Rheumatoid arthritis (M05.9)  [x]? Osteoarthritis (M19.91)    Cardiovascular conditions   [x]? Hypertension (I10)  []? Hyperlipidemia (E78.5)  []? Angina pectoris (I20)  []? Atherosclerosis (I70)  []? CVA Musculoskeletal conditions   []? Disc pathology   []? Congenital spine pathologies   []? Prior surgical intervention  []? Osteoporosis (M81.8)  []? Osteopenia (M85.8)   Endocrine conditions   []? Hypothyroid (E03.9)  []? Hyperthyroid Gastrointestinal conditions   []? Constipation (K42.07)    Metabolic conditions   []? Morbid obesity (E66.01)  []? Diabetes type 1(E10.65) or 2 (E11.65)   []? Neuropathy (G60.9)      Pulmonary conditions   []? Asthma (J45)  []? Coughing   []? COPD (J44.9)    Psychological Disorders  []? Anxiety (F41.9)  []? Depression (F32.9)   []? Other:    [x]? Other:   stroke/TIA    CV/spine issues         Barriers to/and or personal factors that will affect rehab potential:              []? Age  []? Sex               []?Smoker              []? Motivation/Lack of Motivation                      [x]? Co-Morbidities              []? Cognitive Function, education/learning barriers              []? Environmental, home barriers              []? profession/work barriers  [x]? past PT/medical experience  []? other:  Justification:      Falls Risk Assessment (30 days):   [x]? Falls Risk assessed and no intervention required. []? Falls Risk assessed and Patient requires intervention due to being higher risk   TUG score (>12s at risk):     []?  Falls education provided, including       RESTRICTIONS/PRECAUTIONS: balance at times is an issue    Exercises/Interventions:     Therapeutic Ex (34207) Sets/sec Reps Notes/CUES   TREADMILL            STRETCHING      Towel Pull Calf 30\" x5    Inclined Calf       Hamstrings  30\" x5 ea side Back straight and head up   Quads      ITB      Adductors            ROM      Passive 10\" x10 Seated EOB   Weight Hangs      Sheet Pulls 10\" x10    Ankle Pumps      ERMI            STRENGTHENING      Quad Sets 5\" x20 bilat   Pushing down into towel roll    Ham Sets      Hip ADD Sets BS 5\" x20    SLR Flexion 2 x10    SLR Abduction  2 x5    SLR Prone      SLR Adduction      Hooklying clams      SDLY clams 3 x10 R only                     PRE Machines         Knee Extension 5# 3 x10 seated   Knee Flexion 5# 3 x10 Standing KF   Leg Press            CKC         Mini Squats      Wall Sits      Forward step ups    5 plates               Manual Intervention (43654)      Patellar Mobs x5'   All directions   Femoral Tibial mobs      STM x5'    Scar Massage      Foam Roll             NMR re-education (21567)   CUES NEEDED   Biodex Balance       Tandem Balance x30 sec x2 each way Eo, ec   SLB      Rebounder      BOSU                              Therapeutic Activity (06611)      Core Training      Satago Activities      Monster Walks      TRX training      education x10'  Discussed anatomy and pathology with patient, reviewed [address] [place] [address] program, reviewed post op precautions and expectations Therapeutic Exercise and NMR EXR  [x] (02330) Provided verbal/tactile cueing for activities related to strengthening, flexibility, endurance, ROM for improvements in LE, proximal hip, and core control with self care, mobility, lifting, ambulation. [x] (63255) Provided verbal/tactile cueing for activities related to improving balance, coordination, kinesthetic sense, posture, motor skill, proprioception to assist with LE, proximal hip, and core control in self-care, mobility, lifting, ambulation and eccentric single leg control.      NMR and Therapeutic Activities:    [x] (45361 or 73397) Provided verbal/tactile cueing for activities related to improving balance, coordination, kinesthetic sense, posture, motor skill, proprioception and motor activation to allow for proper function of core, proximal hip and LE with self-care and ADLs and functional mobility.   [] (59239) Gait Re-education- Provided training and instruction to the patient for proper LE, core and proximal hip recruitment and positioning and eccentric body weight control with ambulation re-education including up and down stairs     Home Exercise Program:    [x] (89370) Reviewed/Progressed HEP activities related to strengthening, flexibility, endurance, ROM of core, proximal hip and LE for functional self-care, mobility, lifting and ambulation/stair navigation   [] (03415) Reviewed/Progressed HEP activities related to improving balance, coordination, kinesthetic sense, posture, motor skill, proprioception of core, proximal hip and LE for self-care, mobility, lifting, and ambulation/stair navigation      Manual Treatments:  PROM / STM / Oscillations-Mobs:  G-I, II, III, IV (PA's, Inf., Post.)  [x] (69929) Provided manual therapy to mobilize LE, proximal hip and/or LS spine soft tissue/joints for the purpose of modulating pain, promoting relaxation, increasing ROM, reducing/eliminating soft tissue swelling/inflammation/restriction, improving soft tissue extensibility and allowing for proper ROM for normal function with self-care, mobility, lifting and ambulation. Modalities:  CP to L hip   [x] GAME READY (VASO)- for significant edema, swelling, pain control. Charges:  Timed Code Treatment Minutes: 40   Total Treatment Minutes: 65      [] EVAL (LOW) 99306 (typically 20 minutes face-to-face)  [] EVAL (MOD) 99529 (typically 30 minutes face-to-face)  [] EVAL (HIGH) 12285 (typically 45 minutes face-to-face)  [] RE-EVAL     [x] AA(97102) x1 (x15')     [] IONTO  [x] NMR (19341) x1 (x15')     [x] VASO Right knee x15' medium pressure   [] Manual (31594) x     [] Other:  [x] TA x  1 (x10')    [] Mech Traction (42825)  [] ES(attended) (56897)      [] ES (un) (89133):        ASSESSMENT:   She was limping more today and relying on her cane for assist with her left hip. We held off on her CKC today due to her left hip. She has great ROM in her right knee and her quad strength has really improved. Her gait is more limited lately due to her left hip. Before that was bothering her, she was able to walk without her cane. She remains motivated and always pushes herself to get the most out of her body. Plan to hold PT for now until after her left DILAN. GOALS:  Patient stated goal: restore prior level of function  [] Progressing: [x] Met: [] Not Met: [] Adjusted    Therapist goals for Patient:   Short Term Goals: To be achieved in: 2 weeks  1. Independent in HEP and progression per patient tolerance, in order to prevent re-injury. [] Progressing: [x] Met: [] Not Met: [] Adjusted  2. Patient will have a decrease in pain to facilitate improvement in movement, function, and ADLs as indicated by Functional Deficits. [] Progressing: [x] Met: [] Not Met: [] Adjusted    Long Term Goals: To be achieved in: 4-6 weeks  1. Disability index score of 20% or less for the KOS to assist with reaching prior level of function.    [] Progressing: [x] Met: [] Not Met: [] Adjusted  2. Patient will demonstrate increased AROM to 0-125 to allow for proper joint functioning as indicated by patients Functional Deficits. [] Progressing: [x] Met: [] Not Met: [] Adjusted  3. Patient will demonstrate an increase in Strength by 1/2 grade to allow for proper functional mobility as indicated by patients Functional Deficits. [] Progressing: [x] Met: [] Not Met: [] Adjusted  4. Patient will return to low level prior functional activities without increased symptoms or restriction. [] Progressing: [x] Met: [] Not Met: [] Adjusted  5. Normalize gait on levels and stairs   [] Progressing: [x] Met: [] Not Met: [] Adjusted   GOALS UPDATED 8/13/2021   GOALS UPDATED 9/24/2021    Overall Progression Towards Functional goals/ Treatment Progress Update:  [] Patient is progressing as expected towards functional goals listed. [] Progression is slowed due to complexities/Impairments listed. [] Progression has been slowed due to co-morbidities. [x] Plan just implemented, too soon to assess goals progression <30days   [] Goals require adjustment due to lack of progress  [] Patient is not progressing as expected and requires additional follow up with physician  [] Other    Prognosis for POC: [x] Good [] Fair  [] Poor      Patient requires continued skilled intervention: [x] Yes  [] No    Treatment/Activity Tolerance:  [x] Patient able to complete treatment  [] Patient limited by fatigue  [] Patient limited by pain    [] Patient limited by other medical complications  [] Other:         PLAN:    D/C to HEP this date  [] Continue per plan of care [] Alter current plan   [] Plan of care initiated [] Hold pending MD visit [x] Discharge      Electronically signed by:      Annita Robertson  PT #[id number]  [place] PT #[id number]      Note: If patient does not return for scheduled/ recommended follow up visits, this note will serve as a discharge from care along with most recent update on progress.

## 2021-09-28 ENCOUNTER — OFFICE VISIT (OUTPATIENT)
Dept: CARDIOLOGY CLINIC | Age: 71
End: 2021-09-28
Payer: MEDICARE

## 2021-09-28 VITALS
DIASTOLIC BLOOD PRESSURE: 64 MMHG | WEIGHT: 227 LBS | BODY MASS INDEX: 40.21 KG/M2 | SYSTOLIC BLOOD PRESSURE: 118 MMHG | HEART RATE: 86 BPM

## 2021-09-28 DIAGNOSIS — I95.2 HYPOTENSION DUE TO DRUGS: Primary | ICD-10-CM

## 2021-09-28 DIAGNOSIS — E78.2 MIXED HYPERLIPIDEMIA: ICD-10-CM

## 2021-09-28 DIAGNOSIS — I10 ESSENTIAL HYPERTENSION: ICD-10-CM

## 2021-09-28 PROCEDURE — G8399 PT W/DXA RESULTS DOCUMENT: HCPCS | Performed by: NURSE PRACTITIONER

## 2021-09-28 PROCEDURE — 1090F PRES/ABSN URINE INCON ASSESS: CPT | Performed by: NURSE PRACTITIONER

## 2021-09-28 PROCEDURE — 1123F ACP DISCUSS/DSCN MKR DOCD: CPT | Performed by: NURSE PRACTITIONER

## 2021-09-28 PROCEDURE — G8417 CALC BMI ABV UP PARAM F/U: HCPCS | Performed by: NURSE PRACTITIONER

## 2021-09-28 PROCEDURE — 3017F COLORECTAL CA SCREEN DOC REV: CPT | Performed by: NURSE PRACTITIONER

## 2021-09-28 PROCEDURE — G8427 DOCREV CUR MEDS BY ELIG CLIN: HCPCS | Performed by: NURSE PRACTITIONER

## 2021-09-28 PROCEDURE — G9899 SCRN MAM PERF RSLTS DOC: HCPCS | Performed by: NURSE PRACTITIONER

## 2021-09-28 PROCEDURE — 99214 OFFICE O/P EST MOD 30 MIN: CPT | Performed by: NURSE PRACTITIONER

## 2021-09-28 PROCEDURE — 1036F TOBACCO NON-USER: CPT | Performed by: NURSE PRACTITIONER

## 2021-09-28 PROCEDURE — 4040F PNEUMOC VAC/ADMIN/RCVD: CPT | Performed by: NURSE PRACTITIONER

## 2021-09-28 RX ORDER — LISINOPRIL 20 MG/1
20 TABLET ORAL DAILY
Qty: 90 TABLET | Refills: 3
Start: 2021-09-28 | End: 2021-11-12

## 2021-09-28 RX ORDER — AMLODIPINE BESYLATE 5 MG/1
5 TABLET ORAL DAILY
Qty: 90 TABLET | Refills: 3
Start: 2021-09-28 | End: 2021-11-12

## 2021-09-28 NOTE — PROGRESS NOTES
Heart Attack Maternal Aunt      Social History     Tobacco Use    Smoking status: Former Smoker     Quit date: 2005     Years since quittin.2    Smokeless tobacco: Never Used   Substance Use Topics    Alcohol use: No    Drug use: No     Allergies:Tape [adhesive tape], Reglan [metoclopramide], and Oxycodone    Review of Systems  General: No changes in weight, fatigue, or night sweats. HEENT: No blurry or decreased vision. No changes in hearing, nasal discharge or sore throat. Cardiovascular:  See HPI. Respiratory: No cough, hemoptysis, or wheezing. Gastrointestinal:  No abdominal pain, hematochezia, melana, constipation, diarrhea, or history of GI ulcers. Genito-Urinary: No dysuria or hematuria. No urgency or polyuria. Musculoskeletal:  No complaints of joint pain, joint swelling or muscular weakness/soreness. Neurological:  No dizziness, headaches, numbness/tingling, speech problems or weakness. Psychological:  No anxiety or depression. Hematological and Lymphatic: No abnormal bleeding or bruising, blood clots, jaundice or swollen lymph nodes. Endocrine:   No malaise/lethargy, palpitations, polydipsia/polyuria, temperature intolerance or unexpected weight changes  Skin:  No rashes or non-healing ulcers. Physical Exam:  /64   Pulse 86   Wt 227 lb (103 kg)   BMI 40.21 kg/m²    General (appearance):  No acute distress  Eyes: anicteric   Neck: soft, No JVD  Ears/Nose/Mouth/Thorat: No cyanosis  CV: RRR   Respiratory:  Clear, normal effort  GI: soft, non-tender, non-distended  Skin: Warm, dry. No rashes  Neuro/Psych: Alert and oriented x 3 . Appropriate behavior  Ext:  No c/c.  No pitting edema  Pulses:  2+ radial     Weight  Wt Readings from Last 3 Encounters:   21 227 lb (103 kg)   21 224 lb (101.6 kg)   08/10/21 224 lb (101.6 kg)          CBC:   Lab Results   Component Value Date    WBC 7.3 2019    HGB 10.7 (L) 08/15/2019    HCT 31.8 (L) 08/15/2019    MCV 97.2 2019     2019     BMP:  Lab Results   Component Value Date    CREATININE 0.6 2019    BUN 10 2019     2019    K 4.0 2019     2019    CO2 29 2019     Mag: No results found for: MG  LIVER PROFILE:   Lab Results   Component Value Date    ALT 20 2018    AST 25 2018    ALKPHOS 88 2018    BILITOT <0.2 2018     PT/INR:   Lab Results   Component Value Date    INR 1.00 2019    INR 1.08 2018    PROTIME 11.4 2019    PROTIME 12.3 2018     Pro-BNP No results found for: PROBNP  LIPIDS:  No components found for: CHLPL  Lab Results   Component Value Date    TRIG 239 (H) 2019    TRIG 133 2016    TRIG 136 2015     Lab Results   Component Value Date    HDL 51 2019    HDL 50 2016    HDL 57 2015     Lab Results   Component Value Date    LDLCALC 67 2019    LDLCALC 108 (H) 2016    LDLCALC 89 2015     Lab Results   Component Value Date    LABVLDL 48 2019    LABVLDL 27 2016    LABVLDL 27 2015     TSH:  Lab Results   Component Value Date    TSH 2.890 2013       IMAGIN Coronary angiogram  Angiographic Findings:  Right dominant system  Left Main:  Short. Normal  Left Anterior Descending:  No significant CAD  Circumflex:  No significant CAD  Right Coronary:  No significant CAD  Left Ventriculogram:  EF 60%  Hemodynamics (mm Hg):  Left Ventricular Pressure:  117/0, 2  Central Aortic Pressure:  116/60 (84)      2018 Echo:   Suboptimal image quality. Definity contrast administered. Left ventricular   cavity size is normal. There is asymmetric hypertrophy of the basal septum. Overall left ventricular systolic function appears normal with an estimated   ejection fraction of 55-60%. No regional wall motion abnormalities are   noted. Diastolic function is indeterminate.    Estimated pulmonary artery systolic pressure is at 28 mmHg assuming a right atrial pressure of 3 mmHg. No evidence of significant valvular stenosis or regurgitation present. Medications:   Current Outpatient Medications   Medication Sig Dispense Refill    pregabalin (LYRICA) 75 MG capsule       rosuvastatin (CRESTOR) 5 MG tablet ROSUVASTATIN CALCIUM 5 MG TABS      lisinopril (PRINIVIL;ZESTRIL) 20 MG tablet TAKE 1 TABLET TWICE A  tablet 3    amLODIPine (NORVASC) 5 MG tablet TAKE 1 TABLET TWICE A  tablet 3    citalopram (CELEXA) 20 MG tablet TAKE ONE TABLET EVERY DAY 90 tablet 0    calcium carbonate (OSCAL) 500 MG TABS tablet Take 500 mg by mouth daily      vitamin D (CHOLECALCIFEROL) 1000 UNIT TABS tablet Take 1,000 Units by mouth daily      Multiple Vitamins-Minerals (CENTRUM SILVER) TABS Take  by mouth.  Ascorbic Acid (VITAMIN C) 500 MG tablet Take 500 mg by mouth daily.  diclofenac (VOLTAREN) 75 MG EC tablet Take 1 tablet by mouth 2 times daily (Patient not taking: Reported on 9/28/2021) 180 tablet 0    aspirin 81 MG tablet Take 1 tablet by mouth 2 times daily (Patient not taking: Reported on 9/28/2021) 60 tablet 0     No current facility-administered medications for this visit. Assessment:  1. Hypotension due to drugs    2. Essential hypertension    3.  Mixed hyperlipidemia          Plan:  HTN; now having hypotension with weakness and LH   Decrease lisinopril to 20 mg po once a day (not BID)   Decrease amlodipine to 50 mg once a day (not bid)   Cont to monitor BP  HLD   Crestor    Will resume asa 2 weeks after surgery     Follow up with Dr Dany Garcia next week         Reviewed most recent: CBC, BMP, LFT, Lipids, Mag, PT/INR, BNP, TSH  Reviewed most recent: ECG, Echo, Nuc stress test,  CTA, LHC

## 2021-09-30 ENCOUNTER — OFFICE VISIT (OUTPATIENT)
Dept: CARDIOLOGY CLINIC | Age: 71
End: 2021-09-30
Payer: MEDICARE

## 2021-09-30 VITALS
OXYGEN SATURATION: 95 % | HEIGHT: 63 IN | BODY MASS INDEX: 40.68 KG/M2 | DIASTOLIC BLOOD PRESSURE: 70 MMHG | WEIGHT: 229.6 LBS | HEART RATE: 78 BPM | SYSTOLIC BLOOD PRESSURE: 122 MMHG

## 2021-09-30 DIAGNOSIS — E78.2 MIXED HYPERLIPIDEMIA: ICD-10-CM

## 2021-09-30 DIAGNOSIS — I10 ESSENTIAL HYPERTENSION: ICD-10-CM

## 2021-09-30 PROCEDURE — 1090F PRES/ABSN URINE INCON ASSESS: CPT | Performed by: INTERNAL MEDICINE

## 2021-09-30 PROCEDURE — 99213 OFFICE O/P EST LOW 20 MIN: CPT | Performed by: INTERNAL MEDICINE

## 2021-09-30 PROCEDURE — G8427 DOCREV CUR MEDS BY ELIG CLIN: HCPCS | Performed by: INTERNAL MEDICINE

## 2021-09-30 PROCEDURE — G8417 CALC BMI ABV UP PARAM F/U: HCPCS | Performed by: INTERNAL MEDICINE

## 2021-09-30 PROCEDURE — 4040F PNEUMOC VAC/ADMIN/RCVD: CPT | Performed by: INTERNAL MEDICINE

## 2021-09-30 PROCEDURE — G8399 PT W/DXA RESULTS DOCUMENT: HCPCS | Performed by: INTERNAL MEDICINE

## 2021-09-30 PROCEDURE — 1123F ACP DISCUSS/DSCN MKR DOCD: CPT | Performed by: INTERNAL MEDICINE

## 2021-09-30 PROCEDURE — 3017F COLORECTAL CA SCREEN DOC REV: CPT | Performed by: INTERNAL MEDICINE

## 2021-09-30 PROCEDURE — 1036F TOBACCO NON-USER: CPT | Performed by: INTERNAL MEDICINE

## 2021-09-30 PROCEDURE — G9899 SCRN MAM PERF RSLTS DOC: HCPCS | Performed by: INTERNAL MEDICINE

## 2021-09-30 ASSESSMENT — ENCOUNTER SYMPTOMS
ABDOMINAL DISTENTION: 0
EYE DISCHARGE: 0
COUGH: 0
VOMITING: 0
FACIAL SWELLING: 0
SHORTNESS OF BREATH: 0
CHEST TIGHTNESS: 0
BLOOD IN STOOL: 0
BACK PAIN: 0
WHEEZING: 0
COLOR CHANGE: 0
ABDOMINAL PAIN: 0

## 2021-09-30 NOTE — PROGRESS NOTES
[address] [facility]     Outpatient Cardiology         Chief Complaint   Patient presents with    Hypertension     HPI     Bobby Solano a 73 y.o. female here for follow up for hypotension. Hx of HLD and HTN. Hyperlipidemia, LDL controlled, no side effects on crestor  Coronary calcifications, had a normal cath in the past, continue Crestor  Hypertension, seen in the office this week for level hypertension related to pain. Currently taking lisinopril 20 once a day and amlodipine 5 once a day, overall her blood pressure is decently controlled. Will likely need to adjust medications after her hip surgery      PMH  Past Medical History:   Diagnosis Date    Arthritis     Cerebral artery occlusion with cerebral infarction ([facility] [address]     TIA X 4    Depression     GERD (gastroesophageal reflux disease)     HTN (hypertension)     Hyperlipidemia     Overweight     Raynaud's disease     Spinal stenosis     Use of cane as ambulatory aid        PSH  Past Surgical History:   Procedure Laterality Date    APPENDECTOMY  07/17/2016    BACK SURGERY  x3 total     BLADDER REPAIR      CARDIAC CATHETERIZATION  2018    FINGER TRIGGER RELEASE  4/13; 10/13; 4/14, 6/3/20    Multiple fingers    HAMMER TOE SURGERY      Right.     HERNIA REPAIR      ABDOMINAL INCISIONAL HERNIA REPAIR    HIP SURGERY      total hip replacement     HYSTERECTOMY  02/1991    JOINT REPLACEMENT  01/2018    right THR    KNEE SURGERY      LUMBAR FUSION  12/13    L4-L5;L5-S1    LUMBAR LAMINECTOMY  11/1997 and 1981    OTHER SURGICAL HISTORY Left 07/19/2018     LEFT SHOULDER EUA, DIAGNOSTIC  ARTHROSCOPY, EXTENSIVE DEBRIDEMENT, LYSIS OF ADHESIONS, REMOVAL OF RETAINED SUTURE, BICEPS TENOTOMY, REVISION ROTATOR CUFF REPAIR (Left Shoulder)    CONRADO KOVACS ARTHROSCOP,DIAGNOSTIC Left 7/19/2018    LEFT SHOULDER EUA, DIAGNOSTIC  ARTHROSCOPY, EXTENSIVE DEBRIDEMENT, LYSIS OF ADHESIONS, REMOVAL OF RETAINED SUTURE, BICEPS TENOTOMY, REVISION ROTATOR CUFF REPAIR performed by Diaz Lynch MD at [facility]      Left    TOTAL KNEE ARTHROPLASTY Left 2019    LEFT TOTAL KNEE  ARTHROPLASTY performed by Jia Ortiz MD at [facility] OR        Social HIstory  Social History     Tobacco Use    Smoking status: Former Smoker     Quit date: 2005     Years since quittin.2    Smokeless tobacco: Never Used   Substance Use Topics    Alcohol use: No    Drug use: No       Family History  Family History   Problem Relation Age of Onset    Alzheimer's Disease Mother     Prostate Cancer Father     Cancer Brother     Heart Attack Maternal Aunt        Allergies   Allergies   Allergen Reactions    Tape [Adhesive Jyothi Jetty Other (See Comments)     blistering    Reglan [Metoclopramide] Itching and Other (See Comments)     Flushed      Oxycodone Nausea And Vomiting       Medications:     Home Medications:  Were reviewed and are listed in nursing record. and/or listed below    Prior to Admission medications    Medication Sig Start Date End Date Taking?  Authorizing Provider   BUDESONIDE ER PO Take 3 mg by mouth daily Take one tablet daily   Yes Historical Provider, MD   lisinopril (PRINIVIL;ZESTRIL) 20 MG tablet Take 1 tablet by mouth daily 21  Yes FALLON Muniz CNP   amLODIPine (NORVASC) 5 MG tablet Take 1 tablet by mouth daily 21  Yes FALLON Muniz CNP   pregabalin (LYRICA) 75 MG capsule  21  Yes Historical Provider, MD   rosuvastatin (CRESTOR) 5 MG tablet ROSUVASTATIN CALCIUM 5 MG TABS 21  Yes Historical Provider, MD   diclofenac (VOLTAREN) 75 MG EC tablet Take 1 tablet by mouth 2 times daily 19  Yes Margarita Luciano MD   citalopram (CELEXA) 20 MG tablet TAKE ONE TABLET EVERY DAY 19  Yes Margarita Luciano MD   calcium carbonate (OSCAL) 500 MG TABS tablet Take 500 mg by mouth daily   Yes Historical Provider, MD   vitamin D (CHOLECALCIFEROL) 1000 UNIT TABS tablet Take 1,000 Units by mouth daily   Yes Historical Provider, MD   Multiple Vitamins-Minerals (CENTRUM SILVER) TABS Take  by mouth. Yes Historical Provider, MD   Ascorbic Acid (VITAMIN C) 500 MG tablet Take 500 mg by mouth daily. Yes Historical Provider, MD   aspirin 81 MG tablet Take 1 tablet by mouth 2 times daily  Patient not taking: Reported on 9/28/2021 8/15/19   Maame Lizama MD        Review of Systems   Constitutional: Negative for activity change, appetite change, diaphoresis, fatigue, fever and unexpected weight change. HENT: Negative for congestion, facial swelling, mouth sores and nosebleeds. Eyes: Negative for discharge and visual disturbance. Respiratory: Negative for cough, chest tightness, shortness of breath and wheezing. Cardiovascular: Negative for chest pain, palpitations and leg swelling. Gastrointestinal: Negative for abdominal distention, abdominal pain, blood in stool and vomiting. Endocrine: Negative for cold intolerance, heat intolerance and polyuria. Genitourinary: Negative for difficulty urinating, dysuria, frequency and hematuria. Musculoskeletal: Negative for back pain, joint swelling, myalgias and neck pain. Skin: Negative for color change, pallor and rash. Allergic/Immunologic: Negative for immunocompromised state. Neurological: Negative for dizziness, syncope, weakness, light-headedness, numbness and headaches. Hematological: Negative for adenopathy. Does not bruise/bleed easily. Psychiatric/Behavioral: Negative for behavioral problems, confusion, decreased concentration and suicidal ideas. The patient is not nervous/anxious.         Vitals:    09/30/21 1330   BP: 122/70   Pulse: 78   SpO2:     Weight: 229 lb 9.6 oz (104.1 kg)       Vitals:    09/30/21 1323 09/30/21 1330   BP: 132/78 122/70   Site: Left Upper Arm Left Upper Arm   Position: Sitting Sitting   Cuff Size: Medium Adult Medium Adult   Pulse: 96 78   SpO2: 95%    Weight: 229 lb 9.6 oz (104.1 kg)    Height: 5' 3\" (1.6 m)        BP Readings from Last 3 Encounters:   09/30/21 122/70   09/28/21 118/64   03/04/21 110/60       Wt Readings from Last 3 Encounters:   09/30/21 229 lb 9.6 oz (104.1 kg)   09/28/21 227 lb (103 kg)   09/23/21 224 lb (101.6 kg)       Physical Exam  Constitutional:       General: She is not in acute distress. Appearance: She is well-developed. She is not diaphoretic. HENT:      Head: Normocephalic and atraumatic. Eyes:      Pupils: Pupils are equal, round, and reactive to light. Neck:      Thyroid: No thyromegaly. Vascular: No JVD. Cardiovascular:      Rate and Rhythm: Normal rate and regular rhythm. Chest Wall: PMI is not displaced. Heart sounds: Normal heart sounds, S1 normal and S2 normal. No murmur heard. No friction rub. No gallop. Pulmonary:      Effort: Pulmonary effort is normal. No respiratory distress. Breath sounds: Normal breath sounds. No stridor. No wheezing or rales. Chest:      Chest wall: No tenderness. Abdominal:      General: Bowel sounds are normal. There is no distension. Palpations: Abdomen is soft. Tenderness: There is no abdominal tenderness. There is no guarding or rebound. Musculoskeletal:         General: No tenderness. Normal range of motion. Cervical back: Normal range of motion. Lymphadenopathy:      Cervical: No cervical adenopathy. Skin:     General: Skin is warm and dry. Findings: No erythema or rash. Neurological:      Mental Status: She is alert and oriented to person, place, and time. Coordination: Coordination normal.   Psychiatric:         Behavior: Behavior normal.         Thought Content:  Thought content normal.         Judgment: Judgment normal.         Labs:       Lab Results   Component Value Date    WBC 7.3 08/02/2019    HGB 10.7 (L) 08/15/2019    HCT 31.8 (L) 08/15/2019    MCV 97.2 08/02/2019     08/02/2019     Lab Results   Component Value Date     08/02/2019    K 4.0 08/02/2019     08/02/2019    CO2 29 08/02/2019    BUN 10 08/02/2019    CREATININE 0.6 08/02/2019    GLUCOSE 84 08/02/2019    CALCIUM 9.5 08/02/2019    PROT 7.0 12/12/2018    LABALBU 4.1 12/12/2018    BILITOT <0.2 12/12/2018    ALKPHOS 88 12/12/2018    AST 25 12/12/2018    ALT 20 12/12/2018    LABGLOM >60 08/02/2019    GFRAA >60 08/02/2019    AGRATIO 1.4 12/12/2018    GLOB 2.9 12/12/2018         Lab Results   Component Value Date    CHOL 166 01/08/2019    CHOL 185 06/21/2016    CHOL 173 12/16/2015     Lab Results   Component Value Date    TRIG 239 (H) 01/08/2019    TRIG 133 06/21/2016    TRIG 136 12/16/2015     Lab Results   Component Value Date    HDL 51 01/08/2019    HDL 50 06/21/2016    HDL 57 12/16/2015     Lab Results   Component Value Date    LDLCALC 67 01/08/2019    LDLCALC 108 (H) 06/21/2016    LDLCALC 89 12/16/2015     Lab Results   Component Value Date    LABVLDL 48 01/08/2019    LABVLDL 27 06/21/2016    LABVLDL 27 12/16/2015     Lab Results   Component Value Date    CHOLHDLRATIO 3.4 05/25/2011       Lab Results   Component Value Date    INR 1.00 08/02/2019    INR 1.08 12/12/2018    PROTIME 11.4 08/02/2019    PROTIME 12.3 12/12/2018       The ASCVD Risk score (Hellen Trinidad et al., 2013) failed to calculate for the following reasons: The patient has a prior MI or stroke diagnosis      Imaging:       Last ECG (if available):  Normal sinus rhythm    Last Stress (if available): The patient had a Dobutamine Stress Echocardiogram. Baseline echocardiogram   shows normal left ventricular function with an estimated ejection fraction   of 55%. Following the dobutamine infusion there was augmentation of all   segments with no areas of stress induced hypokinesis with an ejection   fraction of 65%.      Patient experienced 10 beat run V-tach during recovery that resolved. Last Cath (if available):  12/12/18  Angiographic Findings:  Right dominant system  Left Main:  Short.  Normal   Left Anterior Descending:  No significant CAD   Circumflex:  No significant CAD   Right Coronary:  No significant CAD   Left Ventriculogram:  EF 60%   Hemodynamics (mm Hg):  Left Ventricular Pressure:  117/0, 2  Central Aortic Pressure:  116/60 (84)    Last TTE/COLT(if available):  11/27/18  Summary   The patient had a Dobutamine Stress Echocardiogram. Baseline echocardiogram   shows normal left ventricular function with an estimated ejection fraction   of 55%. Following the dobutamine infusion there was augmentation of all   segments with no areas of stress induced hypokinesis with an ejection   fraction of 65%. Last CMR  (if available):      Assessment / Plan:     Essential hypertension  Seen this week in the office for episodes of hypotension and hypertension. Adjusted lisinopril amlodipine. Continue lisinopril 20 mg daily and amlodipine 5 mg daily. We will reassess after hip surgery    Mixed hyperlipidemia  Tolerating well current dose of Crestor. Follow up in 12 months. I had the opportunity to review the clinical symptoms and presentation of Odilia Dumont.     Patient's allergies and medications were reviewed and updated. Patient's past medical, surgical, social and family history were reviewed and updated. Patient's testing including laboratory, ECGs, monitor, imaging (TTE,COLT,CMR,cath) were reviewed. All questions and concerns were addressed to the patient/family. Alternatives to my treatment were discussed. The note was completed using EMR. Every effort wasmade to ensure accuracy; however, inadvertent computerized transcription errors may be present. Thank you for allowing me to participate in thecare or [address]  Angelica Gibson MD, [facility] [place]

## 2021-09-30 NOTE — ASSESSMENT & PLAN NOTE
Seen this week in the office for episodes of hypotension and hypertension. Adjusted lisinopril amlodipine. Continue lisinopril 20 mg daily and amlodipine 5 mg daily.   We will reassess after hip surgery

## 2021-10-07 ENCOUNTER — EVALUATION (OUTPATIENT)
Dept: PHYSICAL THERAPY | Age: 71
End: 2021-10-07

## 2021-10-07 DIAGNOSIS — G89.18 ACUTE POSTOPERATIVE PAIN OF LEFT HIP: Primary | ICD-10-CM

## 2021-10-07 DIAGNOSIS — R26.2 DIFFICULTY WALKING: ICD-10-CM

## 2021-10-07 DIAGNOSIS — M16.12 PRIMARY OSTEOARTHRITIS OF LEFT HIP: ICD-10-CM

## 2021-10-07 DIAGNOSIS — M25.552 ACUTE POSTOPERATIVE PAIN OF LEFT HIP: Primary | ICD-10-CM

## 2021-10-07 NOTE — PROGRESS NOTES
[facility]   Phone: [phone]    Fax: [phone]     Physical Therapy Certification    Dear Referring Practitioner: Dr Fredrick Melendez,    We had the pleasure of evaluating the following patient for physical therapy services at [address] [place] [address]. A summary of our findings can be found in the initial assessment below. This includes our plan of care. If you have any questions or concerns regarding these findings, please do not hesitate to contact me at the office phone number checked above. Thank you for the referral.       Physician Signature:_______________________________Date:__________________  By signing above (or electronic signature), therapists plan is approved by physician      Patient: Reyna Sin   : 1950   MRN: <[id number]>  Referring Physician: Referring Practitioner: Dr Fredrick Melendez      Evaluation Date: 10/7/2021      Medical Diagnosis Information:  Diagnosis: s/p Left DILAN   DOS 10/4/2021                                             Insurance information: PT Insurance Information: Medicare     Precautions/ Contra-indications: balance at times is an issue  Latex Allergy:  [x]NO      []YES    C-SSRS Triggered by Intake questionnaire (Past 2 wk assessment):   [x] No, Questionnaire did not trigger screening.   [] Yes, Patient intake triggered further evaluation      [] C-SSRS Screening completed  [] PCP notified via Plan of Care  [] Emergency services notified       Preferred Language for Healthcare:   [x]English       []other:      SUBJECTIVE:   Has had ongoing pain in her left hip. Seemed to get aggravated after her Right TKA and while she was in for post op therapy.      She is now s/p left DILAN on 10/4/2021          Relevant Medical History: significant history (see attached in her chart), left RTC repair , left TKA Aug 2019, Right DILAN , R TKA Aug 2021    Functional Scale/Score:***    Pain Scale: ***/10  Easing factors: ***  Provocative factors: ***     Type: []Constant   []Intermittent  []Radiating []Localized []other:     Numbness/Tingling: ***    Occupation/School:  retired    Living Status/Prior Level of Function: Independent with ADLs and IADLs, Independent with ADLs and IADLs, lives alone, 6 stairs to get in her house. Bedroom/bathroom on main floor, has a basement where she does laundry. Likes to be active and do light workouts in the gym.     OBJECTIVE:     ROM PROM AROM Overpressure Comment    L R L R L R 10/7/2021   Knee Flexion          Knee Extension                    Hip flexion          Hip Abd          Hip IR          Hip ER                Strength L R Comment: majority deferred secondary to surgery   Quad      Hamstring      Abduction      Quad tone   10/7/2021       Special Test  10/7/2021 Results/Comment: majority deferred secondary to surgery   Homans      Clinical Presentation   Possible Score   Clients Score     Active cancer (within 6 months of diagnosis or receiving palliative care)  1     Paralysis, paresis, or recent immobilization of lower extremity  1      Bedridden for more than 3 days or major surgery in the last 4 weeks  1      Localized tenderness in the center of the posterior calf, the popliteal space, or along the femoral vein in the anterior thigh/groin  1      Entire lower extremity swelling  1      Unilateral calf swelling (more than 3 cm larger than uninvolved side)  1      Unilateral pitting edema  1      Collateral superficial veins (nonvaricose)  1      An alternative diagnosis is as likely (or more likely) than DVT (e.g., cellulitis, postoperative swelling, calf strain)  -2     Total Points    0      Key  · -2 to 0 Low probability of DVT 3% (95% confidence interval [CI] 1.7%5.9%)  · 1 to 2 Moderate probability of DVT 17% (95% confidence interval [CI] 12%23%)  · 3 or more High probability of DVT 75% (95% confidence interval [CI] 63%84%)    Medical consultation is advised in the presence of low probability  Medical referral is required with moderate or high score. Lopez PS, Robby DR, Raffi J, et al: Value of assessment of pretest probability of deep-vein thrombosis in clinical management, Lancet 390:9894-8553, 1997. Reflexes/Sensation:    [x]Dermatomes/Myotomes intact    []Reflexes equal and normal bilaterally   []Other:    Joint mobility: NA due to surgery   []Normal    []Hypo   []Hyper    Palpation: NA due to surgery    Functional Mobility/Transfers: ***    Posture: ***    Bandages/Dressings/Incisions: ***    Gait: (include devices/WB status) ***    Orthopedic Special Tests: NA due to surgery                       [x] Patient history, allergies, meds reviewed. Medical chart reviewed. See intake form. Review Of Systems (ROS):  [x]Performed Review of systems (Integumentary, CardioPulmonary, Neurological) by intake and observation. Intake form has been scanned into medical record. Patient has been instructed to contact their primary care physician regarding ROS issues if not already being addressed at this time.       Co-morbidities/Complexities (which will affect course of rehabilitation): ***  []None           Arthritic conditions   []Rheumatoid arthritis (M05.9)  [x]Osteoarthritis (M19.91)   Cardiovascular conditions   [x]Hypertension (I10)  []Hyperlipidemia (E78.5)  []Angina pectoris (I20)  []Atherosclerosis (I70)  []CVA Musculoskeletal conditions   []Disc pathology   []Congenital spine pathologies   []Prior surgical intervention  []Osteoporosis (M81.8)  []Osteopenia (M85.8)   Endocrine conditions   []Hypothyroid (E03.9)  []Hyperthyroid Gastrointestinal conditions   []Constipation (T31.90)   Metabolic conditions   []Morbid obesity (E66.01)  []Diabetes type 1(E10.65) or 2 (E11.65)   []Neuropathy (G60.9)     Pulmonary conditions   []Asthma (J45)  []Coughing   []COPD (J44.9)   Psychological Disorders  []Anxiety (F41.9)  []Depression (F32.9)   []Other:   [x]Other: Stroke/TIA  CV/spine issues; lumbar fusion       Barriers to/and or personal factors that will affect rehab potential:              []Age  []Sex    []Smoker              []Motivation/Lack of Motivation                        [x]Co-Morbidities              []Cognitive Function, education/learning barriers              []Environmental, home barriers              []profession/work barriers  [x]past PT/medical experience  []other:  Justification:     Falls Risk Assessment (30 days): ***  [x] Falls Risk assessed and no intervention required. [] Falls Risk assessed and Patient requires intervention due to being higher risk   TUG score (>12s at risk):     [] Falls education provided, including           Pain  [x]  Pain diagram was completed, pain was present and a follow up plan to address the pain is in the plan of care. ()   []  Pain diagram was completed and there was no pain present and therefore no follow up plan to address pain in the plan of care. ()   []  Pain diagram was not completed and the reason for not completing the pain diagram is in the medical chart ()  []  Patient refused to participate   []  Severe mental or physical capacity, patient is in urgent emergent situation and to complete the questionnaire would jeopardize the patient's health status        Functional Questionnaire  [x]  Patient completed the functional outcome questionnaire and deficiencies were addressed in the plan of care. ()   []  Patient completed the outcome questionnaire and there were no functional deficits identified and therefore no plan of care is required. ()   []  Patient did not complete the functional outcome questionnaire and the reason why is documented in the medical chart. ()  []  Patient refused to participate   []  Patient unable to complete the questionnaire   []   A functional outcome assessment has been reported on within the last 30 days        Falls  [x]  The patient has had no falls or 1 fall without injury in the past year. (1101F)   []  There is no documentation of fall in the medical chart (1101F,8P)     [] The patient has had 2 or more falls or one fall with injury in the past year that is documented in the medical chart. (1100F)  []  A falls risk assessment was completed and documented in the medical chart. (4949Q)   []  Falls were addressed in the plan of care. (9552U)   []  A falls risk assessment was not completed and documented in the medical chart (0760C,9A)   []   Falls were not addressed in the plan of care and reason was documented in the plan of care. (9706B 1P)        Medication     [x] A list of current medications (including prescription, over the counter, herbal supplements, vitamin supplements, mineral supplements, dietary supplements) was reviewed with the patient and documented in the medical chart.  ()      ASSESSMENT: ***  Functional Impairments:     []Noted lumbar/proximal hip/LE hypomobility   []Decreased LE functional ROM   []Decreased core/proximal hip strength and neuromuscular control   []Decreased LE functional strength   []Reduced balance/proprioceptive control   []other:      Functional Activity Limitations (from functional questionnaire and intake)   []Reduced ability to tolerate prolonged functional positions   []Reduced ability or difficulty with changes of positions or transfers between positions   []Reduced ability to maintain good posture and demonstrate good body mechanics with sitting, bending, and lifting   []Reduced ability to sleep   [] Reduced ability or tolerance with driving and/or computer work   []Reduced ability to perform lifting, carrying tasks   []Reduced ability to squat   []Reduced ability to forward bend   []Reduced ability to ambulate prolonged functional periods/distances/surfaces   []Reduced ability to ascend/descend stairs   []Reduced ability to run, hop or jump   []other:     Participation Restrictions   []Reduced participation in self care activities   []Reduced participation in home management activities   []Reduced participation in work activities   []Reduced participation in social activities. []Reduced participation in sport activities. Classification :    []Signs/symptoms consistent with post-surgical status including decreased ROM, strength and function.    []Signs/symptoms consistent with joint sprain/strain   []Signs/symptoms consistent with patella-femoral syndrome   []Signs/symptoms consistent with knee OA/hip OA   []Signs/symptoms consistent with internal derangement of knee/Hip   []Signs/symptoms consistent with functional hip weakness/NMR control      []Signs/symptoms consistent with tendinitis/tendinosis    []signs/symptoms consistent with pathology which may benefit from Dry needling      []other:      Prognosis/Rehab Potential:      []Excellent   []Good    []Fair   []Poor    Tolerance of evaluation/treatment:    []Excellent   []Good    []Fair   []Poor    Physical Therapy Evaluation Complexity Justification  [x] A history of present problem with:  [] no personal factors and/or comorbidities that impact the plan of care;  []1-2 personal factors and/or comorbidities that impact the plan of care  []3 personal factors and/or comorbidities that impact the plan of care  [x] An examination of body systems using standardized tests and measures addressing any of the following: body structures and functions (impairments), activity limitations, and/or participation restrictions;:  [] a total of 1-2 or more elements   [] a total of 3 or more elements   [] a total of 4 or more elements   [x] A clinical presentation with:  [] stable and/or uncomplicated characteristics   [] evolving clinical presentation with changing characteristics  [] unstable and unpredictable characteristics;   [x] Clinical decision making of [] low, [] moderate, [] high complexity using standardized patient assessment instrument and/or measurable assessment of functional outcome. [] EVAL (LOW) 84444 (typically 20 minutes face-to-face)  [] EVAL (MOD) 55284 (typically 30 minutes face-to-face)  [] EVAL (HIGH) 88159 (typically 45 minutes face-to-face)  [] RE-EVAL     PLAN:  Frequency/Duration:  2 days per week for 8 Weeks:  Interventions:  [x]  Therapeutic exercise including: strength training, ROM, for Lower extremity and core   [x]  NMR activation and proprioception for LE, Glutes and Core   [x]  Manual therapy as indicated for LE, Hip and spine to include: Dry Needling/IASTM, STM, PROM, Gr I-IV mobilizations, manipulation. [x] Modalities as needed that may include: thermal agents, E-stim, Biofeedback, US, iontophoresis as indicated  [x] Patient education on joint protection, postural re-education, activity modification, progression of HEP. HEP instruction: Patient returned proper demonstration of HEP. Issued patient written program clearly stating sets/reps/sessions per day. Written program was scanned into media section of medical record. GOALS:  Patient stated goal: ***  [] Progressing: [] Met: [] Not Met: [] Adjusted    Therapist goals for Patient:   Short Term Goals: To be achieved in: 2 weeks  1. Independent in HEP and progression per patient tolerance, in order to prevent re-injury. [] Progressing: [] Met: [] Not Met: [] Adjusted  2. Patient will have a decrease in pain to facilitate improvement in movement, function, and ADLs as indicated by Functional Deficits. [] Progressing: [] Met: [] Not Met: [] Adjusted    Long Term Goals: To be achieved in: *** weeks  1. Disability index score of ***% or less for the LEFS to assist with reaching prior level of function. [] Progressing: [] Met: [] Not Met: [] Adjusted  2. Patient will demonstrate increased AROM to *** to allow for proper joint functioning as indicated by patients Functional Deficits. [] Progressing: [] Met: [] Not Met: [] Adjusted  3.  Patient will demonstrate an increase in Strength to good proximal hip strength and control, within 5lb HHD in LE to allow for proper functional mobility as indicated by patients Functional Deficits. [] Progressing: [] Met: [] Not Met: [] Adjusted  4. Patient will return to *** functional activities without increased symptoms or restriction.    [] Progressing: [] Met: [] Not Met: [] Adjusted  5. ***(patient specific functional goal)    [] Progressing: [] Met: [] Not Met: [] Adjusted     Electronically signed by:      Annita Mancuso  [address] [facility] #[id number]  [place] PT #[id number]

## 2021-10-07 NOTE — PROGRESS NOTES
Kamran Agarwal [facility]   Phone: [phone]    Fax: [phone]      Physical Therapy  Cancellation/No-show Note  Patient Name:  Raymond Reyes  :  1950   Date:  10/7/2021    Cancelled visits to date: 1  No-shows to date: 0    For today's appointment patient:  [x]  Cancelled  []  Rescheduled appointment  []  No-show     Reason given by patient:  [x]  Patient ill  []  Conflicting appointment  []  No transportation    []  Conflict with work  []  No reason given  []  Other:     Comments:      Phone call information:   []  Phone call made today to patient at _ time at number provided:      []  Patient answered, conversation as follows:    []  Patient did not answer, message left as follows:  []  Phone call not made today  [x]  Phone call not needed - pt contacted us to cancel and provided reason for cancellation. She will call back tomorrow and see if she can get a ride to get here on Monday.       Electronically signed by:      Arnie Aleman, [address] #[id number]  [phone] #[id number]

## 2021-10-07 NOTE — PROGRESS NOTES
Kamran Agarwal [facility]   Phone: [phone]    Fax: [phone]      Physical Therapy Treatment Note/ Progress Report:           Date:  10/7/2021    Patient Name:  Luly Castellon    :  1950  MRN: <[id number]>  Restrictions/Precautions:    Medical/Treatment Diagnosis Information:  · Diagnosis: s/p Left DILAN   DOS 10/4/2021  ·    Insurance/Certification information:  PT Insurance Information: Medicare  Physician Information:  Referring Practitioner: Dr Nneka Spencer  Has the plan of care been signed (Y/N):        []  Yes  [x]  No     Date of Patient follow up with Physician: ***      Is this a Progress Report:     []  Yes  [x]  No        If Yes:  Date Range for reporting period:  Beginning 10/7/2021  Ending 2021    Progress report will be due (10 Rx or 30 days whichever is less): 7731       Recertification will be due (POC Duration  / 90 days whichever is less): 2021         Visit # Insurance Allowable Auth Required   1 Medicare []  Yes []  No    Had 19 visits for her knee earlier this year    Functional Scale: ***    Date assessed:  10/7/2021     Latex Allergy:  [x]NO      []YES  Preferred Language for Healthcare:   [x]English       []other:    PQRS:  10/7/2021  Reviewed medication list with patient. No changes since last PT visit. MEDICARE CAP EXCEPTION DOCUMENTATION      I certify that this patient meets one of the below criteria necessary for becoming an exception to the Medicare cap on therapy services:    [x]  The patient has a condition identified by an ICD-9 code that has a direct and significant impact on the need for therapy. (Significantly impacts the rate of recovery.)                   []  The patient has a complexity identified by an ICD-9 code that has a direct and significant impact on the need for therapy.   (Significantly impacts the rate of recovery and is associated with a primary condition.)         []  The patient has associated variables that influence the amount of treatment to include:  Social support, self-efficacy/motivation, prognosis, time since onset/acuity. []  The patient has generalized musculoskeletal conditions or a condition affecting multiple sites that will have a direct impact on the rate of recovery. [x]  The patient had a prior episode of outpatient therapy during this calendar year for a different condition. []  The patient has a mental or cognitive disorder in addition to the condition being treated that will have a direct and significant impact on the rate of recovery.         Pain level:  ***/10     SUBJECTIVE:  3 days post op    See eval    OBJECTIVE:           RESTRICTIONS/PRECAUTIONS: ***    Exercises/Interventions:       Therapeutic Ex (76566) Sets/sec Reps Notes/CUES   BIKE      TREADMILL            STRETCHING      Hamstrings      Towel Pull Calf      Inclined Calf      Hip Flexors      Quads      ITB Rope      Adductors      Piriformis      Figure four-Push knee out            ROM      Passive      Active      Hang weights      Sheet pulls      Ankle pumps            ISOMETRICS      Quad sets      Ham sets      Glute sets      Hip ADD  Ball squeeze      Hip ABD  Loop resistance      Pelvic Tilts            STRENGTHENING      SLR Supine      SLR Abduction      SLR Adduction      SLR Prone      SLR+      Clams      Reverse Clams      Clams in abduction      Bridges            PRE machines      Knee extension      Knee flexion      Leg Press            CKC      Calf raises      Mini squats      Step ups      TKE                  Manual Intervention (79582)      Patellar mobs      Femoral Acetabular mobs      Longitudinal distraction      STM      Scar Massage      Foam Roll            NMR re-education (72811)   CUES NEEDED   Biodex balance      Tandem balance      SLB      Rebounder      BOSU                              Therapeutic Activity (38880)      Core training      Swiss ball activities      Monster walks                              Therapeutic Exercise and NMR EXR  [x] (78208) Provided verbal/tactile cueing for activities related to strengthening, flexibility, endurance, ROM for improvements in LE, proximal hip, and core control with self care, mobility, lifting, ambulation. [x] (64453) Provided verbal/tactile cueing for activities related to improving balance, coordination, kinesthetic sense, posture, motor skill, proprioception to assist with LE, proximal hip, and core control in self-care, mobility, lifting, ambulation and eccentric single leg control.      NMR and Therapeutic Activities:    [x] (42203 or 24035) Provided verbal/tactile cueing for activities related to improving balance, coordination, kinesthetic sense, posture, motor skill, proprioception and motor activation to allow for proper function of core, proximal hip and LE with self-care and ADLs and functional mobility.   [] (80299) Gait Re-education- Provided training and instruction to the patient for proper LE, core and proximal hip recruitment and positioning and eccentric body weight control with ambulation re-education including up and down stairs     Home Exercise Program:    [x] (65496) Reviewed/Progressed HEP activities related to strengthening, flexibility, endurance, ROM of core, proximal hip and LE for functional self-care, mobility, lifting and ambulation/stair navigation   [] (30418) Reviewed/Progressed HEP activities related to improving balance, coordination, kinesthetic sense, posture, motor skill, proprioception of core, proximal hip and LE for self-care, mobility, lifting, and ambulation/stair navigation      Manual Treatments:  PROM / STM / Oscillations-Mobs:  G-I, II, III, IV (PA's, Inf., Post.)  [] (64488) Provided manual therapy to mobilize LE, proximal hip and/or LS spine soft tissue/joints for the purpose of modulating pain, promoting relaxation, increasing ROM, reducing/eliminating soft tissue swelling/inflammation/restriction, improving soft tissue extensibility and allowing for proper ROM for normal function with self-care, mobility, lifting and ambulation. Modalities:     [] GAME READY (VASO)- for significant edema, swelling, pain control. Charges:  Timed Code Treatment Minutes: ***   Total Treatment Minutes: ***      [] EVAL (LOW) 59692 (typically 20 minutes face-to-face)  [] EVAL (MOD) 74429 (typically 30 minutes face-to-face)  [] EVAL (HIGH) 76319 (typically 45 minutes face-to-face)  [] RE-EVAL     [] TW(31641) x     [] IONTO  [] NMR (62653) x     [] VASO  [] Manual (43464) x     [] Other:  [] TA x      [] Mech Traction (67211)  [] ES(attended) (40985)      [] ES (un) (13982):         ASSESSMENT:  See eval      GOALS:   Patient stated goal: ***    [] Progressing: [] Met: [] Not Met: [] Adjusted    Therapist goals for Patient:   Short Term Goals: To be achieved in: 2 weeks  1. Independent in HEP and progression per patient tolerance, in order to prevent re-injury. [] Progressing: [] Met: [] Not Met: [] Adjusted   2. Patient will have a decrease in pain to facilitate improvement in movement, function, and ADLs as indicated by Functional Deficits. [] Progressing: [] Met: [] Not Met: [] Adjusted    Long Term Goals: To be achieved in: *** weeks  1. Disability index score of ***% or less for the LEFS to assist with reaching prior level of function. [] Progressing: [] Met: [] Not Met: [] Adjusted  2. Patient will demonstrate increased AROM to *** to allow for proper joint functioning as indicated by patients Functional Deficits. [] Progressing: [] Met: [] Not Met: [] Adjusted  3. Patient will demonstrate an increase in Strength to good proximal hip strength and control, within 5lb HHD in LE to allow for proper functional mobility as indicated by patients Functional Deficits. [] Progressing: [] Met: [] Not Met: [] Adjusted  4.  Patient will return to *** functional activities without increased symptoms or restriction. [] Progressing: [] Met: [] Not Met: [] Adjusted  5. ***(patient specific functional goal)    [] Progressing: [] Met: [] Not Met: [] Adjusted       Overall Progression Towards Functional goals/ Treatment Progress Update:  [] Patient is progressing as expected towards functional goals listed. [] Progression is slowed due to complexities/Impairments listed. [] Progression has been slowed due to co-morbidities. [x] Plan just implemented, too soon to assess goals progression <30days   [] Goals require adjustment due to lack of progress  [] Patient is not progressing as expected and requires additional follow up with physician  [] Other    Prognosis for POC: [x] Good [] Fair  [] Poor      Patient requires continued skilled intervention: [x] Yes  [] No    Treatment/Activity Tolerance:  [x] Patient able to complete treatment  [] Patient limited by fatigue  [] Patient limited by pain    [] Patient limited by other medical complications  [] Other:         PLAN: See eval  [] Continue per plan of care [] Alter current plan   [x] Plan of care initiated [] Hold pending MD visit [] Discharge      Electronically signed by:      Annita Gregorio  PT #[id number]  [place] PT #[id number]      Note: If patient does not return for scheduled/ recommended follow up visits, this note will serve as a discharge from care along with most recent update on progress.

## 2021-10-12 ENCOUNTER — OFFICE VISIT (OUTPATIENT)
Dept: PHYSICAL THERAPY | Age: 71
End: 2021-10-12
Payer: MEDICARE

## 2021-10-12 DIAGNOSIS — R29.898 WEAKNESS OF LEFT HIP: ICD-10-CM

## 2021-10-12 DIAGNOSIS — G89.18 ACUTE POSTOPERATIVE PAIN OF LEFT HIP: ICD-10-CM

## 2021-10-12 DIAGNOSIS — M25.652 DECREASED RANGE OF LEFT HIP MOVEMENT: ICD-10-CM

## 2021-10-12 DIAGNOSIS — M25.552 ACUTE POSTOPERATIVE PAIN OF LEFT HIP: ICD-10-CM

## 2021-10-12 DIAGNOSIS — M16.12 PRIMARY OSTEOARTHRITIS OF LEFT HIP: Primary | ICD-10-CM

## 2021-10-12 PROCEDURE — 1101F PT FALLS ASSESS-DOCD LE1/YR: CPT | Performed by: PHYSICAL THERAPIST

## 2021-10-12 PROCEDURE — G8539 DOC FUNCT AND CARE PLAN: HCPCS | Performed by: PHYSICAL THERAPIST

## 2021-10-12 PROCEDURE — G8427 DOCREV CUR MEDS BY ELIG CLIN: HCPCS | Performed by: PHYSICAL THERAPIST

## 2021-10-12 PROCEDURE — 97110 THERAPEUTIC EXERCISES: CPT | Performed by: PHYSICAL THERAPIST

## 2021-10-12 PROCEDURE — 97530 THERAPEUTIC ACTIVITIES: CPT | Performed by: PHYSICAL THERAPIST

## 2021-10-12 PROCEDURE — 97161 PT EVAL LOW COMPLEX 20 MIN: CPT | Performed by: PHYSICAL THERAPIST

## 2021-10-12 NOTE — PROGRESS NOTES
[facility]   Phone: [phone]    Fax: [phone]      Physical Therapy Treatment Note/ Progress Report:           Date:  10/12/2021    Patient Name:  Stefania De León    :  1950  MRN: <[id number]>  Restrictions/Precautions:    Medical/Treatment Diagnosis Information:  Diagnosis: S/P Left THR (Sx: 10/4/21) (M16.12)   Treatment Diagnosis:   Left Hip Pain (G89.18, M25.552), Decreased Left Hip ROM (M25.652), Decreased Strength (R29.898)                          Insurance/Certification information:  PT Insurance Information: Medicare  Physician Information:  Referring Practitioner: Dr. Mara Hurt  Has the plan of care been signed (Y/N):        []  Yes  [x]  No (POC sent 10/12/21)     Date of Patient follow up with Physician: Tomorrow      Is this a Progress Report:     [x]  Yes  []  No        If Yes:  Date Range for reporting period:  Beginning 10/12/21  Ending 10/12/21    Progress report will be due (10 Rx or 30 days whichever is less):      Recertification will be due (POC Duration  / 90 days whichever is less): 21        Visit # Insurance Allowable Auth Required   1 Medical necessity  (use KX modifier due to pt having prior PT this year) []  Yes [x]  No        Functional Scale:   Date assessed:  10/12/21    Functional Assessment Tool Used:  Hip Outcome Score (copy scanned into media)  Score: 50/72 = 69%      Latex Allergy:  [x]NO      []YES  Preferred Language for Healthcare:   [x]English       []other:      Pain level:  4-6/10       SUBJECTIVE:  See initial eval      OBJECTIVE: Limited objective measurements taken today due to recent surgery and pt being very nauseous    ROM PROM AROM Comments    Left Right Left Right    Flexion   Not     Extension   Tested     Abduction   Due to      Adduction   Sx     ER        IR                  Strength Left Right Comments   Hip flexors Not      Hip extension Tested     Hip abduction Due to     Hip adduction Sx     Hip ER Hip IR      Quads 4-/5     Hamstrings 4-/5         Flexibility Left Right Comments   Hamstrings Not     ITB (Obers test) Tested     Hip flexor(Sebastian test) Due to     gastroc Sx     Rectus femoris(Elys test)              Special  Test Left Right Comments   FABERS Not     Scour test Tested     Impingement test Due to     Trendelenburg test Sx               Reflexes/Sensation:    []Dermatomes/Myotomes intact    []Reflexes equal and normal bilaterally   [x]Other: Intact to light touch    Joint mobility: NA due to Sx   []Normal    []Hypo   []Hyper    Palpation: General tenderness left hip    Functional Mobility/Transfers: Difficulty getting up from a chair (pt has to use her arms to help push herself up) and getting in/out of bed, unable to stand or walk for long periods, difficulty ascending/descending stairs. Posture: Forward head, rounded shoulders    Bandages/Dressings/Incisions: Post-op bandage in place. Pt has a negative pressure wound therapy unit. She is wearing bilateral compression stockings.       Gait: (include devices/WB status) Pt is amb with wheeled walker, decreased step length bilaterally, decreased WBing left LE    Orthopedic Special Tests: (-) Libby's                           RESTRICTIONS/PRECAUTIONS:  S/P Left THR (Sx: 10/4/21); follow THR precautions        Exercises/Interventions:       Therapeutic Ex (75094) Sets/sec Reps Notes/CUES   BIKE      TREADMILL            STRETCHING      Hamstrings      Towel Pull Calf 30\" hold X 5    Inclined Calf      Hip Flexors      Quads      ITB Rope      Adductors      Piriformis      Figure four-Push knee out            ROM      Passive      Active      Hang weights      Sheet pulls      Ankle pumps            ISOMETRICS      Quad sets 10\" hold X 10    Ham sets      Glute sets 10\" hold X 10    Hip ADD  Ball squeeze 10\" hold  X 10    Hip ABD  Loop resistance      Pelvic Tilts            STRENGTHENING      SLR Supine      SLR Abduction      SLR [x] (27992) Reviewed/Progressed HEP activities related to strengthening, flexibility, endurance, ROM of core, proximal hip and LE for functional self-care, mobility, lifting and ambulation/stair navigation - Pt instructed in HEP through [address] [place] [address] (see below). Pt was also issued written handouts. (10/12/21)   [x] (32364) Reviewed/Progressed HEP activities related to improving balance, coordination, kinesthetic sense, posture, motor skill, proprioception of core, proximal hip and LE for self-care, mobility, lifting, and ambulation/stair navigation        Access Code: [email or web address]  URL: [email or web address]  Date: 10/12/2021  Prepared by: Ernie Enriquez    Exercises  Long Sitting Calf Stretch with Strap - 2 x daily - 7 x weekly - 1 sets - 5 reps - 30 seconds hold  Long Sitting Quad Set - 2 x daily - 7 x weekly - 1 sets - 10 reps - 10 seconds hold  Supine Gluteal Sets - 2 x daily - 7 x weekly - 1 sets - 10 reps - 10 seconds hold  Supine Straight Leg Hip Adduction and Quad Set with Ball - 2 x daily - 7 x weekly - 1 sets - 10 reps - 10 seconds hold  Supine Knee Extension Strengthening - 2 x daily - 7 x weekly - 3 sets - 10 reps  Seated Long Arc Quad - 2 x daily - 7 x weekly - 3 sets - 10 reps        Manual Treatments:  PROM / STM / Oscillations-Mobs:  G-I, II, III, IV (PA's, Inf., Post.)  [] (30370) Provided manual therapy to mobilize LE, proximal hip and/or LS spine soft tissue/joints for the purpose of modulating pain, promoting relaxation, increasing ROM, reducing/eliminating soft tissue swelling/inflammation/restriction, improving soft tissue extensibility and allowing for proper ROM for normal function with self-care, mobility, lifting and ambulation. Modalities:  Pt declined ice   [] GAME READY (VASO)- for significant edema, swelling, pain control.      Charges:  Timed Code Treatment Minutes: 25'   Total Treatment Minutes: 48'      [x] EVAL (LOW) 455 1011 (typically 20 minutes face-to-face)  [] EVAL (MOD) 23275 (typically 30 minutes face-to-face)  [] EVAL (HIGH) 93602 (typically 45 minutes face-to-face)  [] RE-EVAL     [x] LF(72391) x 1 (15')    [] IONTO  [] NMR (01599) x     [] VASO  [] Manual (38038) x     [] Other:  [x] TA x 1 (10')     [] Mech Traction (21363)  [] ES(attended) (58172)      [] ES (un) (23648):         ASSESSMENT:  See eval            MEDICARE CAP EXCEPTION DOCUMENTATION      I certify that this patient meets one of the below criteria necessary for becoming an exception to the Medicare cap on therapy services:    []  The patient has a condition identified by an ICD-10 code that has a direct and significant impact on the need for therapy. (Significantly impacts the rate of recovery.)                   []  The patient has a complexity identified by an ICD-10 code that has a direct and significant impact on the need for therapy. (Significantly impacts the rate of recovery and is associated with a primary condition.)         []  The patient has associated variables that influence the amount of treatment to include:  Social support, self-efficacy/motivation, prognosis, time since onset/acuity. []  The patient has generalized musculoskeletal conditions or a condition affecting multiple sites that will have a direct impact on the rate of recovery. [x]  The patient had a prior episode of outpatient therapy during this calendar year for a different condition. []  The patient has a mental or cognitive disorder in addition to the condition being treated that will have a direct and significant impact on the rate of recovery. GOALS:   Patient stated goal: \"Increase strength, be able to walk and not get tired\"   [] Progressing: [] Met: [] Not Met: [] Adjusted    Therapist goals for Patient:   Short Term Goals: To be achieved in: 2 weeks  1. Independent in HEP and progression per patient tolerance, in order to prevent re-injury. [] Progressing: [] Met: [] Not Met: [] Adjusted  2. Patient will have a decrease in left hip pain to 3/10 to facilitate improvement in movement, function, and ADLs as indicated by Functional Deficits. [] Progressing: [] Met: [] Not Met: [] Adjusted    Long Term Goals: To be achieved in: 6 weeks  1. Disability index score of 20% or less for the Hip Outcome Score to assist with reaching prior level of function. [] Progressing: [] Met: [] Not Met: [] Adjusted  2. Patient will demonstrate an increase in left hip AROM to WFL's to allow for proper joint functioning as indicated by patients Functional Deficits. [] Progressing: [] Met: [] Not Met: [] Adjusted  3. Patient will demonstrate an increase in left hip strength to 4+/5 to allow for proper functional mobility as indicated by patients Functional Deficits. [] Progressing: [] Met: [] Not Met: [] Adjusted  4. Patient will be able to walk community distances, stand at least 1 hour, sit at least 1 hour, and be able to ascend/descend stairs reciprocally without increased symptoms or restriction. (patient specific functional goal)   [] Progressing: [] Met: [] Not Met: [] Adjusted  5. Patient will have a decrease in left hip pain to 0-1/10 with daily act. [] Progressing: [] Met: [] Not Met: [] Adjusted           Overall Progression Towards Functional goals/ Treatment Progress Update:  [] Patient is progressing as expected towards functional goals listed. [] Progression is slowed due to complexities/Impairments listed. [] Progression has been slowed due to co-morbidities.   [x] Plan just implemented, too soon to assess goals progression <30days   [] Goals require adjustment due to lack of progress  [] Patient is not progressing as expected and requires additional follow up with physician  [] Other    Prognosis for POC: [x] Good [] Fair  [] Poor      Patient requires continued skilled intervention: [x] Yes  [] No    Treatment/Activity Tolerance:  [] Patient able to complete treatment  [] Patient limited by fatigue  [] Patient limited by pain    [] Patient limited by other medical complications  [x] Other: Pt is still very nauseas and weak         PLAN: See eval  [] Continue per plan of care [] Alter current plan   [x] Plan of care initiated [] Hold pending MD visit [] Discharge      Electronically signed by:  Mireya Correia PT     Physical Therapist [facility] [address] #[id number]  Physical Therapist [place] License #[id number]    Note: If patient does not return for scheduled/ recommended follow up visits, this note will serve as a discharge from care along with most recent update on progress.

## 2021-10-12 NOTE — PROGRESS NOTES
[facility]   Phone: [phone]    Fax: [phone]     Physical Therapy Certification    Dear Referring Practitioner: Dr. Vivek Forman,    We had the pleasure of evaluating the following patient for physical therapy services at [address] [place] [address]. A summary of our findings can be found in the initial assessment below. This includes our plan of care. If you have any questions or concerns regarding these findings, please do not hesitate to contact me at the office phone number checked above. Thank you for the referral.       Physician Signature:_______________________________Date:__________________  By signing above (or electronic signature), therapists plan is approved by physician      Patient: Raymond Reyes   : 1950   MRN: <[id number]>  Referring Physician: Referring Practitioner: Dr. Vivek Forman      Evaluation Date: 10/12/2021      Medical Diagnosis Information:  Diagnosis: S/P Left THR (Sx: 10/4/21) (M16.12)    Treatment Diagnosis: Left Hip Pain (G89.18, M25.552), Decreased Left Hip ROM (M25.652), Decreased Strength (R29.898)                                        Insurance information: PT Insurance Information: Medicare     Precautions/ Contra-indications: S/P Left THR (Sx: 10/4/21); follow THR precautions  Latex Allergy:  [x]NO      []YES    C-SSRS Triggered by Intake questionnaire (Past 2 wk assessment):   [x] No, Questionnaire did not trigger screening.   [] Yes, Patient intake triggered further evaluation      [] C-SSRS Screening completed  [] PCP notified via Plan of Care  [] Emergency services notified       Preferred Language for Healthcare:   [x]English       []other:      SUBJECTIVE: Patient stated complaint: Left hip pain and weakness following left THR on 10/4/21. Pt underwent right TKR on 21 and ~3 weeks after knee surgery she started having pain in her left hip.   Pain progressively worsened in her left hip and was limiting her ability to walk, perform daily act, and participate in therapy for her right knee. She saw Dr. Maribel Lombardi at [facility] and underwent left THR on 10/4/21. She had severe nausea, vomiting, and diarrhea after surgery. Vomiting and diarrhea stopped over the weekend but she is still very nauseous. She is trying to eat but has no appetite. She is drinking lots of water to stay hydrated. She is very tired and states all she wants to do is sleep. She also feels very weak. She took Hydrocodone for a few days after surgery then stopped due to decreasing pain levels. She tried taking anti-nausea medication but it didn't help. She is currently taking Aspirin 2x/day and Tylenol PM at night to help her sleep. She rates her hip pain 4-6/10 and c/o \"stinging pain\" from her hip down into her thigh. She reports no numbness or tingling in left LE. She is amb with a walker. Pt lives alone but her daughter is nearby and is helping as needed. Pt has a negative pressure wound therapy unit which will be removed tomorrow when she sees Dr. Maribel Lombardi. Pt goal:  \"Increase strength, be able to walk and not get tired\"     Relevant Medical History: Significant PMH (see chart), left RTC repair 2018, right THR 2018, left TKA 2019, right TKR Aug 2021      Functional Scale/Score:  Functional Assessment Tool Used:  Hip Outcome Score (copy scanned into media)  Score: 50/72 = 69%    Pain Scale: 4-6/10  Easing factors: Rest, ice  Provocative factors: Standing, walking, stairs, prolonged sitting    Type: [x]Constant   []Intermittent  []Radiating []Localized []other:     Numbness/Tingling: Pt reports no numbness or tingling in left LE    Occupation/School: Retired    Living Status: Pt lives alone in a house with 6 steps to enter with bilateral HR's. Bedroom and bathroom are on the first floor. Laundry is in the basement. Her daughter lives nearby and is helping when she can.        Prior Level of Function: Independent with ADLs and IADLs, pt likes to be active and was going to the gym 3x/week. Pt has been seen in therapy several times at this facility but never for her left hip. OBJECTIVE: Limited objective measurements taken today due to recent surgery and pt being very nauseous    ROM PROM AROM Comments    Left Right Left Right    Flexion   Not     Extension   Tested     Abduction   Due to      Adduction   Sx     ER        IR                  Strength Left Right Comments   Hip flexors Not      Hip extension Tested     Hip abduction Due to     Hip adduction Sx     Hip ER      Hip IR      Quads 4-/5     Hamstrings 4-/5         Flexibility Left Right Comments   Hamstrings Not     ITB (Obers test) Tested     Hip flexor(Sebastian test) Due to     gastroc Sx     Rectus femoris(Elys test)              Special  Test Left Right Comments   FABERS Not     Scour test Tested     Impingement test Due to     Trendelenburg test Sx               Reflexes/Sensation:    []Dermatomes/Myotomes intact    []Reflexes equal and normal bilaterally   [x]Other: Intact to light touch    Joint mobility: NA due to Sx   []Normal    []Hypo   []Hyper    Palpation: General tenderness left hip    Functional Mobility/Transfers: Difficulty getting up from a chair (pt has to use her arms to help push herself up) and getting in/out of bed, unable to stand or walk for long periods, difficulty ascending/descending stairs. Posture: Forward head, rounded shoulders    Bandages/Dressings/Incisions: Post-op bandage in place. Pt has a negative pressure wound therapy unit. She is wearing bilateral compression stockings. Gait: (include devices/WB status) Pt is amb with wheeled walker, decreased step length bilaterally, decreased WBing left LE    Orthopedic Special Tests: (-) Libby's                       [x] Patient history, allergies, meds reviewed. Medical chart reviewed. See intake form.      Review Of Systems (ROS):  [x]Performed Review of systems (Integumentary, CardioPulmonary, Neurological) by intake and observation. Intake form has been scanned into medical record. Patient has been instructed to contact their primary care physician regarding ROS issues if not already being addressed at this time. Co-morbidities/Complexities (which will affect course of rehabilitation):   []None           Arthritic conditions   []Rheumatoid arthritis (M05.9)  [x]Osteoarthritis (M19.91)   Cardiovascular conditions   [x]Hypertension (I10)  []Hyperlipidemia (E78.5)  []Angina pectoris (I20)  []Atherosclerosis (I70)  []CVA Musculoskeletal conditions   []Disc pathology   []Congenital spine pathologies   []Prior surgical intervention  []Osteoporosis (M81.8)  []Osteopenia (M85.8)   Endocrine conditions   []Hypothyroid (E03.9)  []Hyperthyroid Gastrointestinal conditions   []Constipation (Y41.57)   Metabolic conditions   []Morbid obesity (E66.01)  []Diabetes type 1(E10.65) or 2 (E11.65)   []Neuropathy (G60.9)     Pulmonary conditions   []Asthma (J45)  []Coughing   []COPD (J44.9)   Psychological Disorders  []Anxiety (F41.9)  []Depression (F32.9)   []Other:   [x]Other:    Stroke/TIA  CV/spine issues        Barriers to/and or personal factors that will affect rehab potential:              []Age  []Sex    []Smoker              [x]Motivation/Lack of Motivation - pt is very motivated to return to her PLOF                        [x]Co-Morbidities - see above              []Cognitive Function, education/learning barriers              []Environmental, home barriers              []profession/work barriers  []past PT/medical experience  []other:  Justification:     Falls Risk Assessment (30 days):   [x] Falls Risk assessed and no intervention required.   [] Falls Risk assessed and Patient requires intervention due to being higher risk   TUG score (>12s at risk):     [] Falls education provided, including         ASSESSMENT: Pt presents to therapy s/p left THR on 10/4/21 with decreased left hip ROM, decreased strength, and increased pain limiting her ability to walk community distances, stand or sit for long periods, ascend/descend stairs, and drive. Pt would benefit from skilled PT services to address these deficits and increase function. Good rehab potential.    Pt had vomiting and diarrhea after surgery which have subsided but she is still very nauseous and very weak. She will follow-up with Dr. Mitchell Hall tomorrow. Patient Age: 73years old  Patient Height: 5' 3\"  Patient Weight: 225 lbs  Patient BMI: 39.9      Pain  [x]  Pain diagram was completed, pain was present and a follow up plan to address the pain is in the plan of care. ()   []  Pain diagram was completed and there was no pain present and therefore no follow up plan to address pain in the plan of care. ()   []  Pain diagram was not completed and the reason for not completing the pain diagram is in the medical chart ()  []  Patient refused to participate   []  Severe mental or physical capacity, patient is in urgent emergent situation and to complete the questionnaire would jeopardize the patient's health status        Functional Questionnaire  [x]  Patient completed the functional outcome questionnaire and deficiencies were addressed in the plan of care. ()   []  Patient completed the outcome questionnaire and there were no functional deficits identified and therefore no plan of care is required. ()   []  Patient did not complete the functional outcome questionnaire and the reason why is documented in the medical chart. ()  []  Patient refused to participate   []  Patient unable to complete the questionnaire   []   A functional outcome assessment has been reported on within the last 30 days        Falls  [x]  The patient has had no falls or 1 fall without injury in the past year.  (1101F)   []  There is no documentation of fall in the medical chart (1101F,8P)     [] The patient has had 2 or more falls or one fall with injury in the past year that is documented in the medical chart. (1100F)  []  A falls risk assessment was completed and documented in the medical chart. (7090D)   []  Falls were addressed in the plan of care. (3480Y)   []  A falls risk assessment was not completed and documented in the medical chart (5916R,0V)   []   Falls were not addressed in the plan of care and reason was documented in the plan of care. (4304I 1P)        Medication     [x] A list of current medications (including prescription, over the counter, herbal supplements, vitamin supplements, mineral supplements, dietary supplements) was reviewed with the patient and documented in the medical chart.  ()        Functional Impairments:     []Noted lumbar/proximal hip/LE hypomobility   [x]Decreased LE functional ROM   [x]Decreased core/proximal hip strength and neuromuscular control   [x]Decreased LE functional strength   [x]Reduced balance/proprioceptive control   []other:      Functional Activity Limitations (from functional questionnaire and intake)   [x]Reduced ability to tolerate prolonged functional positions   [x]Reduced ability or difficulty with changes of positions or transfers between positions   [x]Reduced ability to maintain good posture and demonstrate good body mechanics with sitting, bending, and lifting   [x]Reduced ability to sleep   [x] Reduced ability or tolerance with driving and/or computer work   [x]Reduced ability to perform lifting, carrying tasks   [x]Reduced ability to squat   [x]Reduced ability to forward bend   [x]Reduced ability to ambulate prolonged functional periods/distances/surfaces   [x]Reduced ability to ascend/descend stairs   []Reduced ability to run, hop or jump - NA   []other:     Participation Restrictions   [x]Reduced participation in self care activities   [x]Reduced participation in home management activities   []Reduced participation in work activities   [x]Reduced participation in social activities. []Reduced participation in sport activities. Classification :    [x]Signs/symptoms consistent with post-surgical status including decreased ROM, strength and function. []Signs/symptoms consistent with joint sprain/strain   []Signs/symptoms consistent with patella-femoral syndrome   []Signs/symptoms consistent with knee OA/hip OA   []Signs/symptoms consistent with internal derangement of knee/Hip   []Signs/symptoms consistent with functional hip weakness/NMR control      []Signs/symptoms consistent with tendinitis/tendinosis    []signs/symptoms consistent with pathology which may benefit from Dry needling      []other:      Prognosis/Rehab Potential:      []Excellent   [x]Good    []Fair   []Poor    Tolerance of evaluation/treatment:    []Excellent   []Good    [x]Fair - due to pt still feeling nauseous and weak   []Poor    Physical Therapy Evaluation Complexity Justification  [x] A history of present problem with:  [] no personal factors and/or comorbidities that impact the plan of care;  []1-2 personal factors and/or comorbidities that impact the plan of care  [x]3 personal factors and/or comorbidities that impact the plan of care  [x] An examination of body systems using standardized tests and measures addressing any of the following: body structures and functions (impairments), activity limitations, and/or participation restrictions;:  [] a total of 1-2 or more elements   [] a total of 3 or more elements   [x] a total of 4 or more elements   [x] A clinical presentation with:  [x] stable and/or uncomplicated characteristics   [] evolving clinical presentation with changing characteristics  [] unstable and unpredictable characteristics;   [x] Clinical decision making of [x] low, [] moderate, [] high complexity using standardized patient assessment instrument and/or measurable assessment of functional outcome.     [x] EVAL (LOW) 00675 (typically 20 minutes face-to-face)  [] EVAL (MOD) 36970 (typically 30 minutes face-to-face)  [] EVAL (HIGH) 38504 (typically 45 minutes face-to-face)  [] RE-EVAL     PLAN:  Frequency/Duration:  2-3 days per week for 6 Weeks:  Interventions:  [x]  Therapeutic exercise including: strength training, ROM, for Lower extremity and core   [x]  NMR activation and proprioception for LE, Glutes and Core   [x]  Manual therapy as indicated for LE, Hip and spine to include: Dry Needling/IASTM, STM, PROM, Gr I-IV mobilizations, manipulation. [x] Modalities as needed that may include: thermal agents, E-stim, Biofeedback, US, iontophoresis as indicated  [x] Patient education on joint protection, postural re-education, activity modification, progression of HEP. HEP instruction: Instructed patient in a HEP through [address] [place] [address]. Patient demonstrated exercises correctly. Handout with pictures and # of reps/sets was given to pt and a copy was scanned into media. Exercises are listed on flowsheet. Patient Education:  Educated patient on Physical Therapy process. Also educated on diagnosis, related anatomy, pathology and prognosis. Reviewed patient goals/expectations and answered all questions. Patient displays understanding and in agreement with plan of care. Discussed importance of HEP and icing, activity modification, and role of PT    GOALS:  Patient stated goal: \"Increase strength, be able to walk and not get tired\"   [] Progressing: [] Met: [] Not Met: [] Adjusted    Therapist goals for Patient:   Short Term Goals: To be achieved in: 2 weeks  1. Independent in HEP and progression per patient tolerance, in order to prevent re-injury. [] Progressing: [] Met: [] Not Met: [] Adjusted  2. Patient will have a decrease in left hip pain to 3/10 to facilitate improvement in movement, function, and ADLs as indicated by Functional Deficits. [] Progressing: [] Met: [] Not Met: [] Adjusted    Long Term Goals: To be achieved in: 6 weeks  1.  Disability index score of 20% or less for the Hip Outcome Score to assist with reaching prior level of function. [] Progressing: [] Met: [] Not Met: [] Adjusted  2. Patient will demonstrate an increase in left hip AROM to WFL's to allow for proper joint functioning as indicated by patients Functional Deficits. [] Progressing: [] Met: [] Not Met: [] Adjusted  3. Patient will demonstrate an increase in left hip strength to 4+/5 to allow for proper functional mobility as indicated by patients Functional Deficits. [] Progressing: [] Met: [] Not Met: [] Adjusted  4. Patient will be able to walk community distances, stand at least 1 hour, sit at least 1 hour, and be able to ascend/descend stairs reciprocally without increased symptoms or restriction. (patient specific functional goal)   [] Progressing: [] Met: [] Not Met: [] Adjusted  5. Patient will have a decrease in left hip pain to 0-1/10 with daily act.    [] Progressing: [] Met: [] Not Met: [] Adjusted     Electronically signed by:  Efren Donovan PT       Physical Therapist Tanya Orellana [address] #[id number]  Physical Therapist [place] License #[id number]

## 2021-10-15 ENCOUNTER — TREATMENT (OUTPATIENT)
Dept: PHYSICAL THERAPY | Age: 71
End: 2021-10-15
Payer: MEDICARE

## 2021-10-15 DIAGNOSIS — M25.652 DECREASED RANGE OF LEFT HIP MOVEMENT: ICD-10-CM

## 2021-10-15 DIAGNOSIS — M25.552 ACUTE POSTOPERATIVE PAIN OF LEFT HIP: ICD-10-CM

## 2021-10-15 DIAGNOSIS — G89.18 ACUTE POSTOPERATIVE PAIN OF LEFT HIP: ICD-10-CM

## 2021-10-15 DIAGNOSIS — R26.2 DIFFICULTY WALKING: ICD-10-CM

## 2021-10-15 DIAGNOSIS — R29.898 WEAKNESS OF LEFT HIP: ICD-10-CM

## 2021-10-15 DIAGNOSIS — M16.12 PRIMARY OSTEOARTHRITIS OF LEFT HIP: Primary | ICD-10-CM

## 2021-10-15 PROCEDURE — 97140 MANUAL THERAPY 1/> REGIONS: CPT | Performed by: PHYSICAL THERAPIST

## 2021-10-15 PROCEDURE — G8427 DOCREV CUR MEDS BY ELIG CLIN: HCPCS | Performed by: PHYSICAL THERAPIST

## 2021-10-15 PROCEDURE — 97530 THERAPEUTIC ACTIVITIES: CPT | Performed by: PHYSICAL THERAPIST

## 2021-10-15 PROCEDURE — 97110 THERAPEUTIC EXERCISES: CPT | Performed by: PHYSICAL THERAPIST

## 2021-10-15 NOTE — PROGRESS NOTES
Kamran Agarwal [facility]   Phone: [phone]    Fax: [phone]      Physical Therapy Treatment Note/ Progress Report:           Date:  10/15/2021    Patient Name:  Morris Sebastian    :  1950  MRN: <[id number]>  Restrictions/Precautions:    Medical/Treatment Diagnosis Information:  Diagnosis: S/P Left THR (Sx: 10/4/21) (M16.12)   Treatment Diagnosis:   Left Hip Pain (G89.18, M25.552), Decreased Left Hip ROM (M25.652), Decreased Strength (R29.898)                          Insurance/Certification information:  PT Insurance Information: Medicare  Physician Information:  Referring Practitioner: Dr. Sathish Paris  Has the plan of care been signed (Y/N):        []  Yes  [x]  No (POC sent 10/12/21)     Date of Patient follow up with Physician:  10/21/2021      Is this a Progress Report:     []  Yes  []  No        If Yes:  Date Range for reporting period:  Beginning 10/12/21  Ending 10/12/21    Progress report will be due (10 Rx or 30 days whichever is less):  44    Recertification will be due (POC Duration  / 90 days whichever is less): 21        Visit # Insurance Allowable Auth Required   2 Medical necessity  (use KX modifier due to pt having prior PT this year) []  Yes [x]  No        Functional Scale:   Date assessed:  10/12/21    Functional Assessment Tool Used:  Hip Outcome Score (copy scanned into media)  Score: 50/72 = 69%      Latex Allergy:  [x]NO      []YES  Preferred Language for Healthcare:   [x]English       []other:      Pain level:  4-6/10     PQRS: 10/15/2021  Reviewed medication list with patient. No changes since last PT visit. MEDICARE CAP EXCEPTION DOCUMENTATION      I certify that this patient meets one of the below criteria necessary for becoming an exception to the Medicare cap on therapy services:    []  The patient has a condition identified by an ICD-10 code that has a direct and significant impact on the need for therapy.  (Significantly impacts the rate of recovery.)                   []  The patient has a complexity identified by an ICD-10 code that has a direct and significant impact on the need for therapy. (Significantly impacts the rate of recovery and is associated with a primary condition.)         []  The patient has associated variables that influence the amount of treatment to include:  Social support, self-efficacy/motivation, prognosis, time since onset/acuity. []  The patient has generalized musculoskeletal conditions or a condition affecting multiple sites that will have a direct impact on the rate of recovery. [x]  The patient had a prior episode of outpatient therapy during this calendar year for a different condition. []  The patient has a mental or cognitive disorder in addition to the condition being treated that will have a direct and significant impact on the rate of recovery. SUBJECTIVE:  1+ weeks post op  Patient reports she did get in to see her surgeon earlier this week. They removed the wound vac and changed her post op dressing to a water proof one. She is to keep that on until she goes back there next week to get her staples out. She also reports less nausea and she is able to eat a little better. She has continued to drink plenty of water. She reports that she has been doing her HEP 2x/day as directed.              OBJECTIVE: Limited objective measurements taken today due to recent surgery and pt being very nauseous    ROM PROM AROM Comments    Left Right Left Right    Flexion   Not     Extension   Tested     Abduction   Due to      Adduction   Sx     ER        IR                  Strength Left Right Comments   Hip flexors Not      Hip extension Tested     Hip abduction Due to     Hip adduction Sx     Hip ER      Hip IR      Quads 4-/5     Hamstrings 4-/5         Flexibility Left Right Comments   Hamstrings Not     ITB (Obers test) Tested     Hip flexor(Sebastian test) Due to     gastroc Sx PRE machines      Knee extension      Knee flexion      Leg Press            CKC      Calf raises      Mini squats      Step ups      TKE                  Manual Intervention (57417)      Patellar mobs      PROM x8'  Gentle left hip manual stretch within post op guidelines/circumduction   Longitudinal distraction      STM      Scar Massage      Foam Roll            NMR re-education (91524)   CUES NEEDED   Biodex balance      Tandem balance      SLB      Rebounder      BOSU                              Therapeutic Activity (26631)      Core training      Swiss ball activities      Monster walks      Standing at table side/side weight shifts  x5'    Patient Education: Dx, prognosis, pt goals/expectations, rehab timeline (10/12/21)  X 8'              TUG TESTED on 10/15/2021    Timed Get Up and Go Test  Measures mobility in people who are able to walk on their own (assistive device permitted)      Instructions: The person may wear their usual footwear and can use any assistive device they normally use. 1. Have the person sit in the chair with their back to the chair and their arms resting on the back rests  2. Ask the person to stand up from a standard chair and walk a distance of 10 ft. (3 m). 3. Have the person turn around, walk back to the chair and sit down again. Timing begins when the person starts to rise from the chair and ends when he or she returns to the chair and sits down. Time to Complete: 23 seconds      Predictive Results:    Seconds Rating    <10  Freely mobile    <20  Mostly independent    20-29  Variable mobility    >20  Impaired mobility       Source: Jorge L Israel S. The timed 'Up and Go' Test: a Test of Basic Functional Mobility for Frail Elderly Persons. Journal of [facility]. 1991;39:142-148.         G-Code Crosswalk:  TUG time (velma) Disability Index CMS Modifier   12 or faster 0% []CH   13-14 1-19% []CI   15-16 20-39% []CJ   17-18 40-59% []CK 19-20  60-79% []CL   21-22  80-99% []CM   23 or slower 100% []CN       Therapeutic Exercise and NMR EXR  [x] (81667) Provided verbal/tactile cueing for activities related to strengthening, flexibility, endurance, ROM for improvements in LE, proximal hip, and core control with self care, mobility, lifting, ambulation. [x] (81974) Provided verbal/tactile cueing for activities related to improving balance, coordination, kinesthetic sense, posture, motor skill, proprioception to assist with LE, proximal hip, and core control in self-care, mobility, lifting, ambulation and eccentric single leg control. NMR and Therapeutic Activities:    [x] (92920 or 46430) Provided verbal/tactile cueing for activities related to improving balance, coordination, kinesthetic sense, posture, motor skill, proprioception and motor activation to allow for proper function of core, proximal hip and LE with self-care and ADLs and functional mobility.   [] (86758) Gait Re-education- Provided training and instruction to the patient for proper LE, core and proximal hip recruitment and positioning and eccentric body weight control with ambulation re-education including up and down stairs     Home Exercise Program:    [x] (27234) Reviewed/Progressed HEP activities related to strengthening, flexibility, endurance, ROM of core, proximal hip and LE for functional self-care, mobility, lifting and ambulation/stair navigation - Pt instructed in HEP through [address] [place] [address] (see below). Pt was also issued written handouts. (10/12/21)   [x] (69250) Reviewed/Progressed HEP activities related to improving balance, coordination, kinesthetic sense, posture, motor skill, proprioception of core, proximal hip and LE for self-care, mobility, lifting, and ambulation/stair navigation        Access Code: [email or web address]  URL: [email or web address]  Date: 10/12/2021  Prepared by: Madelin Gonzalez    Exercises  Long Sitting Calf Stretch with Strap - 2 x daily - 7 x weekly a baseline. GOALS:   Patient stated goal: \"Increase strength, be able to walk and not get tired\"   [] Progressing: [] Met: [] Not Met: [] Adjusted    Therapist goals for Patient:   Short Term Goals: To be achieved in: 2 weeks  1. Independent in HEP and progression per patient tolerance, in order to prevent re-injury. [] Progressing: [] Met: [] Not Met: [] Adjusted  2. Patient will have a decrease in left hip pain to 3/10 to facilitate improvement in movement, function, and ADLs as indicated by Functional Deficits. [] Progressing: [] Met: [] Not Met: [] Adjusted    Long Term Goals: To be achieved in: 6 weeks  1. Disability index score of 20% or less for the Hip Outcome Score to assist with reaching prior level of function. [] Progressing: [] Met: [] Not Met: [] Adjusted  2. Patient will demonstrate an increase in left hip AROM to WFL's to allow for proper joint functioning as indicated by patients Functional Deficits. [] Progressing: [] Met: [] Not Met: [] Adjusted  3. Patient will demonstrate an increase in left hip strength to 4+/5 to allow for proper functional mobility as indicated by patients Functional Deficits. [] Progressing: [] Met: [] Not Met: [] Adjusted  4. Patient will be able to walk community distances, stand at least 1 hour, sit at least 1 hour, and be able to ascend/descend stairs reciprocally without increased symptoms or restriction. (patient specific functional goal)   [] Progressing: [] Met: [] Not Met: [] Adjusted  5. Patient will have a decrease in left hip pain to 0-1/10 with daily act. [] Progressing: [] Met: [] Not Met: [] Adjusted           Overall Progression Towards Functional goals/ Treatment Progress Update:  [] Patient is progressing as expected towards functional goals listed. [] Progression is slowed due to complexities/Impairments listed. [] Progression has been slowed due to co-morbidities.   [x] Plan just implemented, too soon to assess goals progression <30days   [] Goals require adjustment due to lack of progress  [] Patient is not progressing as expected and requires additional follow up with physician  [] Other    Prognosis for POC: [x] Good [] Fair  [] Poor      Patient requires continued skilled intervention: [x] Yes  [] No    Treatment/Activity Tolerance:  [x] Patient able to complete treatment  [] Patient limited by fatigue  [x] Patient limited by pain    [] Patient limited by other medical complications  [] Other:         PLAN: See eval.   Cont 2x/week  [] Continue per plan of care [] Alter current plan   [x] Plan of care initiated [] Hold pending MD visit [] Discharge      Electronically signed by:      Annita Jerez  PT #[id number]  [place] PT #[id number]      Note: If patient does not return for scheduled/ recommended follow up visits, this note will serve as a discharge from care along with most recent update on progress.

## 2021-10-19 ENCOUNTER — TREATMENT (OUTPATIENT)
Dept: PHYSICAL THERAPY | Age: 71
End: 2021-10-19
Payer: MEDICARE

## 2021-10-19 DIAGNOSIS — R29.898 WEAKNESS OF LEFT HIP: ICD-10-CM

## 2021-10-19 DIAGNOSIS — G89.18 ACUTE POSTOPERATIVE PAIN OF LEFT HIP: ICD-10-CM

## 2021-10-19 DIAGNOSIS — M25.652 DECREASED RANGE OF LEFT HIP MOVEMENT: ICD-10-CM

## 2021-10-19 DIAGNOSIS — R26.2 DIFFICULTY WALKING: ICD-10-CM

## 2021-10-19 DIAGNOSIS — M16.12 PRIMARY OSTEOARTHRITIS OF LEFT HIP: Primary | ICD-10-CM

## 2021-10-19 DIAGNOSIS — M25.552 ACUTE POSTOPERATIVE PAIN OF LEFT HIP: ICD-10-CM

## 2021-10-19 PROCEDURE — 97530 THERAPEUTIC ACTIVITIES: CPT | Performed by: PHYSICAL THERAPIST

## 2021-10-19 PROCEDURE — 97110 THERAPEUTIC EXERCISES: CPT | Performed by: PHYSICAL THERAPIST

## 2021-10-19 PROCEDURE — G8427 DOCREV CUR MEDS BY ELIG CLIN: HCPCS | Performed by: PHYSICAL THERAPIST

## 2021-10-19 PROCEDURE — 97140 MANUAL THERAPY 1/> REGIONS: CPT | Performed by: PHYSICAL THERAPIST

## 2021-10-19 NOTE — PROGRESS NOTES
[facility]   Phone: [phone]    Fax: [phone]      Physical Therapy Treatment Note/ Progress Report:           Date:  10/19/2021    Patient Name:  Radha Sin    :  1950  MRN: <[id number]>  Restrictions/Precautions:    Medical/Treatment Diagnosis Information:  Diagnosis: S/P Left THR (Sx: 10/4/21) (M16.12)   Treatment Diagnosis:   Left Hip Pain (G89.18, M25.552), Decreased Left Hip ROM (M25.652), Decreased Strength (R29.898)                          Insurance/Certification information:  PT Insurance Information: Medicare  Physician Information:  Referring Practitioner: Dr. Rashid Eth  Has the plan of care been signed (Y/N):        []  Yes  [x]  No (POC sent 10/12/21)     Date of Patient follow up with Physician:  10/21/2021      Is this a Progress Report:     []  Yes  []  No        If Yes:  Date Range for reporting period:  Beginning 10/12/21  Ending 10/12/21    Progress report will be due (10 Rx or 30 days whichever is less):      Recertification will be due (POC Duration  / 90 days whichever is less): 21        Visit # Insurance Allowable Auth Required   3 Medical necessity  (use KX modifier due to pt having prior PT this year) []  Yes [x]  No        Functional Scale:   Date assessed:  10/12/21    Functional Assessment Tool Used:  Hip Outcome Score (copy scanned into media)  Score: 50/72 = 69%      Latex Allergy:  [x]NO      []YES  Preferred Language for Healthcare:   [x]English       []other:      Pain level:  4-6/10     PQRS: 10/19/2021  Reviewed medication list with patient. No changes since last PT visit. MEDICARE CAP EXCEPTION DOCUMENTATION      I certify that this patient meets one of the below criteria necessary for becoming an exception to the Medicare cap on therapy services:    []  The patient has a condition identified by an ICD-10 code that has a direct and significant impact on the need for therapy.  (Significantly impacts the rate of recovery.)                   []  The patient has a complexity identified by an ICD-10 code that has a direct and significant impact on the need for therapy. (Significantly impacts the rate of recovery and is associated with a primary condition.)         []  The patient has associated variables that influence the amount of treatment to include:  Social support, self-efficacy/motivation, prognosis, time since onset/acuity. []  The patient has generalized musculoskeletal conditions or a condition affecting multiple sites that will have a direct impact on the rate of recovery. [x]  The patient had a prior episode of outpatient therapy during this calendar year for a different condition. []  The patient has a mental or cognitive disorder in addition to the condition being treated that will have a direct and significant impact on the rate of recovery. SUBJECTIVE:  2+ weeks post op  Patient reports that her left foot has been bothering her some first thing in the am.   But she has been walking in her house without shoes on. She also complains of her lower back bothering her since she stood for about 20 minutes while at her [email or web address] union yesterday.              OBJECTIVE: Limited objective measurements taken today due to recent surgery and pt being very nauseous    ROM PROM AROM Comments    Left Right Left Right    Flexion   Not     Extension   Tested     Abduction   Due to      Adduction   Sx     ER        IR                  Strength Left Right Comments   Hip flexors Not      Hip extension Tested     Hip abduction Due to     Hip adduction Sx     Hip ER      Hip IR      Quads 4-/5     Hamstrings 4-/5         Flexibility Left Right Comments   Hamstrings Not     ITB (Obers test) Tested     Hip flexor(Sebastian test) Due to     gastroc Sx     Rectus femoris(Elys test)              Special  Test Left Right Comments   FABERS Not     Scour test Tested     Impingement test Due to Trendelenburg test Sx               Reflexes/Sensation:    []Dermatomes/Myotomes intact    []Reflexes equal and normal bilaterally   [x]Other: Intact to light touch    Joint mobility: NA due to Sx   []Normal    []Hypo   []Hyper    Palpation: General tenderness left hip    Functional Mobility/Transfers: Difficulty getting up from a chair (pt has to use her arms to help push herself up) and getting in/out of bed, unable to stand or walk for long periods, difficulty ascending/descending stairs. Posture: Forward head, rounded shoulders    Bandages/Dressings/Incisions: Post-op bandage in place. Pt has a negative pressure wound therapy unit. She is wearing bilateral compression stockings.       Gait: (include devices/WB status) Pt is amb with wheeled walker, decreased step length bilaterally, decreased WBing left LE    Orthopedic Special Tests: (-) Libby's                           RESTRICTIONS/PRECAUTIONS:  S/P Left THR (Sx: 10/4/21); follow THR precautions        Exercises/Interventions:       Therapeutic Ex (88011) Sets/sec Reps Notes/CUES   BIKE      TREADMILL            STRETCHING      Hamstrings      Towel Pull Calf 30\" hold X 5    Inclined Calf      Hip Flexors      Quads      ITB Rope      Adductors      Piriformis      Figure four-Push knee out            ROM      Passive      Active      Hang weights      Sheet pulls      Ankle pumps            ISOMETRICS      Quad sets 10\" hold X 10 seated   Ham sets      Glute sets 10\" hold X 10 supine   Hip ADD  Ball squeeze 10\" hold  X 10 supine   Hip ABD  Loop resistance      Pelvic Tilts            STRENGTHENING      SLR Supine      SLR Abduction      SLR Adduction      SLR Prone      SLR+      Clams      Reverse Clams      Clams in abduction      Bridges            SAQ 3 sets X 10 Supine bilat   LAQ 3 sets X 10 Seated bilat         PRE machines      Knee extension      Knee flexion      Leg Press            CKC      Calf raises      Mini squats      Step ups TKE                  Manual Intervention (99179)      Patellar mobs      PROM x8'  Gentle left hip manual stretch within post op guidelines/circumduction   Longitudinal distraction      STM      Scar Massage      Foam Roll            NMR re-education (73646)   CUES NEEDED   Biodex balance x6'  WS side/side and diagonals static 2' each   Tandem balance      SLB      Rebounder      BOSU      Standing at table x3'  Reaching for cups/trunk rotation and weight shifts                     Therapeutic Activity (21089)      Core training      Swiss ball activities      Monster walks         Patient Education: Dx, prognosis, pt goals/expectations, rehab timeline (10/12/21)  X 8'              TUG TESTED on 10/15/2021    Timed Get Up and Go Test  Measures mobility in people who are able to walk on their own (assistive device permitted)      Instructions: The person may wear their usual footwear and can use any assistive device they normally use. 1. Have the person sit in the chair with their back to the chair and their arms resting on the back rests  2. Ask the person to stand up from a standard chair and walk a distance of 10 ft. (3 m). 3. Have the person turn around, walk back to the chair and sit down again. Timing begins when the person starts to rise from the chair and ends when he or she returns to the chair and sits down. Time to Complete: 23 seconds      Predictive Results:    Seconds Rating    <10  Freely mobile    <20  Mostly independent    20-29  Variable mobility    >20  Impaired mobility       Source: Ervin Israel S. The timed 'Up and Go' Test: a Test of Basic Functional Mobility for Frail Elderly Persons. Journal of [facility]. 1991;39:142-148.         G-Code Crosswalk:  TUG time (velma) Disability Index CMS Modifier   12 or faster 0% []CH   13-14 1-19% []CI   15-16 20-39% []CJ   17-18 40-59% []CK   19-20  60-79% []CL   21-22  80-99% []CM   23 or slower 100% []CN daily - 7 x weekly - 1 sets - 10 reps - 10 seconds hold  Supine Gluteal Sets - 2 x daily - 7 x weekly - 1 sets - 10 reps - 10 seconds hold  Supine Straight Leg Hip Adduction and Quad Set with Ball - 2 x daily - 7 x weekly - 1 sets - 10 reps - 10 seconds hold  Supine Knee Extension Strengthening - 2 x daily - 7 x weekly - 3 sets - 10 reps  Seated Long Arc Quad - 2 x daily - 7 x weekly - 3 sets - 10 reps        Manual Treatments:  PROM / STM / Oscillations-Mobs:  G-I, II, III, IV (PA's, Inf., Post.)  [] (78583) Provided manual therapy to mobilize LE, proximal hip and/or LS spine soft tissue/joints for the purpose of modulating pain, promoting relaxation, increasing ROM, reducing/eliminating soft tissue swelling/inflammation/restriction, improving soft tissue extensibility and allowing for proper ROM for normal function with self-care, mobility, lifting and ambulation. Modalities:  Gel pack x15'    [] GAME READY (VASO)- for significant edema, swelling, pain control. Charges:  Timed Code Treatment Minutes: 40'   Total Treatment Minutes: 54'      [] EVAL (LOW) 455 1011 (typically 20 minutes face-to-face)  [] EVAL (MOD) 18440 (typically 30 minutes face-to-face)  [] EVAL (HIGH) 95038 (typically 45 minutes face-to-face)  [] RE-EVAL     [x] YP(83077) x 1 (18')    [] IONTO  [] NMR (95069) x     [] VASO  [x] Manual (60174) x 1 (x8')    [] Other:  [x] TA x 1 (14')     [] [facility] (99725)  [] ES(attended) (28334)      [] ES (un) (88943):         ASSESSMENT:    She prefers to do LAQ while sitting in the chair vs seated off the edge of the table. She demonstrates good passive ROM of her hip, staying within post op guidelines. She tolerates increased time on her feet. Still using wheeled walker for longer walking, can walk short distances without AD.           GOALS:   Patient stated goal: \"Increase strength, be able to walk and not get tired\"   [] Progressing: [] Met: [] Not Met: [] Adjusted    Therapist goals for Patient:   Short Term Goals: To be achieved in: 2 weeks  1. Independent in HEP and progression per patient tolerance, in order to prevent re-injury. [] Progressing: [] Met: [] Not Met: [] Adjusted  2. Patient will have a decrease in left hip pain to 3/10 to facilitate improvement in movement, function, and ADLs as indicated by Functional Deficits. [] Progressing: [] Met: [] Not Met: [] Adjusted    Long Term Goals: To be achieved in: 6 weeks  1. Disability index score of 20% or less for the Hip Outcome Score to assist with reaching prior level of function. [] Progressing: [] Met: [] Not Met: [] Adjusted  2. Patient will demonstrate an increase in left hip AROM to WFL's to allow for proper joint functioning as indicated by patients Functional Deficits. [] Progressing: [] Met: [] Not Met: [] Adjusted  3. Patient will demonstrate an increase in left hip strength to 4+/5 to allow for proper functional mobility as indicated by patients Functional Deficits. [] Progressing: [] Met: [] Not Met: [] Adjusted  4. Patient will be able to walk community distances, stand at least 1 hour, sit at least 1 hour, and be able to ascend/descend stairs reciprocally without increased symptoms or restriction. (patient specific functional goal)   [] Progressing: [] Met: [] Not Met: [] Adjusted  5. Patient will have a decrease in left hip pain to 0-1/10 with daily act. [] Progressing: [] Met: [] Not Met: [] Adjusted           Overall Progression Towards Functional goals/ Treatment Progress Update:  [] Patient is progressing as expected towards functional goals listed. [] Progression is slowed due to complexities/Impairments listed. [] Progression has been slowed due to co-morbidities.   [x] Plan just implemented, too soon to assess goals progression <30days   [] Goals require adjustment due to lack of progress  [] Patient is not progressing as expected and requires additional follow up with physician  [] Other    Prognosis for POC: [x] Good [] Fair  [] Poor      Patient requires continued skilled intervention: [x] Yes  [] No    Treatment/Activity Tolerance:  [x] Patient able to complete treatment  [] Patient limited by fatigue  [x] Patient limited by pain    [] Patient limited by other medical complications  [] Other:         PLAN: See eval.   Cont 2x/week  [] Continue per plan of care [] Alter current plan   [x] Plan of care initiated [] Hold pending MD visit [] Discharge      Electronically signed by:      Annita Hernandez  PT #[id number]  [place] PT #[id number]      Note: If patient does not return for scheduled/ recommended follow up visits, this note will serve as a discharge from care along with most recent update on progress.

## 2021-10-21 ENCOUNTER — TREATMENT (OUTPATIENT)
Dept: PHYSICAL THERAPY | Age: 71
End: 2021-10-21
Payer: MEDICARE

## 2021-10-21 DIAGNOSIS — R26.2 DIFFICULTY WALKING: ICD-10-CM

## 2021-10-21 DIAGNOSIS — M25.552 ACUTE POSTOPERATIVE PAIN OF LEFT HIP: ICD-10-CM

## 2021-10-21 DIAGNOSIS — M16.12 PRIMARY OSTEOARTHRITIS OF LEFT HIP: Primary | ICD-10-CM

## 2021-10-21 DIAGNOSIS — G89.18 ACUTE POSTOPERATIVE PAIN OF LEFT HIP: ICD-10-CM

## 2021-10-21 DIAGNOSIS — R29.898 WEAKNESS OF LEFT HIP: ICD-10-CM

## 2021-10-21 DIAGNOSIS — M25.652 DECREASED RANGE OF LEFT HIP MOVEMENT: ICD-10-CM

## 2021-10-21 PROCEDURE — 97530 THERAPEUTIC ACTIVITIES: CPT | Performed by: PHYSICAL THERAPIST

## 2021-10-21 PROCEDURE — 97140 MANUAL THERAPY 1/> REGIONS: CPT | Performed by: PHYSICAL THERAPIST

## 2021-10-21 PROCEDURE — G8427 DOCREV CUR MEDS BY ELIG CLIN: HCPCS | Performed by: PHYSICAL THERAPIST

## 2021-10-21 PROCEDURE — 97110 THERAPEUTIC EXERCISES: CPT | Performed by: PHYSICAL THERAPIST

## 2021-10-21 NOTE — PROGRESS NOTES
[facility]   Phone: [phone]    Fax: [phone]      Physical Therapy Treatment Note/ Progress Report:           Date:  10/21/2021    Patient Name:  Lore Modi    :  1950  MRN: <[id number]>  Restrictions/Precautions:    Medical/Treatment Diagnosis Information:  Diagnosis: S/P Left THR (Sx: 10/4/21) (M16.12)   Treatment Diagnosis:   Left Hip Pain (G89.18, M25.552), Decreased Left Hip ROM (M25.652), Decreased Strength (R29.898)                          Insurance/Certification information:  PT Insurance Information: Medicare  Physician Information:  Referring Practitioner: Dr. Tony Quinn  Has the plan of care been signed (Y/N):        []  Yes  [x]  No (POC sent 10/12/21)     Date of Patient follow up with Physician:   2021      Is this a Progress Report:     []  Yes  []  No        If Yes:  Date Range for reporting period:  Beginning 10/12/21  Ending 10/12/21    Progress report will be due (10 Rx or 30 days whichever is less):      Recertification will be due (POC Duration  / 90 days whichever is less): 21        Visit # Insurance Allowable Auth Required   4 Medical necessity  (use KX modifier due to pt having prior PT this year) []  Yes [x]  No        Functional Scale:   Date assessed:  10/12/21    Functional Assessment Tool Used:  Hip Outcome Score (copy scanned into media)  Score: 50/72 = 69%      Latex Allergy:  [x]NO      []YES  Preferred Language for Healthcare:   [x]English       []other:      Pain level:  4-6/10     PQRS: 10/21/2021  Reviewed medication list with patient. No changes since last PT visit. MEDICARE CAP EXCEPTION DOCUMENTATION      I certify that this patient meets one of the below criteria necessary for becoming an exception to the Medicare cap on therapy services:    []  The patient has a condition identified by an ICD-10 code that has a direct and significant impact on the need for therapy.  (Significantly impacts the Reflexes/Sensation:    []Dermatomes/Myotomes intact    []Reflexes equal and normal bilaterally   [x]Other: Intact to light touch    Joint mobility: NA due to Sx   []Normal    []Hypo   []Hyper    Palpation: General tenderness left hip    Functional Mobility/Transfers: Difficulty getting up from a chair (pt has to use her arms to help push herself up) and getting in/out of bed, unable to stand or walk for long periods, difficulty ascending/descending stairs. Posture: Forward head, rounded shoulders    Bandages/Dressings/Incisions: Post-op bandage in place. Pt has a negative pressure wound therapy unit. She is wearing bilateral compression stockings.       Gait: (include devices/WB status) Pt is amb with wheeled walker, decreased step length bilaterally, decreased WBing left LE    Orthopedic Special Tests: (-) Libby's                           RESTRICTIONS/PRECAUTIONS:  S/P Left THR (Sx: 10/4/21); follow THR precautions        Exercises/Interventions:       Therapeutic Ex (20876) Sets/sec Reps Notes/CUES   BIKE      TREADMILL            STRETCHING      Hamstrings      Towel Pull Calf 30\" hold X 5    Inclined Calf      Hip Flexors      Quads      ITB Rope      Adductors      Piriformis      Figure four-Push knee out            ROM      Passive      Active      Hang weights      Sheet pulls   Right knee flexion TP 10\"x10   Ankle pumps            ISOMETRICS      Quad sets 10\" hold X 15 seated   Ham sets      Glute sets 10\" hold X 15 supine   Hip ADD  Ball squeeze 10\" hold  X 15 supine   Hip ABD  Loop resistance      Pelvic Tilts            STRENGTHENING      SLR Supine      SLR Abduction      SLR Adduction      SLR Prone      SLR+      Clams      Reverse Clams      Clams in abduction      Bridges            SAQ 3 sets X 10 Supine bilat  2#   LAQ 3 sets X 10 Seated bilat 2#         PRE machines      Knee extension      Knee flexion      Leg Press            CKC      Calf raises      Mini squats      Step ups TKE                  Manual Intervention (59353)      Patellar mobs      PROM x8'  Gentle left hip manual stretch within post op guidelines/circumduction   Longitudinal distraction      STM      Scar Massage      Foam Roll            NMR re-education (74894)   CUES NEEDED   Biodex balance x6'    x2'  WS side/side and diagonals static 2' each  PS fill in [place] L10   Tandem balance      SLB      Rebounder      BOSU                        Therapeutic Activity (45768)      Gait training x5 laps  Gait up/over mini cones with cane   Core training      Swiss ball activities      Monster walks         Patient Education: Dx, prognosis, pt goals/expectations, rehab timeline (10/12/21)  X 8'              TUG TESTED on 10/15/2021    Timed Get Up and Go Test  Measures mobility in people who are able to walk on their own (assistive device permitted)      Instructions: The person may wear their usual footwear and can use any assistive device they normally use. 1. Have the person sit in the chair with their back to the chair and their arms resting on the back rests  2. Ask the person to stand up from a standard chair and walk a distance of 10 ft. (3 m). 3. Have the person turn around, walk back to the chair and sit down again. Timing begins when the person starts to rise from the chair and ends when he or she returns to the chair and sits down. Time to Complete: 23 seconds      Predictive Results:    Seconds Rating    <10  Freely mobile    <20  Mostly independent    20-29  Variable mobility    >20  Impaired mobility       Source: Mayur Israel S. The timed 'Up and Go' Test: a Test of Basic Functional Mobility for Frail Elderly Persons. Journal of [address] [place] [address]. 1991;39:142-148.         G-Code Crosswalk:  TUG time (velma) Disability Index CMS Modifier   12 or faster 0% []CH   13-14 1-19% []CI   15-16 20-39% []CJ   17-18 40-59% []CK   19-20  60-79% []CL   21-22  80-99% []CM   23 or slower 100% []CN       Therapeutic Exercise and NMR EXR  [x] (52944) Provided verbal/tactile cueing for activities related to strengthening, flexibility, endurance, ROM for improvements in LE, proximal hip, and core control with self care, mobility, lifting, ambulation. [x] (94591) Provided verbal/tactile cueing for activities related to improving balance, coordination, kinesthetic sense, posture, motor skill, proprioception to assist with LE, proximal hip, and core control in self-care, mobility, lifting, ambulation and eccentric single leg control. NMR and Therapeutic Activities:    [x] (15606 or 50889) Provided verbal/tactile cueing for activities related to improving balance, coordination, kinesthetic sense, posture, motor skill, proprioception and motor activation to allow for proper function of core, proximal hip and LE with self-care and ADLs and functional mobility.   [] (14118) Gait Re-education- Provided training and instruction to the patient for proper LE, core and proximal hip recruitment and positioning and eccentric body weight control with ambulation re-education including up and down stairs     Home Exercise Program:    [x] (87446) Reviewed/Progressed HEP activities related to strengthening, flexibility, endurance, ROM of core, proximal hip and LE for functional self-care, mobility, lifting and ambulation/stair navigation - Pt instructed in HEP through [address] [place] [address] (see below). Pt was also issued written handouts. (10/12/21)   [x] (40659) Reviewed/Progressed HEP activities related to improving balance, coordination, kinesthetic sense, posture, motor skill, proprioception of core, proximal hip and LE for self-care, mobility, lifting, and ambulation/stair navigation        Access Code: [email or web address]  URL: [email or web address]  Date: 10/12/2021  Prepared by: Agnieszka Moore    Exercises  Long Sitting Calf Stretch with Strap - 2 x daily - 7 x weekly - 1 sets - 5 reps - 30 seconds hold  Long Sitting Quad Set - 2 x daily - 7 x weekly - 1 sets - 10 reps - 10 seconds hold  Supine Gluteal Sets - 2 x daily - 7 x weekly - 1 sets - 10 reps - 10 seconds hold  Supine Straight Leg Hip Adduction and Quad Set with Ball - 2 x daily - 7 x weekly - 1 sets - 10 reps - 10 seconds hold  Supine Knee Extension Strengthening - 2 x daily - 7 x weekly - 3 sets - 10 reps  Seated Long Arc Quad - 2 x daily - 7 x weekly - 3 sets - 10 reps        Manual Treatments:  PROM / STM / Oscillations-Mobs:  G-I, II, III, IV (PA's, Inf., Post.)  [] (58771) Provided manual therapy to mobilize LE, proximal hip and/or LS spine soft tissue/joints for the purpose of modulating pain, promoting relaxation, increasing ROM, reducing/eliminating soft tissue swelling/inflammation/restriction, improving soft tissue extensibility and allowing for proper ROM for normal function with self-care, mobility, lifting and ambulation. Modalities:  Gel pack x15'    [] GAME READY (VASO)- for significant edema, swelling, pain control. Charges:  Timed Code Treatment Minutes: 40'   Total Treatment Minutes: 54'      [] EVAL (LOW) 455 [id number] (typically 20 minutes face-to-face)  [] EVAL (MOD) 72506 (typically 30 minutes face-to-face)  [] EVAL (HIGH) 74057 (typically 45 minutes face-to-face)  [] RE-EVAL     [x] ZE(69388) x 1 (18')    [] IONTO  [] NMR (15598) x     [] VASO  [x] Manual (28970) x 1 (x8')    [] Other:  [x] TA x 1 (14')     [] [facility] Traction (24350)  [] ES(attended) (49549)      [] ES (un) (84058):         ASSESSMENT:    We added in light weights for SAQ and LAQ today. Also verbally reviewed some of her previous exercises for her right LE (s/p TKA) that we were doing before. She stated her right knee was feeling a bit stiff. Resumed sheet pulls for this on the right side. She did well with increased time on her feet. Performed mini cone walking, up/over with minimal cues needed.      Cautioned her about being careful not to do too much on her feet at one time, take breaks. GOALS:   Patient stated goal: \"Increase strength, be able to walk and not get tired\"   [] Progressing: [] Met: [] Not Met: [] Adjusted    Therapist goals for Patient:   Short Term Goals: To be achieved in: 2 weeks  1. Independent in HEP and progression per patient tolerance, in order to prevent re-injury. [] Progressing: [] Met: [] Not Met: [] Adjusted  2. Patient will have a decrease in left hip pain to 3/10 to facilitate improvement in movement, function, and ADLs as indicated by Functional Deficits. [] Progressing: [] Met: [] Not Met: [] Adjusted    Long Term Goals: To be achieved in: 6 weeks  1. Disability index score of 20% or less for the Hip Outcome Score to assist with reaching prior level of function. [] Progressing: [] Met: [] Not Met: [] Adjusted  2. Patient will demonstrate an increase in left hip AROM to WFL's to allow for proper joint functioning as indicated by patients Functional Deficits. [] Progressing: [] Met: [] Not Met: [] Adjusted  3. Patient will demonstrate an increase in left hip strength to 4+/5 to allow for proper functional mobility as indicated by patients Functional Deficits. [] Progressing: [] Met: [] Not Met: [] Adjusted  4. Patient will be able to walk community distances, stand at least 1 hour, sit at least 1 hour, and be able to ascend/descend stairs reciprocally without increased symptoms or restriction. (patient specific functional goal)   [] Progressing: [] Met: [] Not Met: [] Adjusted  5. Patient will have a decrease in left hip pain to 0-1/10 with daily act. [] Progressing: [] Met: [] Not Met: [] Adjusted           Overall Progression Towards Functional goals/ Treatment Progress Update:  [] Patient is progressing as expected towards functional goals listed. [] Progression is slowed due to complexities/Impairments listed. [] Progression has been slowed due to co-morbidities.   [x] Plan just implemented, too soon to assess goals progression <30days   [] Goals require adjustment due to lack of progress  [] Patient is not progressing as expected and requires additional follow up with physician  [] Other    Prognosis for POC: [x] Good [] Fair  [] Poor      Patient requires continued skilled intervention: [x] Yes  [] No    Treatment/Activity Tolerance:  [x] Patient able to complete treatment  [] Patient limited by fatigue  [x] Patient limited by pain    [] Patient limited by other medical complications  [] Other:         PLAN: See eval.   Cont 2x/week  [] Continue per plan of care [] Alter current plan   [x] Plan of care initiated [] Hold pending MD visit [] Discharge      Electronically signed by:      Annita Pineda  PT #[id number]  [place] PT #[id number]      Note: If patient does not return for scheduled/ recommended follow up visits, this note will serve as a discharge from care along with most recent update on progress.

## 2021-10-26 ENCOUNTER — TREATMENT (OUTPATIENT)
Dept: PHYSICAL THERAPY | Age: 71
End: 2021-10-26
Payer: MEDICARE

## 2021-10-26 DIAGNOSIS — G89.18 ACUTE POSTOPERATIVE PAIN OF LEFT HIP: ICD-10-CM

## 2021-10-26 DIAGNOSIS — M16.12 PRIMARY OSTEOARTHRITIS OF LEFT HIP: Primary | ICD-10-CM

## 2021-10-26 DIAGNOSIS — M25.552 ACUTE POSTOPERATIVE PAIN OF LEFT HIP: ICD-10-CM

## 2021-10-26 DIAGNOSIS — R29.898 WEAKNESS OF LEFT HIP: ICD-10-CM

## 2021-10-26 DIAGNOSIS — M25.652 DECREASED RANGE OF LEFT HIP MOVEMENT: ICD-10-CM

## 2021-10-26 PROCEDURE — 97530 THERAPEUTIC ACTIVITIES: CPT | Performed by: PHYSICAL THERAPIST

## 2021-10-26 PROCEDURE — 97140 MANUAL THERAPY 1/> REGIONS: CPT | Performed by: PHYSICAL THERAPIST

## 2021-10-26 PROCEDURE — 97110 THERAPEUTIC EXERCISES: CPT | Performed by: PHYSICAL THERAPIST

## 2021-10-26 PROCEDURE — G8427 DOCREV CUR MEDS BY ELIG CLIN: HCPCS | Performed by: PHYSICAL THERAPIST

## 2021-10-26 NOTE — PROGRESS NOTES
[facility]   Phone: [phone]    Fax: [phone]      Physical Therapy Treatment Note/ Progress Report:           Date:  10/26/2021    Patient Name:  Bienvenido Rocha    :  1950  MRN: <[id number]>  Restrictions/Precautions:    Medical/Treatment Diagnosis Information:  Diagnosis: S/P Left THR (Sx: 10/4/21) (M16.12)   Treatment Diagnosis:   Left Hip Pain (G89.18, M25.552), Decreased Left Hip ROM (M25.652), Decreased Strength (R29.898)                          Insurance/Certification information:  PT Insurance Information: Medicare  Physician Information:  Referring Practitioner: Dr. Brant Odom  Has the plan of care been signed (Y/N):        [x]  Yes (POC signed 10/18/21)  []  No      Date of Patient follow up with Physician:   2021      Is this a Progress Report:     []  Yes  [x]  No        If Yes:  Date Range for reporting period:  Beginning 10/12/21  Ending 10/12/21    Progress report will be due (10 Rx or 30 days whichever is less):  10/3/94    Recertification will be due (POC Duration  / 90 days whichever is less): 21        Visit # Insurance Allowable Auth Required   5 Medical necessity  (use KX modifier due to pt having prior PT this year) []  Yes [x]  No        Functional Scale:   Date assessed:  10/12/21    Functional Assessment Tool Used:  Hip Outcome Score (copy scanned into media)  Score: 50/72 = 69%      Latex Allergy:  [x]NO      []YES  Preferred Language for Healthcare:   [x]English       []other:      Pain level:  4-6/10      PQRS: 10/26/2021  Reviewed medication list with patient. No changes since last PT visit. MEDICARE CAP EXCEPTION DOCUMENTATION      I certify that this patient meets one of the below criteria necessary for becoming an exception to the Medicare cap on therapy services:    []  The patient has a condition identified by an ICD-10 code that has a direct and significant impact on the need for therapy.  (Significantly impacts Quads 4-/5     Hamstrings 4-/5         Flexibility Left Right Comments   Hamstrings Not     ITB (Obers test) Tested     Hip flexor(Sebastian test) Due to     gastroc Sx     Rectus femoris(Elys test)              Special  Test Left Right Comments   FABERS Not     Scour test Tested     Impingement test Due to     Trendelenburg test Sx               Reflexes/Sensation:    []Dermatomes/Myotomes intact    []Reflexes equal and normal bilaterally   [x]Other: Intact to light touch    Joint mobility: NA due to Sx   []Normal    []Hypo   []Hyper    Palpation: General tenderness left hip    Functional Mobility/Transfers: Difficulty getting up from a chair (pt has to use her arms to help push herself up) and getting in/out of bed, unable to stand or walk for long periods, difficulty ascending/descending stairs. Posture: Forward head, rounded shoulders    Bandages/Dressings/Incisions: Post-op bandage in place. Pt has a negative pressure wound therapy unit. She is wearing bilateral compression stockings.       Gait: (include devices/WB status) Pt is amb with wheeled walker, decreased step length bilaterally, decreased WBing left LE    Orthopedic Special Tests: (-) Libby's                           RESTRICTIONS/PRECAUTIONS:  S/P Left THR (Sx: 10/4/21); follow THR precautions        Exercises/Interventions:       Therapeutic Ex (09266) Sets/sec Reps Notes/CUES   BIKE      TREADMILL            STRETCHING      Hamstrings      Towel Pull Calf 30\" hold X 5    Inclined Calf      Hip Flexors      Quads      ITB Rope      Adductors      Piriformis      Figure four-Push knee out            ROM      Passive      Active      Hang weights      Sheet pulls   Right knee flexion TP 10\"x10   Ankle pumps            ISOMETRICS      Quad sets 10\" hold X 15 seated   Ham sets      Glute sets 10\" hold X 15 supine   Hip ADD  Ball squeeze 10\" hold  X 15 supine   Hip ABD  Loop resistance      Pelvic Tilts            STRENGTHENING      SLR Supine SLR Abduction      SLR Adduction      SLR Prone      SLR+      Clams      Reverse Clams      Clams in abduction      Bridges            SAQ 3 sets X 10 Supine bilat  2#   LAQ    Held today due to pain in lateral thigh/ITBand      PRE machines      Knee extension      Knee flexion      Leg Press            CKC      Calf raises      Mini squats      Step ups      TKE                  Manual Intervention (62602)      Patellar mobs      PROM x8'  Gentle left hip manual stretch within post op guidelines/circumduction   Longitudinal distraction      STM left quad/ITBand X 5'     Scar Massage      Foam Roll            NMR re-education (78699)   CUES NEEDED   Biodex balance x6'    x2'  WS side/side and diagonals static 2' each  PS fill in [place] L10   Tandem balance      SLB      Rebounder      BOSU                        Therapeutic Activity (89550)      Gait training x5 laps  Gait up/over mini cones with cane   Core training      Swiss ball activities      Monster walks         Patient Education: Dx, prognosis, pt goals/expectations, rehab timeline (10/12/21)  X 8'              TUG TESTED on 10/15/2021    Timed Get Up and Go Test  Measures mobility in people who are able to walk on their own (assistive device permitted)      Instructions: The person may wear their usual footwear and can use any assistive device they normally use. 1. Have the person sit in the chair with their back to the chair and their arms resting on the back rests  2. Ask the person to stand up from a standard chair and walk a distance of 10 ft. (3 m). 3. Have the person turn around, walk back to the chair and sit down again. Timing begins when the person starts to rise from the chair and ends when he or she returns to the chair and sits down.       Time to Complete: 23 seconds      Predictive Results:    Seconds Rating    <10  Freely mobile    <20  Mostly independent    20-29  Variable mobility    >20  Impaired mobility       Source: HINA Mcclendon The timed 'Up and Go' Test: a Test of Basic Functional Mobility for Frail Elderly Persons. Journal of [facility] . 1991;39:142-148. G-Code Crosswalk:  TUG time (seconds) Disability Index CMS Modifier   12 or faster 0% []CH   13-14 1-19% []CI   15-16 20-39% []CJ   17-18 40-59% []CK   19-20  60-79% []CL   21-22  80-99% []CM   23 or slower 100% []CN       Therapeutic Exercise and NMR EXR  [x] (52060) Provided verbal/tactile cueing for activities related to strengthening, flexibility, endurance, ROM for improvements in LE, proximal hip, and core control with self care, mobility, lifting, ambulation. [x] (41455) Provided verbal/tactile cueing for activities related to improving balance, coordination, kinesthetic sense, posture, motor skill, proprioception to assist with LE, proximal hip, and core control in self-care, mobility, lifting, ambulation and eccentric single leg control. NMR and Therapeutic Activities:    [x] (69749 or 24808) Provided verbal/tactile cueing for activities related to improving balance, coordination, kinesthetic sense, posture, motor skill, proprioception and motor activation to allow for proper function of core, proximal hip and LE with self-care and ADLs and functional mobility.   [] (01472) Gait Re-education- Provided training and instruction to the patient for proper LE, core and proximal hip recruitment and positioning and eccentric body weight control with ambulation re-education including up and down stairs     Home Exercise Program:    [x] (10645) Reviewed/Progressed HEP activities related to strengthening, flexibility, endurance, ROM of core, proximal hip and LE for functional self-care, mobility, lifting and ambulation/stair navigation - Pt instructed in HEP through [address] [place] [address] (see below). Pt was also issued written handouts.  (10/12/21)   [x] (90370) Reviewed/Progressed HEP activities related to improving balance, coordination, kinesthetic sense, posture, motor skill, proprioception of core, proximal hip and LE for self-care, mobility, lifting, and ambulation/stair navigation        Access Code: [email or web address]  URL: [email or web address]  Date: 10/12/2021  Prepared by: Efren Donovan    Exercises  Long Sitting Calf Stretch with Strap - 2 x daily - 7 x weekly - 1 sets - 5 reps - 30 seconds hold  Long Sitting Quad Set - 2 x daily - 7 x weekly - 1 sets - 10 reps - 10 seconds hold  Supine Gluteal Sets - 2 x daily - 7 x weekly - 1 sets - 10 reps - 10 seconds hold  Supine Straight Leg Hip Adduction and Quad Set with Ball - 2 x daily - 7 x weekly - 1 sets - 10 reps - 10 seconds hold  Supine Knee Extension Strengthening - 2 x daily - 7 x weekly - 3 sets - 10 reps  Seated Long Arc Quad - 2 x daily - 7 x weekly - 3 sets - 10 reps        Manual Treatments:  PROM / STM / Oscillations-Mobs:  G-I, II, III, IV (PA's, Inf., Post.)  [x] (25922) Provided manual therapy to mobilize LE, proximal hip and/or LS spine soft tissue/joints for the purpose of modulating pain, promoting relaxation, increasing ROM, reducing/eliminating soft tissue swelling/inflammation/restriction, improving soft tissue extensibility and allowing for proper ROM for normal function with self-care, mobility, lifting and ambulation. Modalities:  Gel pack x15'    [] GAME READY (VASO)- for significant edema, swelling, pain control. Charges:  Timed Code Treatment Minutes: 45'   Total Treatment Minutes: 61'      [] EVAL (LOW) 73376 (typically 20 minutes face-to-face)  [] EVAL (MOD) 03246 (typically 30 minutes face-to-face)  [] EVAL (HIGH) 41585 (typically 45 minutes face-to-face)  [] RE-EVAL     [x] CQ(89160) x 1 (18')    [] IONTO  [] NMR (15027) x     [] VASO  [x] Manual (98902) x 1 (13')    [] Other:  [x] TA x 1 (14')     [] Mech Traction (71839)  [] ES(attended) (06368)      [] ES (un) (93621):         ASSESSMENT:  Pt was very tender over left quad/ITBand today.   She had increased pain along the outside of her leg with LAQ with 2# wt so these were held today. Also instructed pt to hold these at home until her next PT visit to see if her pain improves. She did well with wt shifting on the Biodex. Some cuing needed with mini cone walking for proper gait. Performed STM to left quad/ITBand with tightness present. Continued to educate the pt on the importance of not overdoing it at home. GOALS:   Patient stated goal: \"Increase strength, be able to walk and not get tired\"   [] Progressing: [] Met: [] Not Met: [] Adjusted    Therapist goals for Patient:   Short Term Goals: To be achieved in: 2 weeks  1. Independent in HEP and progression per patient tolerance, in order to prevent re-injury. [] Progressing: [] Met: [] Not Met: [] Adjusted  2. Patient will have a decrease in left hip pain to 3/10 to facilitate improvement in movement, function, and ADLs as indicated by Functional Deficits. [] Progressing: [] Met: [] Not Met: [] Adjusted    Long Term Goals: To be achieved in: 6 weeks  1. Disability index score of 20% or less for the Hip Outcome Score to assist with reaching prior level of function. [] Progressing: [] Met: [] Not Met: [] Adjusted  2. Patient will demonstrate an increase in left hip AROM to WFL's to allow for proper joint functioning as indicated by patients Functional Deficits. [] Progressing: [] Met: [] Not Met: [] Adjusted  3. Patient will demonstrate an increase in left hip strength to 4+/5 to allow for proper functional mobility as indicated by patients Functional Deficits. [] Progressing: [] Met: [] Not Met: [] Adjusted  4. Patient will be able to walk community distances, stand at least 1 hour, sit at least 1 hour, and be able to ascend/descend stairs reciprocally without increased symptoms or restriction. (patient specific functional goal)   [] Progressing: [] Met: [] Not Met: [] Adjusted  5.  Patient will have a decrease in left hip pain to 0-1/10 with daily act. [] Progressing: [] Met: [] Not Met: [] Adjusted           Overall Progression Towards Functional goals/ Treatment Progress Update:  [] Patient is progressing as expected towards functional goals listed. [] Progression is slowed due to complexities/Impairments listed. [] Progression has been slowed due to co-morbidities. [x] Plan just implemented, too soon to assess goals progression <30days   [] Goals require adjustment due to lack of progress  [] Patient is not progressing as expected and requires additional follow up with physician  [] Other    Prognosis for POC: [x] Good [] Fair  [] Poor      Patient requires continued skilled intervention: [x] Yes  [] No    Treatment/Activity Tolerance:  [x] Patient able to complete treatment  [] Patient limited by fatigue  [x] Patient limited by pain    [] Patient limited by other medical complications  [] Other:         PLAN: See eval.   Cont 2x/week  [] Continue per plan of care [] Alter current plan   [x] Plan of care initiated [] Hold pending MD visit [] Discharge      Electronically signed by: Aye Golden, PT       Physical Therapist Tanya Orellana 421 #[id number]  Physical Therapist [place] License #[id number]      Note: If patient does not return for scheduled/ recommended follow up visits, this note will serve as a discharge from care along with most recent update on progress.

## 2021-10-29 ENCOUNTER — TREATMENT (OUTPATIENT)
Dept: PHYSICAL THERAPY | Age: 71
End: 2021-10-29
Payer: MEDICARE

## 2021-10-29 DIAGNOSIS — M25.652 DECREASED RANGE OF LEFT HIP MOVEMENT: ICD-10-CM

## 2021-10-29 DIAGNOSIS — G89.18 ACUTE POSTOPERATIVE PAIN OF LEFT HIP: ICD-10-CM

## 2021-10-29 DIAGNOSIS — M25.552 ACUTE POSTOPERATIVE PAIN OF LEFT HIP: ICD-10-CM

## 2021-10-29 DIAGNOSIS — R29.898 WEAKNESS OF LEFT HIP: ICD-10-CM

## 2021-10-29 DIAGNOSIS — M16.12 PRIMARY OSTEOARTHRITIS OF LEFT HIP: Primary | ICD-10-CM

## 2021-10-29 DIAGNOSIS — R26.2 DIFFICULTY WALKING: ICD-10-CM

## 2021-10-29 PROCEDURE — G8427 DOCREV CUR MEDS BY ELIG CLIN: HCPCS | Performed by: PHYSICAL THERAPIST

## 2021-10-29 PROCEDURE — 97110 THERAPEUTIC EXERCISES: CPT | Performed by: PHYSICAL THERAPIST

## 2021-10-29 PROCEDURE — 97530 THERAPEUTIC ACTIVITIES: CPT | Performed by: PHYSICAL THERAPIST

## 2021-10-29 PROCEDURE — 97140 MANUAL THERAPY 1/> REGIONS: CPT | Performed by: PHYSICAL THERAPIST

## 2021-10-29 NOTE — PROGRESS NOTES
[facility]   Phone: [phone]    Fax: [phone]      Physical Therapy Treatment Note/ Progress Report:           Date:  10/29/2021    Patient Name:  Emmy Hoang    :  1950  MRN: <[id number]>  Restrictions/Precautions:    Medical/Treatment Diagnosis Information:  Diagnosis: S/P Left THR (Sx: 10/4/21) (M16.12)   Treatment Diagnosis:   Left Hip Pain (G89.18, M25.552), Decreased Left Hip ROM (M25.652), Decreased Strength (R29.898)                          Insurance/Certification information:  PT Insurance Information: Medicare  Physician Information:  Referring Practitioner: Dr. Angelina Cotter  Has the plan of care been signed (Y/N):        [x]  Yes (POC signed 10/18/21)  []  No      Date of Patient follow up with Physician:   2021      Is this a Progress Report:     []  Yes  [x]  No        If Yes:  Date Range for reporting period:  Beginning 10/12/21  Ending 10/12/21    Progress report will be due (10 Rx or 30 days whichever is less):  21    Recertification will be due (POC Duration  / 90 days whichever is less): 21        Visit # Insurance Allowable Auth Required   6 Medical necessity  (use KX modifier due to pt having prior PT this year) []  Yes [x]  No        Functional Scale:   Date assessed:  10/12/21    Functional Assessment Tool Used:  Hip Outcome Score (copy scanned into media)  Score: 50/72 = 69%      Latex Allergy:  [x]NO      []YES  Preferred Language for Healthcare:   [x]English       []other:      Pain level:  4-6/10      PQRS: 10/29/2021  Reviewed medication list with patient. No changes since last PT visit. MEDICARE CAP EXCEPTION DOCUMENTATION      I certify that this patient meets one of the below criteria necessary for becoming an exception to the Medicare cap on therapy services:    []  The patient has a condition identified by an ICD-10 code that has a direct and significant impact on the need for therapy.  (Significantly impacts the rate of recovery.)                   []  The patient has a complexity identified by an ICD-10 code that has a direct and significant impact on the need for therapy. (Significantly impacts the rate of recovery and is associated with a primary condition.)         []  The patient has associated variables that influence the amount of treatment to include:  Social support, self-efficacy/motivation, prognosis, time since onset/acuity. []  The patient has generalized musculoskeletal conditions or a condition affecting multiple sites that will have a direct impact on the rate of recovery. [x]  The patient had a prior episode of outpatient therapy during this calendar year for a different condition. []  The patient has a mental or cognitive disorder in addition to the condition being treated that will have a direct and significant impact on the rate of recovery. SUBJECTIVE:  3+ weeks post op  She reports that she has continued to have pain down the lateral side of her left lateral thigh. Patient feels like this may be referred pain from her back. She does have disc loss in L1-2 from a recent report. She is going to see a spine specialist on Monday. She states her leg usually bothers her more when she is up walking.           OBJECTIVE: Limited objective measurements taken today due to recent surgery and pt being very nauseous    ROM PROM AROM Comments    Left Right Left Right    Flexion   Not     Extension   Tested     Abduction   Due to      Adduction   Sx     ER        IR                  Strength Left Right Comments   Hip flexors Not      Hip extension Tested     Hip abduction Due to     Hip adduction Sx     Hip ER      Hip IR      Quads 4-/5     Hamstrings 4-/5         Flexibility Left Right Comments   Hamstrings Not     ITB (Obers test) Tested     Hip flexor(Sebastian test) Due to     gastroc Sx     Rectus femoris(Elys test)              Special  Test Left Right Comments   FABERS Not Supine bilat  2#   LAQ    Held today due to pain in lateral thigh/ITBand      PRE machines      Knee extension      Knee flexion      Leg Press            CKC      Calf raises      Mini squats      Step ups      TKE                  Manual Intervention (29176)      Patellar mobs      PROM x5'  Gentle left hip manual stretch within post op guidelines/circumduction   Longitudinal distraction      STM left quad/ITBand X 5'  Consider theragun   Scar Massage x5'  To her left DILAN incision   Foam Roll            NMR re-education (79356)   CUES NEEDED   Biodex balance x6'    x2'  WS side/side and diagonals L 10 2' each  PS fill in [place] L10  LOS L10 medium spread    Tandem balance      SLB      Rebounder      BOSU                        Therapeutic Activity (29192)      Core training      [employer] activities      Monster walks         Patient Education: Dx, prognosis, pt goals/expectations, rehab timeline (10/12/21)  X 8'              TUG TESTED on 10/15/2021    Timed Get Up and Go Test  Measures mobility in people who are able to walk on their own (assistive device permitted)      Instructions: The person may wear their usual footwear and can use any assistive device they normally use. 1. Have the person sit in the chair with their back to the chair and their arms resting on the back rests  2. Ask the person to stand up from a standard chair and walk a distance of 10 ft. (3 m). 3. Have the person turn around, walk back to the chair and sit down again. Timing begins when the person starts to rise from the chair and ends when he or she returns to the chair and sits down. Time to Complete: 23 seconds      Predictive Results:    Seconds Rating    <10  Freely mobile    <20  Mostly independent    20-29  Variable mobility    >20  Impaired mobility       Source: Keturah Israel S. The timed 'Up and Go' Test: a Test of Basic Functional Mobility for Frail Elderly Persons.   Journal of [facility] [employer]. 1991;39:142-148. G-Code [place]:  TUG time (seconds) Disability Index CMS Modifier   12 or faster 0% []CH   13-14 1-19% []CI   15-16 20-39% []CJ   17-18 40-59% []CK   19-20  60-79% []CL   21-22  80-99% []CM   23 or slower 100% []CN       Therapeutic Exercise and NMR EXR  [x] (97343) Provided verbal/tactile cueing for activities related to strengthening, flexibility, endurance, ROM for improvements in LE, proximal hip, and core control with self care, mobility, lifting, ambulation. [x] (00858) Provided verbal/tactile cueing for activities related to improving balance, coordination, kinesthetic sense, posture, motor skill, proprioception to assist with LE, proximal hip, and core control in self-care, mobility, lifting, ambulation and eccentric single leg control. NMR and Therapeutic Activities:    [x] (45351 or 61247) Provided verbal/tactile cueing for activities related to improving balance, coordination, kinesthetic sense, posture, motor skill, proprioception and motor activation to allow for proper function of core, proximal hip and LE with self-care and ADLs and functional mobility.   [] (68742) Gait Re-education- Provided training and instruction to the patient for proper LE, core and proximal hip recruitment and positioning and eccentric body weight control with ambulation re-education including up and down stairs     Home Exercise Program:    [x] (43451) Reviewed/Progressed HEP activities related to strengthening, flexibility, endurance, ROM of core, proximal hip and LE for functional self-care, mobility, lifting and ambulation/stair navigation - Pt instructed in HEP through [address] [place] [address] (see below). Pt was also issued written handouts.  (10/12/21)   [x] (76370) Reviewed/Progressed HEP activities related to improving balance, coordination, kinesthetic sense, posture, motor skill, proprioception of core, proximal hip and LE for self-care, mobility, lifting, and ambulation/stair navigation Access Code: [email or web address]  URL: [email or web address]  Date: 10/12/2021  Prepared by: Beth Akhtar    Exercises  Long Sitting Calf Stretch with Strap - 2 x daily - 7 x weekly - 1 sets - 5 reps - 30 seconds hold  Long Sitting Quad Set - 2 x daily - 7 x weekly - 1 sets - 10 reps - 10 seconds hold  Supine Gluteal Sets - 2 x daily - 7 x weekly - 1 sets - 10 reps - 10 seconds hold  Supine Straight Leg Hip Adduction and Quad Set with Ball - 2 x daily - 7 x weekly - 1 sets - 10 reps - 10 seconds hold  Supine Knee Extension Strengthening - 2 x daily - 7 x weekly - 3 sets - 10 reps  Seated Long Arc Quad - 2 x daily - 7 x weekly - 3 sets - 10 reps        Manual Treatments:  PROM / STM / Oscillations-Mobs:  G-I, II, III, IV (PA's, Inf., Post.)  [x] (44092) Provided manual therapy to mobilize LE, proximal hip and/or LS spine soft tissue/joints for the purpose of modulating pain, promoting relaxation, increasing ROM, reducing/eliminating soft tissue swelling/inflammation/restriction, improving soft tissue extensibility and allowing for proper ROM for normal function with self-care, mobility, lifting and ambulation. Modalities:  Gel pack x15'    [] GAME READY (VASO)- for significant edema, swelling, pain control. Charges:  Timed Code Treatment Minutes: 45'   Total Treatment Minutes: 61'      [] EVAL (LOW) 16671 (typically 20 minutes face-to-face)  [] EVAL (MOD) 02422 (typically 30 minutes face-to-face)  [] EVAL (HIGH) 00378 (typically 45 minutes face-to-face)  [] RE-EVAL     [x] VY(94334) x 1 (20')    [] IONTO  [] NMR (37661) x     [] VASO  [x] Manual (45338) x 1 (15')    [] Other:  [x] TA x 1 (10')     [] Mech Traction (12658)  [] ES(attended) (78396)      [] ES (un) (42773):         ASSESSMENT:    Her left ITB seems to be flared up. She is tender throughout her left hip musculature and thickened scar tissue noted along her incision line. Advised her that she can start to massage her scar at home.    We also need to get her left hip stronger. We did start some more direct lateral hip strengthening today. Updated her [address] program today. GOALS:   Patient stated goal: \"Increase strength, be able to walk and not get tired\"   [] Progressing: [] Met: [] Not Met: [] Adjusted    Therapist goals for Patient:   Short Term Goals: To be achieved in: 2 weeks  1. Independent in HEP and progression per patient tolerance, in order to prevent re-injury. [] Progressing: [] Met: [] Not Met: [] Adjusted  2. Patient will have a decrease in left hip pain to 3/10 to facilitate improvement in movement, function, and ADLs as indicated by Functional Deficits. [] Progressing: [] Met: [] Not Met: [] Adjusted    Long Term Goals: To be achieved in: 6 weeks  1. Disability index score of 20% or less for the Hip Outcome Score to assist with reaching prior level of function. [] Progressing: [] Met: [] Not Met: [] Adjusted  2. Patient will demonstrate an increase in left hip AROM to WFL's to allow for proper joint functioning as indicated by patients Functional Deficits. [] Progressing: [] Met: [] Not Met: [] Adjusted  3. Patient will demonstrate an increase in left hip strength to 4+/5 to allow for proper functional mobility as indicated by patients Functional Deficits. [] Progressing: [] Met: [] Not Met: [] Adjusted  4. Patient will be able to walk community distances, stand at least 1 hour, sit at least 1 hour, and be able to ascend/descend stairs reciprocally without increased symptoms or restriction. (patient specific functional goal)   [] Progressing: [] Met: [] Not Met: [] Adjusted  5. Patient will have a decrease in left hip pain to 0-1/10 with daily act. [] Progressing: [] Met: [] Not Met: [] Adjusted           Overall Progression Towards Functional goals/ Treatment Progress Update:  [] Patient is progressing as expected towards functional goals listed.     [] Progression is slowed due to

## 2021-11-02 ENCOUNTER — TREATMENT (OUTPATIENT)
Dept: PHYSICAL THERAPY | Age: 71
End: 2021-11-02
Payer: MEDICARE

## 2021-11-02 DIAGNOSIS — G89.18 ACUTE POSTOPERATIVE PAIN OF LEFT HIP: ICD-10-CM

## 2021-11-02 DIAGNOSIS — M16.12 PRIMARY OSTEOARTHRITIS OF LEFT HIP: Primary | ICD-10-CM

## 2021-11-02 DIAGNOSIS — R26.2 DIFFICULTY WALKING: ICD-10-CM

## 2021-11-02 DIAGNOSIS — R29.898 WEAKNESS OF LEFT HIP: ICD-10-CM

## 2021-11-02 DIAGNOSIS — M25.552 ACUTE POSTOPERATIVE PAIN OF LEFT HIP: ICD-10-CM

## 2021-11-02 DIAGNOSIS — M25.652 DECREASED RANGE OF LEFT HIP MOVEMENT: ICD-10-CM

## 2021-11-02 PROCEDURE — 97110 THERAPEUTIC EXERCISES: CPT | Performed by: PHYSICAL THERAPIST

## 2021-11-02 PROCEDURE — G8427 DOCREV CUR MEDS BY ELIG CLIN: HCPCS | Performed by: PHYSICAL THERAPIST

## 2021-11-02 PROCEDURE — 97530 THERAPEUTIC ACTIVITIES: CPT | Performed by: PHYSICAL THERAPIST

## 2021-11-02 PROCEDURE — 97140 MANUAL THERAPY 1/> REGIONS: CPT | Performed by: PHYSICAL THERAPIST

## 2021-11-02 NOTE — PROGRESS NOTES
[facility]   Phone: [phone]    Fax: [phone]    Physical Therapy Re-Certification Plan of Care    Dear Dr Ganesh Burr  ,    We had the pleasure of treating the following patient for physical therapy services at [address] [place] [address]. A summary of our findings can be found in the updated assessment below. This includes our plan of care. If you have any questions or concerns regarding these findings, please do not hesitate to contact me at the office phone number checked above.   Thank you for the referral.     Physician Signature:________________________________Date:__________________  By signing above (or electronic signature), therapists plan is approved by physician      Overall Response to Treatment:   [x]Patient is responding well to treatment and improvement is noted with regards  to goals   [x]Patient should continue to improve in reasonable time if they continue HEP   []Patient has plateaued and is no longer responding to skilled PT intervention    []Patient is getting worse and would benefit from return to referring MD   []Patient unable to adhere to initial POC   []Other:       Physical Therapy Treatment Note/ Progress Report:           Date:  2021    Patient Name:  Julissa Chu    :  1950  MRN: <[id number]>  Restrictions/Precautions:    Medical/Treatment Diagnosis Information:  Diagnosis: S/P Left THR (Sx: 10/4/21) (M16.12)   Treatment Diagnosis:   Left Hip Pain (G89.18, M25.552), Decreased Left Hip ROM (M25.652), Decreased Strength (R29.898)                          Insurance/Certification information:  PT Insurance Information: Medicare  Physician Information:  Referring Practitioner: Dr. Ganesh Burr  Has the plan of care been signed (Y/N):        [x]  Yes (POC signed 10/18/21)  []  No      Date of Patient follow up with Physician:   2021      Is this a Progress Report:     [x]  Yes  []  No        If Yes:  Date Range for reporting period:  Beginning 10/12/21 update npv  Ending 10/12/21    Progress report will be due (10 Rx or 30 days whichever is less):  48/9/54    Recertification will be due (POC Duration  / 90 days whichever is less): 11/9/21        Visit # Insurance Allowable Auth Required   7 Medical necessity  (use KX modifier due to pt having prior PT this year) []  Yes [x]  No        Functional Scale:   Date assessed:  10/12/21    Functional Assessment Tool Used:  Hip Outcome Score (copy scanned into media)  Score: 50/72 = 69%      Latex Allergy:  [x]NO      []YES  Preferred Language for Healthcare:   [x]English       []other:      Pain level:  4-6/10      PQRS: 11/2/2021  Reviewed medication list with patient. No changes since last PT visit. MEDICARE CAP EXCEPTION DOCUMENTATION      I certify that this patient meets one of the below criteria necessary for becoming an exception to the Medicare cap on therapy services:    []  The patient has a condition identified by an ICD-10 code that has a direct and significant impact on the need for therapy. (Significantly impacts the rate of recovery.)                   []  The patient has a complexity identified by an ICD-10 code that has a direct and significant impact on the need for therapy. (Significantly impacts the rate of recovery and is associated with a primary condition.)         []  The patient has associated variables that influence the amount of treatment to include:  Social support, self-efficacy/motivation, prognosis, time since onset/acuity. []  The patient has generalized musculoskeletal conditions or a condition affecting multiple sites that will have a direct impact on the rate of recovery. [x]  The patient had a prior episode of outpatient therapy during this calendar year for a different condition.            []  The patient has a mental or cognitive disorder in addition to the condition being treated that will have a direct and significant impact on the rate of recovery. SUBJECTIVE:  4+ weeks post op  Patient reports that she has been trying to use her hand held massager on her left ITB area more at home. She states she does feel better after this but doesn't get long lasting relief. She notices it more the more she is up on her feet being active.           OBJECTIVE:     ROM PROM AROM Comments    Left Right Left Right 11/2/2021   Flexion   95 107 supine   Extension        Abduction   22 21 supine   Adduction        ER   20 25 sitting   IR   17 20 sitting             Strength Left Right Comments   Hip flexors Not      Hip extension Tested     Hip abduction Due to     Hip adduction Sx     Hip ER      Hip IR      Quads 4-/5     Hamstrings 4-/5         Flexibility Left Right Comments   Hamstrings Not     ITB (Obers test) Tested     Hip flexor(Sebastian test) Due to     gastroc Sx     Rectus femoris(Elys test)              Special  Test Left Right Comments   FABERS Not     Scour test Tested     Impingement test Due to     Trendelenburg test Sx               RESTRICTIONS/PRECAUTIONS:  S/P Left THR (Sx: 10/4/21); follow THR precautions        Exercises/Interventions:       Therapeutic Ex (51832) Sets/sec Reps Notes/CUES   BIKE      TREADMILL            STRETCHING      Hamstrings      Towel Pull Calf 30\" hold X 5    Inclined Calf      Hip Flexors      Quads      ITB Rope      Adductors      Piriformis      Figure four-Push knee out            ROM      Passive      Active      Hang weights      Sheet pulls   Right knee flexion TP 10\"x10   Ankle pumps            ISOMETRICS      Quad sets 10\" hold X 15 seated   Ham sets      Glute sets 10\" hold X 15 supine   Hip ADD  Ball squeeze 10\" hold  X 15 supine   Hooklying Hip ABD  Loop resistance 10\" x15 Isometric into black loop   Pelvic Tilts            STRENGTHENING      SLR Supine 2  x10 bilat   SLR Abduction 2 x15 Standing hip abd  opp foot on riser  bilat   SLR Adduction      SLR Prone      SLR+      Clams Reverse Clams      Clams in abduction      Bridges            SAQ 3 sets X 10 Supine bilat  2#   LAQ    Held today due to pain in lateral thigh/ITBand      PRE machines      Knee extension      Knee flexion      Leg Press            CKC      Calf raises      Mini squats      Step ups      TKE                  Manual Intervention (39148)      Patellar mobs      PROM x5'  Gentle left hip manual stretch within post op guidelines/circumduction   Longitudinal distraction      STM left quad/ITBand x5'  Theragun/avoiding proximal lateral hip area   Scar Massage x5'  To her left DILAN incision   Foam Roll            NMR re-education (95906)   CUES NEEDED   Biode[employer] balance x6'    x2'  WS side/side and diagonals L 10 2' each  PS fill in [place] L10  LOS L10 medium spread    Tandem balance      SLB      Rebounder      BOSU                        Therapeutic Activity (16782)      Core training      [employer] activities      Monster walks         Patient Education: Dx, prognosis, pt goals/expectations, rehab timeline (10/12/21)  X 8'              TUG TESTED on 10/15/2021            Time to Complete: 23 seconds    TUG TESTED ON 11/2/2021     Time to Complete:  13 seconds    Timed Get Up and Go Test  Measures mobility in people who are able to walk on their own (assistive device permitted)      Instructions: The person may wear their usual footwear and can use any assistive device they normally use. 1. Have the person sit in the chair with their back to the chair and their arms resting on the back rests  2. Ask the person to stand up from a standard chair and walk a distance of 10 ft. (3 m). 3. Have the person turn around, walk back to the chair and sit down again. Timing begins when the person starts to rise from the chair and ends when he or she returns to the chair and sits down.     Predictive Results:    Seconds Rating    <10  Freely mobile    <20  Mostly independent    20-29  Variable mobility    >20  Impaired mobility Source: Shayan Anderson. The timed 'Up and Go' Test: a Test of Basic Functional Mobility for Frail Elderly Persons. Journal of [facility]. 1991;39:142-148. G-Code [place]:  TUG time (seconds) Disability Index CMS Modifier   12 or faster 0% []CH   13-14 1-19% []CI   15-16 20-39% []CJ   17-18 40-59% []CK   19-20  60-79% []CL   21-22  80-99% []CM   23 or slower 100% []CN             Therapeutic Exercise and NMR EXR  [x] (12162) Provided verbal/tactile cueing for activities related to strengthening, flexibility, endurance, ROM for improvements in LE, proximal hip, and core control with self care, mobility, lifting, ambulation. [x] (45105) Provided verbal/tactile cueing for activities related to improving balance, coordination, kinesthetic sense, posture, motor skill, proprioception to assist with LE, proximal hip, and core control in self-care, mobility, lifting, ambulation and eccentric single leg control. NMR and Therapeutic Activities:    [x] (54730 or 34538) Provided verbal/tactile cueing for activities related to improving balance, coordination, kinesthetic sense, posture, motor skill, proprioception and motor activation to allow for proper function of core, proximal hip and LE with self-care and ADLs and functional mobility.   [] (43225) Gait Re-education- Provided training and instruction to the patient for proper LE, core and proximal hip recruitment and positioning and eccentric body weight control with ambulation re-education including up and down stairs     Home Exercise Program:    [x] (52676) Reviewed/Progressed HEP activities related to strengthening, flexibility, endurance, ROM of core, proximal hip and LE for functional self-care, mobility, lifting and ambulation/stair navigation - Pt instructed in HEP through [address] [place] [address] (see below). Pt was also issued written handouts.  (10/12/21)   [x] (62523) Reviewed/Progressed HEP activities related to improving balance, coordination, kinesthetic sense, posture, motor skill, proprioception of core, proximal hip and LE for self-care, mobility, lifting, and ambulation/stair navigation        Access Code: [email or web address]  URL: [email or web address]  Date: 10/12/2021  Prepared by: Mikaela Bhatti    Exercises  Long Sitting Calf Stretch with Strap - 2 x daily - 7 x weekly - 1 sets - 5 reps - 30 seconds hold  Long Sitting Quad Set - 2 x daily - 7 x weekly - 1 sets - 10 reps - 10 seconds hold  Supine Gluteal Sets - 2 x daily - 7 x weekly - 1 sets - 10 reps - 10 seconds hold  Supine Straight Leg Hip Adduction and Quad Set with Ball - 2 x daily - 7 x weekly - 1 sets - 10 reps - 10 seconds hold  Supine Knee Extension Strengthening - 2 x daily - 7 x weekly - 3 sets - 10 reps  Seated Long Arc Quad - 2 x daily - 7 x weekly - 3 sets - 10 reps        Manual Treatments:  PROM / STM / Oscillations-Mobs:  G-I, II, III, IV (PA's, Inf., Post.)  [x] (90131) Provided manual therapy to mobilize LE, proximal hip and/or LS spine soft tissue/joints for the purpose of modulating pain, promoting relaxation, increasing ROM, reducing/eliminating soft tissue swelling/inflammation/restriction, improving soft tissue extensibility and allowing for proper ROM for normal function with self-care, mobility, lifting and ambulation. Modalities:  Gel pack x15'    [] GAME READY (VASO)- for significant edema, swelling, pain control.      Charges:  Timed Code Treatment Minutes: 48'   Total Treatment Minutes: 72'      [] EVAL (LOW) 72173 (typically 20 minutes face-to-face)  [] EVAL (MOD) 24428 (typically 30 minutes face-to-face)  [] EVAL (HIGH) 96013 (typically 45 minutes face-to-face)  [] RE-EVAL     [x] US(58213) x 2 (25')    [] IONTO  [] NMR (29329) x     [] VASO  [x] Manual (63535) x 1 (15')    [] Other:  [x] TA x 1 (10')     [] Mech Traction (50210)  [] ES(attended) (33184)      [] ES (un) (50805):         ASSESSMENT:    She was rather sensitive to the theragun along her ITB.    Kept the pressure very light and avoided directly going over her lateral hip. Updated her objective measurements today, see above for details. Also performed an updated TUG test today. 10 second reduction in time compared to the initial test post op. Also initiated SLRs into flexion today. She did fatigue with this but able to complete 2 sets of 10. Working on strengthening for Tyson Chahal as she is still recovering from her right knee surgery which was about 3 months ago. Skilled therapy is deemed necessary in order to continue to work towards achieving her goals. GOALS:   Patient stated goal: \"Increase strength, be able to walk and not get tired\"   [x] Progressing: [] Met: [] Not Met: [] Adjusted    Therapist goals for Patient:   Short Term Goals: To be achieved in: 2 weeks  1. Independent in HEP and progression per patient tolerance, in order to prevent re-injury. [] Progressing: [x] Met: [] Not Met: [] Adjusted  2. Patient will have a decrease in left hip pain to 3/10 to facilitate improvement in movement, function, and ADLs as indicated by Functional Deficits. [x] Progressing: [] Met: [] Not Met: [] Adjusted    Long Term Goals: To be achieved in: 6 weeks  1. Disability index score of 20% or less for the Hip Outcome Score to assist with reaching prior level of function. [x] Progressing: [] Met: [] Not Met: [] Adjusted  2. Patient will demonstrate an increase in left hip AROM to WFL's to allow for proper joint functioning as indicated by patients Functional Deficits. [] Progressing: [x] Partially Met: [] Not Met: [] Adjusted  3. Patient will demonstrate an increase in left hip strength to 4+/5 to allow for proper functional mobility as indicated by patients Functional Deficits. [x] Progressing: [] Met: [] Not Met: [] Adjusted  4.  Patient will be able to walk community distances, stand at least 1 hour, sit at least 1 hour, and be able to ascend/descend stairs

## 2021-11-04 ENCOUNTER — TREATMENT (OUTPATIENT)
Dept: PHYSICAL THERAPY | Age: 71
End: 2021-11-04
Payer: MEDICARE

## 2021-11-04 DIAGNOSIS — G89.18 ACUTE POSTOPERATIVE PAIN OF LEFT HIP: ICD-10-CM

## 2021-11-04 DIAGNOSIS — M25.652 DECREASED RANGE OF LEFT HIP MOVEMENT: ICD-10-CM

## 2021-11-04 DIAGNOSIS — M16.12 PRIMARY OSTEOARTHRITIS OF LEFT HIP: Primary | ICD-10-CM

## 2021-11-04 DIAGNOSIS — M25.552 ACUTE POSTOPERATIVE PAIN OF LEFT HIP: ICD-10-CM

## 2021-11-04 DIAGNOSIS — R26.2 DIFFICULTY WALKING: ICD-10-CM

## 2021-11-04 DIAGNOSIS — R29.898 WEAKNESS OF LEFT HIP: ICD-10-CM

## 2021-11-04 PROCEDURE — G8427 DOCREV CUR MEDS BY ELIG CLIN: HCPCS | Performed by: PHYSICAL THERAPIST

## 2021-11-04 PROCEDURE — 97110 THERAPEUTIC EXERCISES: CPT | Performed by: PHYSICAL THERAPIST

## 2021-11-04 PROCEDURE — 97140 MANUAL THERAPY 1/> REGIONS: CPT | Performed by: PHYSICAL THERAPIST

## 2021-11-04 PROCEDURE — 97530 THERAPEUTIC ACTIVITIES: CPT | Performed by: PHYSICAL THERAPIST

## 2021-11-04 NOTE — PROGRESS NOTES
[facility]   Phone: [phone]    Fax: [phone]         Date:  2021    Patient Name:  Owen Valdes    :  1950  MRN: <[id number]>  Restrictions/Precautions:    Medical/Treatment Diagnosis Information:  Diagnosis: S/P Left THR (Sx: 10/4/21) (M16.12)   Treatment Diagnosis:   Left Hip Pain (G89.18, M25.552), Decreased Left Hip ROM (M25.652), Decreased Strength (R29.898)                          Insurance/Certification information:  PT Insurance Information: Medicare  Physician Information:  Referring Practitioner: Dr. Modesta Delgado  Has the plan of care been signed (Y/N):        [x]  Yes (POC signed 10/18/21)  []  No      Date of Patient follow up with Physician:         Is this a Progress Report:     []  Yes  []  No        If Yes:  Date Range for reporting period:  Beginning 2021  Ending 2021    Progress report will be due (10 Rx or 30 days whichever is less):      Recertification will be due (POC Duration  / 90 days whichever is less): 2021        Visit # Insurance Allowable Auth Required   8 Medical necessity  (use KX modifier due to pt having prior PT this year) []  Yes [x]  No        Functional Scale:   Date assessed:  10/12/21    Functional Assessment Tool Used:  Hip Outcome Score (copy scanned into media)  Score: 50/72 = 69%      Latex Allergy:  [x]NO      []YES  Preferred Language for Healthcare:   [x]English       []other:      Pain level:  4-6/10      PQRS: 2021  Reviewed medication list with patient. No changes since last PT visit. MEDICARE CAP EXCEPTION DOCUMENTATION      I certify that this patient meets one of the below criteria necessary for becoming an exception to the Medicare cap on therapy services:    []  The patient has a condition identified by an ICD-10 code that has a direct and significant impact on the need for therapy.  (Significantly impacts the rate of recovery.)                   []  The patient has a complexity identified by an ICD-10 code that has a direct and significant impact on the need for therapy. (Significantly impacts the rate of recovery and is associated with a primary condition.)         []  The patient has associated variables that influence the amount of treatment to include:  Social support, self-efficacy/motivation, prognosis, time since onset/acuity. []  The patient has generalized musculoskeletal conditions or a condition affecting multiple sites that will have a direct impact on the rate of recovery. [x]  The patient had a prior episode of outpatient therapy during this calendar year for a different condition. []  The patient has a mental or cognitive disorder in addition to the condition being treated that will have a direct and significant impact on the rate of recovery. SUBJECTIVE:  4+ weeks post op  Patient just saw her hip surgeon earlier today. He agreed and thought we need to keep working on her ITB and overall strengthening. She took a Lyrica yesterday and that seemed to really help. She was able to go out and did some shopping yesterday walking for 1-2 hours and felt pretty good. Still a little tender and sore along her lateral hip and lateral thigh.          OBJECTIVE:     ROM PROM AROM Comments    Left Right Left Right 11/2/2021   Flexion   95 107 supine   Extension        Abduction   22 21 supine   Adduction        ER   20 25 sitting   IR   17 20 sitting             Strength Left Right Comments   Hip flexors Not      Hip extension Tested     Hip abduction Due to     Hip adduction Sx     Hip ER      Hip IR      Quads 4-/5     Hamstrings 4-/5         Flexibility Left Right Comments   Hamstrings Not     ITB (Obers test) Tested     Hip flexor(Sebastian test) Due to     gastroc Sx     Rectus femoris(Elys test)              Special  Test Left Right Comments   FABERS Not     Scour test Tested     Impingement test Due to     Trendelenburg test Sx RESTRICTIONS/PRECAUTIONS:  S/P Left THR (Sx: 10/4/21); follow THR precautions        Exercises/Interventions:       Therapeutic Ex (72200) Sets/sec Reps Notes/CUES   BIKE      TREADMILL            STRETCHING      Hamstrings      Towel Pull Calf 30\" hold X 5    Inclined Calf      Hip Flexors      Quads      ITB Rope      Adductors      Piriformis      Figure four-Push knee out            ROM      Passive      Active      Hang weights      Sheet pulls   Right knee flexion TP 10\"x10   Ankle pumps            ISOMETRICS      Quad sets 10\" hold X 15 seated   Ham sets      Glute sets 10\" hold X 15 supine   Hip ADD  Ball squeeze 10\" hold  X 15 supine   Hooklying Hip ABD  Loop resistance 10\" x20 Isometric into black loop   Pelvic Tilts            STRENGTHENING      SLR Supine 2  x10 bilat   SLR Abduction 2 x15 Standing hip abd  opp foot on riser  bilat   SLR Adduction      SLR Prone      SLR+      Clams      Reverse Clams      Clams in abduction      Hamlet Snuffer            SAQ 3 sets X 10 Supine bilat  2#   LAQ    Held today due to pain in lateral thigh/ITBand      PRE machines      Knee extension      Knee flexion      Leg Press            CKC      Calf raises      Mini squats      Step ups L1-2  Next week or so   TKE                  Manual Intervention (40791)      Patellar mobs      PROM x5'  Gentle left hip manual stretch within post op guidelines/circumduction   Longitudinal distraction      STM left quad/ITBand x5'  Theragun/avoiding proximal lateral hip area   Scar Massage x5'  To her left DILAN incision   Foam Roll            NMR re-education (92351)   CUES NEEDED   Biodex balance x6'    x2'  WS side/side and diagonals L 10 2' each  PS fill in [place] L10  LOS L10 medium spread x3    Tandem balance      SLB      Rebounder      BOSU                        Therapeutic Activity (75422)      Core training      Swiss ball activities      Monster walks         Patient Education: Dx, prognosis, pt goals/expectations, rehab timeline (10/12/21)  X 8'              TUG TESTED on 10/15/2021            Time to Complete: 23 seconds    TUG TESTED ON 11/2/2021     Time to Complete:  13 seconds    Timed Get Up and Go Test  Measures mobility in people who are able to walk on their own (assistive device permitted)      Instructions: The person may wear their usual footwear and can use any assistive device they normally use. 1. Have the person sit in the chair with their back to the chair and their arms resting on the back rests  2. Ask the person to stand up from a standard chair and walk a distance of 10 ft. (3 m). 3. Have the person turn around, walk back to the chair and sit down again. Timing begins when the person starts to rise from the chair and ends when he or she returns to the chair and sits down. Predictive Results:    Seconds Rating    <10  Freely mobile    <20  Mostly independent    20-29  Variable mobility    >20  Impaired mobility     Source: Abdirizak Israel S. The timed 'Up and Go' Test: a Test of Basic Functional Mobility for Frail Elderly Persons. Journal of [facility]. 1991;39:142-148. G-Code Crosswalk:  TUG time (seconds) Disability Index CMS Modifier   12 or faster 0% []CH   13-14 1-19% []CI   15-16 20-39% []CJ   17-18 40-59% []CK   19-20  60-79% []CL   21-22  80-99% []CM   23 or slower 100% []CN             Therapeutic Exercise and NMR EXR  [x] (74116) Provided verbal/tactile cueing for activities related to strengthening, flexibility, endurance, ROM for improvements in LE, proximal hip, and core control with self care, mobility, lifting, ambulation. [x] (91663) Provided verbal/tactile cueing for activities related to improving balance, coordination, kinesthetic sense, posture, motor skill, proprioception to assist with LE, proximal hip, and core control in self-care, mobility, lifting, ambulation and eccentric single leg control.      NMR and Therapeutic Activities:    [x] (78102 or ) Provided verbal/tactile cueing for activities related to improving balance, coordination, kinesthetic sense, posture, motor skill, proprioception and motor activation to allow for proper function of core, proximal hip and LE with self-care and ADLs and functional mobility.   [] (64544) Gait Re-education- Provided training and instruction to the patient for proper LE, core and proximal hip recruitment and positioning and eccentric body weight control with ambulation re-education including up and down stairs     Home Exercise Program:    [x] (76162) Reviewed/Progressed HEP activities related to strengthening, flexibility, endurance, ROM of core, proximal hip and LE for functional self-care, mobility, lifting and ambulation/stair navigation - Pt instructed in HEP through [address] [place] [address] (see below). Pt was also issued written handouts. (10/12/21)   [x] (65584) Reviewed/Progressed HEP activities related to improving balance, coordination, kinesthetic sense, posture, motor skill, proprioception of core, proximal hip and LE for self-care, mobility, lifting, and ambulation/stair navigation        Access Code: [email or web address]  URL: [email or web address]  Date: 10/12/2021  Prepared by: Shahbaz Feng    Exercises  Long Sitting Calf Stretch with Strap - 2 x daily - 7 x weekly - 1 sets - 5 reps - 30 seconds hold  Long Sitting Quad Set - 2 x daily - 7 x weekly - 1 sets - 10 reps - 10 seconds hold  Supine Gluteal Sets - 2 x daily - 7 x weekly - 1 sets - 10 reps - 10 seconds hold  Supine Straight Leg Hip Adduction and Quad Set with Ball - 2 x daily - 7 x weekly - 1 sets - 10 reps - 10 seconds hold  Supine Knee Extension Strengthening - 2 x daily - 7 x weekly - 3 sets - 10 reps  Seated Long Arc Quad - 2 x daily - 7 x weekly - 3 sets - 10 reps        Manual Treatments:  PROM / STM / Oscillations-Mobs:  G-I, II, III, IV (PA's, Inf., Post.)  [x] (26045) Provided manual therapy to mobilize LE, proximal hip and/or LS spine soft tissue/joints for the purpose of modulating pain, promoting relaxation, increasing ROM, reducing/eliminating soft tissue swelling/inflammation/restriction, improving soft tissue extensibility and allowing for proper ROM for normal function with self-care, mobility, lifting and ambulation. Modalities:  Gel pack x15'    [] GAME READY (VASO)- for significant edema, swelling, pain control. Charges:  Timed Code Treatment Minutes: 48'   Total Treatment Minutes: 72'      [] EVAL (LOW) 82163 (typically 20 minutes face-to-face)  [] EVAL (MOD) 97733 (typically 30 minutes face-to-face)  [] EVAL (HIGH) 67448 (typically 45 minutes face-to-face)  [] RE-EVAL     [x] BQ(56524) x 2 (25')    [] IONTO  [] NMR (16840) x     [] VASO  [x] Manual (22207) x 1 (15')    [] Other:  [x] TA x 1 (10')     [] Mech Traction (95971)  [] ES(attended) (86735)      [] ES (un) (13585):         ASSESSMENT:    She tolerated the theragun better today. Still has some tenderness around her lateral hip and incision area, but not as much as earlier this week. She demonstrates good ROM throughout her left hip. She does seem to walk better after the Biodex, that really helps to turn on her hip musculature. She was fatigued post workout. GOALS:   Patient stated goal: \"Increase strength, be able to walk and not get tired\"   [x] Progressing: [] Met: [] Not Met: [] Adjusted    Therapist goals for Patient:   Short Term Goals: To be achieved in: 2 weeks  1. Independent in HEP and progression per patient tolerance, in order to prevent re-injury. [] Progressing: [x] Met: [] Not Met: [] Adjusted  2. Patient will have a decrease in left hip pain to 3/10 to facilitate improvement in movement, function, and ADLs as indicated by Functional Deficits. [x] Progressing: [] Met: [] Not Met: [] Adjusted    Long Term Goals: To be achieved in: 6 weeks  1.  Disability index score of 20% or less for the Hip Outcome Score to assist with reaching prior level of function. [x] Progressing: [] Met: [] Not Met: [] Adjusted  2. Patient will demonstrate an increase in left hip AROM to WFL's to allow for proper joint functioning as indicated by patients Functional Deficits. [] Progressing: [x] Partially Met: [] Not Met: [] Adjusted  3. Patient will demonstrate an increase in left hip strength to 4+/5 to allow for proper functional mobility as indicated by patients Functional Deficits. [x] Progressing: [] Met: [] Not Met: [] Adjusted  4. Patient will be able to walk community distances, stand at least 1 hour, sit at least 1 hour, and be able to ascend/descend stairs reciprocally without increased symptoms or restriction. (patient specific functional goal)   [x] Progressing: [] Met: [] Not Met: [] Adjusted  5. Patient will have a decrease in left hip pain to 0-1/10 with daily act. [x] Progressing: [] Met: [] Not Met: [] Adjusted    GOALS UPDATED 11/2/2021        Overall Progression Towards Functional goals/ Treatment Progress Update:  [x] Patient is progressing as expected towards functional goals listed. [x] Progression is slowed due to complexities/Impairments listed. [x] Progression has been slowed due to co-morbidities.  (Had Right TKA 8/11/2021)  [] Plan just implemented, too soon to assess goals progression <30days   [] Goals require adjustment due to lack of progress  [] Patient is not progressing as expected and requires additional follow up with physician  [] Other    Prognosis for POC: [x] Good [] Fair  [] Poor      Patient requires continued skilled intervention: [x] Yes  [] No    Treatment/Activity Tolerance:  [x] Patient able to complete treatment  [] Patient limited by fatigue  [x] Patient limited by pain    [] Patient limited by other medical complications  [] Other:         PLAN: See eval.   Cont 2x/week    [] Continue per plan of care [] Alter current plan   [x] Plan of care initiated [] Hold pending MD visit [] Discharge      Electronically signed by:     Annita Mathews  PT #[id number]  [place] [facility] #[id number]      Note: If patient does not return for scheduled/ recommended follow up visits, this note will serve as a discharge from care along with most recent update on progress.

## 2021-11-11 ENCOUNTER — PATIENT MESSAGE (OUTPATIENT)
Dept: CARDIOLOGY CLINIC | Age: 71
End: 2021-11-11

## 2021-11-11 ENCOUNTER — TREATMENT (OUTPATIENT)
Dept: PHYSICAL THERAPY | Age: 71
End: 2021-11-11
Payer: MEDICARE

## 2021-11-11 DIAGNOSIS — M25.552 ACUTE POSTOPERATIVE PAIN OF LEFT HIP: ICD-10-CM

## 2021-11-11 DIAGNOSIS — M25.652 DECREASED RANGE OF LEFT HIP MOVEMENT: ICD-10-CM

## 2021-11-11 DIAGNOSIS — G89.18 ACUTE POSTOPERATIVE PAIN OF LEFT HIP: ICD-10-CM

## 2021-11-11 DIAGNOSIS — R29.898 WEAKNESS OF LEFT HIP: ICD-10-CM

## 2021-11-11 DIAGNOSIS — M16.12 PRIMARY OSTEOARTHRITIS OF LEFT HIP: Primary | ICD-10-CM

## 2021-11-11 DIAGNOSIS — R26.2 DIFFICULTY WALKING: ICD-10-CM

## 2021-11-11 PROCEDURE — 97110 THERAPEUTIC EXERCISES: CPT | Performed by: PHYSICAL THERAPIST

## 2021-11-11 PROCEDURE — 97530 THERAPEUTIC ACTIVITIES: CPT | Performed by: PHYSICAL THERAPIST

## 2021-11-11 PROCEDURE — 97140 MANUAL THERAPY 1/> REGIONS: CPT | Performed by: PHYSICAL THERAPIST

## 2021-11-11 PROCEDURE — G8427 DOCREV CUR MEDS BY ELIG CLIN: HCPCS | Performed by: PHYSICAL THERAPIST

## 2021-11-11 NOTE — PROGRESS NOTES
Kamran Agarwal [facility]   Phone: [phone]    Fax: [phone]         Date:  2021    Patient Name:  Cinthya Dimas    :  1950  MRN: <[id number]>  Restrictions/Precautions:    Medical/Treatment Diagnosis Information:  Diagnosis: S/P Left THR (Sx: 10/4/21) (M16.12)   Treatment Diagnosis:   Left Hip Pain (G89.18, M25.552), Decreased Left Hip ROM (M25.652), Decreased Strength (R29.898)                          Insurance/Certification information:  PT Insurance Information: Medicare  Physician Information:  Referring Practitioner: Dr. Hector Gtz  Has the plan of care been signed (Y/N):        [x]  Yes (POC signed 10/18/21)  []  No      Date of Patient follow up with Physician:         Is this a Progress Report:     []  Yes  []  No        If Yes:  Date Range for reporting period:  Beginning 2021  Ending 2021    Progress report will be due (10 Rx or 30 days whichever is less):      Recertification will be due (POC Duration  / 90 days whichever is less): 2021        Visit # Insurance Allowable Auth Required   9 Medical necessity  (use KX modifier due to pt having prior PT this year) []  Yes [x]  No        Functional Scale:   Date assessed:  10/12/21    Functional Assessment Tool Used:  Hip Outcome Score (copy scanned into media)  Score: 50/72 = 69%      Latex Allergy:  [x]NO      []YES  Preferred Language for Healthcare:   [x]English       []other:      Pain level:  4-6/10      PQRS: 2021  Reviewed medication list with patient. No changes since last PT visit. MEDICARE CAP EXCEPTION DOCUMENTATION      I certify that this patient meets one of the below criteria necessary for becoming an exception to the Medicare cap on therapy services:    []  The patient has a condition identified by an ICD-10 code that has a direct and significant impact on the need for therapy.  (Significantly impacts the rate of recovery.)                   []  The patient has a complexity identified by an ICD-10 code that has a direct and significant impact on the need for therapy. (Significantly impacts the rate of recovery and is associated with a primary condition.)         []  The patient has associated variables that influence the amount of treatment to include:  Social support, self-efficacy/motivation, prognosis, time since onset/acuity. []  The patient has generalized musculoskeletal conditions or a condition affecting multiple sites that will have a direct impact on the rate of recovery. [x]  The patient had a prior episode of outpatient therapy during this calendar year for a different condition. []  The patient has a mental or cognitive disorder in addition to the condition being treated that will have a direct and significant impact on the rate of recovery. SUBJECTIVE:  5+ weeks post op  Patient reports that her left lateral hip and thigh are feeling much better. She states that it finally started feeling better earlier this week. She also reports that she is going for orientation for a  position at [employer].           OBJECTIVE:     ROM PROM AROM Comments    Left Right Left Right 11/2/2021   Flexion   95 107 supine   Extension        Abduction   22 21 supine   Adduction        ER   20 25 sitting   IR   17 20 sitting             Strength Left Right Comments   Hip flexors Not      Hip extension Tested     Hip abduction Due to     Hip adduction Sx     Hip ER      Hip IR      Quads 4-/5     Hamstrings 4-/5         Flexibility Left Right Comments   Hamstrings Not     ITB (Obers test) Tested     Hip flexor(Sebastian test) Due to     gastroc Sx     Rectus femoris(Elys test)              Special  Test Left Right Comments   FABERS Not     Scour test Tested     Impingement test Due to     Trendelenburg test Sx               RESTRICTIONS/PRECAUTIONS:  S/P Left THR (Sx: 10/4/21); follow THR precautions        Exercises/Interventions: and Go Test  Measures mobility in people who are able to walk on their own (assistive device permitted)      Instructions: The person may wear their usual footwear and can use any assistive device they normally use. 1. Have the person sit in the chair with their back to the chair and their arms resting on the back rests  2. Ask the person to stand up from a standard chair and walk a distance of 10 ft. (3 m). 3. Have the person turn around, walk back to the chair and sit down again. Timing begins when the person starts to rise from the chair and ends when he or she returns to the chair and sits down. Predictive Results:    Seconds Rating    <10  Freely mobile    <20  Mostly independent    20-29  Variable mobility    >20  Impaired mobility     Source: Teofilo Israel SHaley The timed 'Up and Go' Test: a Test of Basic Functional Mobility for Frail Elderly Persons. Journal of [facility]. 1991;39:142-148. G-Code Crosswalk:  TUG time (seconds) Disability Index CMS Modifier   12 or faster 0% []CH   13-14 1-19% []CI   15-16 20-39% []CJ   17-18 40-59% []CK   19-20  60-79% []CL   21-22  80-99% []CM   23 or slower 100% []CN             Therapeutic Exercise and NMR EXR  [x] (90226) Provided verbal/tactile cueing for activities related to strengthening, flexibility, endurance, ROM for improvements in LE, proximal hip, and core control with self care, mobility, lifting, ambulation. [x] (64177) Provided verbal/tactile cueing for activities related to improving balance, coordination, kinesthetic sense, posture, motor skill, proprioception to assist with LE, proximal hip, and core control in self-care, mobility, lifting, ambulation and eccentric single leg control.      NMR and Therapeutic Activities:    [x] (98176 or 09869) Provided verbal/tactile cueing for activities related to improving balance, coordination, kinesthetic sense, posture, motor skill, proprioception and motor activation to allow for proper function of core, proximal hip and LE with self-care and ADLs and functional mobility.   [] (52703) Gait Re-education- Provided training and instruction to the patient for proper LE, core and proximal hip recruitment and positioning and eccentric body weight control with ambulation re-education including up and down stairs     Home Exercise Program:    [x] (31966) Reviewed/Progressed HEP activities related to strengthening, flexibility, endurance, ROM of core, proximal hip and LE for functional self-care, mobility, lifting and ambulation/stair navigation - Pt instructed in HEP through [address] [place] [address] (see below). Pt was also issued written handouts. (10/12/21)   [x] (47640) Reviewed/Progressed HEP activities related to improving balance, coordination, kinesthetic sense, posture, motor skill, proprioception of core, proximal hip and LE for self-care, mobility, lifting, and ambulation/stair navigation        Access Code: [email or web address]  URL: [email or web address]  Date: 10/12/2021  Prepared by: Sara Nunn    Exercises  Long Sitting Calf Stretch with Strap - 2 x daily - 7 x weekly - 1 sets - 5 reps - 30 seconds hold  Long Sitting Quad Set - 2 x daily - 7 x weekly - 1 sets - 10 reps - 10 seconds hold  Supine Gluteal Sets - 2 x daily - 7 x weekly - 1 sets - 10 reps - 10 seconds hold  Supine Straight Leg Hip Adduction and Quad Set with Ball - 2 x daily - 7 x weekly - 1 sets - 10 reps - 10 seconds hold  Supine Knee Extension Strengthening - 2 x daily - 7 x weekly - 3 sets - 10 reps  Seated Long Arc Quad - 2 x daily - 7 x weekly - 3 sets - 10 reps        Manual Treatments:  PROM / STM / Oscillations-Mobs:  G-I, II, III, IV (PA's, Inf., Post.)  [x] (70728) Provided manual therapy to mobilize LE, proximal hip and/or LS spine soft tissue/joints for the purpose of modulating pain, promoting relaxation, increasing ROM, reducing/eliminating soft tissue swelling/inflammation/restriction, improving soft tissue extensibility and allowing for proper ROM for normal function with self-care, mobility, lifting and ambulation. Modalities:  Gel pack x15'    [] GAME READY (VASO)- for significant edema, swelling, pain control. Charges:  Timed Code Treatment Minutes: 48'   Total Treatment Minutes: 72'      [] EVAL (LOW) 35715 (typically 20 minutes face-to-face)  [] EVAL (MOD) 17239 (typically 30 minutes face-to-face)  [] EVAL (HIGH) 58397 (typically 45 minutes face-to-face)  [] RE-EVAL     [x] KV(72164) x 2 (25')    [] IONTO  [] NMR (10136) x     [] VASO  [x] Manual (96954) x 1 (10')    [] Other:  [x] TA x 1 (15')     [] Mech Traction (63201)  [] ES(attended) (76672)      [] ES (un) (24890):         ASSESSMENT:    No longer has tenderness along left ITB or lateral hip. Her incision is still a bit thickened, especially on the proximal end. She seems to struggle more with SLR on the right vs the left (increased lag). We also added in step ups today. This showed more weakness on the left. Overall, better control with dynamic balance on the Biodex today. GOALS:   Patient stated goal: \"Increase strength, be able to walk and not get tired\"   [x] Progressing: [] Met: [] Not Met: [] Adjusted    Therapist goals for Patient:   Short Term Goals: To be achieved in: 2 weeks  1. Independent in HEP and progression per patient tolerance, in order to prevent re-injury. [] Progressing: [x] Met: [] Not Met: [] Adjusted  2. Patient will have a decrease in left hip pain to 3/10 to facilitate improvement in movement, function, and ADLs as indicated by Functional Deficits. [x] Progressing: [] Met: [] Not Met: [] Adjusted    Long Term Goals: To be achieved in: 6 weeks  1. Disability index score of 20% or less for the Hip Outcome Score to assist with reaching prior level of function. [x] Progressing: [] Met: [] Not Met: [] Adjusted  2.  Patient will demonstrate an increase in left hip AROM to WFL's to allow for proper joint functioning as indicated by patients Functional Deficits. [] Progressing: [x] Partially Met: [] Not Met: [] Adjusted  3. Patient will demonstrate an increase in left hip strength to 4+/5 to allow for proper functional mobility as indicated by patients Functional Deficits. [x] Progressing: [] Met: [] Not Met: [] Adjusted  4. Patient will be able to walk community distances, stand at least 1 hour, sit at least 1 hour, and be able to ascend/descend stairs reciprocally without increased symptoms or restriction. (patient specific functional goal)   [x] Progressing: [] Met: [] Not Met: [] Adjusted  5. Patient will have a decrease in left hip pain to 0-1/10 with daily act. [x] Progressing: [] Met: [] Not Met: [] Adjusted    GOALS UPDATED 11/2/2021        Overall Progression Towards Functional goals/ Treatment Progress Update:  [x] Patient is progressing as expected towards functional goals listed. [x] Progression is slowed due to complexities/Impairments listed. [x] Progression has been slowed due to co-morbidities.  (Had Right TKA 8/11/2021)  [] Plan just implemented, too soon to assess goals progression <30days   [] Goals require adjustment due to lack of progress  [] Patient is not progressing as expected and requires additional follow up with physician  [] Other    Prognosis for POC: [x] Good [] Fair  [] Poor      Patient requires continued skilled intervention: [x] Yes  [] No    Treatment/Activity Tolerance:  [x] Patient able to complete treatment  [] Patient limited by fatigue  [x] Patient limited by pain    [] Patient limited by other medical complications  [] Other:         PLAN: See eval.   Cont 2x/week    [] Continue per plan of care [] Alter current plan   [x] Plan of care initiated [] Hold pending MD visit [] Discharge      Electronically signed by:     Arnie Aleman, [address] #[id number]  [place] [facility] #[id number]      Note: If patient does not return for scheduled/ recommended follow up visits, this note will serve as a discharge from care along with most recent update on progress.

## 2021-11-12 RX ORDER — AMLODIPINE BESYLATE 5 MG/1
5 TABLET ORAL 2 TIMES DAILY
Qty: 180 TABLET | Refills: 1 | Status: SHIPPED | OUTPATIENT
Start: 2021-11-12 | End: 2022-07-05

## 2021-11-12 RX ORDER — LISINOPRIL 20 MG/1
20 TABLET ORAL 2 TIMES DAILY
Qty: 180 TABLET | Refills: 1 | Status: SHIPPED | OUTPATIENT
Start: 2021-11-12 | End: 2022-07-27

## 2021-11-16 ENCOUNTER — TREATMENT (OUTPATIENT)
Dept: PHYSICAL THERAPY | Age: 71
End: 2021-11-16
Payer: MEDICARE

## 2021-11-16 DIAGNOSIS — M16.12 PRIMARY OSTEOARTHRITIS OF LEFT HIP: Primary | ICD-10-CM

## 2021-11-16 DIAGNOSIS — R26.2 DIFFICULTY WALKING: ICD-10-CM

## 2021-11-16 DIAGNOSIS — M25.552 ACUTE POSTOPERATIVE PAIN OF LEFT HIP: ICD-10-CM

## 2021-11-16 DIAGNOSIS — M25.652 DECREASED RANGE OF LEFT HIP MOVEMENT: ICD-10-CM

## 2021-11-16 DIAGNOSIS — R29.898 WEAKNESS OF LEFT HIP: ICD-10-CM

## 2021-11-16 DIAGNOSIS — G89.18 ACUTE POSTOPERATIVE PAIN OF LEFT HIP: ICD-10-CM

## 2021-11-16 PROCEDURE — 97530 THERAPEUTIC ACTIVITIES: CPT | Performed by: PHYSICAL THERAPIST

## 2021-11-16 PROCEDURE — 97110 THERAPEUTIC EXERCISES: CPT | Performed by: PHYSICAL THERAPIST

## 2021-11-16 PROCEDURE — G8427 DOCREV CUR MEDS BY ELIG CLIN: HCPCS | Performed by: PHYSICAL THERAPIST

## 2021-11-16 PROCEDURE — 97140 MANUAL THERAPY 1/> REGIONS: CPT | Performed by: PHYSICAL THERAPIST

## 2021-11-16 NOTE — PROGRESS NOTES
[facility]   Phone: [phone]    Fax: [phone]         Date:  2021    Patient Name:  Jensen Millard    :  1950  MRN: <[id number]>  Restrictions/Precautions:    Medical/Treatment Diagnosis Information:  Diagnosis: S/P Left THR (Sx: 10/4/21) (M16.12)   Treatment Diagnosis:   Left Hip Pain (G89.18, M25.552), Decreased Left Hip ROM (M25.652), Decreased Strength (R29.898)                          Insurance/Certification information:  PT Insurance Information: Medicare  Physician Information:  Referring Practitioner: Dr. Dwight Baumgarten  Has the plan of care been signed (Y/N):        [x]  Yes (POC signed 10/18/21)  []  No      Date of Patient follow up with Physician:         Is this a Progress Report:     []  Yes  []  No        If Yes:  Date Range for reporting period:  Beginning 2021  Ending 2021    Progress report will be due (10 Rx or 30 days whichever is less):  7320    Recertification will be due (POC Duration  / 90 days whichever is less): 2021        Visit # Insurance Allowable Auth Required   10 Medical necessity  (use KX modifier due to pt having prior PT this year) []  Yes [x]  No        Functional Scale:   Date assessed:  10/12/21    Functional Assessment Tool Used:  Hip Outcome Score (copy scanned into media)  Score: 50/72 = 69%      Latex Allergy:  [x]NO      []YES  Preferred Language for Healthcare:   [x]English       []other:      Pain level:  4-6/10      PQRS: 2021  Reviewed medication list with patient. No changes since last PT visit. MEDICARE CAP EXCEPTION DOCUMENTATION      I certify that this patient meets one of the below criteria necessary for becoming an exception to the Medicare cap on therapy services:    []  The patient has a condition identified by an ICD-10 code that has a direct and significant impact on the need for therapy.  (Significantly impacts the rate of recovery.)                   []  The patient has a complexity identified by an ICD-10 code that has a direct and significant impact on the need for therapy. (Significantly impacts the rate of recovery and is associated with a primary condition.)         []  The patient has associated variables that influence the amount of treatment to include:  Social support, self-efficacy/motivation, prognosis, time since onset/acuity. []  The patient has generalized musculoskeletal conditions or a condition affecting multiple sites that will have a direct impact on the rate of recovery. [x]  The patient had a prior episode of outpatient therapy during this calendar year for a different condition. []  The patient has a mental or cognitive disorder in addition to the condition being treated that will have a direct and significant impact on the rate of recovery. SUBJECTIVE:  6+ weeks post op  She reports that she started to work at [employer] this week as of today. She was there about 4 hours today. States her knees are a little stiff. Her hip and thigh have been feeling ok.           OBJECTIVE:     ROM PROM AROM Comments    Left Right Left Right 11/2/2021   Flexion   95 107 supine   Extension        Abduction   22 21 supine   Adduction        ER   20 25 sitting   IR   17 20 sitting             Strength Left Right Comments   Hip flexors Not      Hip extension Tested     Hip abduction Due to     Hip adduction Sx     Hip ER      Hip IR      Quads 4-/5     Hamstrings 4-/5         Flexibility Left Right Comments   Hamstrings Not     ITB (Obers test) Tested     Hip flexor(Sebastian test) Due to     gastroc Sx     Rectus femoris(Elys test)              Special  Test Left Right Comments   FABERS Not     Scour test Tested     Impingement test Due to     Trendelenburg test Sx               RESTRICTIONS/PRECAUTIONS:  S/P Left THR (Sx: 10/4/21); follow THR precautions        Exercises/Interventions:       Therapeutic Ex (30005) Sets/sec Reps Notes/CUES   BIKE TREADMILL            STRETCHING      Hamstrings      Towel Pull Calf 30\" hold X 5    Inclined Calf      Hip Flexors      Quads      ITB Rope      Adductors      Piriformis      Figure four-Push knee out            ROM      Passive      Active      Hang weights      Sheet pulls   Right knee flexion TP 10\"x10   Ankle pumps            ISOMETRICS      Quad sets 10\" hold X 15 seated   Ham sets      Glute sets 10\" hold X 15 supine   Hip ADD  Ball squeeze 5\" hold  X 30 supine   Hooklying Hip ABD  Loop resistance 5\" x30 Isometric into black loop   Pelvic Tilts            STRENGTHENING      SLR Supine 2  x10 bilat   SLR Abduction 2 x15 Standing hip abd  opp foot on riser  bilat   SLR Adduction      SLR Prone      SLR+      Clams      Reverse Clams      Clams in abduction      Bridges            SAQ 3 sets X 10 Supine bilat  3#   LAQ    Held today due to pain in lateral thigh/ITBand      PRE machines      Knee extension      Knee flexion      Leg Press            CKC      Calf raises      Mini squats      Step ups L2 2x10 Leading with each LE   TKE                  Manual Intervention (77092)      Patellar mobs      PROM x5'  Gentle left hip manual stretch within post op guidelines/circumduction   Longitudinal distraction      Scar Massage x5'  To her left DILAN incision   Foam Roll            NMR re-education (17410)   CUES NEEDED   Biodex balance x6'    x2'  WS side/side and diagonals L 10 2' each  PS fill in Platinum L10  LOS L10 medium spread x3    Tandem balance      SLB      Rebounder      BOSU                        Therapeutic Activity (10464)      Core training      Swiss ball activities      Monster walks         Patient Education: Dx, prognosis, pt goals/expectations, rehab timeline (10/12/21)  X 8'              TUG TESTED on 10/15/2021            Time to Complete: 23 seconds    TUG TESTED ON 11/2/2021     Time to Complete:  13 seconds    Timed Get Up and Go Test  Measures mobility in people who are able to walk on their own (assistive device permitted)      Instructions: The person may wear their usual footwear and can use any assistive device they normally use. 1. Have the person sit in the chair with their back to the chair and their arms resting on the back rests  2. Ask the person to stand up from a standard chair and walk a distance of 10 ft. (3 m). 3. Have the person turn around, walk back to the chair and sit down again. Timing begins when the person starts to rise from the chair and ends when he or she returns to the chair and sits down. Predictive Results:    Seconds Rating    <10  Freely mobile    <20  Mostly independent    20-29  Variable mobility    >20  Impaired mobility     Source: Ervin Israel S. The timed 'Up and Go' Test: a Test of Basic Functional Mobility for Frail Elderly Persons. Journal of [address] [place] [address]. 1991;39:142-148. G-Code Crosswalk:  TUG time (seconds) Disability Index CMS Modifier   12 or faster 0% []CH   13-14 1-19% []CI   15-16 20-39% []CJ   17-18 40-59% []CK   19-20  60-79% []CL   21-22  80-99% []CM   23 or slower 100% []CN             Therapeutic Exercise and NMR EXR  [x] (26712) Provided verbal/tactile cueing for activities related to strengthening, flexibility, endurance, ROM for improvements in LE, proximal hip, and core control with self care, mobility, lifting, ambulation. [x] (16454) Provided verbal/tactile cueing for activities related to improving balance, coordination, kinesthetic sense, posture, motor skill, proprioception to assist with LE, proximal hip, and core control in self-care, mobility, lifting, ambulation and eccentric single leg control.      NMR and Therapeutic Activities:    [x] (51766 or 14166) Provided verbal/tactile cueing for activities related to improving balance, coordination, kinesthetic sense, posture, motor skill, proprioception and motor activation to allow for proper function of core, proximal hip and LE with self-care and ADLs and functional mobility.   [] (33924) Gait Re-education- Provided training and instruction to the patient for proper LE, core and proximal hip recruitment and positioning and eccentric body weight control with ambulation re-education including up and down stairs     Home Exercise Program:    [x] (44562) Reviewed/Progressed HEP activities related to strengthening, flexibility, endurance, ROM of core, proximal hip and LE for functional self-care, mobility, lifting and ambulation/stair navigation - Pt instructed in HEP through [address] [place] [address] (see below). Pt was also issued written handouts. (10/12/21)   [x] (36539) Reviewed/Progressed HEP activities related to improving balance, coordination, kinesthetic sense, posture, motor skill, proprioception of core, proximal hip and LE for self-care, mobility, lifting, and ambulation/stair navigation        Access Code: [email or web address]  URL: [email or web address]  Date: 10/12/2021  Prepared by: Chidi Parks    Exercises  Long Sitting Calf Stretch with Strap - 2 x daily - 7 x weekly - 1 sets - 5 reps - 30 seconds hold  Long Sitting Quad Set - 2 x daily - 7 x weekly - 1 sets - 10 reps - 10 seconds hold  Supine Gluteal Sets - 2 x daily - 7 x weekly - 1 sets - 10 reps - 10 seconds hold  Supine Straight Leg Hip Adduction and Quad Set with Ball - 2 x daily - 7 x weekly - 1 sets - 10 reps - 10 seconds hold  Supine Knee Extension Strengthening - 2 x daily - 7 x weekly - 3 sets - 10 reps  Seated Long Arc Quad - 2 x daily - 7 x weekly - 3 sets - 10 reps        Manual Treatments:  PROM / STM / Oscillations-Mobs:  G-I, II, III, IV (PA's, Inf., Post.)  [x] (32009) Provided manual therapy to mobilize LE, proximal hip and/or LS spine soft tissue/joints for the purpose of modulating pain, promoting relaxation, increasing ROM, reducing/eliminating soft tissue swelling/inflammation/restriction, improving soft tissue extensibility and allowing for proper ROM for normal function with self-care, mobility, lifting and ambulation. Modalities:  Gel pack x15'    [] GAME READY (VASO)- for significant edema, swelling, pain control. Charges:  Timed Code Treatment Minutes: 50'   Total Treatment Minutes: 72'      [] EVAL (LOW) [phone] (typically 20 minutes face-to-face)  [] EVAL (MOD) 52927 (typically 30 minutes face-to-face)  [] EVAL (HIGH) 34693 (typically 45 minutes face-to-face)  [] RE-EVAL     [x] EP(67772) x 2 (25')    [] IONTO  [] NMR (13256) x     [] VASO  [x] Manual (97515) x 1 (10')    [] Other:  [x] TA x 1 (15')     [] Mech Traction (29075)  [] ES(attended) (70305)      [] ES (un) (56737):         ASSESSMENT:    Very mild soreness along her ITB with STM today. Spent some time working on her incision with scar massage. She was a bit more fatigued in general today due to being up on her feet at work. She was still able to work through all her activities. Advised her to try and sit when she can. GOALS:   Patient stated goal: \"Increase strength, be able to walk and not get tired\"   [x] Progressing: [] Met: [] Not Met: [] Adjusted    Therapist goals for Patient:   Short Term Goals: To be achieved in: 2 weeks  1. Independent in HEP and progression per patient tolerance, in order to prevent re-injury. [] Progressing: [x] Met: [] Not Met: [] Adjusted  2. Patient will have a decrease in left hip pain to 3/10 to facilitate improvement in movement, function, and ADLs as indicated by Functional Deficits. [x] Progressing: [] Met: [] Not Met: [] Adjusted    Long Term Goals: To be achieved in: 6 weeks  1. Disability index score of 20% or less for the Hip Outcome Score to assist with reaching prior level of function. [x] Progressing: [] Met: [] Not Met: [] Adjusted  2. Patient will demonstrate an increase in left hip AROM to WFL's to allow for proper joint functioning as indicated by patients Functional Deficits.    [] Progressing: [x] Partially Met: [] Not Met: [] Adjusted  3. Patient will demonstrate an increase in left hip strength to 4+/5 to allow for proper functional mobility as indicated by patients Functional Deficits. [x] Progressing: [] Met: [] Not Met: [] Adjusted  4. Patient will be able to walk community distances, stand at least 1 hour, sit at least 1 hour, and be able to ascend/descend stairs reciprocally without increased symptoms or restriction. (patient specific functional goal)   [x] Progressing: [] Met: [] Not Met: [] Adjusted  5. Patient will have a decrease in left hip pain to 0-1/10 with daily act. [x] Progressing: [] Met: [] Not Met: [] Adjusted    GOALS UPDATED 11/2/2021        Overall Progression Towards Functional goals/ Treatment Progress Update:  [x] Patient is progressing as expected towards functional goals listed. [x] Progression is slowed due to complexities/Impairments listed. [x] Progression has been slowed due to co-morbidities. (Had Right TKA 8/11/2021)  [] Plan just implemented, too soon to assess goals progression <30days   [] Goals require adjustment due to lack of progress  [] Patient is not progressing as expected and requires additional follow up with physician  [] Other    Prognosis for POC: [x] Good [] Fair  [] Poor      Patient requires continued skilled intervention: [x] Yes  [] No    Treatment/Activity Tolerance:  [x] Patient able to complete treatment  [] Patient limited by fatigue  [x] Patient limited by pain    [] Patient limited by other medical complications  [] Other:         PLAN: See eval.   Cont 2x/week    [] Continue per plan of care [] Alter current plan   [x] Plan of care initiated [] Hold pending MD visit [] Discharge      Electronically signed by:     Annita Plummer  PT #[id number]  [place] [facility] #[id number]      Note: If patient does not return for scheduled/ recommended follow up visits, this note will serve as a discharge from care along with most recent update on progress.

## 2021-11-19 ENCOUNTER — TREATMENT (OUTPATIENT)
Dept: PHYSICAL THERAPY | Age: 71
End: 2021-11-19
Payer: MEDICARE

## 2021-11-19 DIAGNOSIS — R26.2 DIFFICULTY WALKING: ICD-10-CM

## 2021-11-19 DIAGNOSIS — R29.898 WEAKNESS OF LEFT HIP: ICD-10-CM

## 2021-11-19 DIAGNOSIS — G89.18 ACUTE POSTOPERATIVE PAIN OF LEFT HIP: ICD-10-CM

## 2021-11-19 DIAGNOSIS — M25.652 DECREASED RANGE OF LEFT HIP MOVEMENT: ICD-10-CM

## 2021-11-19 DIAGNOSIS — M25.552 ACUTE POSTOPERATIVE PAIN OF LEFT HIP: ICD-10-CM

## 2021-11-19 DIAGNOSIS — M16.12 PRIMARY OSTEOARTHRITIS OF LEFT HIP: Primary | ICD-10-CM

## 2021-11-19 PROCEDURE — 97140 MANUAL THERAPY 1/> REGIONS: CPT | Performed by: PHYSICAL THERAPIST

## 2021-11-19 PROCEDURE — 97110 THERAPEUTIC EXERCISES: CPT | Performed by: PHYSICAL THERAPIST

## 2021-11-19 PROCEDURE — G8427 DOCREV CUR MEDS BY ELIG CLIN: HCPCS | Performed by: PHYSICAL THERAPIST

## 2021-11-19 PROCEDURE — 97530 THERAPEUTIC ACTIVITIES: CPT | Performed by: PHYSICAL THERAPIST

## 2021-11-19 NOTE — PROGRESS NOTES
[facility]   Phone: [phone]    Fax: [phone]         Date:  2021    Patient Name:  Khloe Gar    :  1950  MRN: <[id number]>  Restrictions/Precautions:    Medical/Treatment Diagnosis Information:  Diagnosis: S/P Left THR (Sx: 10/4/21) (M16.12)   Treatment Diagnosis:   Left Hip Pain (G89.18, M25.552), Decreased Left Hip ROM (M25.652), Decreased Strength (R29.898)                          Insurance/Certification information:  PT Insurance Information: Medicare  Physician Information:  Referring Practitioner: Dr. Cherelle Mccoy  Has the plan of care been signed (Y/N):        [x]  Yes (POC signed 10/18/21)  []  No      Date of Patient follow up with Physician:         Is this a Progress Report:     []  Yes  []  No        If Yes:  Date Range for reporting period:  Beginning 2021  Ending 2021    Progress report will be due (10 Rx or 30 days whichever is less):  93/3/0732    Recertification will be due (POC Duration  / 90 days whichever is less): 2021        Visit # Insurance Allowable Auth Required   11 Medical necessity  (use KX modifier due to pt having prior PT this year) []  Yes [x]  No        Functional Scale:   Date assessed:  10/12/21    Functional Assessment Tool Used:  Hip Outcome Score (copy scanned into media)  Score: 50/72 = 69%      Latex Allergy:  [x]NO      []YES  Preferred Language for Healthcare:   [x]English       []other:      Pain level:  4-6/10      PQRS: 2021  Reviewed medication list with patient. No changes since last PT visit. MEDICARE CAP EXCEPTION DOCUMENTATION      I certify that this patient meets one of the below criteria necessary for becoming an exception to the Medicare cap on therapy services:    []  The patient has a condition identified by an ICD-10 code that has a direct and significant impact on the need for therapy.  (Significantly impacts the rate of recovery.)                   []  The patient has a complexity identified by an ICD-10 code that has a direct and significant impact on the need for therapy. (Significantly impacts the rate of recovery and is associated with a primary condition.)         []  The patient has associated variables that influence the amount of treatment to include:  Social support, self-efficacy/motivation, prognosis, time since onset/acuity. []  The patient has generalized musculoskeletal conditions or a condition affecting multiple sites that will have a direct impact on the rate of recovery. [x]  The patient had a prior episode of outpatient therapy during this calendar year for a different condition. []  The patient has a mental or cognitive disorder in addition to the condition being treated that will have a direct and significant impact on the rate of recovery. SUBJECTIVE:  6+ weeks post op  So she worked 3 days in a row this week, roughly 4 hrs/shift. Talked to her manager about trying to go to every other day so she can recover better. She gets tired when on her feet for those long periods of time. And really complains of both of her knees being sore. 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           OBJECTIVE:     ROM PROM AROM Comments    Left Right Left Right 11/2/2021   Flexion   95 107 supine   Extension        Abduction   22 21 supine   Adduction        ER   20 25 sitting   IR   17 20 sitting             Strength Left Right Comments   Hip flexors Not      Hip extension Tested     Hip abduction Due to     Hip adduction Sx     Hip ER      Hip IR      Quads 4-/5     Hamstrings 4-/5         Flexibility Left Right Comments   Hamstrings Not     ITB (Obers test) Tested     Hip flexor(Sebastian test) Due to     gastroc Sx     Rectus femoris(Elys test)              Special  Test Left Right Comments   FABERS Not     Scour test Tested     Impingement test Due to     Trendelenburg test Sx               RESTRICTIONS/PRECAUTIONS:  S/P Left THR (Sx: 10/4/21); follow THR precautions        Exercises/Interventions:       Therapeutic Ex (80862) Sets/sec Reps Notes/CUES   BIKE      TREADMILL            STRETCHING      Hamstrings      Towel Pull Calf 30\" hold X 5    Inclined Calf      Hip Flexors      Quads      ITB Rope      Adductors      Piriformis      Figure four-Push knee out            ROM      Passive      Active      Hang weights      Sheet pulls   Right knee flexion TP 10\"x10   Ankle pumps            ISOMETRICS      Quad sets 10\" hold X 15 seated   Ham sets      Glute sets 10\" hold X 15 supine   Hip ADD  Ball squeeze 5\" hold  X 30 supine   Hooklying Hip ABD  Loop resistance 5\" x30 Isometric into black loop   Pelvic Tilts            STRENGTHENING      SLR Supine 3 x10 bilat   SLR Abduction 2 x15 Standing hip abd  opp foot on riser  bilat   SLR Adduction      SLR Prone      SLR+      Clams      Reverse Clams      Clams in abduction      Bridges            SAQ 3 sets X 10 Supine bilat  3#   LAQ    Held today due to pain in lateral thigh/ITBand      PRE machines      Knee extension      Knee flexion      Leg Press            CKC      Calf raises      Mini squats      Step ups L2 2x10 Leading with each LE   TKE                  Manual Intervention (59348)      Patellar mobs      PROM x5'  Gentle left hip manual stretch within post op guidelines/circumduction   Longitudinal distraction      Scar Massage x5'  To her left DILAN incision   Foam Roll            NMR re-education (94145)   CUES NEEDED   Biodex balance x6'    x2'  WS side/side and diagonals L 10 2' each  PS fill in Kashia L10  LOS L10 medium spread x3    Tandem balance      SLB      Rebounder      BOSU                        Therapeutic Activity (34098)      Core training      Swiss [employer] Beauty activities      Monster walks         Patient Education: Dx, prognosis, pt goals/expectations, rehab timeline (10/12/21)  X 8'              TUG TESTED on 10/15/2021            Time to Complete: 23 seconds    TUG TESTED ON 11/2/2021     Time to Complete:  13 seconds    Timed Get Up and Go Test  Measures mobility in people who are able to walk on their own (assistive device permitted)      Instructions: The person may wear their usual footwear and can use any assistive device they normally use. 1. Have the person sit in the chair with their back to the chair and their arms resting on the back rests  2. Ask the person to stand up from a standard chair and walk a distance of 10 ft. (3 m). 3. Have the person turn around, walk back to the chair and sit down again. Timing begins when the person starts to rise from the chair and ends when he or she returns to the chair and sits down. Predictive Results:    Seconds Rating    <10  Freely mobile    <20  Mostly independent    20-29  Variable mobility    >20  Impaired mobility     Source: Alf Israel S. The timed 'Up and Go' Test: a Test of Basic Functional Mobility for Frail Elderly Persons. Journal of [address] [place] [address]. 1991;39:142-148. G-Code Crosswalk:  TUG time (seconds) Disability Index CMS Modifier   12 or faster 0% []CH   13-14 1-19% []CI   15-16 20-39% []CJ   17-18 40-59% []CK   19-20  60-79% []CL   21-22  80-99% []CM   23 or slower 100% []CN             Therapeutic Exercise and NMR EXR  [x] (65436) Provided verbal/tactile cueing for activities related to strengthening, flexibility, endurance, ROM for improvements in LE, proximal hip, and core control with self care, mobility, lifting, ambulation. [x] (56986) Provided verbal/tactile cueing for activities related to improving balance, coordination, kinesthetic sense, posture, motor skill, proprioception to assist with LE, proximal hip, and core control in self-care, mobility, lifting, ambulation and eccentric single leg control.      NMR and Therapeutic Activities:    [x] (20337 or 83850) Provided verbal/tactile cueing for activities related to improving balance, coordination, kinesthetic sense, posture, motor skill, proprioception and motor activation to allow for proper function of core, proximal hip and LE with self-care and ADLs and functional mobility.   [] (12036) Gait Re-education- Provided training and instruction to the patient for proper LE, core and proximal hip recruitment and positioning and eccentric body weight control with ambulation re-education including up and down stairs     Home Exercise Program:    [x] (65452) Reviewed/Progressed HEP activities related to strengthening, flexibility, endurance, ROM of core, proximal hip and LE for functional self-care, mobility, lifting and ambulation/stair navigation - Pt instructed in HEP through [address] [place] [address] (see below). Pt was also issued written handouts. (10/12/21)   [x] (78966) Reviewed/Progressed HEP activities related to improving balance, coordination, kinesthetic sense, posture, motor skill, proprioception of core, proximal hip and LE for self-care, mobility, lifting, and ambulation/stair navigation        Access Code: [email or web address]  URL: [email or web address]  Date: 10/12/2021  Prepared by: Sandra Bowman    Exercises  Long Sitting Calf Stretch with Strap - 2 x daily - 7 x weekly - 1 sets - 5 reps - 30 seconds hold  Long Sitting Quad Set - 2 x daily - 7 x weekly - 1 sets - 10 reps - 10 seconds hold  Supine Gluteal Sets - 2 x daily - 7 x weekly - 1 sets - 10 reps - 10 seconds hold  Supine Straight Leg Hip Adduction and Quad Set with Ball - 2 x daily - 7 x weekly - 1 sets - 10 reps - 10 seconds hold  Supine Knee Extension Strengthening - 2 x daily - 7 x weekly - 3 sets - 10 reps  Seated Long Arc Quad - 2 x daily - 7 x weekly - 3 sets - 10 reps        Manual Treatments:  PROM / STM / Oscillations-Mobs:  G-I, II, III, IV (PA's, Inf., Post.)  [x] (70747) Provided manual therapy to mobilize LE, proximal hip and/or LS spine soft tissue/joints for the purpose of modulating pain, promoting relaxation, increasing ROM, as indicated by patients Functional Deficits. [] Progressing: [x] Partially Met: [] Not Met: [] Adjusted  3. Patient will demonstrate an increase in left hip strength to 4+/5 to allow for proper functional mobility as indicated by patients Functional Deficits. [x] Progressing: [] Met: [] Not Met: [] Adjusted  4. Patient will be able to walk community distances, stand at least 1 hour, sit at least 1 hour, and be able to ascend/descend stairs reciprocally without increased symptoms or restriction. (patient specific functional goal)   [x] Progressing: [] Met: [] Not Met: [] Adjusted  5. Patient will have a decrease in left hip pain to 0-1/10 with daily act. [x] Progressing: [] Met: [] Not Met: [] Adjusted    GOALS UPDATED 11/2/2021        Overall Progression Towards Functional goals/ Treatment Progress Update:  [x] Patient is progressing as expected towards functional goals listed. [x] Progression is slowed due to complexities/Impairments listed. [x] Progression has been slowed due to co-morbidities.  (Had Right TKA 8/11/2021)  [] Plan just implemented, too soon to assess goals progression <30days   [] Goals require adjustment due to lack of progress  [] Patient is not progressing as expected and requires additional follow up with physician  [] Other    Prognosis for POC: [x] Good [] Fair  [] Poor      Patient requires continued skilled intervention: [x] Yes  [] No    Treatment/Activity Tolerance:  [x] Patient able to complete treatment  [] Patient limited by fatigue  [x] Patient limited by pain    [] Patient limited by other medical complications  [] Other:         PLAN: See eval.   Cont 2x/week    [] Continue per plan of care [] Alter current plan   [x] Plan of care initiated [] Hold pending MD visit [] Discharge      Electronically signed by:     Annita Lai  PT #[id number]  [place] [facility] #[id number]      Note: If patient does not return for scheduled/ recommended follow up visits, this note will serve as a discharge from care along with most recent update on progress.

## 2021-11-22 ENCOUNTER — TREATMENT (OUTPATIENT)
Dept: PHYSICAL THERAPY | Age: 71
End: 2021-11-22
Payer: MEDICARE

## 2021-11-22 DIAGNOSIS — M16.12 PRIMARY OSTEOARTHRITIS OF LEFT HIP: Primary | ICD-10-CM

## 2021-11-22 DIAGNOSIS — G89.18 ACUTE POSTOPERATIVE PAIN OF LEFT HIP: ICD-10-CM

## 2021-11-22 DIAGNOSIS — R29.898 WEAKNESS OF LEFT HIP: ICD-10-CM

## 2021-11-22 DIAGNOSIS — R26.2 DIFFICULTY WALKING: ICD-10-CM

## 2021-11-22 DIAGNOSIS — M25.652 DECREASED RANGE OF LEFT HIP MOVEMENT: ICD-10-CM

## 2021-11-22 DIAGNOSIS — M25.552 ACUTE POSTOPERATIVE PAIN OF LEFT HIP: ICD-10-CM

## 2021-11-22 PROCEDURE — 97110 THERAPEUTIC EXERCISES: CPT | Performed by: PHYSICAL THERAPIST

## 2021-11-22 PROCEDURE — 97140 MANUAL THERAPY 1/> REGIONS: CPT | Performed by: PHYSICAL THERAPIST

## 2021-11-22 PROCEDURE — G8427 DOCREV CUR MEDS BY ELIG CLIN: HCPCS | Performed by: PHYSICAL THERAPIST

## 2021-11-22 PROCEDURE — 97530 THERAPEUTIC ACTIVITIES: CPT | Performed by: PHYSICAL THERAPIST

## 2021-11-22 NOTE — PROGRESS NOTES
[facility]   Phone: [phone]    Fax: [phone]         Date:  2021    Patient Name:  Elvia Carlos    :  1950  MRN: <[id number]>  Restrictions/Precautions:    Medical/Treatment Diagnosis Information:  Diagnosis: S/P Left THR (Sx: 10/4/21) (M16.12)   Treatment Diagnosis:   Left Hip Pain (G89.18, M25.552), Decreased Left Hip ROM (M25.652), Decreased Strength (R29.898)                          Insurance/Certification information:  PT Insurance Information: Medicare  Physician Information:  Referring Practitioner: Dr. Diana Constantino  Has the plan of care been signed (Y/N):        [x]  Yes (POC signed 10/18/21)  []  No      Date of Patient follow up with Physician:         Is this a Progress Report:     []  Yes  []  No        If Yes:  Date Range for reporting period:  Beginning 2021  Ending 2021    Progress report will be due (10 Rx or 30 days whichever is less):      Recertification will be due (POC Duration  / 90 days whichever is less): 2021        Visit # Insurance Allowable Auth Required   12 Medical necessity  (use KX modifier due to pt having prior PT this year) []  Yes [x]  No        Functional Scale:   Date assessed:  10/12/21    Functional Assessment Tool Used:  Hip Outcome Score (copy scanned into media)  Score: 50/72 = 69%      Latex Allergy:  [x]NO      []YES  Preferred Language for Healthcare:   [x]English       []other:      Pain level:  4-6/10      PQRS: 2021  Reviewed medication list with patient. No changes since last PT visit. MEDICARE CAP EXCEPTION DOCUMENTATION      I certify that this patient meets one of the below criteria necessary for becoming an exception to the Medicare cap on therapy services:    []  The patient has a condition identified by an ICD-10 code that has a direct and significant impact on the need for therapy.  (Significantly impacts the rate of recovery.)                   []  The patient has a complexity identified by an ICD-10 code that has a direct and significant impact on the need for therapy. (Significantly impacts the rate of recovery and is associated with a primary condition.)         []  The patient has associated variables that influence the amount of treatment to include:  Social support, self-efficacy/motivation, prognosis, time since onset/acuity. []  The patient has generalized musculoskeletal conditions or a condition affecting multiple sites that will have a direct impact on the rate of recovery. [x]  The patient had a prior episode of outpatient therapy during this calendar year for a different condition. []  The patient has a mental or cognitive disorder in addition to the condition being treated that will have a direct and significant impact on the rate of recovery. SUBJECTIVE:  7 weeks post op  She reports that again over the weekend she worked 5 hours on Saturday and another 5.5 hrs on Sunday. Her legs are sore and complains of her feet being swollen.            OBJECTIVE:     ROM PROM AROM Comments    Left Right Left Right 11/2/2021   Flexion   95 107 supine   Extension        Abduction   22 21 supine   Adduction        ER   20 25 sitting   IR   17 20 sitting             Strength Left Right Comments   Hip flexors Not      Hip extension Tested     Hip abduction Due to     Hip adduction Sx     Hip ER      Hip IR      Quads 4-/5     Hamstrings 4-/5         Flexibility Left Right Comments   Hamstrings Not     ITB (Obers test) Tested     Hip flexor(Sebastian test) Due to     gastroc Sx     Rectus femoris(Elys test)              Special  Test Left Right Comments   FABERS Not     Scour test Tested     Impingement test Due to     Trendelenburg test Sx               RESTRICTIONS/PRECAUTIONS:  S/P Left THR (Sx: 10/4/21); follow THR precautions        Exercises/Interventions:       Therapeutic Ex (35210) Sets/sec Reps Notes/CUES   BIKE      TREADMILL STRETCHING      Hamstrings      Towel Pull Calf 30\" hold X 5    Inclined Calf      Hip Flexors      Quads      ITB Rope      Adductors      Piriformis      Figure four-Push knee out            ROM      Passive      Active      Hang weights      Sheet pulls   Right knee flexion TP 10\"x10   Ankle pumps            ISOMETRICS      Quad sets 10\" hold X 15 seated   Ham sets      Glute sets 10\" hold X 15 supine   Hip ADD  Ball squeeze 5\" hold  X 30 supine   Hooklying Hip ABD  Loop resistance 5\" x30 Isometric into black loop   Pelvic Tilts            STRENGTHENING      SLR Supine 3 x10 bilat   SLR Abduction 2 x15 Standing hip abd  opp foot on riser  bilat   SLR Adduction      SLR Prone      SLR+      Clams      Reverse Clams      Clams in abduction      Bridges            SAQ 3 sets X 10 Supine bilat  3#   LAQ    Held today due to pain in lateral thigh/ITBand      PRE machines      Knee extension      Knee flexion      Leg Press            CKC      Calf raises      Mini squats      Step ups L2 2x10 Leading with each LE   TKE                  Manual Intervention (75764)      Patellar mobs      PROM x5'  Gentle left hip manual stretch within post op guidelines/circumduction   Longitudinal distraction      Scar Massage x5'  To her left DILAN incision   Foam Roll            NMR re-education (24097)   CUES NEEDED   Biodex balance x6'    x2'  WS side/side and diagonals L 10 2' each  PS fill in [place] L10  LOS L10 medium spread x3    Tandem balance      SLB      Rebounder      BOSU                        Therapeutic Activity (19446)      Core training      Swiss ball activities      Monster walks         Patient Education: Dx, prognosis, pt goals/expectations, rehab timeline (10/12/21)  X 8'              TUG TESTED on 10/15/2021            Time to Complete: 23 seconds    TUG TESTED ON 11/2/2021     Time to Complete:  13 seconds    Timed Get Up and Go Test  Measures mobility in people who are able to walk on their own (assistive device permitted)      Instructions: The person may wear their usual footwear and can use any assistive device they normally use. 1. Have the person sit in the chair with their back to the chair and their arms resting on the back rests  2. Ask the person to stand up from a standard chair and walk a distance of 10 ft. (3 m). 3. Have the person turn around, walk back to the chair and sit down again. Timing begins when the person starts to rise from the chair and ends when he or she returns to the chair and sits down. Predictive Results:    Seconds Rating    <10  Freely mobile    <20  Mostly independent    20-29  Variable mobility    >20  Impaired mobility     Source: Tammi Israel S. The timed 'Up and Go' Test: a Test of Basic Functional Mobility for Frail Elderly Persons. Journal of [address] [place] [address]. 1991;39:142-148. G-Code Crosswalk:  TUG time (seconds) Disability Index CMS Modifier   12 or faster 0% []CH   13-14 1-19% []CI   15-16 20-39% []CJ   17-18 40-59% []CK   19-20  60-79% []CL   21-22  80-99% []CM   23 or slower 100% []CN             Therapeutic Exercise and NMR EXR  [x] (11292) Provided verbal/tactile cueing for activities related to strengthening, flexibility, endurance, ROM for improvements in LE, proximal hip, and core control with self care, mobility, lifting, ambulation. [x] (28091) Provided verbal/tactile cueing for activities related to improving balance, coordination, kinesthetic sense, posture, motor skill, proprioception to assist with LE, proximal hip, and core control in self-care, mobility, lifting, ambulation and eccentric single leg control.      NMR and Therapeutic Activities:    [x] (25947 or 51116) Provided verbal/tactile cueing for activities related to improving balance, coordination, kinesthetic sense, posture, motor skill, proprioception and motor activation to allow for proper function of core, proximal hip and LE with self-care and ADLs and functional mobility.   [] (16672) Gait Re-education- Provided training and instruction to the patient for proper LE, core and proximal hip recruitment and positioning and eccentric body weight control with ambulation re-education including up and down stairs     Home Exercise Program:    [x] (82477) Reviewed/Progressed HEP activities related to strengthening, flexibility, endurance, ROM of core, proximal hip and LE for functional self-care, mobility, lifting and ambulation/stair navigation - Pt instructed in HEP through [address] [place] [address] (see below). Pt was also issued written handouts. (10/12/21)   [x] (15914) Reviewed/Progressed HEP activities related to improving balance, coordination, kinesthetic sense, posture, motor skill, proprioception of core, proximal hip and LE for self-care, mobility, lifting, and ambulation/stair navigation        Access Code: [email or web address]  URL: [employer]/  Date: 10/12/2021  Prepared by: Jake Bending    Exercises  Long Sitting Calf Stretch with Strap - 2 x daily - 7 x weekly - 1 sets - 5 reps - 30 seconds hold  Long Sitting Quad Set - 2 x daily - 7 x weekly - 1 sets - 10 reps - 10 seconds hold  Supine Gluteal Sets - 2 x daily - 7 x weekly - 1 sets - 10 reps - 10 seconds hold  Supine Straight Leg Hip Adduction and Quad Set with Ball - 2 x daily - 7 x weekly - 1 sets - 10 reps - 10 seconds hold  Supine Knee Extension Strengthening - 2 x daily - 7 x weekly - 3 sets - 10 reps  Seated Long Arc Quad - 2 x daily - 7 x weekly - 3 sets - 10 reps        Manual Treatments:  PROM / STM / Oscillations-Mobs:  G-I, II, III, IV (PA's, Inf., Post.)  [x] (94323) Provided manual therapy to mobilize LE, proximal hip and/or LS spine soft tissue/joints for the purpose of modulating pain, promoting relaxation, increasing ROM, reducing/eliminating soft tissue swelling/inflammation/restriction, improving soft tissue extensibility and allowing for proper ROM for normal function with self-care, mobility, lifting and ambulation. Modalities:  Gel pack x15'    [] GAME READY (VASO)- for significant edema, swelling, pain control. Charges:  Timed Code Treatment Minutes: 48'   Total Treatment Minutes: 72'      [] EVAL (LOW) 455 [id number] (typically 20 minutes face-to-face)  [] EVAL (MOD) 75015 (typically 30 minutes face-to-face)  [] EVAL (HIGH) 83039 (typically 45 minutes face-to-face)  [] RE-EVAL     [x] MW(71755) x 2 (25')    [] IONTO  [] NMR (29945) x     [] VASO  [x] Manual (48751) x 1 (10')    [] Other:  [x] TA x 1 (15')     [] Mech Traction (16362)  [] ES(attended) (04607)      [] ES (un) (48167):         ASSESSMENT:    She does have some swelling in her feet and ankles. She does not have any tenderness in her calf or lower leg. Reminded her about trying to wear compression stockings during the day. Needs to get off of her feet and elevate her LE whenever she can. She has been pushing herself too much. She needs to take it easy. GOALS:   Patient stated goal: \"Increase strength, be able to walk and not get tired\"   [x] Progressing: [] Met: [] Not Met: [] Adjusted    Therapist goals for Patient:   Short Term Goals: To be achieved in: 2 weeks  1. Independent in HEP and progression per patient tolerance, in order to prevent re-injury. [] Progressing: [x] Met: [] Not Met: [] Adjusted  2. Patient will have a decrease in left hip pain to 3/10 to facilitate improvement in movement, function, and ADLs as indicated by Functional Deficits. [x] Progressing: [] Met: [] Not Met: [] Adjusted    Long Term Goals: To be achieved in: 6 weeks  1. Disability index score of 20% or less for the Hip Outcome Score to assist with reaching prior level of function. [x] Progressing: [] Met: [] Not Met: [] Adjusted  2. Patient will demonstrate an increase in left hip AROM to WFL's to allow for proper joint functioning as indicated by patients Functional Deficits.    [] Progressing: [x] Partially Met: [] Not Met: [] Adjusted  3. Patient will demonstrate an increase in left hip strength to 4+/5 to allow for proper functional mobility as indicated by patients Functional Deficits. [x] Progressing: [] Met: [] Not Met: [] Adjusted  4. Patient will be able to walk community distances, stand at least 1 hour, sit at least 1 hour, and be able to ascend/descend stairs reciprocally without increased symptoms or restriction. (patient specific functional goal)   [x] Progressing: [] Met: [] Not Met: [] Adjusted  5. Patient will have a decrease in left hip pain to 0-1/10 with daily act. [x] Progressing: [] Met: [] Not Met: [] Adjusted    GOALS UPDATED 11/2/2021        Overall Progression Towards Functional goals/ Treatment Progress Update:  [x] Patient is progressing as expected towards functional goals listed. [x] Progression is slowed due to complexities/Impairments listed. [x] Progression has been slowed due to co-morbidities. (Had Right TKA 8/11/2021)  [] Plan just implemented, too soon to assess goals progression <30days   [] Goals require adjustment due to lack of progress  [] Patient is not progressing as expected and requires additional follow up with physician  [] Other    Prognosis for POC: [x] Good [] Fair  [] Poor      Patient requires continued skilled intervention: [x] Yes  [] No    Treatment/Activity Tolerance:  [x] Patient able to complete treatment  [] Patient limited by fatigue  [x] Patient limited by pain    [] Patient limited by other medical complications  [] Other:         PLAN: See eval.   Cont 2x/week    [] Continue per plan of care [] Alter current plan   [x] Plan of care initiated [] Hold pending MD visit [] Discharge      Electronically signed by:     Annita Pineda  PT #[id number]  [place] [facility] #[id number]      Note: If patient does not return for scheduled/ recommended follow up visits, this note will serve as a discharge from care along with most recent update on progress.

## 2021-11-23 ENCOUNTER — OFFICE VISIT (OUTPATIENT)
Dept: ORTHOPEDIC SURGERY | Age: 71
End: 2021-11-23
Payer: MEDICARE

## 2021-11-23 VITALS — WEIGHT: 229 LBS | HEIGHT: 63 IN | BODY MASS INDEX: 40.57 KG/M2

## 2021-11-23 DIAGNOSIS — M75.121 NONTRAUMATIC COMPLETE TEAR OF RIGHT ROTATOR CUFF: Primary | ICD-10-CM

## 2021-11-23 PROCEDURE — G8399 PT W/DXA RESULTS DOCUMENT: HCPCS | Performed by: ORTHOPAEDIC SURGERY

## 2021-11-23 PROCEDURE — 4040F PNEUMOC VAC/ADMIN/RCVD: CPT | Performed by: ORTHOPAEDIC SURGERY

## 2021-11-23 PROCEDURE — G9899 SCRN MAM PERF RSLTS DOC: HCPCS | Performed by: ORTHOPAEDIC SURGERY

## 2021-11-23 PROCEDURE — 1123F ACP DISCUSS/DSCN MKR DOCD: CPT | Performed by: ORTHOPAEDIC SURGERY

## 2021-11-23 PROCEDURE — 3017F COLORECTAL CA SCREEN DOC REV: CPT | Performed by: ORTHOPAEDIC SURGERY

## 2021-11-23 PROCEDURE — 1090F PRES/ABSN URINE INCON ASSESS: CPT | Performed by: ORTHOPAEDIC SURGERY

## 2021-11-23 PROCEDURE — G8484 FLU IMMUNIZE NO ADMIN: HCPCS | Performed by: ORTHOPAEDIC SURGERY

## 2021-11-23 PROCEDURE — 99212 OFFICE O/P EST SF 10 MIN: CPT | Performed by: ORTHOPAEDIC SURGERY

## 2021-11-23 PROCEDURE — G8417 CALC BMI ABV UP PARAM F/U: HCPCS | Performed by: ORTHOPAEDIC SURGERY

## 2021-11-23 PROCEDURE — G8428 CUR MEDS NOT DOCUMENT: HCPCS | Performed by: ORTHOPAEDIC SURGERY

## 2021-11-23 PROCEDURE — 1036F TOBACCO NON-USER: CPT | Performed by: ORTHOPAEDIC SURGERY

## 2021-11-23 RX ORDER — ASPIRIN 81 MG/1
TABLET, COATED ORAL
COMMUNITY
Start: 2021-10-04

## 2021-11-23 NOTE — PROGRESS NOTES
Chief Complaint    Shoulder Pain (F/U RIGHT SHOULDER)      History of Present Illness:  Radha Sin is a pleasant, 73 y.o., female, here today for follow up of her right shoulder. This patient has a massive, irreparable rotator cuff tear. We previously discussed candidacy for a balloon spacer operation vs a reverse total shoulder arthroplasty. She reports recently her symptoms have improved - she is unsure what caused the improvement in pain. She has recently undergone both hip and knee surgery and is doing very well from those. She is aware she is heading towards an operation, but states she would like to put off an operation. She reports no new injuries or setbacks. Pain Assessment  Location of Pain: Shoulder  Location Modifiers: Right  Severity of Pain: 2  Quality of Pain: Aching  Frequency of Pain: Intermittent  Aggravating Factors: Other (Comment)  Limiting Behavior: Yes  Relieving Factors: Rest, Nsaids  Work-Related Injury: No  Are there other pain locations you wish to document?: No      Medical History:  Patient's medications, allergies, past medical, surgical, social and family histories were reviewed and updated as appropriate. Patient has had no medical changes since last evaluated        Review of Systems  A 14 point review of systems was completed by the patient on 6/25/21 and is available in the media section of the scanned medical record and was reviewed on 11/23/2021. The review is negative with the exception of those things mentioned in the HPI and Past Medical History    Vital Signs: There were no vitals filed for this visit. General/Appearance: Alert and oriented and in no apparent distress. Skin:  There are no skin lesions, cellulitis, or extreme edema. The patient has warm and well-perfused Bilateral upper extremities with brisk capillary refill. Right Shoulder Exam:  Inspection: No gross deformities, no signs of infection.     Palpation: Tenderness over the greater tuberosity    Active Range of Motion: Forward Elevation 110 vs 120 on the left, Internal Rotation T10 vs T8 on the left    Passive Range of Motion: Deferred    Strength:  External Rotation 4-/5, Internal Rotation 5/5    Special Tests: No Herson muscle deformity. Neurovascular: Sensation to light touch is intact, no motor deficits, palpable radial pulses 2+      Radiology:     No new XR obtained at this time. MRI Right Shoulder: 9/12/21    CONCLUSION:   1. Interval complete supraspinatus tendon tear retracted 3.8 cm.  Grade 3 muscle fibrofatty    infiltration. 2. Interval full thickness superior fiber of the infraspinatus retracted up to 3 cm.   No    atrophy. 3. Progressive biceps pulley pathology with ruptured long biceps.  Atrophic remnant in the    intratrabecular groove. 4. High-grade subscapularis tendon tear retracted up to 1.5 cm.  Grade 2 muscle fibrofatty    infiltration. 5. Chronic SLAP 2 tear. 6. Moderate glenohumeral joint osteoarthritis. 7. Capsulitis and bursitis. Assessment :  Ms. Kristine Fierro is a pleasant, 73 y.o. patient with a massive irreparable rotator cuff tear. Although she remains a candidate for an operation, recently her symptoms have improved and we put off surgery until her pain warrants intervention. Impression:  Encounter Diagnosis   Name Primary?  Nontraumatic complete tear of right rotator cuff Yes       Office Procedures:  No orders of the defined types were placed in this encounter. Treatment Plan:  We discussed both the balloon spacer operation as well as a reverse total shoulder arthroplasty. She is aware she is heading towards an operation however we will put this off for now. We recommend She continue activities as tolerated. We will see Suyapa Britt back as needed. All questions were answered to patient's satisfaction and She was encouraged to call with any further questions or concerns.  Kristine Fierro is in agreement with this

## 2021-11-30 ENCOUNTER — TREATMENT (OUTPATIENT)
Dept: PHYSICAL THERAPY | Age: 71
End: 2021-11-30
Payer: MEDICARE

## 2021-11-30 DIAGNOSIS — M16.12 PRIMARY OSTEOARTHRITIS OF LEFT HIP: Primary | ICD-10-CM

## 2021-11-30 DIAGNOSIS — R26.2 DIFFICULTY WALKING: ICD-10-CM

## 2021-11-30 DIAGNOSIS — G89.18 ACUTE POSTOPERATIVE PAIN OF LEFT HIP: ICD-10-CM

## 2021-11-30 DIAGNOSIS — M25.552 ACUTE POSTOPERATIVE PAIN OF LEFT HIP: ICD-10-CM

## 2021-11-30 DIAGNOSIS — M25.652 DECREASED RANGE OF LEFT HIP MOVEMENT: ICD-10-CM

## 2021-11-30 DIAGNOSIS — R29.898 WEAKNESS OF LEFT HIP: ICD-10-CM

## 2021-11-30 PROCEDURE — 97110 THERAPEUTIC EXERCISES: CPT | Performed by: PHYSICAL THERAPIST

## 2021-11-30 PROCEDURE — 97140 MANUAL THERAPY 1/> REGIONS: CPT | Performed by: PHYSICAL THERAPIST

## 2021-11-30 PROCEDURE — 97530 THERAPEUTIC ACTIVITIES: CPT | Performed by: PHYSICAL THERAPIST

## 2021-11-30 PROCEDURE — G8427 DOCREV CUR MEDS BY ELIG CLIN: HCPCS | Performed by: PHYSICAL THERAPIST

## 2021-11-30 NOTE — PROGRESS NOTES
[facility]   Phone: [phone]    Fax: [phone]         Date:  2021    Patient Name:  Cinthya Dimas    :  1950  MRN: <[id number]>  Restrictions/Precautions:    Medical/Treatment Diagnosis Information:  Diagnosis: S/P Left THR (Sx: 10/4/21) (M16.12)   Treatment Diagnosis:   Left Hip Pain (G89.18, M25.552), Decreased Left Hip ROM (M25.652), Decreased Strength (R29.898)                          Insurance/Certification information:  PT Insurance Information: Medicare  Physician Information:  Referring Practitioner: Dr. Hector Gtz  Has the plan of care been signed (Y/N):        [x]  Yes (POC signed 10/18/21)  []  No      Date of Patient follow up with Physician:         Is this a Progress Report:     []  Yes  []  No        If Yes:  Date Range for reporting period:  Beginning 2021  Ending 2021    Progress report will be due (10 Rx or 30 days whichever is less):  5329    Recertification will be due (POC Duration  / 90 days whichever is less): 2021        Visit # Insurance Allowable Auth Required   13 Medical necessity  (use KX modifier due to pt having prior PT this year) []  Yes [x]  No        Functional Scale:   Date assessed:  10/12/21    Functional Assessment Tool Used:  Hip Outcome Score (copy scanned into media)  Score: 50/72 = 69%      Latex Allergy:  [x]NO      []YES  Preferred Language for Healthcare:   [x]English       []other:      Pain level:  4-6/10      PQRS: 2021  Reviewed medication list with patient. No changes since last PT visit. MEDICARE CAP EXCEPTION DOCUMENTATION      I certify that this patient meets one of the below criteria necessary for becoming an exception to the Medicare cap on therapy services:    []  The patient has a condition identified by an ICD-10 code that has a direct and significant impact on the need for therapy.  (Significantly impacts the rate of recovery.)                   []  The patient has a complexity identified by an ICD-10 code that has a direct and significant impact on the need for therapy. (Significantly impacts the rate of recovery and is associated with a primary condition.)         []  The patient has associated variables that influence the amount of treatment to include:  Social support, self-efficacy/motivation, prognosis, time since onset/acuity. []  The patient has generalized musculoskeletal conditions or a condition affecting multiple sites that will have a direct impact on the rate of recovery. [x]  The patient had a prior episode of outpatient therapy during this calendar year for a different condition. []  The patient has a mental or cognitive disorder in addition to the condition being treated that will have a direct and significant impact on the rate of recovery. SUBJECTIVE:  8+ weeks post op  She did get a note from her MD reiterating she can only work 4 hours per shift. She is coming down with a little cold the past day or so. She states the swelling in her left foot has gone down some.           OBJECTIVE:     ROM PROM AROM Comments    Left Right Left Right 11/2/2021   Flexion   95 107 supine   Extension        Abduction   22 21 supine   Adduction        ER   20 25 sitting   IR   17 20 sitting             Strength Left Right Comments   Hip flexors Not      Hip extension Tested     Hip abduction Due to     Hip adduction Sx     Hip ER      Hip IR      Quads 4-/5     Hamstrings 4-/5         Flexibility Left Right Comments   Hamstrings Not     ITB (Obers test) Tested     Hip flexor(Sebastian test) Due to     gastroc Sx     Rectus femoris(Elys test)              Special  Test Left Right Comments   FABERS Not     Scour test Tested     Impingement test Due to     Trendelenburg test Sx               RESTRICTIONS/PRECAUTIONS:  S/P Left THR (Sx: 10/4/21); follow THR precautions        Exercises/Interventions:       Therapeutic Ex (05857) Sets/sec Reps Notes/CUES   BIKE      TREADMILL            STRETCHING      Hamstrings      Towel Pull Calf 30\" hold X 5    Inclined Calf      Hip Flexors      Quads      ITB Rope      Adductors      Piriformis      Figure four-Push knee out            ROM      Passive      Active      Hang weights      Sheet pulls   Right knee flexion TP 10\"x10   Ankle pumps            ISOMETRICS      Quad sets 10\" hold X 15 seated   Ham sets      Glute sets 10\" hold X 15 supine   Hip ADD  Ball squeeze 5\" hold  X 30 supine   Hooklying Hip ABD  Loop resistance 5\" x30 Isometric into black loop   Pelvic Tilts            STRENGTHENING      SLR Supine 3 x10 bilat   SLR Abduction 2 x15 Standing hip abd  opp foot on riser  bilat   SLR Adduction      SLR Prone      SLR+      Clams 3 x10 SDLY   Reverse Clams      Clams in abduction      Bridges            SAQ 3 sets X 10 Supine bilat  3#   LAQ    Held today due to pain in lateral thigh/ITBand      PRE machines      Knee extension      Knee flexion      Leg Press            CKC      Calf raises      Mini squats      Step ups L2 2x10 Leading with each LE   TKE                  Manual Intervention (91566)      Patellar mobs      PROM x5'  Gentle left hip manual stretch within post op guidelines/circumduction   Longitudinal distraction      Scar Massage x5'  To her left DILAN incision   Foam Roll            NMR re-education (39641)   CUES NEEDED   Biodex balance x6'    x2'  WS side/side and diagonals L 9 2' each  PS fill in [place] L9  LOS L9 medium spread x3    Tandem balance      SLB      Rebounder      BOSU                        Therapeutic Activity (76342)      Core training      Swiss ball activities      Monster walks         Patient Education: Dx, prognosis, pt goals/expectations, rehab timeline (10/12/21)  X 8'              TUG TESTED on 10/15/2021            Time to Complete: 23 seconds    TUG TESTED ON 11/2/2021     Time to Complete:  13 seconds    Timed Get Up and Go Test  Measures mobility in people who are able to walk on their own (assistive device permitted)      Instructions: The person may wear their usual footwear and can use any assistive device they normally use. 1. Have the person sit in the chair with their back to the chair and their arms resting on the back rests  2. Ask the person to stand up from a standard chair and walk a distance of 10 ft. (3 m). 3. Have the person turn around, walk back to the chair and sit down again. Timing begins when the person starts to rise from the chair and ends when he or she returns to the chair and sits down. Predictive Results:    Seconds Rating    <10  Freely mobile    <20  Mostly independent    20-29  Variable mobility    >20  Impaired mobility     Source: Sneha Israel S. The timed 'Up and Go' Test: a Test of Basic Functional Mobility for Frail Elderly Persons. Journal of [address] [place] [address]. 1991;39:142-148. G-Code Crosswalk:  TUG time (seconds) Disability Index CMS Modifier   12 or faster 0% []CH   13-14 1-19% []CI   15-16 20-39% []CJ   17-18 40-59% []CK   19-20  60-79% []CL   21-22  80-99% []CM   23 or slower 100% []CN             Therapeutic Exercise and NMR EXR  [x] (85525) Provided verbal/tactile cueing for activities related to strengthening, flexibility, endurance, ROM for improvements in LE, proximal hip, and core control with self care, mobility, lifting, ambulation. [x] (91956) Provided verbal/tactile cueing for activities related to improving balance, coordination, kinesthetic sense, posture, motor skill, proprioception to assist with LE, proximal hip, and core control in self-care, mobility, lifting, ambulation and eccentric single leg control.      NMR and Therapeutic Activities:    [x] (61851 or 83578) Provided verbal/tactile cueing for activities related to improving balance, coordination, kinesthetic sense, posture, motor skill, proprioception and motor activation to allow for proper function of core, proximal hip and LE with self-care and ADLs and functional mobility.   [] (27359) Gait Re-education- Provided training and instruction to the patient for proper LE, core and proximal hip recruitment and positioning and eccentric body weight control with ambulation re-education including up and down stairs     Home Exercise Program:    [x] (06266) Reviewed/Progressed HEP activities related to strengthening, flexibility, endurance, ROM of core, proximal hip and LE for functional self-care, mobility, lifting and ambulation/stair navigation - Pt instructed in HEP through [address] [place] [address] (see below). Pt was also issued written handouts. (10/12/21)   [x] (33341) Reviewed/Progressed HEP activities related to improving balance, coordination, kinesthetic sense, posture, motor skill, proprioception of core, proximal hip and LE for self-care, mobility, lifting, and ambulation/stair navigation        Access Code: [email or web address]  URL: [email or web address]  Date: 10/12/2021  Prepared by: Emi Pink    Exercises  Long Sitting Calf Stretch with Strap - 2 x daily - 7 x weekly - 1 sets - 5 reps - 30 seconds hold  Long Sitting Quad Set - 2 x daily - 7 x weekly - 1 sets - 10 reps - 10 seconds hold  Supine Gluteal Sets - 2 x daily - 7 x weekly - 1 sets - 10 reps - 10 seconds hold  Supine Straight Leg Hip Adduction and Quad Set with Ball - 2 x daily - 7 x weekly - 1 sets - 10 reps - 10 seconds hold  Supine Knee Extension Strengthening - 2 x daily - 7 x weekly - 3 sets - 10 reps  Seated Long Arc Quad - 2 x daily - 7 x weekly - 3 sets - 10 reps        Manual Treatments:  PROM / STM / Oscillations-Mobs:  G-I, II, III, IV (PA's, Inf., Post.)  [x] (29110) Provided manual therapy to mobilize LE, proximal hip and/or LS spine soft tissue/joints for the purpose of modulating pain, promoting relaxation, increasing ROM, reducing/eliminating soft tissue swelling/inflammation/restriction, improving soft tissue extensibility and allowing for proper ROM for normal function with self-care, mobility, lifting and ambulation. Modalities:  Gel pack x15'    [] GAME READY (VASO)- for significant edema, swelling, pain control. Charges:  Timed Code Treatment Minutes: 48'   Total Treatment Minutes: 72'      [] EVAL (LOW) 73455 (typically 20 minutes face-to-face)  [] EVAL (MOD) 99228 (typically 30 minutes face-to-face)  [] EVAL (HIGH) 52898 (typically 45 minutes face-to-face)  [] RE-EVAL     [x] II(72924) x 2 (25')    [] IONTO  [] NMR (02160) x     [] VASO  [x] Manual (17604) x 1 (10')    [] Other:  [x] TA x 1 (15')     [] Mech Traction (25357)  [] ES(attended) (95636)      [] ES (un) (46571):         ASSESSMENT:    Less tenderness noted along her left anterior hip incision. Overall, her hip ROM is doing well too. Still gets fatigued rather quickly with activities on her feet. The more she is on her feet, the more she complains of her feet bothering her. She is going for an US of her LE on Thursday after PT.         GOALS:   Patient stated goal: \"Increase strength, be able to walk and not get tired\"   [x] Progressing: [] Met: [] Not Met: [] Adjusted    Therapist goals for Patient:   Short Term Goals: To be achieved in: 2 weeks  1. Independent in HEP and progression per patient tolerance, in order to prevent re-injury. [] Progressing: [x] Met: [] Not Met: [] Adjusted  2. Patient will have a decrease in left hip pain to 3/10 to facilitate improvement in movement, function, and ADLs as indicated by Functional Deficits. [x] Progressing: [] Met: [] Not Met: [] Adjusted    Long Term Goals: To be achieved in: 6 weeks  1. Disability index score of 20% or less for the Hip Outcome Score to assist with reaching prior level of function. [x] Progressing: [] Met: [] Not Met: [] Adjusted  2. Patient will demonstrate an increase in left hip AROM to WFL's to allow for proper joint functioning as indicated by patients Functional Deficits.    [] Progressing: [x] Partially Met: [] Not Met: [] Adjusted  3. Patient will demonstrate an increase in left hip strength to 4+/5 to allow for proper functional mobility as indicated by patients Functional Deficits. [x] Progressing: [] Met: [] Not Met: [] Adjusted  4. Patient will be able to walk community distances, stand at least 1 hour, sit at least 1 hour, and be able to ascend/descend stairs reciprocally without increased symptoms or restriction. (patient specific functional goal)   [x] Progressing: [] Met: [] Not Met: [] Adjusted  5. Patient will have a decrease in left hip pain to 0-1/10 with daily act. [x] Progressing: [] Met: [] Not Met: [] Adjusted    GOALS UPDATED 11/2/2021        Overall Progression Towards Functional goals/ Treatment Progress Update:  [x] Patient is progressing as expected towards functional goals listed. [x] Progression is slowed due to complexities/Impairments listed. [x] Progression has been slowed due to co-morbidities. (Had Right TKA 8/11/2021)  [] Plan just implemented, too soon to assess goals progression <30days   [] Goals require adjustment due to lack of progress  [] Patient is not progressing as expected and requires additional follow up with physician  [] Other    Prognosis for POC: [x] Good [] Fair  [] Poor      Patient requires continued skilled intervention: [x] Yes  [] No    Treatment/Activity Tolerance:  [x] Patient able to complete treatment  [] Patient limited by fatigue  [x] Patient limited by pain    [] Patient limited by other medical complications  [] Other:         PLAN: See eval.   Cont 2x/week    [] Continue per plan of care [] Alter current plan   [x] Plan of care initiated [] Hold pending MD visit [] Discharge      Electronically signed by:     Annita Chávez  PT #[id number]  [place] [facility] #[id number]      Note: If patient does not return for scheduled/ recommended follow up visits, this note will serve as a discharge from care along with most recent update on progress.

## 2021-12-02 ENCOUNTER — TREATMENT (OUTPATIENT)
Dept: PHYSICAL THERAPY | Age: 71
End: 2021-12-02
Payer: MEDICARE

## 2021-12-02 DIAGNOSIS — M25.552 ACUTE POSTOPERATIVE PAIN OF LEFT HIP: ICD-10-CM

## 2021-12-02 DIAGNOSIS — M25.652 DECREASED RANGE OF LEFT HIP MOVEMENT: ICD-10-CM

## 2021-12-02 DIAGNOSIS — G89.18 ACUTE POSTOPERATIVE PAIN OF LEFT HIP: ICD-10-CM

## 2021-12-02 DIAGNOSIS — R26.2 DIFFICULTY WALKING: ICD-10-CM

## 2021-12-02 DIAGNOSIS — R29.898 WEAKNESS OF LEFT HIP: ICD-10-CM

## 2021-12-02 DIAGNOSIS — M16.12 PRIMARY OSTEOARTHRITIS OF LEFT HIP: Primary | ICD-10-CM

## 2021-12-02 PROCEDURE — 97110 THERAPEUTIC EXERCISES: CPT | Performed by: PHYSICAL THERAPIST

## 2021-12-02 PROCEDURE — G8427 DOCREV CUR MEDS BY ELIG CLIN: HCPCS | Performed by: PHYSICAL THERAPIST

## 2021-12-02 PROCEDURE — 97530 THERAPEUTIC ACTIVITIES: CPT | Performed by: PHYSICAL THERAPIST

## 2021-12-02 PROCEDURE — 97140 MANUAL THERAPY 1/> REGIONS: CPT | Performed by: PHYSICAL THERAPIST

## 2021-12-02 NOTE — PROGRESS NOTES
[facility]   Phone: [phone]    Fax: [phone]         Date:  2021    Patient Name:  Alysa Flores    :  1950  MRN: <[id number]>  Restrictions/Precautions:    Medical/Treatment Diagnosis Information:  Diagnosis: S/P Left THR (Sx: 10/4/21) (M16.12)   Treatment Diagnosis:   Left Hip Pain (G89.18, M25.552), Decreased Left Hip ROM (M25.652), Decreased Strength (R29.898)                          Insurance/Certification information:  PT Insurance Information: Medicare  Physician Information:  Referring Practitioner: Dr. Sunshine Flores  Has the plan of care been signed (Y/N):        [x]  Yes (POC signed 10/18/21)  []  No      Date of Patient follow up with Physician:         Is this a Progress Report:     []  Yes  []  No        If Yes:  Date Range for reporting period:  Beginning 2021  Ending 2021    Progress report will be due (10 Rx or 30 days whichever is less):  594    Recertification will be due (POC Duration  / 90 days whichever is less): 2021        Visit # Insurance Allowable Auth Required   14 Medical necessity  (use KX modifier due to pt having prior PT this year) []  Yes [x]  No        Functional Scale:   Date assessed:  10/12/21    Functional Assessment Tool Used:  Hip Outcome Score (copy scanned into media)  Score: 50/72 = 69%      Latex Allergy:  [x]NO      []YES  Preferred Language for Healthcare:   [x]English       []other:      Pain level:  4-6/10      PQRS: 2021  Reviewed medication list with patient. No changes since last PT visit. MEDICARE CAP EXCEPTION DOCUMENTATION      I certify that this patient meets one of the below criteria necessary for becoming an exception to the Medicare cap on therapy services:    []  The patient has a condition identified by an ICD-10 code that has a direct and significant impact on the need for therapy.  (Significantly impacts the rate of recovery.)                   []  The patient has a complexity identified by an ICD-10 code that has a direct and significant impact on the need for therapy. (Significantly impacts the rate of recovery and is associated with a primary condition.)         []  The patient has associated variables that influence the amount of treatment to include:  Social support, self-efficacy/motivation, prognosis, time since onset/acuity. []  The patient has generalized musculoskeletal conditions or a condition affecting multiple sites that will have a direct impact on the rate of recovery. [x]  The patient had a prior episode of outpatient therapy during this calendar year for a different condition. []  The patient has a mental or cognitive disorder in addition to the condition being treated that will have a direct and significant impact on the rate of recovery. SUBJECTIVE:  8+ weeks post op  She is still fighting a cold a bit this week. She reports that the past 2 days she did not work a full shift at work. She states her legs are feeling better. She notices that her left foot is still bigger than her right, although not as swollen as it has been. She reports that she still struggles a bit going up/down a full flight of stairs.           OBJECTIVE:     ROM PROM AROM Comments    Left Right Left Right 11/2/2021   Flexion   95 107 supine   Extension        Abduction   22 21 supine   Adduction        ER   20 25 sitting   IR   17 20 sitting             Strength Left Right Comments   Hip flexors Not      Hip extension Tested     Hip abduction Due to     Hip adduction Sx     Hip ER      Hip IR      Quads 4-/5     Hamstrings 4-/5         Flexibility Left Right Comments   Hamstrings Not     ITB (Obers test) Tested     Hip flexor(Sebastian test) Due to     gastroc Sx     Rectus femoris(Elys test)              Special  Test Left Right Comments   FABERS Not     Scour test Tested     Impingement test Due to     Trendelenburg test Sx RESTRICTIONS/PRECAUTIONS:  S/P Left THR (Sx: 10/4/21); follow THR precautions        Exercises/Interventions:       Therapeutic Ex (44107) Sets/sec Reps Notes/CUES   BIKE      TREADMILL            STRETCHING      Hamstrings      Towel Pull Calf 30\" hold X 5    Inclined Calf      Hip Flexors      Quads      ITB Rope      Adductors      Piriformis      Figure four-Push knee out            ROM      Passive      Active      Hang weights      Sheet pulls   Right knee flexion TP 10\"x10   Ankle pumps            ISOMETRICS      Quad sets 10\" hold X 15 seated   Ham sets      Hip ADD  Ball squeeze 5\" hold  X 30 supine   Hooklying Hip ABD  Loop resistance 5\" x30 Isometric into black loop   Pelvic Tilts            STRENGTHENING      SLR Supine 3 x10 bilat   SLR Abduction 2 x15 Standing hip abd  opp foot on riser  bilat   SLR Adduction      SLR Prone      SLR+      Clams 3 x10 SDLY   Reverse Clams      Clams in abduction      Bridges            SAQ 3 sets X 10 Supine bilat  4#   LAQ    Held today due to pain in lateral thigh/ITBand      PRE machines      Knee extension      Knee flexion      Leg Press            CKC      Calf raises      Mini squats      Step ups L3 1x10 Leading with each LE   TKE                  Manual Intervention (80771)      Patellar mobs      PROM x5'  Gentle left hip manual stretch within post op guidelines/circumduction   Longitudinal distraction      Scar Massage x5'  To her left DILAN incision   Foam Roll            NMR re-education (54919)   CUES NEEDED   Biodex balance x6'    x2'  WS side/side and diagonals L 9 2' each  PS fill in [place] L9  LOS L9 medium spread x3    Tandem balance      SLB      Rebounder      BOSU                        Therapeutic Activity (70326)      Core training      Swiss ball activities      Monster walks         Patient Education: Dx, prognosis, pt goals/expectations, rehab timeline (10/12/21)  X 8'              TUG TESTED on 10/15/2021            Time to Complete: 23 seconds    TUG TESTED ON 11/2/2021     Time to Complete:  13 seconds    Timed Get Up and Go Test  Measures mobility in people who are able to walk on their own (assistive device permitted)      Instructions: The person may wear their usual footwear and can use any assistive device they normally use. 1. Have the person sit in the chair with their back to the chair and their arms resting on the back rests  2. Ask the person to stand up from a standard chair and walk a distance of 10 ft. (3 m). 3. Have the person turn around, walk back to the chair and sit down again. Timing begins when the person starts to rise from the chair and ends when he or she returns to the chair and sits down. Predictive Results:    Seconds Rating    <10  Freely mobile    <20  Mostly independent    20-29  Variable mobility    >20  Impaired mobility     Source: Danitza Israel S. The timed 'Up and Go' Test: a Test of Basic Functional Mobility for Frail Elderly Persons. Journal of [address] [place] [address]. 1991;39:142-148. G-Code Crosswalk:  TUG time (seconds) Disability Index CMS Modifier   12 or faster 0% []CH   13-14 1-19% []CI   15-16 20-39% []CJ   17-18 40-59% []CK   19-20  60-79% []CL   21-22  80-99% []CM   23 or slower 100% []CN             Therapeutic Exercise and NMR EXR  [x] (66245) Provided verbal/tactile cueing for activities related to strengthening, flexibility, endurance, ROM for improvements in LE, proximal hip, and core control with self care, mobility, lifting, ambulation. [x] (86958) Provided verbal/tactile cueing for activities related to improving balance, coordination, kinesthetic sense, posture, motor skill, proprioception to assist with LE, proximal hip, and core control in self-care, mobility, lifting, ambulation and eccentric single leg control.      NMR and Therapeutic Activities:    [x] (54922 or 26805) Provided verbal/tactile cueing for activities related to improving balance, coordination, kinesthetic sense, posture, motor skill, proprioception and motor activation to allow for proper function of core, proximal hip and LE with self-care and ADLs and functional mobility.   [] (54290) Gait Re-education- Provided training and instruction to the patient for proper LE, core and proximal hip recruitment and positioning and eccentric body weight control with ambulation re-education including up and down stairs     Home Exercise Program:    [x] (19356) Reviewed/Progressed HEP activities related to strengthening, flexibility, endurance, ROM of core, proximal hip and LE for functional self-care, mobility, lifting and ambulation/stair navigation - Pt instructed in HEP through [address] [place] [address] (see below). Pt was also issued written handouts. (10/12/21)   [x] (80203) Reviewed/Progressed HEP activities related to improving balance, coordination, kinesthetic sense, posture, motor skill, proprioception of core, proximal hip and LE for self-care, mobility, lifting, and ambulation/stair navigation        Access Code: [email or web address]  URL: [employer]  Date: 10/12/2021  Prepared by: Tawana Moss    Exercises  Long Sitting Calf Stretch with Strap - 2 x daily - 7 x weekly - 1 sets - 5 reps - 30 seconds hold  Long Sitting Quad Set - 2 x daily - 7 x weekly - 1 sets - 10 reps - 10 seconds hold  Supine Gluteal Sets - 2 x daily - 7 x weekly - 1 sets - 10 reps - 10 seconds hold  Supine Straight Leg Hip Adduction and Quad Set with Ball - 2 x daily - 7 x weekly - 1 sets - 10 reps - 10 seconds hold  Supine Knee Extension Strengthening - 2 x daily - 7 x weekly - 3 sets - 10 reps  Seated Long Arc Quad - 2 x daily - 7 x weekly - 3 sets - 10 reps        Manual Treatments:  PROM / STM / Oscillations-Mobs:  G-I, II, III, IV (PA's, Inf., Post.)  [x] (37403) Provided manual therapy to mobilize LE, proximal hip and/or LS spine soft tissue/joints for the purpose of modulating pain, promoting relaxation, increasing ROM, reducing/eliminating soft tissue swelling/inflammation/restriction, improving soft tissue extensibility and allowing for proper ROM for normal function with self-care, mobility, lifting and ambulation. Modalities:  Gel pack x15'    [] GAME READY (VASO)- for significant edema, swelling, pain control. Charges:  Timed Code Treatment Minutes: 50'   Total Treatment Minutes: 72'      [] EVAL (LOW) 455 [id number] (typically 20 minutes face-to-face)  [] EVAL (MOD) 22456 (typically 30 minutes face-to-face)  [] EVAL (HIGH) 18921 (typically 45 minutes face-to-face)  [] RE-EVAL     [x] UT(07795) x 2 (25')    [] IONTO  [] NMR (53669) x     [] VASO  [x] Manual (77079) x 1 (10')    [] Other:  [x] TA x 1 (15')     [] Mech Traction (99428)  [] ES(attended) (89116)      [] ES (un) (23981):         ASSESSMENT:    We increased the step height today to 8\". She was challenged with this and had to use her UE for assistance more today. Seemed a little weaker and had to work harder with her right LE vs her LE. She struggles some to pick her knees up high in terms of marching in place. Tends to swing her leg out to the side to bring it up to the step. GOALS:   Patient stated goal: \"Increase strength, be able to walk and not get tired\"   [x] Progressing: [] Met: [] Not Met: [] Adjusted    Therapist goals for Patient:   Short Term Goals: To be achieved in: 2 weeks  1. Independent in HEP and progression per patient tolerance, in order to prevent re-injury. [] Progressing: [x] Met: [] Not Met: [] Adjusted  2. Patient will have a decrease in left hip pain to 3/10 to facilitate improvement in movement, function, and ADLs as indicated by Functional Deficits. [x] Progressing: [] Met: [] Not Met: [] Adjusted    Long Term Goals: To be achieved in: 6 weeks  1. Disability index score of 20% or less for the Hip Outcome Score to assist with reaching prior level of function. [x] Progressing: [] Met: [] Not Met: [] Adjusted  2. Patient will demonstrate an increase in left hip AROM to WFL's to allow for proper joint functioning as indicated by patients Functional Deficits. [] Progressing: [x] Partially Met: [] Not Met: [] Adjusted  3. Patient will demonstrate an increase in left hip strength to 4+/5 to allow for proper functional mobility as indicated by patients Functional Deficits. [x] Progressing: [] Met: [] Not Met: [] Adjusted  4. Patient will be able to walk community distances, stand at least 1 hour, sit at least 1 hour, and be able to ascend/descend stairs reciprocally without increased symptoms or restriction. (patient specific functional goal)   [x] Progressing: [] Met: [] Not Met: [] Adjusted  5. Patient will have a decrease in left hip pain to 0-1/10 with daily act. [x] Progressing: [] Met: [] Not Met: [] Adjusted    GOALS UPDATED 11/2/2021        Overall Progression Towards Functional goals/ Treatment Progress Update:  [x] Patient is progressing as expected towards functional goals listed. [x] Progression is slowed due to complexities/Impairments listed. [x] Progression has been slowed due to co-morbidities. (Had Right TKA 8/11/2021)  [] Plan just implemented, too soon to assess goals progression <30days   [] Goals require adjustment due to lack of progress  [] Patient is not progressing as expected and requires additional follow up with physician  [] Other    Prognosis for POC: [x] Good [] Fair  [] Poor      Patient requires continued skilled intervention: [x] Yes  [] No    Treatment/Activity Tolerance:  [x] Patient able to complete treatment  [] Patient limited by fatigue  [x] Patient limited by pain    [] Patient limited by other medical complications  [] Other:         PLAN: See eval.   Cont 1x/week through December and plan for D/C after that if ready.     [] Continue per plan of care [] Alter current plan   [x] Plan of care initiated [] Hold pending MD visit [] Discharge      Electronically signed by:     Rocio Prado [id number] [facility] #[id number]  [place] [facility] #[id number]      Note: If patient does not return for scheduled/ recommended follow up visits, this note will serve as a discharge from care along with most recent update on progress.

## 2021-12-03 ENCOUNTER — TELEPHONE (OUTPATIENT)
Dept: CARDIOLOGY CLINIC | Age: 71
End: 2021-12-03

## 2021-12-06 NOTE — TELEPHONE ENCOUNTER
Spoke with patient, started on Eliquis for blood clot. Let her know to continue taking along with HTN medication.

## 2021-12-09 ENCOUNTER — TREATMENT (OUTPATIENT)
Dept: PHYSICAL THERAPY | Age: 71
End: 2021-12-09
Payer: MEDICARE

## 2021-12-09 DIAGNOSIS — R26.2 DIFFICULTY WALKING: ICD-10-CM

## 2021-12-09 DIAGNOSIS — M25.652 DECREASED RANGE OF LEFT HIP MOVEMENT: ICD-10-CM

## 2021-12-09 DIAGNOSIS — M25.552 ACUTE POSTOPERATIVE PAIN OF LEFT HIP: ICD-10-CM

## 2021-12-09 DIAGNOSIS — G89.18 ACUTE POSTOPERATIVE PAIN OF LEFT HIP: ICD-10-CM

## 2021-12-09 DIAGNOSIS — M16.12 PRIMARY OSTEOARTHRITIS OF LEFT HIP: Primary | ICD-10-CM

## 2021-12-09 DIAGNOSIS — R29.898 WEAKNESS OF LEFT HIP: ICD-10-CM

## 2021-12-09 PROCEDURE — G8427 DOCREV CUR MEDS BY ELIG CLIN: HCPCS | Performed by: PHYSICAL THERAPIST

## 2021-12-09 PROCEDURE — 97110 THERAPEUTIC EXERCISES: CPT | Performed by: PHYSICAL THERAPIST

## 2021-12-09 PROCEDURE — 97530 THERAPEUTIC ACTIVITIES: CPT | Performed by: PHYSICAL THERAPIST

## 2021-12-09 PROCEDURE — 97140 MANUAL THERAPY 1/> REGIONS: CPT | Performed by: PHYSICAL THERAPIST

## 2021-12-09 NOTE — PROGRESS NOTES
[facility]   Phone: [phone]    Fax: [phone]         Date:  2021    Patient Name:  Owen Valdes    :  1950  MRN: <[id number]>  Restrictions/Precautions:    Medical/Treatment Diagnosis Information:  Diagnosis: S/P Left THR (Sx: 10/4/21) (M16.12)   Treatment Diagnosis:   Left Hip Pain (G89.18, M25.552), Decreased Left Hip ROM (M25.652), Decreased Strength (R29.898)                          Insurance/Certification information:  PT Insurance Information: Medicare  Physician Information:  Referring Practitioner: Dr. Modesta Delgado  Has the plan of care been signed (Y/N):        [x]  Yes (POC signed 10/18/21)  []  No      Date of Patient follow up with Physician:         Is this a Progress Report:     []  Yes  []  No        If Yes:  Date Range for reporting period:  Beginning 2021  Ending 2021    Progress report will be due (10 Rx or 30 days whichever is less):      Recertification will be due (POC Duration  / 90 days whichever is less): 2021        Visit # Insurance Allowable Auth Required   15 Medical necessity  (use KX modifier due to pt having prior PT this year) []  Yes [x]  No        Functional Scale:   Date assessed:  10/12/21    Functional Assessment Tool Used:  Hip Outcome Score (copy scanned into media)  Score: 50/72 = 69%      Latex Allergy:  [x]NO      []YES  Preferred Language for Healthcare:   [x]English       []other:      Pain level:  4-6/10      PQRS: 2021  Reviewed medication list with patient. No changes since last PT visit. MEDICARE CAP EXCEPTION DOCUMENTATION      I certify that this patient meets one of the below criteria necessary for becoming an exception to the Medicare cap on therapy services:    []  The patient has a condition identified by an ICD-10 code that has a direct and significant impact on the need for therapy.  (Significantly impacts the rate of recovery.)                   []  The patient has a complexity identified by an ICD-10 code that has a direct and significant impact on the need for therapy. (Significantly impacts the rate of recovery and is associated with a primary condition.)         []  The patient has associated variables that influence the amount of treatment to include:  Social support, self-efficacy/motivation, prognosis, time since onset/acuity. []  The patient has generalized musculoskeletal conditions or a condition affecting multiple sites that will have a direct impact on the rate of recovery. [x]  The patient had a prior episode of outpatient therapy during this calendar year for a different condition. []  The patient has a mental or cognitive disorder in addition to the condition being treated that will have a direct and significant impact on the rate of recovery. SUBJECTIVE:  9+ weeks post op  Patient underwent an US last week and a small DVT was noted at the level of her ankle. She is on Eliquis (anti coagulant therapy) for 3 months. She was told to stop taking her Diclofenac. Since then, she is achy \"all over\". Reports that her Diclofenac really helps the arthritis in her whole body. She is working her doctor on other options. She also reports that she had a little fall at work on Monday. Walking too quickly without her cane and her left foot got caught up under her. Landed on her left knee and hit her left side and hit her head.            OBJECTIVE:     ROM PROM AROM Comments    Left Right Left Right 11/2/2021   Flexion   95 107 supine   Extension        Abduction   22 21 supine   Adduction        ER   20 25 sitting   IR   17 20 sitting             Strength Left Right Comments   Hip flexors Not      Hip extension Tested     Hip abduction Due to     Hip adduction Sx     Hip ER      Hip IR      Quads 4-/5     Hamstrings 4-/5         Flexibility Left Right Comments   Hamstrings Not     ITB (Obers test) Tested     Hip flexor(Sebastian test) Due to     gastroc Sx     Rectus femoris(Elys test)              Special  Test Left Right Comments   FABERS Not     Scour test Tested     Impingement test Due to     Trendelenburg test Sx               RESTRICTIONS/PRECAUTIONS:  S/P Left THR (Sx: 10/4/21); follow THR precautions        Exercises/Interventions:       Therapeutic Ex (23397) Sets/sec Reps Notes/CUES   BIKE      TREADMILL            STRETCHING      Hamstrings      Towel Pull Calf 30\" hold X 5    Inclined Calf      Hip Flexors      Quads      ITB Rope      Adductors      Piriformis      Figure four-Push knee out            ROM      Passive      Active      Hang weights      Sheet pulls   Right knee flexion TP 10\"x10   Ankle pumps            ISOMETRICS      Quad sets 10\" hold X 15 seated   Ham sets      Hip ADD  Ball squeeze 5\" hold  X 30 supine   Hooklying Hip ABD  Loop resistance 5\" x30 Isometric into black loop   Pelvic Tilts            STRENGTHENING      SLR Supine 3 x10 bilat   SLR Abduction 2 x15 Standing hip abd  opp foot on riser  bilat   SLR Adduction      SLR Prone      SLR+      Clams 3 x10 SDLY   Reverse Clams      Clams in abduction      Bridges            SAQ 3 sets X 10 Supine bilat  4#   LAQ    Held today due to pain in lateral thigh/ITBand      PRE machines      Knee extension      Knee flexion      Leg Press            CKC      Calf raises      Mini squats      Step ups L2 1x10 Leading with each LE   TKE                  Manual Intervention (57013)      Patellar mobs      PROM x5'  Gentle left hip manual stretch within post op guidelines/circumduction   Longitudinal distraction      Scar Massage x5'  To her left DILAN incision   Foam Roll            NMR re-education (07845)   CUES NEEDED   Biodex balance x6'    x2'  WS side/side and diagonals L 9 2' each  PS fill in Mcgrath L9  LOS L9 medium spread x3    Tandem balance      SLB      Rebounder      BOSU                        Therapeutic Activity (15649)      Core training      Swiss ball activities      Monster walks         Patient Education: Dx, prognosis, pt goals/expectations, rehab timeline (10/12/21)  X 8'              TUG TESTED on 10/15/2021            Time to Complete: 23 seconds    TUG TESTED ON 11/2/2021     Time to Complete:  13 seconds    Timed Get Up and Go Test  Measures mobility in people who are able to walk on their own (assistive device permitted)      Instructions: The person may wear their usual footwear and can use any assistive device they normally use. 1. Have the person sit in the chair with their back to the chair and their arms resting on the back rests  2. Ask the person to stand up from a standard chair and walk a distance of 10 ft. (3 m). 3. Have the person turn around, walk back to the chair and sit down again. Timing begins when the person starts to rise from the chair and ends when he or she returns to the chair and sits down. Predictive Results:    Seconds Rating    <10  Freely mobile    <20  Mostly independent    20-29  Variable mobility    >20  Impaired mobility     Source: Arabella Israel S. The timed 'Up and Go' Test: a Test of Basic Functional Mobility for Frail Elderly Persons. Journal of [facility]. 1991;39:142-148. G-Code Crosswalk:  TUG time (seconds) Disability Index CMS Modifier   12 or faster 0% []CH   13-14 1-19% []CI   15-16 20-39% []CJ   17-18 40-59% []CK   19-20  60-79% []CL   21-22  80-99% []CM   23 or slower 100% []CN             Therapeutic Exercise and NMR EXR  [x] (24037) Provided verbal/tactile cueing for activities related to strengthening, flexibility, endurance, ROM for improvements in LE, proximal hip, and core control with self care, mobility, lifting, ambulation.   [x] (82191) Provided verbal/tactile cueing for activities related to improving balance, coordination, kinesthetic sense, posture, motor skill, proprioception to assist with LE, proximal hip, and core control in self-care, mobility, lifting, ambulation and eccentric single leg control. NMR and Therapeutic Activities:    [x] (07273 or 97746) Provided verbal/tactile cueing for activities related to improving balance, coordination, kinesthetic sense, posture, motor skill, proprioception and motor activation to allow for proper function of core, proximal hip and LE with self-care and ADLs and functional mobility.   [] (87090) Gait Re-education- Provided training and instruction to the patient for proper LE, core and proximal hip recruitment and positioning and eccentric body weight control with ambulation re-education including up and down stairs     Home Exercise Program:    [x] (11778) Reviewed/Progressed HEP activities related to strengthening, flexibility, endurance, ROM of core, proximal hip and LE for functional self-care, mobility, lifting and ambulation/stair navigation - Pt instructed in HEP through [address] [place] [address] (see below). Pt was also issued written handouts. (10/12/21)   [x] (18833) Reviewed/Progressed HEP activities related to improving balance, coordination, kinesthetic sense, posture, motor skill, proprioception of core, proximal hip and LE for self-care, mobility, lifting, and ambulation/stair navigation        Access Code: [email or web address]  URL: [email or web address]  Date: 10/12/2021  Prepared by: Demario Craig    Exercises  Long Sitting Calf Stretch with Strap - 2 x daily - 7 x weekly - 1 sets - 5 reps - 30 seconds hold  Long Sitting Quad Set - 2 x daily - 7 x weekly - 1 sets - 10 reps - 10 seconds hold  Supine Gluteal Sets - 2 x daily - 7 x weekly - 1 sets - 10 reps - 10 seconds hold  Supine Straight Leg Hip Adduction and Quad Set with Ball - 2 x daily - 7 x weekly - 1 sets - 10 reps - 10 seconds hold  Supine Knee Extension Strengthening - 2 x daily - 7 x weekly - 3 sets - 10 reps  Seated Long Arc Quad - 2 x daily - 7 x weekly - 3 sets - 10 reps        Manual Treatments:  PROM / STM / Oscillations-Mobs:  G-I, II, III, IV (PA's, Inf., Post.)  [x] (49856) Provided manual therapy to mobilize LE, proximal hip and/or LS spine soft tissue/joints for the purpose of modulating pain, promoting relaxation, increasing ROM, reducing/eliminating soft tissue swelling/inflammation/restriction, improving soft tissue extensibility and allowing for proper ROM for normal function with self-care, mobility, lifting and ambulation. Modalities:  Gel pack x15'    [] GAME READY (VASO)- for significant edema, swelling, pain control. Charges:  Timed Code Treatment Minutes: 48'   Total Treatment Minutes: 72'      [] EVAL (LOW) 455 [id number] (typically 20 minutes face-to-face)  [] EVAL (MOD) 10221 (typically 30 minutes face-to-face)  [] EVAL (HIGH) 69340 (typically 45 minutes face-to-face)  [] RE-EVAL     [x] VW(32267) x 2 (25')    [] IONTO  [] NMR (85013) x     [] VASO  [x] Manual (16177) x 1 (10')    [] Other:  [x] TA x 1 (15')     [] Mech Traction (54288)  [] ES(attended) (99773)      [] ES (un) (08337):         ASSESSMENT:    Reminded her about wearing compression socks when on her feet, especially while at work. Also how she has to walk with her cane at all times. We went back down to L2 for the step ups since she was more sore today. She was moving more slowly in general today. Complained of her right knee bothering her more today. Still able to do SLR and SAQ with weights. She is looking into getting a \"jamal\" to aid in getting her compression hose on/off more easily. GOALS:   Patient stated goal: \"Increase strength, be able to walk and not get tired\"   [x] Progressing: [] Met: [] Not Met: [] Adjusted    Therapist goals for Patient:   Short Term Goals: To be achieved in: 2 weeks  1. Independent in HEP and progression per patient tolerance, in order to prevent re-injury. [] Progressing: [x] Met: [] Not Met: [] Adjusted  2.  Patient will have a decrease in left hip pain to 3/10 to facilitate improvement in movement, function, and ADLs as indicated by Functional Deficits. [x] Progressing: [] Met: [] Not Met: [] Adjusted    Long Term Goals: To be achieved in: 6 weeks  1. Disability index score of 20% or less for the Hip Outcome Score to assist with reaching prior level of function. [x] Progressing: [] Met: [] Not Met: [] Adjusted  2. Patient will demonstrate an increase in left hip AROM to WFL's to allow for proper joint functioning as indicated by patients Functional Deficits. [] Progressing: [x] Partially Met: [] Not Met: [] Adjusted  3. Patient will demonstrate an increase in left hip strength to 4+/5 to allow for proper functional mobility as indicated by patients Functional Deficits. [x] Progressing: [] Met: [] Not Met: [] Adjusted  4. Patient will be able to walk community distances, stand at least 1 hour, sit at least 1 hour, and be able to ascend/descend stairs reciprocally without increased symptoms or restriction. (patient specific functional goal)   [x] Progressing: [] Met: [] Not Met: [] Adjusted  5. Patient will have a decrease in left hip pain to 0-1/10 with daily act. [x] Progressing: [] Met: [] Not Met: [] Adjusted    GOALS UPDATED 11/2/2021        Overall Progression Towards Functional goals/ Treatment Progress Update:  [x] Patient is progressing as expected towards functional goals listed. [x] Progression is slowed due to complexities/Impairments listed. [x] Progression has been slowed due to co-morbidities.  (Had Right TKA 8/11/2021)  [] Plan just implemented, too soon to assess goals progression <30days   [] Goals require adjustment due to lack of progress  [] Patient is not progressing as expected and requires additional follow up with physician  [] Other    Prognosis for POC: [x] Good [] Fair  [] Poor      Patient requires continued skilled intervention: [x] Yes  [] No    Treatment/Activity Tolerance:  [x] Patient able to complete treatment  [] Patient limited by fatigue  [x] Patient limited by pain    [] Patient

## 2021-12-16 ENCOUNTER — TREATMENT (OUTPATIENT)
Dept: PHYSICAL THERAPY | Age: 71
End: 2021-12-16
Payer: MEDICARE

## 2021-12-16 DIAGNOSIS — M25.652 DECREASED RANGE OF LEFT HIP MOVEMENT: ICD-10-CM

## 2021-12-16 DIAGNOSIS — G89.18 ACUTE POSTOPERATIVE PAIN OF LEFT HIP: ICD-10-CM

## 2021-12-16 DIAGNOSIS — R26.2 DIFFICULTY WALKING: ICD-10-CM

## 2021-12-16 DIAGNOSIS — R29.898 WEAKNESS OF LEFT HIP: ICD-10-CM

## 2021-12-16 DIAGNOSIS — M25.552 ACUTE POSTOPERATIVE PAIN OF LEFT HIP: ICD-10-CM

## 2021-12-16 DIAGNOSIS — M16.12 PRIMARY OSTEOARTHRITIS OF LEFT HIP: Primary | ICD-10-CM

## 2021-12-16 PROCEDURE — G8427 DOCREV CUR MEDS BY ELIG CLIN: HCPCS | Performed by: PHYSICAL THERAPIST

## 2021-12-16 PROCEDURE — 97110 THERAPEUTIC EXERCISES: CPT | Performed by: PHYSICAL THERAPIST

## 2021-12-16 PROCEDURE — 97140 MANUAL THERAPY 1/> REGIONS: CPT | Performed by: PHYSICAL THERAPIST

## 2021-12-16 PROCEDURE — 97530 THERAPEUTIC ACTIVITIES: CPT | Performed by: PHYSICAL THERAPIST

## 2021-12-16 NOTE — PROGRESS NOTES
[facility]   Phone: [phone]    Fax: [phone]         Date:  2021    Patient Name:  Raymond Reyes    :  1950  MRN: <[id number]>  Restrictions/Precautions:    Medical/Treatment Diagnosis Information:  Diagnosis: S/P Left THR (Sx: 10/4/21) (M16.12)   Treatment Diagnosis:   Left Hip Pain (G89.18, M25.552), Decreased Left Hip ROM (M25.652), Decreased Strength (R29.898)                          Insurance/Certification information:  PT Insurance Information: Medicare  Physician Information:  Referring Practitioner: Dr. Vivek Forman  Has the plan of care been signed (Y/N):        [x]  Yes (POC signed 10/18/21)  []  No      Date of Patient follow up with Physician:         Is this a Progress Report:     []  Yes  []  No        If Yes:  Date Range for reporting period:  Beginning 2021  Ending 2021    Progress report will be due (10 Rx or 30 days whichever is less):  2340    Recertification will be due (POC Duration  / 90 days whichever is less): 2021        Visit # Insurance Allowable Auth Required   16 Medical necessity  (use KX modifier due to pt having prior PT this year) []  Yes [x]  No        Functional Scale:   Date assessed:  10/12/21    Functional Assessment Tool Used:  Hip Outcome Score (copy scanned into media)  Score: 50/72 = 69%      Latex Allergy:  [x]NO      []YES  Preferred Language for Healthcare:   [x]English       []other:      Pain level:  4-6/10      PQRS: 2021  Reviewed medication list with patient. No changes since last PT visit. MEDICARE CAP EXCEPTION DOCUMENTATION      I certify that this patient meets one of the below criteria necessary for becoming an exception to the Medicare cap on therapy services:    []  The patient has a condition identified by an ICD-10 code that has a direct and significant impact on the need for therapy.  (Significantly impacts the rate of recovery.)                   []  The patient has a complexity identified by an ICD-10 code that has a direct and significant impact on the need for therapy. (Significantly impacts the rate of recovery and is associated with a primary condition.)         []  The patient has associated variables that influence the amount of treatment to include:  Social support, self-efficacy/motivation, prognosis, time since onset/acuity. []  The patient has generalized musculoskeletal conditions or a condition affecting multiple sites that will have a direct impact on the rate of recovery. [x]  The patient had a prior episode of outpatient therapy during this calendar year for a different condition. []  The patient has a mental or cognitive disorder in addition to the condition being treated that will have a direct and significant impact on the rate of recovery. SUBJECTIVE:  10+ weeks post op  Patient reports that she feels like she needs a break from therapy. After having her right knee replaced, then therapy, then her left hip replaced, and more therapy, she feels like she has to give her body a break. She is happy with how her left hip feels. Rates it at 98% overall improvement. Having more issues with her right hip and knee at times. She is still dealing with not taking her Diclofenac. They did prescribe her Celebrex which she is nervous about taking due to bleeding because she is still on blood thinners. She is getting a little more used to working and being on her feet.               OBJECTIVE:     ROM PROM AROM Comments    Left Right Left Right 12/16/2021   Flexion   108 107 supine   Extension        Abduction   25 21 supine   Adduction        ER   30 25 sitting   IR   20 20 sitting             Strength Left Right Comments   Hip flexors      Hip extension      Hip abduction      Hip adduction      Hip ER      Hip IR      Quads 4+/5     Hamstrings 4+/5         Flexibility Left Right Comments   Hamstrings Not     ITB (Obers test) Tested     Hip flexor(Sebastian test) Due to     gastroc Sx     Rectus femoris(Elys test)              Special  Test Left Right Comments   FABERS Not     Scour test Tested     Impingement test Due to     Trendelenburg test Sx               RESTRICTIONS/PRECAUTIONS:  S/P Left THR (Sx: 10/4/21); follow THR precautions        Exercises/Interventions:       Therapeutic Ex (03872) Sets/sec Reps Notes/CUES   BIKE      TREADMILL            STRETCHING      Hamstrings      Towel Pull Calf 30\" hold X 5    Inclined Calf      Hip Flexors      Quads      ITB Rope      Adductors      Piriformis      Figure four-Push knee out            ROM      Passive      Active      Hang weights      Sheet pulls   Right knee flexion TP 10\"x10   Ankle pumps            ISOMETRICS      Quad sets 10\" hold X 15 seated   Ham sets      Hip ADD  Ball squeeze 5\" hold  X 30 supine   Hooklying Hip ABD  Loop resistance 5\" x30 Isometric into black loop   Pelvic Tilts            STRENGTHENING      SLR Supine 3 x10 bilat   SLR Abduction 2 x15 Standing hip abd  opp foot on riser  bilat   SLR Adduction      SLR Prone      SLR+      Clams 3 x10 SDLY   Reverse Clams      Clams in abduction      Bridges            SAQ 3 sets X 10 Supine bilat  4#   LAQ    Held today due to pain in lateral thigh/ITBand      PRE machines      Knee extension      Knee flexion      Leg Press            CKC      Calf raises      Mini squats      Step ups L2 1x10 Leading with each LE   TKE                  Manual Intervention (83839)      Patellar mobs      PROM x5'  Gentle left hip manual stretch within post op guidelines/circumduction   Longitudinal distraction      Scar Massage x5'  To her left DILAN incision   Foam Roll            NMR re-education (03417)   CUES NEEDED   Biodex balance x6'    x2'  WS side/side and diagonals L 9 2' each  PS fill in [place] L9  LOS L9 medium spread x3    Tandem balance      SLB      Rebounder      BOSU                        Therapeutic Activity (02311) skill, proprioception to assist with LE, proximal hip, and core control in self-care, mobility, lifting, ambulation and eccentric single leg control. NMR and Therapeutic Activities:    [x] (18275 or 35347) Provided verbal/tactile cueing for activities related to improving balance, coordination, kinesthetic sense, posture, motor skill, proprioception and motor activation to allow for proper function of core, proximal hip and LE with self-care and ADLs and functional mobility.   [] (44725) Gait Re-education- Provided training and instruction to the patient for proper LE, core and proximal hip recruitment and positioning and eccentric body weight control with ambulation re-education including up and down stairs     Home Exercise Program:    [x] (21611) Reviewed/Progressed HEP activities related to strengthening, flexibility, endurance, ROM of core, proximal hip and LE for functional self-care, mobility, lifting and ambulation/stair navigation - Pt instructed in HEP through [address] [place] [address] (see below). Pt was also issued written handouts. (10/12/21)   [x] (92507) Reviewed/Progressed HEP activities related to improving balance, coordination, kinesthetic sense, posture, motor skill, proprioception of core, proximal hip and LE for self-care, mobility, lifting, and ambulation/stair navigation        Access Code: [email or web address]  URL: [email or web address]  Date: 10/12/2021  Prepared by: Cony Hooker    Exercises  Long Sitting Calf Stretch with Strap - 2 x daily - 7 x weekly - 1 sets - 5 reps - 30 seconds hold  Long Sitting Quad Set - 2 x daily - 7 x weekly - 1 sets - 10 reps - 10 seconds hold  Supine Gluteal Sets - 2 x daily - 7 x weekly - 1 sets - 10 reps - 10 seconds hold  Supine Straight Leg Hip Adduction and Quad Set with Ball - 2 x daily - 7 x weekly - 1 sets - 10 reps - 10 seconds hold  Supine Knee Extension Strengthening - 2 x daily - 7 x weekly - 3 sets - 10 reps  Seated Long Arc Quad - 2 x daily - 7 x weekly - 3 sets - 10 reps        Manual Treatments:  PROM / STM / Oscillations-Mobs:  G-I, II, III, IV (PA's, Inf., Post.)  [x] (15125) Provided manual therapy to mobilize LE, proximal hip and/or LS spine soft tissue/joints for the purpose of modulating pain, promoting relaxation, increasing ROM, reducing/eliminating soft tissue swelling/inflammation/restriction, improving soft tissue extensibility and allowing for proper ROM for normal function with self-care, mobility, lifting and ambulation. Modalities:  Gel pack x15'    [] GAME READY (VASO)- for significant edema, swelling, pain control. Charges:  Timed Code Treatment Minutes: 50'   Total Treatment Minutes: 72'      [] EVAL (LOW) 455 1011 (typically 20 minutes face-to-face)  [] EVAL (MOD) 50974 (typically 30 minutes face-to-face)  [] EVAL (HIGH) 97526 (typically 45 minutes face-to-face)  [] RE-EVAL     [x] AZ(26826) x 2 (25')    [] IONTO  [] NMR (69327) x     [] VASO  [x] Manual (29588) x 1 (10')    [] Other:  [x] TA x 1 (15')     [] Mech Traction (33958)  [] ES(attended) (23383)      [] ES (un) (49195):         ASSESSMENT:    Updated her objective measurements today, see above for details. Her AROM is WNL for her left hip now. Her strength is better, but still demonstrates some weakness in hip musculature of bilat hips. She is doing a better job of consistently wearing her compression stockings. We reviewed the most important exercises for her to keep up with on her own. Also reminded her that it is ok to push off and step first with her left LE. She understands all and plans to be compliant. GOALS:   Patient stated goal: \"Increase strength, be able to walk and not get tired\"   [x] Progressing: [] Met: [] Not Met: [] Adjusted    Therapist goals for Patient:   Short Term Goals: To be achieved in: 2 weeks  1. Independent in HEP and progression per patient tolerance, in order to prevent re-injury.    [] Progressing: [x] Met: [] Not Met: [] Adjusted  2. Patient will have a decrease in left hip pain to 3/10 to facilitate improvement in movement, function, and ADLs as indicated by Functional Deficits. [] Progressing: [x] Met: [] Not Met: [] Adjusted    Long Term Goals: To be achieved in: 6 weeks  1. Disability index score of 20% or less for the Hip Outcome Score to assist with reaching prior level of function. [] Progressing: [x] Met: [] Not Met: [] Adjusted  2. Patient will demonstrate an increase in left hip AROM to WFL's to allow for proper joint functioning as indicated by patients Functional Deficits. [] Progressing: [x]   Met: [] Not Met: [] Adjusted  3. Patient will demonstrate an increase in left hip strength to 4+/5 to allow for proper functional mobility as indicated by patients Functional Deficits. [] Progressing: [x] Met: [] Not Met: [] Adjusted  4. Patient will be able to walk community distances, stand at least 1 hour, sit at least 1 hour, and be able to ascend/descend stairs reciprocally without increased symptoms or restriction. (patient specific functional goal)   [] Progressing: [x] Mostly Met: [] Not Met: [] Adjusted  5. Patient will have a decrease in left hip pain to 0-1/10 with daily act. [] Progressing: [x] Met: [] Not Met: [] Adjusted    GOALS UPDATED 11/2/2021   GOALS UPDATED 12/16/2021      Overall Progression Towards Functional goals/ Treatment Progress Update:  [x] Patient is progressing as expected towards functional goals listed. [x] Progression is slowed due to complexities/Impairments listed. [x] Progression has been slowed due to co-morbidities.  (Had Right TKA 8/11/2021)  [] Plan just implemented, too soon to assess goals progression <30days   [] Goals require adjustment due to lack of progress  [] Patient is not progressing as expected and requires additional follow up with physician  [] Other    Prognosis for POC: [x] Good [] Fair  [] Poor      Patient requires continued skilled intervention: [x] Yes  [] No    Treatment/Activity Tolerance:  [x] Patient able to complete treatment  [x] Patient limited by fatigue  [] Patient limited by pain    [] Patient limited by other medical complications  [] Other:         PLAN: Plan to D/C this date to [facility]. To call with any problems or questions. [] Continue per plan of care [] Alter current plan   [] Plan of care initiated [] Hold pending MD visit [x] Discharge      Electronically signed by:     Annita Lai  PT #[id number]  [place] [facility] #[id number]      Note: If patient does not return for scheduled/ recommended follow up visits, this note will serve as a discharge from care along with most recent update on progress.

## 2022-03-20 ENCOUNTER — HOSPITAL ENCOUNTER (EMERGENCY)
Age: 72
Discharge: HOME | End: 2022-03-20
Attending: EMERGENCY MEDICINE
Payer: MEDICARE

## 2022-03-20 ENCOUNTER — APPOINTMENT (OUTPATIENT)
Dept: GENERAL RADIOLOGY | Age: 72
End: 2022-03-20
Payer: MEDICARE

## 2022-03-20 VITALS
RESPIRATION RATE: 19 BRPM | HEIGHT: 63 IN | HEART RATE: 65 BPM | DIASTOLIC BLOOD PRESSURE: 69 MMHG | WEIGHT: 227.3 LBS | SYSTOLIC BLOOD PRESSURE: 122 MMHG | TEMPERATURE: 98 F | OXYGEN SATURATION: 96 % | BODY MASS INDEX: 40.27 KG/M2

## 2022-03-20 DIAGNOSIS — R42 LIGHTHEADEDNESS: Primary | ICD-10-CM

## 2022-03-20 LAB
ALBUMIN SERPL-MCNC: 4.2 G/DL (ref 3.4–5)
ALP BLD-CCNC: 119 U/L (ref 40–129)
ALT SERPL-CCNC: 29 U/L (ref 10–40)
AMORPHOUS: ABNORMAL /HPF
ANION GAP SERPL CALCULATED.3IONS-SCNC: 12 MMOL/L (ref 3–16)
AST SERPL-CCNC: 33 U/L (ref 15–37)
BASE EXCESS VENOUS: -1.4 MMOL/L (ref -2–3)
BASOPHILS ABSOLUTE: 0.1 K/UL (ref 0–0.2)
BASOPHILS RELATIVE PERCENT: 0.6 %
BILIRUB SERPL-MCNC: 0.3 MG/DL (ref 0–1)
BILIRUBIN DIRECT: <0.2 MG/DL (ref 0–0.3)
BILIRUBIN URINE: NEGATIVE
BILIRUBIN, INDIRECT: NORMAL MG/DL (ref 0–1)
BLOOD, URINE: NEGATIVE
BUN BLDV-MCNC: 17 MG/DL (ref 7–20)
CALCIUM SERPL-MCNC: 9.3 MG/DL (ref 8.3–10.6)
CARBOXYHEMOGLOBIN: 1.4 % (ref 0–1.5)
CHLORIDE BLD-SCNC: 105 MMOL/L (ref 99–110)
CLARITY: CLEAR
CO2: 22 MMOL/L (ref 21–32)
COLOR: YELLOW
CREAT SERPL-MCNC: 0.6 MG/DL (ref 0.6–1.2)
EOSINOPHILS ABSOLUTE: 0.2 K/UL (ref 0–0.6)
EOSINOPHILS RELATIVE PERCENT: 1.9 %
GFR AFRICAN AMERICAN: >60
GFR NON-AFRICAN AMERICAN: >60
GLUCOSE BLD-MCNC: 112 MG/DL (ref 70–99)
GLUCOSE URINE: NEGATIVE MG/DL
HCO3 VENOUS: 25.1 MMOL/L (ref 24–28)
HCT VFR BLD CALC: 38.2 % (ref 36–48)
HEMOGLOBIN, VEN, REDUCED: 42 %
HEMOGLOBIN: 12.9 G/DL (ref 12–16)
KETONES, URINE: NEGATIVE MG/DL
LEUKOCYTE ESTERASE, URINE: NEGATIVE
LIPASE: 34 U/L (ref 13–60)
LYMPHOCYTES ABSOLUTE: 3 K/UL (ref 1–5.1)
LYMPHOCYTES RELATIVE PERCENT: 32.8 %
MCH RBC QN AUTO: 32 PG (ref 26–34)
MCHC RBC AUTO-ENTMCNC: 33.9 G/DL (ref 31–36)
MCV RBC AUTO: 94.3 FL (ref 80–100)
METHEMOGLOBIN VENOUS: 0.3 % (ref 0–1.5)
MICROSCOPIC EXAMINATION: YES
MONOCYTES ABSOLUTE: 0.8 K/UL (ref 0–1.3)
MONOCYTES RELATIVE PERCENT: 9.3 %
NEUTROPHILS ABSOLUTE: 5 K/UL (ref 1.7–7.7)
NEUTROPHILS RELATIVE PERCENT: 55.4 %
NITRITE, URINE: NEGATIVE
O2 SAT, VEN: 57 %
PCO2, VEN: 48.3 MMHG (ref 41–51)
PDW BLD-RTO: 14.1 % (ref 12.4–15.4)
PH UA: 6 (ref 5–8)
PH VENOUS: 7.32 (ref 7.35–7.45)
PLATELET # BLD: 239 K/UL (ref 135–450)
PMV BLD AUTO: 9.5 FL (ref 5–10.5)
PO2, VEN: 32.8 MMHG (ref 25–40)
POTASSIUM REFLEX MAGNESIUM: 4.3 MMOL/L (ref 3.5–5.1)
PRO-BNP: 461 PG/ML (ref 0–124)
PROTEIN UA: ABNORMAL MG/DL
RAPID INFLUENZA  B AGN: NEGATIVE
RAPID INFLUENZA A AGN: NEGATIVE
RBC # BLD: 4.05 M/UL (ref 4–5.2)
RBC UA: ABNORMAL /HPF (ref 0–4)
SODIUM BLD-SCNC: 139 MMOL/L (ref 136–145)
SPECIFIC GRAVITY UA: >=1.03 (ref 1–1.03)
TCO2 CALC VENOUS: 27 MMOL/L
TOTAL PROTEIN: 7 G/DL (ref 6.4–8.2)
TROPONIN: <0.01 NG/ML
URINE TYPE: ABNORMAL
UROBILINOGEN, URINE: 0.2 E.U./DL
WBC # BLD: 9 K/UL (ref 4–11)
WBC UA: ABNORMAL /HPF (ref 0–5)

## 2022-03-20 PROCEDURE — 81001 URINALYSIS AUTO W/SCOPE: CPT

## 2022-03-20 PROCEDURE — 84484 ASSAY OF TROPONIN QUANT: CPT

## 2022-03-20 PROCEDURE — 99283 EMERGENCY DEPT VISIT LOW MDM: CPT

## 2022-03-20 PROCEDURE — 93005 ELECTROCARDIOGRAM TRACING: CPT | Performed by: STUDENT IN AN ORGANIZED HEALTH CARE EDUCATION/TRAINING PROGRAM

## 2022-03-20 PROCEDURE — 85025 COMPLETE CBC W/AUTO DIFF WBC: CPT

## 2022-03-20 PROCEDURE — 82803 BLOOD GASES ANY COMBINATION: CPT

## 2022-03-20 PROCEDURE — 80048 BASIC METABOLIC PNL TOTAL CA: CPT

## 2022-03-20 PROCEDURE — 87804 INFLUENZA ASSAY W/OPTIC: CPT

## 2022-03-20 PROCEDURE — 83690 ASSAY OF LIPASE: CPT

## 2022-03-20 PROCEDURE — 2580000003 HC RX 258: Performed by: STUDENT IN AN ORGANIZED HEALTH CARE EDUCATION/TRAINING PROGRAM

## 2022-03-20 PROCEDURE — 83880 ASSAY OF NATRIURETIC PEPTIDE: CPT

## 2022-03-20 PROCEDURE — 71045 X-RAY EXAM CHEST 1 VIEW: CPT

## 2022-03-20 PROCEDURE — 80076 HEPATIC FUNCTION PANEL: CPT

## 2022-03-20 RX ORDER — SODIUM CHLORIDE, SODIUM LACTATE, POTASSIUM CHLORIDE, AND CALCIUM CHLORIDE .6; .31; .03; .02 G/100ML; G/100ML; G/100ML; G/100ML
1000 INJECTION, SOLUTION INTRAVENOUS ONCE
Status: COMPLETED | OUTPATIENT
Start: 2022-03-20 | End: 2022-03-20

## 2022-03-20 RX ADMIN — SODIUM CHLORIDE, POTASSIUM CHLORIDE, SODIUM LACTATE AND CALCIUM CHLORIDE 1000 ML: 600; 310; 30; 20 INJECTION, SOLUTION INTRAVENOUS at 10:42

## 2022-03-20 ASSESSMENT — ENCOUNTER SYMPTOMS
SHORTNESS OF BREATH: 1
NAUSEA: 0
COUGH: 0
ABDOMINAL PAIN: 0
EYE DISCHARGE: 0
EYE REDNESS: 0
SORE THROAT: 0

## 2022-03-20 NOTE — ED NOTES
Pt DC from ED ambulatory with daughter. She verbalized understanding to dc instructions. Vitals stable.       Med Jules, RN  03/20/22 5864

## 2022-03-20 NOTE — ED PROVIDER NOTES
[id number] RESIDENT NOTE       Date of evaluation: 3/20/2022    Chief Complaint     Dizziness (c/o dizziness while in the shower this am, pt to bp and it was low and hr was high)      History of Present Illness     Jodi Pallas is a 73 y.o. female with history of CVA, HTN, HLD who presents for evaluation of dizziness and hypotension that occurred this morning. Patient reports that she was getting a shower and felt dizzy and lightheaded. She took her blood pressure using a wrist cuff and it measured in the 80s over 50s on 2 separate measurements. She decided to come into the ED for further evaluation. This time the patient reports that she felt lightheaded and off-balance. She denies jackelyn vertigo. She reports that her heart rate was in the 100s at the time that this occurred. She complains that she has felt more short of breath with exertion during these episodes compared to her baseline. She has normal able to climb a flight of stairs which point she will become short of breath. No fevers or chills. No chest pain or palpitations. She complains of generally feeling unwell nauseated but has not had vomiting or abdominal pain. No urinary or bowel changes. She has been eating and drinking normally. She complains of generally feeling weak and fatigued but denies focal weakness or numbness. Review of Systems     Review of Systems   Constitutional: Positive for chills and fatigue. Negative for fever. HENT: Negative for congestion and sore throat. Eyes: Negative for discharge and redness. Respiratory: Positive for shortness of breath. Negative for cough. Cardiovascular: Negative for chest pain and palpitations. Gastrointestinal: Negative for abdominal pain and nausea. Genitourinary: Negative for dysuria and hematuria. Musculoskeletal: Negative for gait problem and joint swelling. Skin: Negative for rash and wound.    Neurological: Positive for dizziness and light-headedness. Negative for syncope. Hematological: Negative for adenopathy. Does not bruise/bleed easily. Psychiatric/Behavioral: Negative for agitation and behavioral problems. Past Medical, Surgical, Family, and Social History     She has a past medical history of Arthritis, Cerebral artery occlusion with cerebral infarction ([facility] [address] Depression, GERD (gastroesophageal reflux disease), HTN (hypertension), Hyperlipidemia, Overweight, Raynaud's disease, Spinal stenosis, and Use of cane as ambulatory aid. She has a past surgical history that includes Hysterectomy (02/1991); lumbar laminectomy (11/1997 and 1981); bladder repair; Hammer toe surgery; shoulder surgery; Finger trigger release (4/13; 10/13; 4/14, 6/3/20); lumbar fusion (12/13); Appendectomy (07/17/2016); hip surgery; hernia repair; joint replacement (01/2018); other surgical history (Left, 07/19/2018); pr shldr arthroscop,diagnostic (Left, 7/19/2018); Cardiac catheterization (2018); back surgery (x3 total ); Total knee arthroplasty (Left, 8/14/2019); and knee surgery. Her family history includes Alzheimer's Disease in her mother; Cancer in her brother; Heart Attack in her maternal aunt; Prostate Cancer in her father. She reports that she quit smoking about 16 years ago. She has never used smokeless tobacco. She reports that she does not drink alcohol and does not use drugs. Medications     Previous Medications    AMLODIPINE (NORVASC) 5 MG TABLET    Take 1 tablet by mouth 2 times daily    ASCORBIC ACID (VITAMIN C) 500 MG TABLET    Take 500 mg by mouth daily.     ASPIRIN LOW DOSE 81 MG EC TABLET        BUDESONIDE ER PO    Take 3 mg by mouth daily Take one tablet daily    CALCIUM CARBONATE (OSCAL) 500 MG TABS TABLET    Take 500 mg by mouth daily    CITALOPRAM (CELEXA) 20 MG TABLET    TAKE ONE TABLET EVERY DAY    DICLOFENAC (VOLTAREN) 75 MG EC TABLET    Take 1 tablet by mouth 2 times daily    LISINOPRIL (PRINIVIL;ZESTRIL) 20 MG TABLET    Take 1 tablet by mouth 2 times daily    MULTIPLE VITAMINS-MINERALS (CENTRUM SILVER) TABS    Take  by mouth. PREGABALIN (LYRICA) 75 MG CAPSULE        ROSUVASTATIN (CRESTOR) 5 MG TABLET    ROSUVASTATIN CALCIUM 5 MG TABS    VITAMIN D (CHOLECALCIFEROL) 1000 UNIT TABS TABLET    Take 1,000 Units by mouth daily       Allergies     She is allergic to tape [adhesive tape], reglan [metoclopramide], dermabond, and oxycodone. Physical Exam     INITIAL VITALS: BP: 120/62, Temp: 98 °F (36.7 °C), Pulse: 74, Resp: 18, SpO2: 100 %   Physical Exam  Constitutional:       General: She is not in acute distress. Appearance: Normal appearance. She is not ill-appearing or diaphoretic. HENT:      Nose: Nose normal. No rhinorrhea. Mouth/Throat:      Mouth: Mucous membranes are moist.      Pharynx: Oropharynx is clear. No posterior oropharyngeal erythema. Eyes:      Extraocular Movements: Extraocular movements intact. Conjunctiva/sclera: Conjunctivae normal.      Pupils: Pupils are equal, round, and reactive to light. Neck:      Vascular: No carotid bruit. Cardiovascular:      Rate and Rhythm: Normal rate and regular rhythm. Pulses: Normal pulses. Heart sounds: Normal heart sounds. No murmur heard. No friction rub. No gallop. Pulmonary:      Effort: Pulmonary effort is normal. No respiratory distress. Breath sounds: Normal breath sounds. No stridor. No wheezing, rhonchi or rales. Abdominal:      Palpations: Abdomen is soft. There is no mass. Tenderness: There is no abdominal tenderness. There is no guarding. Musculoskeletal:         General: No swelling or tenderness. Normal range of motion. Cervical back: Normal range of motion and neck supple. No tenderness. Lymphadenopathy:      Cervical: No cervical adenopathy. Skin:     General: Skin is warm and dry. Coloration: Skin is not pale. Findings: No erythema.    Neurological: General: No focal deficit present. Mental Status: She is alert and oriented to person, place, and time. Mental status is at baseline. Cranial Nerves: No cranial nerve deficit. Motor: No weakness. Coordination: Coordination normal.   Psychiatric:         Mood and Affect: Mood normal.         Behavior: Behavior normal.         DiagnosticResults     EKG   Interpreted in conjunction with emergencydepartment physician No att. providers found  Rhythm: normal sinus   Rate: normal  Axis: left  Ectopy: none  Conduction: normal  ST Segments: no acute change  T Waves:inversion in v1 and III  Q Waves: none  Clinical Impression: non-specific EKG  Comparison: 8/2/19. The previously seen sinus bradycardia has resolved, the ECG is otherwise similar in appearance    RADIOLOGY:  XR CHEST PORTABLE   Final Result   1. No findings for acute cardiopulmonary disease.           LABS:   Results for orders placed or performed during the hospital encounter of 03/20/22   Rapid influenza A/B antigens    Specimen: Nasopharyngeal   Result Value Ref Range    Rapid Influenza A Ag Negative Negative    Rapid Influenza B Ag Negative Negative   CBC with Auto Differential   Result Value Ref Range    WBC 9.0 4.0 - 11.0 K/uL    RBC 4.05 4.00 - 5.20 M/uL    Hemoglobin 12.9 12.0 - 16.0 g/dL    Hematocrit 38.2 36.0 - 48.0 %    MCV 94.3 80.0 - 100.0 fL    MCH 32.0 26.0 - 34.0 pg    MCHC 33.9 31.0 - 36.0 g/dL    RDW 14.1 12.4 - 15.4 %    Platelets 281 292 - 032 K/uL    MPV 9.5 5.0 - 10.5 fL    Neutrophils % 55.4 %    Lymphocytes % 32.8 %    Monocytes % 9.3 %    Eosinophils % 1.9 %    Basophils % 0.6 %    Neutrophils Absolute 5.0 1.7 - 7.7 K/uL    Lymphocytes Absolute 3.0 1.0 - 5.1 K/uL    Monocytes Absolute 0.8 0.0 - 1.3 K/uL    Eosinophils Absolute 0.2 0.0 - 0.6 K/uL    Basophils Absolute 0.1 0.0 - 0.2 K/uL   Basic Metabolic Panel w/ Reflex to MG   Result Value Ref Range    Sodium 139 136 - 145 mmol/L    Potassium reflex Magnesium 4.3 3.5 - 5.1 mmol/L    Chloride 105 99 - 110 mmol/L    CO2 22 21 - 32 mmol/L    Anion Gap 12 3 - 16    Glucose 112 (H) 70 - 99 mg/dL    BUN 17 7 - 20 mg/dL    CREATININE 0.6 0.6 - 1.2 mg/dL    GFR Non-African American >60 >60    GFR African American >60 >60    Calcium 9.3 8.3 - 10.6 mg/dL   Troponin   Result Value Ref Range    Troponin <0.01 <0.01 ng/mL   Brain Natriuretic Peptide   Result Value Ref Range    Pro- (H) 0 - 124 pg/mL   Blood Gas, Venous   Result Value Ref Range    pH, Julio Cesar 7.324 (L) 7.350 - 7.450    pCO2, Julio Cesar 48.3 41.0 - 51.0 mmHg    pO2, Julio Cesar 32.8 25.0 - 40.0 mmHg    HCO3, Venous 25.1 24.0 - 28.0 mmol/L    Base Excess, Julio Cesar -1.4 -2.0 - 3.0 mmol/L    O2 Sat, Julio Cesar 57 Not established %    Carboxyhemoglobin 1.4 0.0 - 1.5 %    MetHgb, Julio Cesar 0.3 0.0 - 1.5 %    TC02 (Calc), Julio Cesar 27 mmol/L    Hemoglobin, Julio Cesar, Reduced 42.00 %   Urinalysis with Microscopic   Result Value Ref Range    Color, UA Yellow Straw/Yellow    Clarity, UA Clear Clear    Glucose, Ur Negative Negative mg/dL    Bilirubin Urine Negative Negative    Ketones, Urine Negative Negative mg/dL    Specific Gravity, UA >=1.030 1.005 - 1.030    Blood, Urine Negative Negative    pH, UA 6.0 5.0 - 8.0    Protein, UA TRACE (A) Negative mg/dL    Urobilinogen, Urine 0.2 <2.0 E.U./dL    Nitrite, Urine Negative Negative    Leukocyte Esterase, Urine Negative Negative    Microscopic Examination YES     Urine Type NotGiven     WBC, UA 0-2 0 - 5 /HPF    RBC, UA 0-2 0 - 4 /HPF    Amorphous, UA 1+ /HPF   Hepatic Function Panel   Result Value Ref Range    Total Protein 7.0 6.4 - 8.2 g/dL    Albumin 4.2 3.4 - 5.0 g/dL    Alkaline Phosphatase 119 40 - 129 U/L    ALT 29 10 - 40 U/L    AST 33 15 - 37 U/L    Total Bilirubin 0.3 0.0 - 1.0 mg/dL    Bilirubin, Direct <0.2 0.0 - 0.3 mg/dL    Bilirubin, Indirect see below 0.0 - 1.0 mg/dL   Lipase   Result Value Ref Range    Lipase 34.0 13.0 - 60.0 U/L     RECENT VITALS:  BP: 122/69, Temp: 98 °F (36.7 °C), Pulse: 65,Resp: 19, SpO2: 96 % ED Course     Nursing Notes, Past Medical Hx, Past Surgical Hx, Social Hx, Allergies, and Family Hx were reviewed. The patient was given the followingmedications:  Orders Placed This Encounter   Medications    lactated ringers bolus       CONSULTS:  None    MEDICAL DECISION MAKING / ASSESSMENT / Mackenzie Vianney is a 73 y.o. female with history of CVA, HTN, HLD who presents for evaluation of dizziness and hypotension that occurred this morning. Initial impression notable for elderly woman appearing younger than stated age no acute distress with stable vital signs. Orthostatic vitals were done and were not symptomatic and overall reassuring. IV access established and laboratory evaluation sent. EKG done without acute ischemic changes or conduction abnormalities. Patient given 1 L bolus of lactated Ringer's. Laboratory evaluation reviewed. CBC without leukocytosis or anemia. BMP without electrolyte drinking or kidney injury. VBG without hypercarbic respiratory failure. Hepatic function panel and lipase within normal limits. UA without evidence of infection or significant hematuria. Troponin less than 0.01. . Chest x-ray unremarkable. Reevaluation patient is feeling significantly improved and feels that her generalized weakness is gone away. Given that this occurred during an episode of showering I suspect she is slightly dehydrated and that the vasodilation caused transient hypotension and mild tachycardia. Discussed that given her recent clot there is a possibility for pulmonary embolism although this is less likely. Patient is not interested in pursuing this diagnosis further and does not want CT imaging at this time. Discussed that she should return to the emergency department for signs and symptoms such as chest pain, shortness of breath, syncope, recurrent lightheadedness, fevers, or with any other concerns.   Patient encouraged to drink plenty of fluids when she goes home.    This patient was also evaluated by the attending physician. All care plans were discussed and agreed upon. Clinical Impression     1. Lightheadedness        Disposition     PATIENT REFERRED TO:  Martine Redman MD  [address] [place] [address].   [address] [place] [address]  [phone]    Schedule an appointment as soon as possible for a visit in 1 week        DISCHARGE MEDICATIONS:  New Prescriptions    No medications on file       Jose Akbar MD  Resident  03/20/22 [id number]

## 2022-03-21 LAB
EKG ATRIAL RATE: 75 BPM
EKG DIAGNOSIS: NORMAL
EKG P AXIS: 15 DEGREES
EKG P-R INTERVAL: 154 MS
EKG Q-T INTERVAL: 360 MS
EKG QRS DURATION: 78 MS
EKG QTC CALCULATION (BAZETT): 402 MS
EKG R AXIS: -10 DEGREES
EKG T AXIS: 25 DEGREES
EKG VENTRICULAR RATE: 75 BPM

## 2022-07-05 ENCOUNTER — OFFICE VISIT (OUTPATIENT)
Dept: ORTHOPEDIC SURGERY | Age: 72
End: 2022-07-05
Payer: MEDICARE

## 2022-07-05 VITALS — HEIGHT: 63 IN | WEIGHT: 227 LBS | BODY MASS INDEX: 40.22 KG/M2

## 2022-07-05 DIAGNOSIS — M25.512 LEFT SHOULDER PAIN, UNSPECIFIED CHRONICITY: Primary | ICD-10-CM

## 2022-07-05 DIAGNOSIS — M12.812 ROTATOR CUFF ARTHROPATHY OF LEFT SHOULDER: ICD-10-CM

## 2022-07-05 DIAGNOSIS — M75.82 TENDINITIS OF LEFT ROTATOR CUFF: ICD-10-CM

## 2022-07-05 PROCEDURE — 20610 DRAIN/INJ JOINT/BURSA W/O US: CPT | Performed by: ORTHOPAEDIC SURGERY

## 2022-07-05 PROCEDURE — 1090F PRES/ABSN URINE INCON ASSESS: CPT | Performed by: ORTHOPAEDIC SURGERY

## 2022-07-05 PROCEDURE — 99214 OFFICE O/P EST MOD 30 MIN: CPT | Performed by: ORTHOPAEDIC SURGERY

## 2022-07-05 PROCEDURE — G8399 PT W/DXA RESULTS DOCUMENT: HCPCS | Performed by: ORTHOPAEDIC SURGERY

## 2022-07-05 PROCEDURE — 1123F ACP DISCUSS/DSCN MKR DOCD: CPT | Performed by: ORTHOPAEDIC SURGERY

## 2022-07-05 PROCEDURE — G8417 CALC BMI ABV UP PARAM F/U: HCPCS | Performed by: ORTHOPAEDIC SURGERY

## 2022-07-05 PROCEDURE — 1036F TOBACCO NON-USER: CPT | Performed by: ORTHOPAEDIC SURGERY

## 2022-07-05 PROCEDURE — 3017F COLORECTAL CA SCREEN DOC REV: CPT | Performed by: ORTHOPAEDIC SURGERY

## 2022-07-05 PROCEDURE — G8428 CUR MEDS NOT DOCUMENT: HCPCS | Performed by: ORTHOPAEDIC SURGERY

## 2022-07-05 RX ORDER — AMLODIPINE BESYLATE 10 MG/1
TABLET ORAL
COMMUNITY
Start: 2022-06-08

## 2022-07-05 RX ORDER — TRIAMCINOLONE ACETONIDE 40 MG/ML
80 INJECTION, SUSPENSION INTRA-ARTICULAR; INTRAMUSCULAR ONCE
Status: COMPLETED | OUTPATIENT
Start: 2022-07-05 | End: 2022-07-05

## 2022-07-05 RX ORDER — LIDOCAINE HYDROCHLORIDE 10 MG/ML
8 INJECTION, SOLUTION INFILTRATION; PERINEURAL ONCE
Status: COMPLETED | OUTPATIENT
Start: 2022-07-05 | End: 2022-07-05

## 2022-07-05 RX ADMIN — TRIAMCINOLONE ACETONIDE 80 MG: 40 INJECTION, SUSPENSION INTRA-ARTICULAR; INTRAMUSCULAR at 14:45

## 2022-07-05 RX ADMIN — LIDOCAINE HYDROCHLORIDE 8 ML: 10 INJECTION, SOLUTION INFILTRATION; PERINEURAL at 14:43

## 2022-07-05 NOTE — PROGRESS NOTES
[address] [facility]  History and Physical  Shoulder Pain    Date:  2022    Name:  Dinorah Chacon  Address:  [address] [place], [place] [address]    :  1950      Age:   81 y.o.    SSN:  [id number]      Medical Record Number:  [id number]    Reason for Visit:    Shoulder Pain (OP/NP LEFT SHOULDER)      HPI:   Dinorah Chacon is a 81 y.o. female who presents to our office today for follow up of the left shoulder pain. She is a longtime patient of Dr. Cinthia Shin. Patient has a history of undergoing two rotator cuff repairs of her left shoulder. The index repair was on 2012 and the revision surgery was 2018. Both surgeries were performed by Dr. Srinath Waller. The patient reports she has been doing fine with her left shoulder until a couple months ago when she had some difficulty raising her arm overhead. She denies any specific injuries that could have contributed to this. She reports she is having pain with overhead movement as well as weakness with lifting or carrying items. She is taking diclofenac twice a day. She denies any numbness or tingling. Pain Assessment  Location of Pain: Shoulder  Location Modifiers: Left  Severity of Pain: 4  Quality of Pain: Sharp,Aching,Throbbing  Duration of Pain: Persistent  Frequency of Pain: Intermittent  Limiting Behavior: Yes  Relieving Factors: Rest,Ice,Nsaids  Work-Related Injury: No  Are there other pain locations you wish to document?: No    No notes on file    Review of Systems:  A 14 point review of systems available in the scanned medical record as documented by the patient on 21. The review is negative with the exception of those things mentioned in the History of Present Illness and Past Medical History. Past Medical History:  Patient's medications, allergies, past medical, surgical, social and family histories were reviewed and updated as appropriate. Allergies:   Allergies   Allergen Reactions  Tape Modesta Andres Tape] Other (See Comments)     blistering    Reglan [Metoclopramide] Itching and Other (See Comments)     Flushed      Dermabond Dermatitis, Itching and Rash    Oxycodone Nausea And Vomiting       Physical Exam:  There were no vitals filed for this visit. General: Ana Linares is a healthy and well appearing 81 y.o. female who is sitting comfortably in our office in acute distress. Skin:  There are no skin lesions, cellulitis, or extreme edema. The patient has warm and well-perfused Bilateral upper extremities with brisk capillary refill. Eyes: Extra-ocular muscles intact  Mouth: Oral mucosa moist. No perioral lesions  Pulm: Respirations unlabored and regular. Neuro: Alert and oriented x3    left Shoulder Exam:  Inspection:  No gross deformities, no signs of infection. Palpation: She has tenderness over rotator cuff footprint    Active Range of Motion: Forward elevation of 130, abduction of 90, external rotation with elbow at the side 40, internal rotation to the back is T12 versus T9 on the right    Passive Range of Motion: Passively forward elevation can be further increased to 130. Strength: External rotation with resistance with elbow at the side -4/5, internal rotation with resistance with elbow at the side 4/5,  -4/5 Jobes and champagne toast test    Special Tests: Negative external rotation lag, no Herson muscle deformity. Neurovascular: Sensation to light touch is intact, no motor deficits, palpable radial pulses 2+    Additional Examinations:    Examination of the contralateral extremity does not show any tenderness, deformity or injury. Range of motion is unremarkable. There is no gross instability. There are no rashes, ulcerations or lesions.  Strength and tone are normal.      Radiographic:  3 xray views of the left  shoulder including True AP in internal and external and axillary lateral were taken in our office today reveals generative changes within the glenohumeral joint along with a high riding humeral head. No fractures, dislocations, visible tumors, or signs of acute trauma. MRI left shoulder 2019  Previous cuff repair.  Full thickness retracted recurrent tear supraspinatus tendon critical    zone fibers.  The tear 1.9cm.  Fibers distracted 1.9cm.  Moderate peritendinobursitis and    capsulitis.  Moderate muscle fatty replacement.  Tendinopathic and frayed infraspinatus    insertion.  Chronically torn subscapularis insertion.  Fibers attenuated and scarred distally.     Severe fatty atrophy subscapularis muscle.       Biceps tenodesis.       Glenohumeral arthropathy.  Osteophytes.  Intermediate grade chondromalacia throughout the    humerus and glenoid.  Degenerative labrum tear.  Moderate effusion.  Debris within the capsule.       CONCLUSION:   1.  Large recurrent cuff tear. Complete retracted tear supraspinatus tendon critical zone    fibers. Moderate peritendinobursitis and capsulitis. Moderate muscle fatty replacement. 2. Moderately tendinopathic infraspinatus insertion. Fibers frayed. 3. Chronically torn subscapularis insertion. Fibers scarred. Severe fatty atrophy subscapularis    muscle. 4. Biceps tenodesis. Self assessment questionnaires including ASES and Simple Shoulder Test were completed today. Assessment:  Donna Matamoros is a 81 y.o. female with left rotator cuff arthropathy. Additional conservative measures should be considered before yet another surgery. Impression:  Encounter Diagnoses   Name Primary?     Left shoulder pain, unspecified chronicity Yes    Rotator cuff arthropathy of left shoulder        Office Procedures:  Orders Placed This Encounter   Procedures    XR SHOULDER LEFT (MIN 2 VIEWS)     Standing Status:   Future     Number of Occurrences:   1     Standing Expiration Date:   7/1/2023    NH ARTHROCENTESIS ASPIR&/INJ MAJOR JT/BURSA W/O US     After discussing the risks and benefits of a corticosteroid injection, Nicky Rios did state an understanding and gave verbal consent to proceed. After cleansing the injection site with Chlora-prep and using aseptic techniques,  2 CC of Kenalog 40mg/ml and 8 CC of 1% lidocaine were injected in the left glenohumeral and subacromial space. She  tolerated the procedure well with no immediate adverse sequelae after the injection. A bandage was placed over the injection site. Appropriate post injections instructions were given to the patient. Plan: We had a lengthy discussion with the patient today. The patient continues have well-preserved range of motion and function of her left shoulder. We recommend that she stay conservative in her management. We recommend that she try a corticosteroid injection and see if that can help alleviate some of her symptoms. She may continue take the diclofenac on a regular basis a week from now. Patient was also told that if conservative treatment does not help her she may be considered for a balloon arthroplasty at some point in the future. All of her questions were fully answered today. She was agreeable to the above plan        7/5/2022  2:30 PM      Jasmin Jorge PA-C  Orthopaedic Sports Medicine Physician Assistant    During this examination, Jasmin GUERRERO PA-C, functioned as a scribe for Dr. Madiha Yu. This dictation was performed with a verbal recognition program (DRAGON) and it was checked for errors. It is possible that there are still dictated errors within this office note. If so, please bring any errors to my attention for an addendum. All efforts were made to ensure that this office note is accurate.  ______________  I, Dr. Madiha Yu, personally performed the services described in this documentation as described by Jasmin Jorge PA-C in my presence, and it is both accurate and complete. Sharad Crystal MD, PhD  7/5/2022

## 2022-07-25 NOTE — TELEPHONE ENCOUNTER
Requested Prescriptions     Pending Prescriptions Disp Refills    lisinopril (PRINIVIL;ZESTRIL) 20 MG tablet [Pharmacy Med Name: LISINOPRIL TAB 20MG] 180 tablet 1     Sig: TAKE 1 TABLET TWICE A DAY            Last Office Visit: 9/30/2021     Next Office Visit: Visit date not found

## 2022-07-27 RX ORDER — LISINOPRIL 20 MG/1
TABLET ORAL
Qty: 180 TABLET | Refills: 0 | Status: SHIPPED | OUTPATIENT
Start: 2022-07-27

## 2022-10-27 ENCOUNTER — OFFICE VISIT (OUTPATIENT)
Dept: ORTHOPEDIC SURGERY | Age: 72
End: 2022-10-27
Payer: MEDICARE

## 2022-10-27 VITALS — HEIGHT: 63 IN | BODY MASS INDEX: 40.22 KG/M2 | WEIGHT: 227 LBS

## 2022-10-27 DIAGNOSIS — M12.812 ROTATOR CUFF ARTHROPATHY OF LEFT SHOULDER: Primary | ICD-10-CM

## 2022-10-27 PROCEDURE — 20610 DRAIN/INJ JOINT/BURSA W/O US: CPT | Performed by: ORTHOPAEDIC SURGERY

## 2022-10-27 RX ORDER — LIDOCAINE HYDROCHLORIDE 10 MG/ML
8 INJECTION, SOLUTION INFILTRATION; PERINEURAL ONCE
Status: COMPLETED | OUTPATIENT
Start: 2022-10-27 | End: 2022-10-27

## 2022-10-27 RX ORDER — HYDROXYCHLOROQUINE SULFATE 200 MG/1
TABLET, FILM COATED ORAL
COMMUNITY
Start: 2022-10-18

## 2022-10-27 RX ORDER — METHYLPREDNISOLONE ACETATE 40 MG/ML
80 INJECTION, SUSPENSION INTRA-ARTICULAR; INTRALESIONAL; INTRAMUSCULAR; SOFT TISSUE ONCE
Status: COMPLETED | OUTPATIENT
Start: 2022-10-27 | End: 2022-10-27

## 2022-10-27 RX ORDER — SPIRONOLACTONE 25 MG/1
TABLET ORAL
COMMUNITY
Start: 2022-10-18

## 2022-10-27 RX ADMIN — LIDOCAINE HYDROCHLORIDE 8 ML: 10 INJECTION, SOLUTION INFILTRATION; PERINEURAL at 16:42

## 2022-10-27 RX ADMIN — METHYLPREDNISOLONE ACETATE 80 MG: 40 INJECTION, SUSPENSION INTRA-ARTICULAR; INTRALESIONAL; INTRAMUSCULAR; SOFT TISSUE at 16:44

## 2022-10-27 NOTE — PROGRESS NOTES
Chief Complaint    Shoulder Pain (F/U LEFT SHOULDER)      History of Present Illness:  Patricia Philippe is a pleasant, 81 y.o., female, here today for follow up of her left shoulder. This patient is well known to Dr. Lamine Ceja. Patient has a history of undergoing two rotator cuff repairs of her left shoulder. The initial repair was on October 8, 2012 and the revision surgery was July 19, 2018. She is currently under our care for left rotator cuff arthropathy. We did administer a steroid injection for this problem on 7/5/22. This provided approximately 2 months relief from symptoms. She struggles the most at night trying to sleep. She does report pain which shoots down her arm into her wrist. She reports no new injuries or setbacks. Pain Assessment  Location of Pain: Shoulder  Location Modifiers: Left  Severity of Pain: 6  Quality of Pain: Dull, Aching  Duration of Pain: Persistent  Frequency of Pain: Intermittent  Aggravating Factors: Other (Comment)  Limiting Behavior: Yes  Relieving Factors: Rest, Other (Comment)  Work-Related Injury: No  Are there other pain locations you wish to document?: No      Medical History:  Patient's medications, allergies, past medical, surgical, social and family histories were reviewed and updated as appropriate. Review of Systems  A 14 point review of systems was completed by the patient and is available in the media section of the scanned medical record and was reviewed on 10/27/2022. The review is negative with the exception of those things mentioned in the HPI and Past Medical History    Vital Signs: There were no vitals filed for this visit. General/Appearance: Alert and oriented and in no apparent distress. Skin:  There are no skin lesions, cellulitis, or extreme edema. The patient has warm and well-perfused Bilateral upper extremities with brisk capillary refill. Left Shoulder Exam:  Inspection: No gross deformities, no signs of infection.     Palpation: Tenderness over the rotator cuff footprint, non-tender at the elbow    Active Range of Motion: Forward Elevation 110, Abduction 90, External Rotation 50, Internal Rotation T11    Passive Range of Motion: Forward Elevation 140    Strength:  External Rotation 4-/5, Internal Rotation 4-/5    Special Tests: No Herson muscle deformity. Neurovascular: Sensation to light touch is intact, no motor deficits, palpable radial pulses 2+      Radiology:     No new XR obtained at this time. Assessment :  Ms. Alton Coleman is a pleasant, 81 y.o. patient with left rotator cuff arthropathy. She compensates well for this. Impression:  Encounter Diagnosis   Name Primary? Rotator cuff arthropathy of left shoulder Yes       Office Procedures:  Orders Placed This Encounter   Procedures    06377 - AL DRAIN/INJECT LARGE JOINT/BURSA       After discussing the risks and benefits of a corticosteroid injection, Laurence did state an understanding and gave verbal consent to proceed. After cleansing the injection site with Chlora-prep and using aseptic techniques,  2 CC of Depo Medrol 40mg/ml and 8 CC of 1% lidocaine were injected in the left glenohumeral joint. She  tolerated the procedure well with no immediate adverse sequelae after the injection. A bandage was placed over the injection site. Appropriate post injections instructions were given to the patient. Treatment Plan:  Alton Coleman responded well to a steroid injection. Our recommendation is a repeat injection today. We do have the option to repeat these injections every 3 months as needed, however it is best to space these out when possible. We will see Laurence back in 3 months and/or as needed. All questions were answered to patient's satisfaction and She was encouraged to call with any further questions or concerns. Alton Coleman is in agreement with this plan.     10/27/2022  4:56 PM    Sonia Ortiz ATC  [facility]    During this examination, I, Ynes Schwartz, functioned as a scribe for Dr. Michi Stark. The history taking and physical examination were performed by Dr. Lamine Ceja. All counseling during the appointment was performed between the patient and Dr. Lamine Ceja. 10/27/22  ______________  I, Dr. Michi Stark, personally performed the services described in this documentation as described by Jeovany Schafer ATC in my presence, and it is both accurate and complete. Sharad Ceja MD, PhD  10/27/2022

## 2022-11-14 ENCOUNTER — TELEPHONE (OUTPATIENT)
Dept: ORTHOPEDIC SURGERY | Age: 72
End: 2022-11-14

## 2022-11-14 DIAGNOSIS — M79.662 PAIN OF LEFT CALF: Primary | ICD-10-CM

## 2022-11-14 NOTE — TELEPHONE ENCOUNTER
SCHEDULED DOPPLER TODAY 1:30 [facility]  FAXED ORDER [phone]  PATIENT WILL CALL BACK AFTER TO SCHEDULE

## 2022-11-15 ENCOUNTER — TELEPHONE (OUTPATIENT)
Dept: ORTHOPEDIC SURGERY | Age: 72
End: 2022-11-15

## 2022-11-15 NOTE — TELEPHONE ENCOUNTER
General Question     Subject: PATIENT REQUESTING A CALL FROM JIE REGARDING HER LEG.     Patient: Cee Sensor  Contact Number: [phone]

## 2022-11-15 NOTE — TELEPHONE ENCOUNTER
Appointment Request     Patient requesting earlier appointment: Yes  Appointment offered to patient:   Patient Contact Number: [phone]    THE PT WOULD LIKE  [facility].

## 2022-11-15 NOTE — TELEPHONE ENCOUNTER
SPOKE WITH PATIENT  SHE IS NEGATIVE FOR DVT  SHE IS ALSO HAVING BACK PAIN  SHE IS GOING TO CONTACT HER PCP AND WILL FOLLOW UP AS NEEDED  DID OFFER TO SCHEDULE APPT WITH DR Kourtney Smith WILL CALL BACK

## 2022-11-16 ENCOUNTER — OFFICE VISIT (OUTPATIENT)
Dept: ORTHOPEDIC SURGERY | Age: 72
End: 2022-11-16
Payer: MEDICARE

## 2022-11-16 VITALS — WEIGHT: 220 LBS | BODY MASS INDEX: 38.98 KG/M2 | HEIGHT: 63 IN

## 2022-11-16 DIAGNOSIS — M25.562 LEFT KNEE PAIN, UNSPECIFIED CHRONICITY: ICD-10-CM

## 2022-11-16 DIAGNOSIS — M79.605 LEFT LEG PAIN: ICD-10-CM

## 2022-11-16 DIAGNOSIS — S86.112A STRAIN OF GASTROCNEMIUS MUSCLE OF LEFT LOWER EXTREMITY, INITIAL ENCOUNTER: Primary | ICD-10-CM

## 2022-11-16 DIAGNOSIS — Z96.652 S/P TKR (TOTAL KNEE REPLACEMENT), LEFT: ICD-10-CM

## 2022-11-16 PROCEDURE — 1036F TOBACCO NON-USER: CPT | Performed by: ORTHOPAEDIC SURGERY

## 2022-11-16 PROCEDURE — 1123F ACP DISCUSS/DSCN MKR DOCD: CPT | Performed by: ORTHOPAEDIC SURGERY

## 2022-11-16 PROCEDURE — 3017F COLORECTAL CA SCREEN DOC REV: CPT | Performed by: ORTHOPAEDIC SURGERY

## 2022-11-16 PROCEDURE — G8417 CALC BMI ABV UP PARAM F/U: HCPCS | Performed by: ORTHOPAEDIC SURGERY

## 2022-11-16 PROCEDURE — G8427 DOCREV CUR MEDS BY ELIG CLIN: HCPCS | Performed by: ORTHOPAEDIC SURGERY

## 2022-11-16 PROCEDURE — G8399 PT W/DXA RESULTS DOCUMENT: HCPCS | Performed by: ORTHOPAEDIC SURGERY

## 2022-11-16 PROCEDURE — 99214 OFFICE O/P EST MOD 30 MIN: CPT | Performed by: ORTHOPAEDIC SURGERY

## 2022-11-16 PROCEDURE — G8484 FLU IMMUNIZE NO ADMIN: HCPCS | Performed by: ORTHOPAEDIC SURGERY

## 2022-11-16 PROCEDURE — 1090F PRES/ABSN URINE INCON ASSESS: CPT | Performed by: ORTHOPAEDIC SURGERY

## 2022-11-16 NOTE — PROGRESS NOTES
Chief Complaint  Follow-up (Left knee. S/p tkr. C/o increased left knee pain )      History of Present Illness:  Trevor Mayer is a pleasant 81 y.o. female here for an evaluation of her left knee. She reports today that 2 weeks ago she had developed pain in her left thigh that radiated up into her hip and left knee. She does not recall a specific injury, but notices the pain once she goes to step or applying pressure to the thigh area. She was seen by her primary care physician who then prescribed her a prednisone taper and sent her for a doppler to evaluate for a DVT which came back negative. Patient is 3 years status post left knee total replacement and has not reported any issues until recently. Of note, patient underwent a left total hip arthroplasty back on November 4th 2021. She does complain of low back pain       Medical History:  Patient's medications, allergies, past medical, surgical, social and family histories were reviewed and updated as appropriate. Pertinent items are noted in HPI  Review of systems reviewed from Patient History Form dated on 11/16/2022 and available in the patient's chart under the Media tab. Vital Signs: There were no vitals filed for this visit. Neuro: Alert & oriented x 3,  normal,  no focal deficits noted. Normal affect. Eyes: sclera clear  Ears: Normal external ear  Mouth:  No perioral lesions  Pulm: Respirations unlabored and regular  Pulse: Regular rate and rhythm   Skin: Warm, well perfused      Constitutional: In no apparent distress. Normal affect. Alert and oriented X3 and is cooperative. LEFT Knee Examination:     Gait: Ambulates with cane. Alignment: Alignment appreciated. Inspection/skin: Incision well healed. No indication of infection. No dehiscence. Skin is intact without erythema or ecchymosis. No gross deformity. Bruising      Palpation: Nontender to light touch. Range of Motion: Near full extension.  Flexion to approximately 120 degrees. Strength: seated straight leg raise without pain or difficulty. Effusion: mild effusion. Ligamentous stability: No gross instability. Neurologic and vascular: Skin is warm and well-perfused. Distally neurovascularly intact. Additional findings: Calf soft nontender. Sensation is intact to light-touch. No pretibial edema. Right  knee exam    Gait: No use of assistive devices. No antalgic gait. Alignment: normal alignment. Inspection/skin: Skin is intact without erythema or ecchymosis. No gross deformity. Palpation: no crepitus. no joint line tenderness present. Range of Motion: There is full range of motion. Strength: Normal quadriceps development. Effusion: No effusion or swelling present. Ligamentous stability: No cruciate or collateral ligament instability. Neurologic and vascular: Skin is warm and well-perfused. Sensation is intact to light-touch. Special tests: Negative Stephanie sign. Radiology:     Radiographs were obtained and reviewed in the office; 4 views: bilateral PA, bilateral Kamla Conneaut, bilateral Merchants AND left lateral    Impression: Component in good position following a left total knee arthroplasty     AP and Lateral X-rays of left femur were obtained and reviewed    Impression: hardware in good position following a left hip arthroplasty    Assessment :  81 y.o female s/p 3 years left total knee arthroplasty and s/p 1 year left total hip arthroplasty. Onset of pain 2 weeks ago with no known injury. Impression:  Encounter Diagnoses   Name Primary?     Left knee pain, unspecified chronicity     Left leg pain     Strain of gastrocnemius muscle of left lower extremity, initial encounter Yes    S/P TKR (total knee replacement), left        Office Procedures:  Orders Placed This Encounter   Procedures    XR KNEE RIGHT (3 VIEWS)     Standing Status:   Future     Number of Occurrences:   1     Standing Expiration Date: 11/16/2023     Order Specific Question:   Reason for exam:     Answer:   pain    XR FEMUR LEFT (MIN 2 VIEWS)     Standing Status:   Future     Number of Occurrences:   1     Standing Expiration Date:   11/16/2023     Order Specific Question:   Reason for exam:     Answer:   pain    Ambulatory referral to Physical Therapy     Referral Priority:   Routine     Referral Type:   Eval and Treat     Referral Reason:   Specialty Services Required     Requested Specialty:   Physical Therapist     Number of Visits Requested:   1         Plan: Pertinent imaging was reviewed. The etiology, natural history, and treatment options for the disorder were discussed. The roles of activity medication, antiinflammatories, injections, bracing, physical therapy, and surgical interventions were all described to the patient and questions were answered. We suspect that the patient most likely popped a blood vessel and is also suffering from a muscle strain. She has mild tenderness in her femur with applied pressure, other than that muscles are nice and strong. Patient would benefit from formal supervised physical therapy for muscle strain. We will place a referral in the system. She will follow up with us if symptoms persist. Duane Parker is in agreement with this plan. All questions were answered to patient's satisfaction and was encouraged to call with any further questions. Total time spent for evaluation, education, and development of treatment plan: 39 minutes   I Jagdish Branham CMA, am scribing for and in the presence of Dr. Yassine Ann MD.    11/16/22 6:00 PM  Jagdish Branham CMA        I attest that I met personally with the patient, performed the described exam, reviewed the radiographic studies and medical records associated with this patient and supervised the services that are described above.      Henry Arzola MD

## 2022-11-21 ENCOUNTER — EVALUATION (OUTPATIENT)
Dept: PHYSICAL THERAPY | Age: 72
End: 2022-11-21

## 2022-11-21 DIAGNOSIS — M79.605 LEFT LEG PAIN: Primary | ICD-10-CM

## 2022-11-21 DIAGNOSIS — S86.112D STRAIN OF GASTROCNEMIUS MUSCLE OF LEFT LOWER EXTREMITY, SUBSEQUENT ENCOUNTER: ICD-10-CM

## 2022-11-21 DIAGNOSIS — M25.562 ACUTE PAIN OF LEFT KNEE: ICD-10-CM

## 2022-11-21 NOTE — PROGRESS NOTES
[facility]   Phone: [phone]    Fax: [phone]      Physical Therapy Treatment Note/ Progress Report:           Date:  2022    Patient Name:  Frankey Noon    :  1950  MRN: [id number]  Restrictions/Precautions:  HBP, fusion, OA  Medical/Treatment Diagnosis Information:  Diagnosis: left knee pain/left leg pain/gastroc strain   Treating Diagnosis: limited walking; hip and thigh pain; Insurance/Certification information:  PT Insurance Information: Medicare  Physician Information:  Referring Practitioner: Dr Bea Hunter  Has the plan of care been signed (Y/N):        []  Yes  [x]  No     Date of Patient follow up with Physician: not scheduled      Is this a Progress Report:     []  Yes  [x]  No        If Yes:  Date Range for reporting period:  Beginning 2022  Ending 2022    Progress report will be due (10 Rx or 30 days whichever is less):        Recertification will be due (POC Duration  / 90 days whichever is less): 2022         Visit # Insurance Allowable Auth Required   BMN []  Yes []  No        Functional Scale: FOTO    Date assessed:  2022      Latex Allergy:  [x]NO      []YES  Preferred Language for Healthcare:   [x]English       []other:    PQRS:  2022  Reviewed medication list with patient. No changes since last PT visit.        Pain level:  3-8/10     SUBJECTIVE:  See eval    OBJECTIVE:       TUG TEST:NPV        RESTRICTIONS/PRECAUTIONS: Hx of B; TKA, hx of B DILAN; lumbar fusion; HBP;    Exercises/Interventions:   Therapeutic Ex (90113) Sets/sec Reps Notes/CUES   BIKE      TREADMILL            STRETCHING      Towel Pull Calf      Inclined Calf      Hamstrings      Quads      Hip Flexors      ITB      Adductors            ROM      Passive      Active      Weight Hangs      Sheet Pulls      Ankle Pumps      ERMI            STRENGTHENING      Quad Sets      Ham Sets      Hip ADD Sets BS      SLR Flexion bridging x20  L glute contraction   Isometric hip flex 10\"x10     Clams in supine 2x10  Black TB; silver issued for home   Standing hip ext 2x10     SLR Adduction      SLR+            PRE Machines      Knee Extension      Knee Flexion      Leg Press            CKC      Calf Raises      Mini Squats      Wall Sits      Step ups      TKE                  Manual Intervention (25720)      Patellar Mobs      Femoral Tibial mobs      STM      Scar Massage      S/cs x10'  AL3 B; PT12 L         NMR re-education (11455)   CUES NEEDED   Biodex Balance      Tandem Balance      SLB      Rebounder      BOSU            Ed given on POC, expectation and goals x10'                 Therapeutic Activity (12347)      Core Training      [employer] Activities      Monster Walks      TRX training                      Patient Height: 5'3  Patient Weight: 220 lb  Patient BMI: 38.97 kg/m2    Pain  [x]  Pain diagram was completed, pain was present and a follow up plan to address the pain is in the plan of care. ()   []  Pain diagram was completed and there was no pain present and therefore no follow up plan to address pain in the plan of care. ()   []  Pain diagram was not completed and the reason for not completing the pain diagram is in the medical chart ()  []  Patient refused to participate   []  Severe mental or physical capacity, patient is in urgent emergent situation and to complete the questionnaire would jeopardize the patient's health status        Functional Questionnaire  [x]  Patient completed the functional outcome questionnaire and deficiencies were addressed in the plan of care. ()   []  Patient completed the outcome questionnaire and there were no functional deficits identified and therefore no plan of care is required. ()   []  Patient did not complete the functional outcome questionnaire and the reason why is documented in the medical chart. ()  []  Patient refused to participate   []  Patient unable to complete the questionnaire   []   A functional outcome assessment has been reported on within the last 30 days        Falls  []  The patient has had no falls or 1 fall without injury in the past year. (1101F)   []  There is no documentation of fall in the medical chart (1101F,8P)     [x] The patient has had 2 or more falls or one fall with injury in the past year that is documented in the medical chart. (1100F)  []  A falls risk assessment was completed and documented in the medical chart. (5374E)   []  Falls were addressed in the plan of care. (2676B)   []  A falls risk assessment was not completed and documented in the medical chart (9347J,5K)   []   Falls were not addressed in the plan of care and reason was documented in the plan of care. (4499I 1P)        Medication     [x] A list of current medications (including prescription, over the counter, herbal supplements, vitamin supplements, mineral supplements, dietary supplements) was reviewed with the patient and documented in the medical chart. ()     Therapeutic Exercise and NMR EXR  [x] (27752) Provided verbal/tactile cueing for activities related to strengthening, flexibility, endurance, ROM for improvements in LE, proximal hip, and core control with self care, mobility, lifting, ambulation. [x] (35002) Provided verbal/tactile cueing for activities related to improving balance, coordination, kinesthetic sense, posture, motor skill, proprioception to assist with LE, proximal hip, and core control in self-care, mobility, lifting, ambulation and eccentric single leg control.      NMR and Therapeutic Activities:    [x] (25159 or 43621) Provided verbal/tactile cueing for activities related to improving balance, coordination, kinesthetic sense, posture, motor skill, proprioception and motor activation to allow for proper function of core, proximal hip and LE with self-care and ADLs and functional mobility.   [] (62661) Gait Re-education- Provided training and instruction to the patient for proper LE, core and proximal hip recruitment and positioning and eccentric body weight control with ambulation re-education including up and down stairs     Home Exercise Program:    [x] (56832) Reviewed/Progressed HEP activities related to strengthening, flexibility, endurance, ROM of core, proximal hip and LE for functional self-care, mobility, lifting and ambulation/stair navigation   [] (45181) Reviewed/Progressed HEP activities related to improving balance, coordination, kinesthetic sense, posture, motor skill, proprioception of core, proximal hip and LE for self-care, mobility, lifting, and ambulation/stair navigation      Manual Treatments:  PROM / STM / Oscillations-Mobs:  G-I, II, III, IV (PA's, Inf., Post.)  [] (38992) Provided manual therapy to mobilize LE, proximal hip and/or LS spine soft tissue/joints for the purpose of modulating pain, promoting relaxation, increasing ROM, reducing/eliminating soft tissue swelling/inflammation/restriction, improving soft tissue extensibility and allowing for proper ROM for normal function with self-care, mobility, lifting and ambulation. Modalities:     [] GAME READY (VASO)- for significant edema, swelling, pain control. Charges:  Timed Code Treatment Minutes: 40   Total Treatment Minutes: 70      [x] EVAL (LOW) 64330 (typically 20 minutes face-to-face)  [] EVAL (MOD) 52850 (typically 30 minutes face-to-face)  [] EVAL (HIGH) 11166 (typically 45 minutes face-to-face)  [] RE-EVAL     [x] NT(73763) x   20'  [] IONTO  [] NMR (93945) x  15'   [] VASO  [x] Manual (59416) x  10'   [] Other:  [x] TA x   10'   [] Mech Traction (42810)  [] ES(attended) (59531)      [] ES (un) (24895):         ASSESSMENT:  See eval      GOALS:   Patient stated goal: to get rid of pain so she can walk w/o problems   [] Progressing: [] Met: [] Not Met: [] Adjusted    Therapist goals for Patient:   Short Term Goals: To be achieved in: 2 weeks  1. Independent in HEP and progression per patient tolerance, in order to prevent re-injury. [] Progressing: [] Met: [] Not Met: [] Adjusted   2. Patient will have a decrease in pain to facilitate improvement in movement, function, and ADLs as indicated by Functional Deficits. [] Progressing: [] Met: [] Not Met: [] Adjusted    Long Term Goals: To be achieved in: 10 weeks  1. Disability index score of 75% or less for the LEFS to assist with reaching prior level of function. [] Progressing: [] Met: [] Not Met: [] Adjusted  2. Patient will demonstrate increased AROM to WNL to allow for proper joint functioning as indicated by patients Functional Deficits. [] Progressing: [] Met: [] Not Met: [] Adjusted  3. Patient will demonstrate an increase in Strength to good proximal hip strength and control, within 5lb HHD in LE to allow for proper functional mobility as indicated by patients Functional Deficits. [] Progressing: [] Met: [] Not Met: [] Adjusted  4. Patient will return to 90% functional activities without increased symptoms or restriction. [] Progressing: [] Met: [] Not Met: [] Adjusted  5. Pt will be able to walk at work w/o pain. patient specific functional goal)    [] Progressing: [] Met: [] Not Met: [] Adjusted       Overall Progression Towards Functional goals/ Treatment Progress Update:  [] Patient is progressing as expected towards functional goals listed. [] Progression is slowed due to complexities/Impairments listed. [] Progression has been slowed due to co-morbidities.   [x] Plan just implemented, too soon to assess goals progression <30days   [] Goals require adjustment due to lack of progress  [] Patient is not progressing as expected and requires additional follow up with physician  [] Other    Prognosis for POC: [x] Good [] Fair  [] Poor      Patient requires continued skilled intervention: [x] Yes  [] No    Treatment/Activity Tolerance:  [x] Patient able to complete treatment  [] Patient limited by fatigue  [] Patient limited by pain    [] Patient limited by other medical complications  [] Other:         PLAN: See eval  [] Continue per plan of care [] Alter current plan   [x] Plan of care initiated [] Hold pending MD visit [] Discharge      Electronically signed by:  Tim Schwartz, PT, MS, OMT-C    Physical Therapist [place] license #[id number]  Physical Therapist [place] license #[id number]         Note: If patient does not return for scheduled/ recommended follow up visits, this note will serve as a discharge from care along with most recent update on progress.

## 2022-11-21 NOTE — PROGRESS NOTES
[facility]   Phone: [phone]    Fax: [phone]          Physical Therapy Certification       Dear Referring Practitioner: Dr Magno Nunez,    We had the pleasure of evaluating the following patient for physical therapy services at [address] [place] [address]. A summary of our findings can be found in the initial assessment below. This includes our plan of care. If you have any questions or concerns regarding these findings, please do not hesitate to contact me at the office phone number checked above. Thank you for the referral.       Physician Signature:_______________________________Date:__________________  By signing above (or electronic signature), therapists plan is approved by physician      Patient: Patricia Philippe   : 1950   MRN: [id number]  Referring Physician: Referring Practitioner: Dr Magno Nunez      Evaluation Date: 2022      Medical Diagnosis Information:  Diagnosis: left knee pain/left leg pain/gastroc strain     Treating Diagnosis: difficulty walking, weakness, LE pain; Insurance information: PT Insurance Information: Medicare                                                Precautions/ Contra-indications: HBP, fusion, OA  Latex Allergy:  [x]NO      []YES    C-SSRS Triggered by Intake questionnaire (Past 2 wk assessment):   [x] No, Questionnaire did not trigger screening.   [] Yes, Patient intake triggered further evaluation      [] C-SSRS Screening completed  [] PCP notified via Plan of Care  [] Emergency services notified       Preferred Language for Healthcare:   [x]English       []other:      SUBJECTIVE: Patient stated complaint: L thigh and hip pain. Pt has been having pain in L hip and thigh that wraps around knee. Xray neg for L knee, thigh and hip. Prednisone helpful w/ pain. Pain from back and wraps around hip extending down lateral thigh and across L distal thigh.  Pt does have steps to enter her home and to go into basement for laundry. Relevant Medical History:L DILAN Oct 2021, R TKA Aug 2021, Significant PMH (see chart), left RTC repair 2018, right THR 2018, left TKA 2019; lumbar fusion 2013; HBP; stroke/TIA x4;  COVID; Functional Scale/Score:    Pain Scale: 3-8/10  Easing factors: sitting unlimited; vibrator helpful w/ symptoms. 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  She has been treated in this clinic previously after surgeries for hip and knee;    OBJECTIVE:   ROM  Comments   Trunk flexion 100% NE    Trunk extension 50% Inc NW Tightness due to fusion   Trunk R sidebend 75% NE    Trunk L sidebend 75% NE    Trunk R rotation WNL NE    Trunk L rotation WNL NE    HS flexibility WNL                Strength Left Right Comments   Hip flexion(L2) 4 4 11/21/22   Knee extension(L3) 42 lb 36 lb    Knee flexion(S1-2) 40 lb 37 lb    Ankle dorsiflexion(L4) 4+ 4+    Toe extension(L5)      Ankle eversion/plantar flexion(S1) 4+ 4+      Special tests  Comments   SLR neg    Slump test neg    Pelvic symmetry  symmetrical    Segmental Spinal mobility Lumbar fusion             DTRs Left Right Comments   Patellar(L3-L4) WNL WNL    Achilles(S1-S2) WNL WNL             Flexibility L R Comment   Hamstring WNL WNL 11/21/2022   Gastroc Mod tightness Mod tightness    ITB      Quad                Reflexes/Sensation:    [x]Dermatomes/Myotomes intact    [x]Reflexes equal and normal bilaterally   []Other:    Joint mobility:    [x]Normal    []Hypo   []Hyper    Palpation: more muscle development noted in R glut vs L; s/cs tender points noted around ant and posterior hip region; Functional Mobility/Transfers: transfers indep w/o assistance or deviations; Posture: endomorph body structure;     Bandages/Dressings/Incisions: n/a;    Gait: (include devices/WB status) amb w/ limp on L LE and SPC; Orthopedic Special Tests: limited due to joint replacements and lumbar fusion;       TEST    GOAL   SINGLE LEG STANCE TIME    >25 SECONDS   TIMED UP And GO    61-77 y/o: <9sec  66-76 y/o: <10sec  80-81 y/o: <12 sec   6 MINUTE WALK TEST                      M             F          60-68 y/o: >.31mile,  >.30mile  66-76 y/o: >.28 mile, >.26 mile  80-81 y/o: >.21mile,  >.20 mile   STAIR CLIMBING TEST    < 13 SEC (M&F)                        [x] Patient history, allergies, meds reviewed. Medical chart reviewed. See intake form. Review Of Systems (ROS):  [x]Performed Review of systems (Integumentary, CardioPulmonary, Neurological) by intake and observation. Intake form has been scanned into medical record. Patient has been instructed to contact their primary care physician regarding ROS issues if not already being addressed at this time.       Co-morbidities/Complexities (which will affect course of rehabilitation):   []None           Arthritic conditions   []Rheumatoid arthritis (M05.9)  [x]Osteoarthritis (M19.91)   Cardiovascular conditions   [x]Hypertension (I10)  []Hyperlipidemia (E78.5)  []Angina pectoris (I20)  []Atherosclerosis (I70)  []CVA Musculoskeletal conditions   []Disc pathology   []Congenital spine pathologies   []Prior surgical intervention  []Osteoporosis (M81.8)  []Osteopenia (M85.8)   Endocrine conditions   []Hypothyroid (E03.9)  []Hyperthyroid Gastrointestinal conditions   []Constipation (T06.74)   Metabolic conditions   []Morbid obesity (E66.01)  []Diabetes type 1(E10.65) or 2 (E11.65)   []Neuropathy (G60.9)     Pulmonary conditions   []Asthma (J45)  []Coughing   []COPD (J44.9)   Psychological Disorders  []Anxiety (F41.9)  []Depression (F32.9) []Other:   []Other:     COVID     Barriers to/and or personal factors that will affect rehab potential:              []Age  []Sex    []Smoker              [x]Motivation/Lack of Motivation                        [x]Co-Morbidities              []Cognitive Function, education/learning barriers              []Environmental, home barriers              []profession/work barriers  [x]past PT/medical experience  []other:  Justification:     Falls Risk Assessment (30 days):   [x] Falls Risk assessed and no intervention required. [] Falls Risk assessed and Patient requires intervention due to being higher risk   TUG score (>12s at risk):     [] Falls education provided, including         ASSESSMENT: Pt is 80 y/o female w/ L hip pain radiating to the knee. Pt has good strength in L thigh but hip muscles have weakness. Tender points notes in pelvic girdle that responded well to manual tech. Pt should benefit from therapy to strengthen specific muscles around her hip and low back to address her pain and deficits.     Functional Impairments:     []Noted lumbar/proximal hip/LE hypomobility   []Decreased LE functional ROM   [x]Decreased core/proximal hip strength and neuromuscular control   [x]Decreased LE functional strength   [x]Reduced balance/proprioceptive control   []other:      Functional Activity Limitations (from functional questionnaire and intake)   [x]Reduced ability to tolerate prolonged functional positions   []Reduced ability or difficulty with changes of positions or transfers between positions   []Reduced ability to maintain good posture and demonstrate good body mechanics with sitting, bending, and lifting   []Reduced ability to sleep   [] Reduced ability or tolerance with driving and/or computer work   [x]Reduced ability to perform lifting, carrying tasks   [x]Reduced ability to squat   []Reduced ability to forward bend   [x]Reduced ability to ambulate prolonged functional periods/distances/surfaces   [x]Reduced ability to ascend/descend stairs   []Reduced ability to run, hop or jump   []other:     Participation Restrictions   [x]Reduced participation in self care activities   []Reduced participation in home management activities   [x]Reduced participation in work activities   [x]Reduced participation in social activities. []Reduced participation in sport activities. Classification :    []Signs/symptoms consistent with post-surgical status including decreased ROM, strength and function.    []Signs/symptoms consistent with joint sprain/strain   []Signs/symptoms consistent with patella-femoral syndrome   []Signs/symptoms consistent with knee OA/hip OA   []Signs/symptoms consistent with internal derangement of knee/Hip   [x]Signs/symptoms consistent with functional hip weakness/NMR control      []Signs/symptoms consistent with tendinitis/tendinosis    []signs/symptoms consistent with pathology which may benefit from Dry needling      []other:      Prognosis/Rehab Potential:      []Excellent   [x]Good    []Fair   []Poor    Tolerance of evaluation/treatment:    []Excellent   [x]Good    []Fair   []Poor    Physical Therapy Evaluation Complexity Justification  [x] A history of present problem with:  [] no personal factors and/or comorbidities that impact the plan of care;  [x]1-2 personal factors and/or comorbidities that impact the plan of care  []3 personal factors and/or comorbidities that impact the plan of care  [x] An examination of body systems using standardized tests and measures addressing any of the following: body structures and functions (impairments), activity limitations, and/or participation restrictions;:  [] a total of 1-2 or more elements   [x] a total of 3 or more elements   [] a total of 4 or more elements   [x] A clinical presentation with:  [x] stable and/or uncomplicated characteristics   [] evolving clinical presentation with changing characteristics  [] unstable and unpredictable characteristics;   [x] Clinical decision making of [x] low, [] moderate, [] high complexity using standardized patient assessment instrument and/or measurable assessment of functional outcome. [x] EVAL (LOW) 93073 (typically 20 minutes face-to-face)  [] EVAL (MOD) 16310 (typically 30 minutes face-to-face)  [] EVAL (HIGH) 42258 (typically 45 minutes face-to-face)  [] RE-EVAL     PLAN:  Frequency/Duration:  1-2 days per week for 6 Weeks:  Interventions:  [x]  Therapeutic exercise including: strength training, ROM, for Lower extremity and core   [x]  NMR activation and proprioception for LE, Glutes and Core   [x]  Manual therapy as indicated for LE, Hip and spine to include: Dry Needling/IASTM, STM, PROM, Gr I-IV mobilizations, manipulation. [x] Modalities as needed that may include: thermal agents, E-stim, Biofeedback, US, iontophoresis as indicated  [x] Patient education on joint protection, postural re-education, activity modification, progression of HEP. HEP instruction: Instructed patient in a HEP. Patient demonstrated exercises correctly. Handout with  pictures and # of reps/sets was given to pt. Exercises included those listed above. PT's business card with information given to pt for any questions or problems that may occur before next visit. GOALS:  Patient stated goal: walking w/o pain;  [] Progressing: [] Met: [] Not Met: [] Adjusted    Therapist goals for Patient:   Short Term Goals: To be achieved in: 2 weeks  1. Independent in HEP and progression per patient tolerance, in order to prevent re-injury. [] Progressing: [] Met: [] Not Met: [] Adjusted  2. Patient will have a decrease in pain to facilitate improvement in movement, function, and ADLs as indicated by Functional Deficits. [] Progressing: [] Met: [] Not Met: [] Adjusted    Long Term Goals: To be achieved in: 10 weeks  1. Functional index score of 75 or more for FOTO to assist with reaching prior level of function. [] Progressing: [] Met: [] Not Met: [] Adjusted  2. Patient will demonstrate increased AROM to WNL to allow for proper joint functioning as indicated by patients Functional Deficits. [] Progressing: [] Met: [] Not Met: [] Adjusted  3. Patient will demonstrate an increase in Strength to good proximal hip strength and control, within 5lb HHD in LE to allow for proper functional mobility as indicated by patients Functional Deficits. [] Progressing: [] Met: [] Not Met: [] Adjusted  4. Patient will return to 90% functional activities without increased symptoms or restriction. [] Progressing: [] Met: [] Not Met: [] Adjusted  5.  Pt will be able to work a 7hr shift w/o pain. (patient specific functional goal)    [] Progressing: [] Met: [] Not Met: [] Adjusted     Electronically signed by:  Terry Sharma PT, Jane Chaves 89, OMT-C    Physical Therapist [place] license #[id number]  Physical Therapist [place] license #[id number]

## 2022-11-21 NOTE — PROGRESS NOTES
[facility]ran Agarwal [facility]   Phone: [phone]    Fax: [phone]      Physical Therapy Treatment Note/ Progress Report:           Date:  2022    Patient Name:  Haleigh Ellis    :  1950  MRN: [id number]  Restrictions/Precautions:    Medical/Treatment Diagnosis Information:  Diagnosis: left knee pain/left leg pain/gastroc strain     Insurance/Certification information:  PT Insurance Information: Medicare  Physician Information:  Referring Practitioner: Dr Jonathan Leyva  Has the plan of care been signed (Y/N):        []  Yes  [x]  No     Date of Patient follow up with Physician: ***      Is this a Progress Report:     []  Yes  [x]  No        If Yes:  Date Range for reporting period:  Beginning 2022  [id number]    Progress report will be due (10 Rx or 30 days whichever is less):        Recertification will be due (POC Duration  / 90 days whichever is less): 2022         Visit # Insurance Allowable Auth Required   BMN []  Yes []  No        Functional Scale: FOTO    Date assessed:  2022      Latex Allergy:  [x]NO      []YES  Preferred Language for Healthcare:   [x]English       []other:    PQRS:  2022  Reviewed medication list with patient. No changes since last PT visit.        Pain level:  ***/10     SUBJECTIVE:  See eval    OBJECTIVE:             RESTRICTIONS/PRECAUTIONS: Hx of L TKA, hx of L DILAN    Exercises/Interventions:   Therapeutic Ex (88655) Sets/sec Reps Notes/CUES   BIKE      TREADMILL            STRETCHING      Towel Pull Calf      Inclined Calf      Hamstrings      Quads      Hip Flexors      ITB      Adductors            ROM      Passive      Active      Weight Hangs      Sheet Pulls      Ankle Pumps      ERMI            STRENGTHENING      Quad Sets      Ham Sets      Hip ADD Sets BS      SLR Flexion      SLR Abduction      SLR Prone      SLR Adduction      SLR+            PRE Machines      Knee Extension      Knee Flexion Leg Press            CKC      Calf Raises      Mini Squats      Wall Sits      Step ups      TKE                  Manual Intervention (89173)      Patellar Mobs      Femoral Tibial mobs      STM      Scar Massage      Foam Roll            NMR re-education (89463)   CUES NEEDED   Biodex Balance      Tandem Balance      SLB      Rebounder      BOSU                              Therapeutic Activity (99792)      Core Training      [employer] Activities      Monster Walks      TRX training                                  Therapeutic Exercise and NMR EXR  [x] (98939) Provided verbal/tactile cueing for activities related to strengthening, flexibility, endurance, ROM for improvements in LE, proximal hip, and core control with self care, mobility, lifting, ambulation. [x] (69828) Provided verbal/tactile cueing for activities related to improving balance, coordination, kinesthetic sense, posture, motor skill, proprioception to assist with LE, proximal hip, and core control in self-care, mobility, lifting, ambulation and eccentric single leg control.      NMR and Therapeutic Activities:    [x] (75471 or 21238) Provided verbal/tactile cueing for activities related to improving balance, coordination, kinesthetic sense, posture, motor skill, proprioception and motor activation to allow for proper function of core, proximal hip and LE with self-care and ADLs and functional mobility.   [] (57015) Gait Re-education- Provided training and instruction to the patient for proper LE, core and proximal hip recruitment and positioning and eccentric body weight control with ambulation re-education including up and down stairs     Home Exercise Program:    [x] (80726) Reviewed/Progressed HEP activities related to strengthening, flexibility, endurance, ROM of core, proximal hip and LE for functional self-care, mobility, lifting and ambulation/stair navigation   [] (57055) Reviewed/Progressed HEP activities related to improving balance, coordination, kinesthetic sense, posture, motor skill, proprioception of core, proximal hip and LE for self-care, mobility, lifting, and ambulation/stair navigation      Manual Treatments:  PROM / STM / Oscillations-Mobs:  G-I, II, III, IV (PA's, Inf., Post.)  [] (43073) Provided manual therapy to mobilize LE, proximal hip and/or LS spine soft tissue/joints for the purpose of modulating pain, promoting relaxation, increasing ROM, reducing/eliminating soft tissue swelling/inflammation/restriction, improving soft tissue extensibility and allowing for proper ROM for normal function with self-care, mobility, lifting and ambulation. Modalities:     [] GAME READY (VASO)- for significant edema, swelling, pain control. Charges:  Timed Code Treatment Minutes: ***   Total Treatment Minutes: ***      [] EVAL (LOW) 08833 (typically 20 minutes face-to-face)  [] EVAL (MOD) 98376 (typically 30 minutes face-to-face)  [] EVAL (HIGH) 70076 (typically 45 minutes face-to-face)  [] RE-EVAL     [] WH(18787) x     [] IONTO  [] NMR (19268) x     [] VASO  [] Manual (70318) x     [] Other:  [] TA x      [] Mech Traction (19457)  [] ES(attended) (36772)      [] ES (un) (51981):         ASSESSMENT:  See eval      GOALS:   Patient stated goal: ***    [] Progressing: [] Met: [] Not Met: [] Adjusted    Therapist goals for Patient:   Short Term Goals: To be achieved in: 2 weeks  1. Independent in HEP and progression per patient tolerance, in order to prevent re-injury. [] Progressing: [] Met: [] Not Met: [] Adjusted   2. Patient will have a decrease in pain to facilitate improvement in movement, function, and ADLs as indicated by Functional Deficits. [] Progressing: [] Met: [] Not Met: [] Adjusted    Long Term Goals: To be achieved in: *** weeks  1. Disability index score of ***% or less for the LEFS to assist with reaching prior level of function. [] Progressing: [] Met: [] Not Met: [] Adjusted  2.  Patient will demonstrate increased AROM to *** to allow for proper joint functioning as indicated by patients Functional Deficits. [] Progressing: [] Met: [] Not Met: [] Adjusted  3. Patient will demonstrate an increase in Strength to good proximal hip strength and control, within 5lb HHD in LE to allow for proper functional mobility as indicated by patients Functional Deficits. [] Progressing: [] Met: [] Not Met: [] Adjusted  4. Patient will return to *** functional activities without increased symptoms or restriction. [] Progressing: [] Met: [] Not Met: [] Adjusted  5. ***(patient specific functional goal)    [] Progressing: [] Met: [] Not Met: [] Adjusted       Overall Progression Towards Functional goals/ Treatment Progress Update:  [] Patient is progressing as expected towards functional goals listed. [] Progression is slowed due to complexities/Impairments listed. [] Progression has been slowed due to co-morbidities.   [x] Plan just implemented, too soon to assess goals progression <30days   [] Goals require adjustment due to lack of progress  [] Patient is not progressing as expected and requires additional follow up with physician  [] Other    Prognosis for POC: [x] Good [] Fair  [] Poor      Patient requires continued skilled intervention: [x] Yes  [] No    Treatment/Activity Tolerance:  [x] Patient able to complete treatment  [] Patient limited by fatigue  [] Patient limited by pain    [] Patient limited by other medical complications  [] Other:         PLAN: See eval  [] Continue per plan of care [] Alter current plan   [x] Plan of care initiated [] Hold pending MD visit [] Discharge      Electronically signed by:      Jacqueline Ramachandran, PT      Board Certified Orthopaedic Clinical Specialist  [facility] Certified  Physical Therapist    [address] [address] PT #[id number]  [place] PT #[id number]      Note: If patient does not return for scheduled/ recommended follow up visits, this note will serve as a discharge from care along with most recent update on progress.

## 2022-11-21 NOTE — PROGRESS NOTES
[facility]   Phone: [phone]    Fax: [phone]     Physical Therapy Certification    Dear Referring Practitioner: Dr Karrie Dominguez,    We had the pleasure of evaluating the following patient for physical therapy services at [address] [place] [address]. A summary of our findings can be found in the initial assessment below. This includes our plan of care. If you have any questions or concerns regarding these findings, please do not hesitate to contact me at the office phone number checked above.   Thank you for the referral.       Physician Signature:_______________________________Date:__________________  By signing above (or electronic signature), therapists plan is approved by physician      Patient: Karsten Francois   : 1950   MRN: [id number]  Referring Physician: Referring Practitioner: Dr Karrie Dominguez      Evaluation Date: 2022      Medical Diagnosis Information:  Diagnosis: left knee pain/left leg pain/gastroc strain                                             Insurance information: PT Insurance Information: Medicare     Precautions/ Contra-indications: Hx of left TKA, hx of left DILAN  Latex Allergy:  [x]NO      []YES    C-SSRS Triggered by Intake questionnaire (Past 2 wk assessment):   [x] No, Questionnaire did not trigger screening.   [] Yes, Patient intake triggered further evaluation      [] C-SSRS Screening completed  [] PCP notified via Plan of Care  [] Emergency services notified       Preferred Language for Healthcare:   [x]English       []other:      SUBJECTIVE:             Relevant Medical History: L DILAN Oct 2021, R TKA Aug 2021, Significant PMH (see chart), left RTC repair , right THR , left TKA     Functional Scale/Score:FOTO     Pain Scale: ***/10  Easing factors: ***  Provocative factors: ***     Type: []Constant   []Intermittent  []Radiating []Localized []other:     Numbness/Tingling: ***    Occupation/School:***    Living Status/Prior Level of Function: Pt lives alone in a house with 6 steps to enter with bilateral HR's. Bedroom and bathroom are on the first floor. Laundry is in the basement. Her daughter lives nearby and is helping when she can. OBJECTIVE:     Flexibility L R Comment   Hamstring      Gastroc      ITB      Quad                ROM PROM AROM Overpressure Comment    L R L R L R    Flexion          Extension                                  Strength L R Comment   Quad      Hamstring      Gastroc                    Girth L R   Mid Patella     Suprapatellar     5cm above     15cm above         Special Test Results/Comment   Meniscal Click    Crepitus    Flexion Test    Valgus Laxity    Varus Laxity    Lachmans    Drop Back           Reflexes/Sensation:    [x]Dermatomes/Myotomes intact    []Reflexes equal and normal bilaterally   []Other:    Joint mobility: ***   []Normal    []Hypo   []Hyper    Palpation: ***    Functional Mobility/Transfers: independent with all    Posture: ***    Bandages/Dressings/Incisions: NA    Gait: (include devices/WB status) ***    Orthopedic Special Tests: see above                       [x] Patient history, allergies, meds reviewed. Medical chart reviewed. See intake form. Review Of Systems (ROS):  [x]Performed Review of systems (Integumentary, CardioPulmonary, Neurological) by intake and observation. Intake form has been scanned into medical record. Patient has been instructed to contact their primary care physician regarding ROS issues if not already being addressed at this time.       Co-morbidities/Complexities (which will affect course of rehabilitation): ***  []None           Arthritic conditions   []Rheumatoid arthritis (M05.9)  []Osteoarthritis (M19.91)   Cardiovascular conditions   []Hypertension (I10)  []Hyperlipidemia (E78.5)  []Angina pectoris (I20)  []Atherosclerosis (I70)  []CVA Musculoskeletal conditions   []Disc pathology   []Congenital spine pathologies   []Prior surgical intervention  []Osteoporosis (M81.8)  []Osteopenia (M85.8)   Endocrine conditions   []Hypothyroid (E03.9)  []Hyperthyroid Gastrointestinal conditions   []Constipation (J41.99)   Metabolic conditions   []Morbid obesity (E66.01)  []Diabetes type 1(E10.65) or 2 (E11.65)   []Neuropathy (G60.9)     Pulmonary conditions   []Asthma (J45)  []Coughing   []COPD (J44.9)   Psychological Disorders  []Anxiety (F41.9)  []Depression (F32.9)   []Other:   []Other:          Barriers to/and or personal factors that will affect rehab potential:              []Age  []Sex    []Smoker              []Motivation/Lack of Motivation                        []Co-Morbidities              []Cognitive Function, education/learning barriers              []Environmental, home barriers              []profession/work barriers  []past PT/medical experience  []other:  Justification: ***    Falls Risk Assessment (30 days): ***  [x] Falls Risk assessed and no intervention required. [] Falls Risk assessed and Patient requires intervention due to being higher risk   TUG score (>12s at risk):     [] Falls education provided, including           Pain  [x]  Pain diagram was completed, pain was present and a follow up plan to address the pain is in the plan of care. ()   []  Pain diagram was completed and there was no pain present and therefore no follow up plan to address pain in the plan of care. ()   []  Pain diagram was not completed and the reason for not completing the pain diagram is in the medical chart ()  []  Patient refused to participate   []  Severe mental or physical capacity, patient is in urgent emergent situation and to complete the questionnaire would jeopardize the patient's health status        Functional Questionnaire  [x]  Patient completed the functional outcome questionnaire and deficiencies were addressed in the plan of care.  ()   []  Patient completed the outcome questionnaire and there were no functional deficits identified and therefore no plan of care is required. ()   []  Patient did not complete the functional outcome questionnaire and the reason why is documented in the medical chart. ()  []  Patient refused to participate   []  Patient unable to complete the questionnaire   []   A functional outcome assessment has been reported on within the last 30 days        Falls  [x]  The patient has had no falls or 1 fall without injury in the past year. (1101F)   []  There is no documentation of fall in the medical chart (1101F,8P)     [] The patient has had 2 or more falls or one fall with injury in the past year that is documented in the medical chart. (1100F)  []  A falls risk assessment was completed and documented in the medical chart. (8305X)   []  Falls were addressed in the plan of care. (3778N)   []  A falls risk assessment was not completed and documented in the medical chart (1386K,9E)   []   Falls were not addressed in the plan of care and reason was documented in the plan of care. (1796L 1P)        Medication     [x] A list of current medications (including prescription, over the counter, herbal supplements, vitamin supplements, mineral supplements, dietary supplements) was reviewed with the patient and documented in the medical chart.  ()     ASSESSMENT: ***  Functional Impairments:     []Noted lumbar/proximal hip/LE hypomobility   []Decreased LE functional ROM   []Decreased core/proximal hip strength and neuromuscular control   []Decreased LE functional strength   []Reduced balance/proprioceptive control   []other:      Functional Activity Limitations (from functional questionnaire and intake)   []Reduced ability to tolerate prolonged functional positions   []Reduced ability or difficulty with changes of positions or transfers between positions   []Reduced ability to maintain good posture and demonstrate good body mechanics with sitting, bending, and lifting   []Reduced ability to sleep   [] Reduced ability or tolerance with driving and/or computer work   []Reduced ability to perform lifting, carrying tasks   []Reduced ability to squat   []Reduced ability to forward bend   []Reduced ability to ambulate prolonged functional periods/distances/surfaces   []Reduced ability to ascend/descend stairs   []Reduced ability to run, hop or jump   []other:     Participation Restrictions   []Reduced participation in self care activities   []Reduced participation in home management activities   []Reduced participation in work activities   []Reduced participation in social activities. []Reduced participation in sport activities. Classification :    []Signs/symptoms consistent with post-surgical status including decreased ROM, strength and function.    []Signs/symptoms consistent with joint sprain/strain   []Signs/symptoms consistent with patella-femoral syndrome   []Signs/symptoms consistent with knee OA/hip OA   []Signs/symptoms consistent with internal derangement of knee/Hip   []Signs/symptoms consistent with functional hip weakness/NMR control      []Signs/symptoms consistent with tendinitis/tendinosis    []signs/symptoms consistent with pathology which may benefit from Dry needling      []other:      Prognosis/Rehab Potential:      []Excellent   []Good    []Fair   []Poor    Tolerance of evaluation/treatment:    []Excellent   []Good    []Fair   []Poor    Physical Therapy Evaluation Complexity Justification  [x] A history of present problem with:  [] no personal factors and/or comorbidities that impact the plan of care;  []1-2 personal factors and/or comorbidities that impact the plan of care  []3 personal factors and/or comorbidities that impact the plan of care  [x] An examination of body systems using standardized tests and measures addressing any of the following: body structures and functions (impairments), activity limitations, and/or participation restrictions;:  [] a total of 1-2 or more elements   [] a total of 3 or more elements   [] a total of 4 or more elements   [x] A clinical presentation with:  [] stable and/or uncomplicated characteristics   [] evolving clinical presentation with changing characteristics  [] unstable and unpredictable characteristics;   [x] Clinical decision making of [] low, [] moderate, [] high complexity using standardized patient assessment instrument and/or measurable assessment of functional outcome. [] EVAL (LOW) 73080 (typically 20 minutes face-to-face)  [] EVAL (MOD) 77013 (typically 30 minutes face-to-face)  [] EVAL (HIGH) 41977 (typically 45 minutes face-to-face)  [] RE-EVAL     PLAN:  Frequency/Duration:  1-2 days per week for 4 Weeks:  Interventions:  [x]  Therapeutic exercise including: strength training, ROM, for Lower extremity and core   [x]  NMR activation and proprioception for LE, Glutes and Core   [x]  Manual therapy as indicated for LE, Hip and spine to include: Dry Needling/IASTM, STM, PROM, Gr I-IV mobilizations, manipulation. [x] Modalities as needed that may include: thermal agents, E-stim, Biofeedback, US, iontophoresis as indicated  [x] Patient education on joint protection, postural re-education, activity modification, progression of HEP. HEP instruction: Patient returned proper demonstration of HEP. Issued patient written program clearly stating sets/reps/sessions per day. Written program was scanned into media section of medical record. (see scanned forms)    GOALS:  Patient stated goal: ***  [] Progressing: [] Met: [] Not Met: [] Adjusted    Therapist goals for Patient:   Short Term Goals: To be achieved in: 2 weeks  1. Independent in HEP and progression per patient tolerance, in order to prevent re-injury. [] Progressing: [] Met: [] Not Met: [] Adjusted  2. Patient will have a decrease in pain to facilitate improvement in movement, function, and ADLs as indicated by Functional Deficits.   [] Progressing: [] Met: [] Not Met: [] Adjusted    Long Term Goals: To be achieved in: *** weeks  1. Functional index score of *** or more for FOTO to assist with reaching prior level of function. [] Progressing: [] Met: [] Not Met: [] Adjusted  2. Patient will demonstrate increased AROM to *** to allow for proper joint functioning as indicated by patients Functional Deficits. [] Progressing: [] Met: [] Not Met: [] Adjusted  3. Patient will demonstrate an increase in Strength to good proximal hip strength and control, within 5lb HHD in LE to allow for proper functional mobility as indicated by patients Functional Deficits. [] Progressing: [] Met: [] Not Met: [] Adjusted  4. Patient will return to *** functional activities without increased symptoms or restriction.    [] Progressing: [] Met: [] Not Met: [] Adjusted  5. ***(patient specific functional goal)    [] Progressing: [] Met: [] Not Met: [] Adjusted     Electronically signed by:      Mohamud Ramey      Board Certified Orthopaedic Clinical Specialist  [facility] Certified  Physical Therapist    [facility] #[id number]  [place] [facility] #[id number]

## 2022-12-05 ENCOUNTER — TREATMENT (OUTPATIENT)
Dept: PHYSICAL THERAPY | Age: 72
End: 2022-12-05
Payer: MEDICARE

## 2022-12-05 DIAGNOSIS — M25.562 ACUTE PAIN OF LEFT KNEE: Primary | ICD-10-CM

## 2022-12-05 DIAGNOSIS — M79.605 LEFT LEG PAIN: ICD-10-CM

## 2022-12-05 DIAGNOSIS — S86.112D STRAIN OF GASTROCNEMIUS MUSCLE OF LEFT LOWER EXTREMITY, SUBSEQUENT ENCOUNTER: ICD-10-CM

## 2022-12-05 PROCEDURE — 97110 THERAPEUTIC EXERCISES: CPT | Performed by: PHYSICAL THERAPIST

## 2022-12-05 PROCEDURE — G8427 DOCREV CUR MEDS BY ELIG CLIN: HCPCS | Performed by: PHYSICAL THERAPIST

## 2022-12-05 PROCEDURE — 97530 THERAPEUTIC ACTIVITIES: CPT | Performed by: PHYSICAL THERAPIST

## 2022-12-05 PROCEDURE — 97140 MANUAL THERAPY 1/> REGIONS: CPT | Performed by: PHYSICAL THERAPIST

## 2022-12-05 NOTE — PROGRESS NOTES
[facility]   Phone: [phone]    Fax: [phone]      Physical Therapy Treatment Note/ Progress Report:           Date:  2022    Patient Name:  Agnieszka Parra    :  1950  MRN: [id number]  Restrictions/Precautions:  HBP, fusion, OA  Medical/Treatment Diagnosis Information:  Diagnosis: left knee pain/left leg pain/gastroc strain   Treating Diagnosis: limited walking; hip and thigh pain; Insurance/Certification information:  PT Insurance Information: Medicare  Physician Information:  Referring Practitioner: Dr Nelson   Has the plan of care been signed (Y/N):        []  Yes  [x]  No     Date of Patient follow up with Physician: not scheduled      Is this a Progress Report:     []  Yes  [x]  No        If Yes:  Date Range for reporting period:  Beginning 2022  Ending 2022    Progress report will be due (10 Rx or 30 days whichever is less):        Recertification will be due (POC Duration  / 90 days whichever is less): 2022         Visit # Insurance Allowable Auth Required   2 BMN []  Yes []  No        Functional Scale: FOTO    Date assessed:  2022      Latex Allergy:  [x]NO      []YES  Preferred Language for Healthcare:   [x]English       []other:    PQRS:  2022  Reviewed medication list with patient. No changes since last PT visit. Pain level:  3-8/10     SUBJECTIVE:    She reports that she finished the prednisone last week, her discomfort is still there. Feels most discomfort in her knee and back and front of her thigh. Mostly when she is up on her feet walking or standing like when at work. She gets some relief when laying down.              OBJECTIVE:   ROM   Comments   Trunk flexion 100% NE  2022   Trunk extension 50% Inc NW Tightness due to fusion   Trunk R sidebend 75% NE     Trunk L sidebend 75% NE     Trunk R rotation WNL NE     Trunk L rotation WNL NE     HS flexibility WNL Strength Left Right Comments   Hip flexion(L2) 4 4 11/21/22   Knee extension(L3) 42 lb 36 lb     Knee flexion(S1-2) 40 lb 37 lb     Ankle dorsiflexion(L4) 4+ 4+     Toe extension(L5)         Ankle eversion/plantar flexion(S1) 4+ 4+        Special tests   Comments   SLR neg  11/21/2022   Slump test neg     Pelvic symmetry  symmetrical     Segmental Spinal mobility Lumbar fusion                   DTRs Left Right Comments   Patellar(L3-L4) WNL WNL  11/21/2022   Achilles(S1-S2) WNL WNL                  Flexibility L R Comment   Hamstring WNL WNL 11/21/2022   Gastroc Mod tightness Mod tightness     ITB         Quad               TUG TEST:NPV        RESTRICTIONS/PRECAUTIONS: Hx of B; TKA, hx of B DILAN; lumbar fusion; HBP;    Exercises/Interventions:   Therapeutic Ex (92040) Sets/sec Reps Notes/CUES   BIKE      TREADMILL            STRETCHING      Towel Pull Calf      Inclined Calf      Hamstrings 15\" x5 seated   Quads      Hip Flexors      ITB      Adductors            ROM      Passive      Active      Weight Hangs      Sheet Pulls      Ankle Pumps      ERMI            STRENGTHENING      Quad Sets      Ham Sets      Hip ADD Sets BS 2x10     SLR Flexion      bridging x20  L glute contraction          Clams in supine 2x10  Black TB   Standing hip ext 2x10     SLR Adduction      SLR+            PRE Machines      Knee Extension      Knee Flexion      Leg Press            CKC      Calf Raises      Mini Squats      Wall Sits      Step ups      TKE                  Manual Intervention (58632)      Patellar Mobs      Manual stretching x5'  Left LE hams/piriformis/SKC  SDLY quads   STM X10'  General STM to ITB/quads   Scar Massage            NMR re-education (00736)   CUES NEEDED   Biodex Balance      Tandem Balance      SLB      Rebounder      BOSU            Ed given on POC, expectation and goals x10'                 Therapeutic Activity (27531)      Core Training      Purdys Kristina 30      TRX training Patient Height: 5'3  Patient Weight: 220 lb  Patient BMI: 38.97 kg/m2    Pain  [x]  Pain diagram was completed, pain was present and a follow up plan to address the pain is in the plan of care. ()   []  Pain diagram was completed and there was no pain present and therefore no follow up plan to address pain in the plan of care. ()   []  Pain diagram was not completed and the reason for not completing the pain diagram is in the medical chart ()  []  Patient refused to participate   []  Severe mental or physical capacity, patient is in urgent emergent situation and to complete the questionnaire would jeopardize the patient's health status        Functional Questionnaire  [x]  Patient completed the functional outcome questionnaire and deficiencies were addressed in the plan of care. ()   []  Patient completed the outcome questionnaire and there were no functional deficits identified and therefore no plan of care is required. ()   []  Patient did not complete the functional outcome questionnaire and the reason why is documented in the medical chart. ()  []  Patient refused to participate   []  Patient unable to complete the questionnaire   []   A functional outcome assessment has been reported on within the last 30 days        Falls  []  The patient has had no falls or 1 fall without injury in the past year. (1101F)   []  There is no documentation of fall in the medical chart (1101F,8P)     [x] The patient has had 2 or more falls or one fall with injury in the past year that is documented in the medical chart. (1100F)  []  A falls risk assessment was completed and documented in the medical chart. (1760H)   []  Falls were addressed in the plan of care. (2035W)   []  A falls risk assessment was not completed and documented in the medical chart (7076K,1P)   []   Falls were not addressed in the plan of care and reason was documented in the plan of care.  (0518F 1P) Medication     [x] A list of current medications (including prescription, over the counter, herbal supplements, vitamin supplements, mineral supplements, dietary supplements) was reviewed with the patient and documented in the medical chart. ()     Therapeutic Exercise and NMR EXR  [x] (32336) Provided verbal/tactile cueing for activities related to strengthening, flexibility, endurance, ROM for improvements in LE, proximal hip, and core control with self care, mobility, lifting, ambulation. [x] (16335) Provided verbal/tactile cueing for activities related to improving balance, coordination, kinesthetic sense, posture, motor skill, proprioception to assist with LE, proximal hip, and core control in self-care, mobility, lifting, ambulation and eccentric single leg control.      NMR and Therapeutic Activities:    [x] (33925 or 18510) Provided verbal/tactile cueing for activities related to improving balance, coordination, kinesthetic sense, posture, motor skill, proprioception and motor activation to allow for proper function of core, proximal hip and LE with self-care and ADLs and functional mobility.   [] (65011) Gait Re-education- Provided training and instruction to the patient for proper LE, core and proximal hip recruitment and positioning and eccentric body weight control with ambulation re-education including up and down stairs     Home Exercise Program:    [x] (49718) Reviewed/Progressed HEP activities related to strengthening, flexibility, endurance, ROM of core, proximal hip and LE for functional self-care, mobility, lifting and ambulation/stair navigation   [] (66774) Reviewed/Progressed HEP activities related to improving balance, coordination, kinesthetic sense, posture, motor skill, proprioception of core, proximal hip and LE for self-care, mobility, lifting, and ambulation/stair navigation      Manual Treatments:  PROM / STM / Oscillations-Mobs:  G-I, II, III, IV (PA's, Inf., Post.)  [] (48747) Provided manual therapy to mobilize LE, proximal hip and/or LS spine soft tissue/joints for the purpose of modulating pain, promoting relaxation, increasing ROM, reducing/eliminating soft tissue swelling/inflammation/restriction, improving soft tissue extensibility and allowing for proper ROM for normal function with self-care, mobility, lifting and ambulation. Modalities:  ice to left knee/thigh x10'   [] GAME READY (VASO)- for significant edema, swelling, pain control. Charges:  Timed Code Treatment Minutes: 40   Total Treatment Minutes: 50      [] EVAL (LOW) 63974 (typically 20 minutes face-to-face)  [] EVAL (MOD) 49500 (typically 30 minutes face-to-face)  [] EVAL (HIGH) 93606 (typically 45 minutes face-to-face)  [] RE-EVAL     [x] QS(45033) x   15'  [] IONTO  [] NMR (63327) x     [] VASO  [x] Manual (00900) x  15'   [] Other:  [x] TA x   10'   [] Mech Traction (82147)  [] ES(attended) (41087)      [] ES (un) (80487):         ASSESSMENT:    We reviewed what she has been doing at home. She has good ROM in general throughout her left hip and knee. Increased tenderness lateral quads closer to her knee. Some of her symptoms may be coming from her back. She is going for another RF procedure on Dami 3, 2023. GOALS:   Patient stated goal: to get rid of pain so she can walk w/o problems   [] Progressing: [] Met: [] Not Met: [] Adjusted    Therapist goals for Patient:   Short Term Goals: To be achieved in: 2 weeks  1. Independent in HEP and progression per patient tolerance, in order to prevent re-injury. [] Progressing: [] Met: [] Not Met: [] Adjusted   2. Patient will have a decrease in pain to facilitate improvement in movement, function, and ADLs as indicated by Functional Deficits. [] Progressing: [] Met: [] Not Met: [] Adjusted    Long Term Goals: To be achieved in: 10 weeks  1.  Disability index score of 75% or less for the LEFS to assist with reaching prior level of function. [] Progressing: [] Met: [] Not Met: [] Adjusted  2. Patient will demonstrate increased AROM to WNL to allow for proper joint functioning as indicated by patients Functional Deficits. [] Progressing: [] Met: [] Not Met: [] Adjusted  3. Patient will demonstrate an increase in Strength to good proximal hip strength and control, within 5lb HHD in LE to allow for proper functional mobility as indicated by patients Functional Deficits. [] Progressing: [] Met: [] Not Met: [] Adjusted  4. Patient will return to 90% functional activities without increased symptoms or restriction. [] Progressing: [] Met: [] Not Met: [] Adjusted  5. Pt will be able to walk at work w/o pain. patient specific functional goal)    [] Progressing: [] Met: [] Not Met: [] Adjusted       Overall Progression Towards Functional goals/ Treatment Progress Update:  [] Patient is progressing as expected towards functional goals listed. [] Progression is slowed due to complexities/Impairments listed. [] Progression has been slowed due to co-morbidities.   [x] Plan just implemented, too soon to assess goals progression <30days   [] Goals require adjustment due to lack of progress  [] Patient is not progressing as expected and requires additional follow up with physician  [] Other    Prognosis for POC: [x] Good [] Fair  [] Poor      Patient requires continued skilled intervention: [x] Yes  [] No    Treatment/Activity Tolerance:  [x] Patient able to complete treatment  [] Patient limited by fatigue  [] Patient limited by pain    [] Patient limited by other medical complications  [] Other:         PLAN: See eval  [] Continue per plan of care [] Alter current plan   [x] Plan of care initiated [] Hold pending MD visit [] Discharge      Electronically signed by:      Anahi Joya, PT      Board Certified Orthopaedic Clinical Specialist  [facility] Certified  Physical Therapist    [place] PT #[id number]  [place] PT #[id number]            Note: If patient does not return for scheduled/ recommended follow up visits, this note will serve as a discharge from care along with most recent update on progress.

## 2022-12-12 ENCOUNTER — TREATMENT (OUTPATIENT)
Dept: PHYSICAL THERAPY | Age: 72
End: 2022-12-12
Payer: MEDICARE

## 2022-12-12 DIAGNOSIS — M79.605 LEFT LEG PAIN: ICD-10-CM

## 2022-12-12 DIAGNOSIS — S86.112D STRAIN OF GASTROCNEMIUS MUSCLE OF LEFT LOWER EXTREMITY, SUBSEQUENT ENCOUNTER: ICD-10-CM

## 2022-12-12 DIAGNOSIS — M25.562 ACUTE PAIN OF LEFT KNEE: Primary | ICD-10-CM

## 2022-12-12 PROCEDURE — 97530 THERAPEUTIC ACTIVITIES: CPT | Performed by: PHYSICAL THERAPIST

## 2022-12-12 PROCEDURE — 97110 THERAPEUTIC EXERCISES: CPT | Performed by: PHYSICAL THERAPIST

## 2022-12-12 PROCEDURE — 97140 MANUAL THERAPY 1/> REGIONS: CPT | Performed by: PHYSICAL THERAPIST

## 2022-12-12 PROCEDURE — G8427 DOCREV CUR MEDS BY ELIG CLIN: HCPCS | Performed by: PHYSICAL THERAPIST

## 2022-12-12 NOTE — PROGRESS NOTES
[facility]   Phone: [phone]    Fax: [phone]      Physical Therapy Treatment Note/ Progress Report:           Date:  2022    Patient Name:  Trish Iglesias    :  1950  MRN: [id number]  Restrictions/Precautions:  HBP, fusion, OA  Medical/Treatment Diagnosis Information:  Diagnosis: left knee pain/left leg pain/gastroc strain   Treating Diagnosis: limited walking; hip and thigh pain; Insurance/Certification information:  PT Insurance Information: Medicare  Physician Information:  Referring Practitioner: Dr Kanchan Avalos  Has the plan of care been signed (Y/N):        []  Yes  [x]  No     Date of Patient follow up with Physician: not scheduled      Is this a Progress Report:     []  Yes  [x]  No        If Yes:  Date Range for reporting period:  Beginning 2022  Ending 2022    Progress report will be due (10 Rx or 30 days whichever is less):        Recertification will be due (POC Duration  / 90 days whichever is less): 2022         Visit # Insurance Allowable Auth Required   3 /BMN []  Yes []  No        Functional Scale: FOTO    Date assessed:  2022      Latex Allergy:  [x]NO      []YES  Preferred Language for Healthcare:   [x]English       []other:    PQRS:  2022  Reviewed medication list with patient. No changes since last PT visit. Pain level:  3-8/10     SUBJECTIVE:    She does feel like her overall mobility is a little better. Has an easier time getting to her shoes. She still gets more discomfort the more she is on her feet, especially at work. When she works 3 days in a row, she really feels it. Uses her cane to help take some pressure off of her left side.            OBJECTIVE:   ROM   Comments   Trunk flexion 100% NE  2022   Trunk extension 50% Inc NW Tightness due to fusion   Trunk R sidebend 75% NE     Trunk L sidebend 75% NE     Trunk R rotation WNL NE     Trunk L rotation WNL NE     HS flexibility WNL                        Strength Left Right Comments   Hip flexion(L2) 4 4 11/21/22   Knee extension(L3) 42 lb 36 lb     Knee flexion(S1-2) 40 lb 37 lb     Ankle dorsiflexion(L4) 4+ 4+     Toe extension(L5)         Ankle eversion/plantar flexion(S1) 4+ 4+        Special tests   Comments   SLR neg  11/21/2022   Slump test neg     Pelvic symmetry  symmetrical     Segmental Spinal mobility Lumbar fusion                   DTRs Left Right Comments   Patellar(L3-L4) WNL WNL  11/21/2022   Achilles(S1-S2) WNL WNL                  Flexibility L R Comment   Hamstring WNL WNL 11/21/2022   Gastroc Mod tightness Mod tightness     ITB         Quad               TUG TEST:NPV        RESTRICTIONS/PRECAUTIONS: Hx of B; TKA, hx of B DILAN; lumbar fusion; HBP;    Exercises/Interventions:   Therapeutic Ex (70134) Sets/sec Reps Notes/CUES   BIKE      TREADMILL            STRETCHING      Towel Pull Calf      Inclined Calf      Hamstrings 15\" x5 seated   Quads      Hip Flexors      ITB      Adductors            ROM      Passive      Active      Weight Hangs      Sheet Pulls      Ankle Pumps      ERMI            STRENGTHENING      Quad Sets      Ham Sets      Hip ADD Sets BS 2x10     SLR Flexion      bridging x20  L glute contraction    Pelvic tilt x15     Clams in supine 2x10  Black TB   Standing hip ext 2x10     SLR Adduction      SLR+            PRE Machines      Knee Extension      Knee Flexion      Leg Press            CKC      Calf Raises      Mini Squats      Wall Sits      Step ups      TKE                  Manual Intervention (92272)      Patellar Mobs      Manual stretching x5'  Left LE hams/piriformis/SKC  SDLY quads   STM-utilizing theragun X10'  General STM to ITB/quads   Scar Massage            NMR re-education (23259)   CUES NEEDED   Biodex Balance      Tandem Balance      SLB      Rebounder      BOSU            Ed given on POC, expectation and goals x10'                 Therapeutic Activity (55555)      Core Training      Swiss Ball Activities      Monster Walks      TRX training                      Patient Height: 5'3  Patient Weight: 220 lb  Patient BMI: 38.97 kg/m2    Pain  [x]  Pain diagram was completed, pain was present and a follow up plan to address the pain is in the plan of care. ()   []  Pain diagram was completed and there was no pain present and therefore no follow up plan to address pain in the plan of care. ()   []  Pain diagram was not completed and the reason for not completing the pain diagram is in the medical chart ()  []  Patient refused to participate   []  Severe mental or physical capacity, patient is in urgent emergent situation and to complete the questionnaire would jeopardize the patient's health status        Functional Questionnaire  [x]  Patient completed the functional outcome questionnaire and deficiencies were addressed in the plan of care. ()   []  Patient completed the outcome questionnaire and there were no functional deficits identified and therefore no plan of care is required. ()   []  Patient did not complete the functional outcome questionnaire and the reason why is documented in the medical chart. ()  []  Patient refused to participate   []  Patient unable to complete the questionnaire   []   A functional outcome assessment has been reported on within the last 30 days        Falls  []  The patient has had no falls or 1 fall without injury in the past year. (1101F)   []  There is no documentation of fall in the medical chart (1101F,8P)     [x] The patient has had 2 or more falls or one fall with injury in the past year that is documented in the medical chart. (1100F)  []  A falls risk assessment was completed and documented in the medical chart. (2853H)   []  Falls were addressed in the plan of care.  (0573I)   []  A falls risk assessment was not completed and documented in the medical chart (0916D,1B)   []   Falls were not addressed in the plan of care and reason was documented in the plan of care. (0539M 1P)        Medication     [x] A list of current medications (including prescription, over the counter, herbal supplements, vitamin supplements, mineral supplements, dietary supplements) was reviewed with the patient and documented in the medical chart. ()     Therapeutic Exercise and NMR EXR  [x] (07638) Provided verbal/tactile cueing for activities related to strengthening, flexibility, endurance, ROM for improvements in LE, proximal hip, and core control with self care, mobility, lifting, ambulation. [x] (14291) Provided verbal/tactile cueing for activities related to improving balance, coordination, kinesthetic sense, posture, motor skill, proprioception to assist with LE, proximal hip, and core control in self-care, mobility, lifting, ambulation and eccentric single leg control.      NMR and Therapeutic Activities:    [x] (98703 or 27077) Provided verbal/tactile cueing for activities related to improving balance, coordination, kinesthetic sense, posture, motor skill, proprioception and motor activation to allow for proper function of core, proximal hip and LE with self-care and ADLs and functional mobility.   [] (68097) Gait Re-education- Provided training and instruction to the patient for proper LE, core and proximal hip recruitment and positioning and eccentric body weight control with ambulation re-education including up and down stairs     Home Exercise Program:    [x] (75084) Reviewed/Progressed HEP activities related to strengthening, flexibility, endurance, ROM of core, proximal hip and LE for functional self-care, mobility, lifting and ambulation/stair navigation   [] (32404) Reviewed/Progressed HEP activities related to improving balance, coordination, kinesthetic sense, posture, motor skill, proprioception of core, proximal hip and LE for self-care, mobility, lifting, and ambulation/stair navigation      Manual Treatments:  PROM / STM / Oscillations-Mobs:  G-I, II, III, IV (PA's, Inf., Post.)  [x] (36113) Provided manual therapy to mobilize LE, proximal hip and/or LS spine soft tissue/joints for the purpose of modulating pain, promoting relaxation, increasing ROM, reducing/eliminating soft tissue swelling/inflammation/restriction, improving soft tissue extensibility and allowing for proper ROM for normal function with self-care, mobility, lifting and ambulation. Modalities:   [] GAME READY (VASO)- for significant edema, swelling, pain control. Charges:  Timed Code Treatment Minutes: 40   Total Treatment Minutes: 45      [] EVAL (LOW) 77633 (typically 20 minutes face-to-face)  [] EVAL (MOD) 39262 (typically 30 minutes face-to-face)  [] EVAL (HIGH) 60066 (typically 45 minutes face-to-face)  [] RE-EVAL     [x] DE(97435) x   15'  [] IONTO  [] NMR (68022) x     [] VASO  [x] Manual (53787) x  15'   [] Other:  [x] TA x   10'   [] Mech Traction (36356)  [] ES(attended) (49882)      [] ES (un) (55689):         ASSESSMENT:    We utilized the theragun today for STM to her left thigh. Increased sensitivity anteriorly close to her knee. Added in basic pelvic tilt today and she performed this well. Reviewed her HEP. She did not feel like she needed ice today. GOALS:   Patient stated goal: to get rid of pain so she can walk w/o problems   [] Progressing: [] Met: [] Not Met: [] Adjusted    Therapist goals for Patient:   Short Term Goals: To be achieved in: 2 weeks  1. Independent in HEP and progression per patient tolerance, in order to prevent re-injury. [] Progressing: [] Met: [] Not Met: [] Adjusted   2. Patient will have a decrease in pain to facilitate improvement in movement, function, and ADLs as indicated by Functional Deficits. [] Progressing: [] Met: [] Not Met: [] Adjusted    Long Term Goals: To be achieved in: 10 weeks  1.  Disability index score of 75% or less for the LEFS to assist with reaching prior level of function. [] Progressing: [] Met: [] Not Met: [] Adjusted  2. Patient will demonstrate increased AROM to WNL to allow for proper joint functioning as indicated by patients Functional Deficits. [] Progressing: [] Met: [] Not Met: [] Adjusted  3. Patient will demonstrate an increase in Strength to good proximal hip strength and control, within 5lb HHD in LE to allow for proper functional mobility as indicated by patients Functional Deficits. [] Progressing: [] Met: [] Not Met: [] Adjusted  4. Patient will return to 90% functional activities without increased symptoms or restriction. [] Progressing: [] Met: [] Not Met: [] Adjusted  5. Pt will be able to walk at work w/o pain. patient specific functional goal)    [] Progressing: [] Met: [] Not Met: [] Adjusted       Overall Progression Towards Functional goals/ Treatment Progress Update:  [] Patient is progressing as expected towards functional goals listed. [] Progression is slowed due to complexities/Impairments listed. [] Progression has been slowed due to co-morbidities. [x] Plan just implemented, too soon to assess goals progression <30days   [] Goals require adjustment due to lack of progress  [] Patient is not progressing as expected and requires additional follow up with physician  [] Other    Prognosis for POC: [x] Good [] Fair  [] Poor      Patient requires continued skilled intervention: [x] Yes  [] No    Treatment/Activity Tolerance:  [x] Patient able to complete treatment  [] Patient limited by fatigue  [] Patient limited by pain    [] Patient limited by other medical complications  [] Other:         PLAN: See eval.  Hold PT for now until after holidays and after her RF procedure.   [] Continue per plan of care [] Alter current plan   [x] Plan of care initiated [x] Hold pending MD visit [] Discharge      Electronically signed by:      Leela Kay PT      Board Certified Orthopaedic Clinical Specialist  [facility] Certified  Physical Therapist    [place] PT #[id number]  [place] PT #[id number]            Note: If patient does not return for scheduled/ recommended follow up visits, this note will serve as a discharge from care along with most recent update on progress.

## 2023-01-26 ENCOUNTER — OFFICE VISIT (OUTPATIENT)
Dept: ORTHOPEDIC SURGERY | Age: 73
End: 2023-01-26
Payer: MEDICARE

## 2023-01-26 VITALS — BODY MASS INDEX: 38.98 KG/M2 | WEIGHT: 220 LBS | HEIGHT: 63 IN

## 2023-01-26 DIAGNOSIS — M12.812 ROTATOR CUFF ARTHROPATHY OF LEFT SHOULDER: Primary | ICD-10-CM

## 2023-01-26 PROCEDURE — G8484 FLU IMMUNIZE NO ADMIN: HCPCS | Performed by: ORTHOPAEDIC SURGERY

## 2023-01-26 PROCEDURE — 1036F TOBACCO NON-USER: CPT | Performed by: ORTHOPAEDIC SURGERY

## 2023-01-26 PROCEDURE — G8417 CALC BMI ABV UP PARAM F/U: HCPCS | Performed by: ORTHOPAEDIC SURGERY

## 2023-01-26 PROCEDURE — G8427 DOCREV CUR MEDS BY ELIG CLIN: HCPCS | Performed by: ORTHOPAEDIC SURGERY

## 2023-01-26 PROCEDURE — 3017F COLORECTAL CA SCREEN DOC REV: CPT | Performed by: ORTHOPAEDIC SURGERY

## 2023-01-26 PROCEDURE — 99213 OFFICE O/P EST LOW 20 MIN: CPT | Performed by: ORTHOPAEDIC SURGERY

## 2023-01-26 PROCEDURE — G8399 PT W/DXA RESULTS DOCUMENT: HCPCS | Performed by: ORTHOPAEDIC SURGERY

## 2023-01-26 PROCEDURE — 1090F PRES/ABSN URINE INCON ASSESS: CPT | Performed by: ORTHOPAEDIC SURGERY

## 2023-01-26 PROCEDURE — 1123F ACP DISCUSS/DSCN MKR DOCD: CPT | Performed by: ORTHOPAEDIC SURGERY

## 2023-01-26 PROCEDURE — 20610 DRAIN/INJ JOINT/BURSA W/O US: CPT | Performed by: ORTHOPAEDIC SURGERY

## 2023-01-26 RX ORDER — TRIAMCINOLONE ACETONIDE 40 MG/ML
80 INJECTION, SUSPENSION INTRA-ARTICULAR; INTRAMUSCULAR ONCE
Status: COMPLETED | OUTPATIENT
Start: 2023-01-26 | End: 2023-01-26

## 2023-01-26 RX ORDER — DOXYCYCLINE HYCLATE 100 MG
TABLET ORAL
COMMUNITY
Start: 2023-01-19

## 2023-01-26 RX ADMIN — TRIAMCINOLONE ACETONIDE 80 MG: 40 INJECTION, SUSPENSION INTRA-ARTICULAR; INTRAMUSCULAR at 17:54

## 2023-01-26 NOTE — PROGRESS NOTES
Chief Complaint    Shoulder Pain (F/U LEFT SHOULDER)      History of Present Illness:  Bruce Jaimes is a pleasant, 81 y.o., female, here today for follow up of her left shoulder. This patient is well known to Dr. Lorrin Bamberger. Patient has a history of undergoing two rotator cuff repairs of her left shoulder. The initial repair was on October 8, 2012 and the revision surgery was July 19, 2018. She is currently under our care for left rotator cuff arthropathy. We did administer two previous intra-articular corticosteroid injections for this problem - the most recent injection was back on  10/27/22. This provided approximately 6-8 weeks relief from symptoms. She reports no new injuries or setbacks. Pain Assessment  Location of Pain: Shoulder  Location Modifiers: Left  Severity of Pain: 3  Quality of Pain: Aching, Throbbing  Frequency of Pain: Intermittent  Aggravating Factors: Other (Comment)  Limiting Behavior: Some  Relieving Factors: Rest, Other (Comment)  Work-Related Injury: No  Are there other pain locations you wish to document?: No      Medical History:  Patient's medications, allergies, past medical, surgical, social and family histories were reviewed and updated as appropriate. Review of Systems  A 14 point review of systems was completed by the patient and is available in the media section of the scanned medical record and was reviewed on 1/26/2023. The review is negative with the exception of those things mentioned in the HPI and Past Medical History    Vital Signs: There were no vitals filed for this visit. General/Appearance: Alert and oriented and in no apparent distress. Skin:  There are no skin lesions, cellulitis, or extreme edema. The patient has warm and well-perfused Bilateral upper extremities with brisk capillary refill. Left Shoulder Exam:  Inspection: No gross deformities, no signs of infection.     Palpation: Tenderness over the rotator cuff footprint    Active Range of Motion: Forward Elevation 110 with pain on arm descent, Abduction 90, External Rotation 50, Internal Rotation T11    Passive Range of Motion: Forward Elevation 140    Strength:  External Rotation 4-/5 bilaterally, Internal Rotation 4-/5    Special Tests: No Herson muscle deformity. Neurovascular: Sensation to light touch is intact, no motor deficits, palpable radial pulses 2+      Radiology:     No new XR obtained at this time. Assessment :  Ms. Rhonda Phillips is a pleasant, 81 y.o. patient with left rotator cuff arthropathy. She compensates well for this in terms of overall function. Continued conservative treatment is reasonable for her. Impression:  Encounter Diagnosis   Name Primary? Rotator cuff arthropathy of left shoulder Yes       Office Procedures:  Orders Placed This Encounter   Procedures    [id number] - [place] DRAIN/INJECT LARGE JOINT/BURSA       After discussing the risks and benefits of a corticosteroid injection, Arthurdigna Kendalia did state an understanding and gave verbal consent to proceed. After cleansing the injection site with Chlora-prep and using aseptic techniques,  2 CC of Kenalog 40mg/ml and 8 CC of 1% lidocaine were injected in the left glenohumeral joint. She  tolerated the procedure well with no immediate adverse sequelae after the injection. A bandage was placed over the injection site. Appropriate post injections instructions were given to the patient. Treatment Plan:  Rhonda Phillips is a candidate for a repeat injection today. We will continue with these injections as long as she provides meaningful relief from symptoms. At some point  she may not find relief from these symptoms or she will find a decrease in function. At that time we will discuss further a reverse total shoulder replacement, however for now we will stay conservative in our methods. We will see Marvin Bernabe back in 3 months and/or as needed.  All questions were answered to patient's satisfaction and She was encouraged to call with any further questions or concerns. Magdiel Power is in agreement with this plan. 1/26/2023  5:02 PM    Felecia Fox ATC  [facility] [address] [facility]    During this examination, I, Josias Yolie, functioned as a scribe for Dr. Gibson No. The history taking and physical examination were performed by Dr. Pelon Kruse. All counseling during the appointment was performed between the patient and Dr. Pelon Kruse. 1/26/23  ______________  I, Dr. Gibson No, personally performed the services described in this documentation as described by Felecia Fox ATC in my presence, and it is both accurate and complete. Sameromeo Kruse MD, PhD  1/26/2023

## 2023-05-18 ENCOUNTER — OFFICE VISIT (OUTPATIENT)
Dept: ORTHOPEDIC SURGERY | Age: 73
End: 2023-05-18

## 2023-05-18 VITALS — WEIGHT: 220 LBS | HEIGHT: 63 IN | BODY MASS INDEX: 38.98 KG/M2

## 2023-05-18 DIAGNOSIS — M12.812 ROTATOR CUFF ARTHROPATHY OF LEFT SHOULDER: Primary | ICD-10-CM

## 2023-05-18 NOTE — PROGRESS NOTES
Chief Complaint    Shoulder Pain (F/u bilat shoulder)        History of Present Illness:  Alvaro Avila is a pleasant, 72 y.o., female, here today for follow up of her left shoulder. This patient is well known to Dr. Yoly Freiats. Patient has a history of undergoing two rotator cuff repairs of her left shoulder. The initial repair was on October 8, 2012 and the revision surgery was July 19, 2018. She is currently under our care for left rotator cuff arthropathy. We have been administering intra-articular corticosteroid injections for this problem. Her most recent injection was back on 1/28/23 although she feels this injection has not provided much relief from symptoms as previous injections have. She feels her shoulder is declining. She reports no new injuries or setbacks. Pain Assessment  Location of Pain: Shoulder  Location Modifiers: Left  Severity of Pain: 8  Quality of Pain: Aching, Dull, Sharp, Throbbing  Duration of Pain: Persistent  Frequency of Pain: Constant  Aggravating Factors: Other (Comment), Straightening, Stretching  Limiting Behavior: Yes  Relieving Factors: Rest  Work-Related Injury: No  Are there other pain locations you wish to document?: No      Medical History:  Patient's medications, allergies, past medical, surgical, social and family histories were reviewed and updated as appropriate. Review of Systems  A 14 point review of systems was completed by the patient and is available in the media section of the scanned medical record and was reviewed on 5/18/2023. The review is negative with the exception of those things mentioned in the HPI and Past Medical History    Vital Signs: There were no vitals filed for this visit. General/Appearance: Alert and oriented and in no apparent distress. Skin:  There are no skin lesions, cellulitis, or extreme edema. The patient has warm and well-perfused Bilateral upper extremities with brisk capillary refill.       Left Shoulder Exam:  Inspection: No

## 2023-05-25 ENCOUNTER — OFFICE VISIT (OUTPATIENT)
Dept: ORTHOPEDIC SURGERY | Age: 73
End: 2023-05-25

## 2023-05-25 VITALS — HEIGHT: 63 IN | BODY MASS INDEX: 38.98 KG/M2 | WEIGHT: 220 LBS

## 2023-05-25 DIAGNOSIS — M12.812 ROTATOR CUFF ARTHROPATHY OF LEFT SHOULDER: Primary | ICD-10-CM

## 2023-05-25 NOTE — PROGRESS NOTES
VISCO SUPPLEMENTATION  INJECTION of the left shoulder. HISTORY OF PRESENT ILLNESS: The Eduardo Scott presents for a viscosuppementation injection. She states the first injection has provided decent relief from symptoms already. PHYSICAL EXAMINATION: Inspection of the left shoulder reveals warm, dry, intact skin, and no evidence of infection. There is no effusion. The distal neurovascular exam is grossly intact. PROCEDURE: Risks, benefits, and alternatives to the injections were discussed in detail with the patient. The risks discussed included but are not limited to infection, skin reactions, hot swollen joints, and anaphylaxis. After discussing the risks and benefits of a viscosuppementation injection, Eduardo Scott did state an understanding and gave verbal consent to proceed. After cleansing the injection site with Chlora-prep and using aseptic techniques,  Supartz preparation of 2.5mg was injected in the left glenohumeral Joint. She tolerated the procedure well with no immediate adverse sequelae after the injection. A bandage was placed over the injection site. Appropriate post injections instructions were given to the patient. ASSESSMENT: Osteoarthritis left shoulder    PLAN: Return in 1 week for Supartz 3/3 injections and/or as needed. 1:27 PM  5/25/2023    Heather Leavitt ATC  [facility] [address] [facility]    During this examination, I, Denysnicole Apex, functioned as a scribe for Dr. Javi Paiz. The history taking and physical examination were performed by Dr. Adi Melo. All counseling during the appointment was performed between the patient and Dr. Adi Melo. 5/25/23  _________________  I, Dr. Javi Paiz, personally performed the services described in this documentation as described by Heather Leavitt ATC in my presence, and it is both accurate and complete. Sharad Melo MD, PhD  5/25/2023

## 2023-06-01 ENCOUNTER — OFFICE VISIT (OUTPATIENT)
Dept: ORTHOPEDIC SURGERY | Age: 73
End: 2023-06-01

## 2023-06-01 VITALS — WEIGHT: 220 LBS | HEIGHT: 63 IN | BODY MASS INDEX: 38.98 KG/M2

## 2023-06-01 DIAGNOSIS — M25.512 LEFT SHOULDER PAIN, UNSPECIFIED CHRONICITY: Primary | ICD-10-CM

## 2023-06-01 NOTE — PROGRESS NOTES
VISCO SUPPLEMENTATION  INJECTION of the left shoulder. HISTORY OF PRESENT ILLNESS: The Shirin Rizo presents for a viscosuppementation injection. She states the first injection has provided decent relief from symptoms already. PHYSICAL EXAMINATION: Inspection of the left shoulder reveals warm, dry, intact skin, and no evidence of infection. There is no effusion. The distal neurovascular exam is grossly intact. PROCEDURE: Risks, benefits, and alternatives to the injections were discussed in detail with the patient. The risks discussed included but are not limited to infection, skin reactions, hot swollen joints, and anaphylaxis. After discussing the risks and benefits of a viscosuppementation injection, Shirin Rizo did state an understanding and gave verbal consent to proceed. After cleansing the injection site with Chlora-prep and using aseptic techniques,  Supartz preparation of 2.5mg was injected in the left glenohumeral Joint. She tolerated the procedure well with no immediate adverse sequelae after the injection. A bandage was placed over the injection site. Appropriate post injections instructions were given to the patient. ASSESSMENT: Osteoarthritis left shoulder    PLAN: Return in 6 weeks if she continues to have persistent pain in her left shoulder. .    3:09 PM  6/1/2023

## 2023-10-01 SDOH — HEALTH STABILITY: PHYSICAL HEALTH: ON AVERAGE, HOW MANY MINUTES DO YOU ENGAGE IN EXERCISE AT THIS LEVEL?: PATIENT DECLINED

## 2023-10-01 SDOH — HEALTH STABILITY: PHYSICAL HEALTH: ON AVERAGE, HOW MANY DAYS PER WEEK DO YOU ENGAGE IN MODERATE TO STRENUOUS EXERCISE (LIKE A BRISK WALK)?: 4 DAYS

## 2023-10-01 ASSESSMENT — SOCIAL DETERMINANTS OF HEALTH (SDOH)

## 2023-10-02 ENCOUNTER — OFFICE VISIT (OUTPATIENT)
Dept: ORTHOPEDIC SURGERY | Age: 73
End: 2023-10-02

## 2023-10-02 DIAGNOSIS — M54.16 LUMBAR RADICULOPATHY: ICD-10-CM

## 2023-10-02 DIAGNOSIS — Z96.652 HISTORY OF TOTAL KNEE ARTHROPLASTY, LEFT: ICD-10-CM

## 2023-10-02 DIAGNOSIS — Z96.652 HISTORY OF TOTAL KNEE ARTHROPLASTY, LEFT: Primary | ICD-10-CM

## 2023-10-02 NOTE — PROGRESS NOTES
Dr Singh Span      Date /Time 10/2/2023       12:59 PM EDT  Name Kirsten Avina             1950   Location  [facility]  MRN [id number]                Chief Complaint   Patient presents with    Knee Pain     Np Left Knee  (TKA 08/14/2019)         History of Present Illness  Kirsten Avina is a 72 y.o. female who presents with  left knee pain, . Sent in consultation by Rebecca Dougherty MD, . Injury Mechanism:  none. Worker's Comp. & legal issues:   none. Previous Treatments: Ice, Heat, and NSAIDs    Patient presents to the office today for a new problem. Patient here with a chief complaint of left knee pain. Patient did have a left total knee arthroplasty August 14, 2019 by Dr. Shawn Rodriguez. Patient also previously had a left total hip arthroplasty done November 4, 2021. She is here with a chief complaint of left knee pain. No recent injury or trauma. She does have a history of lumbar pathology and had a lumbar fusion in 2013. She was informed that she has junctional syndrome the last time she saw her spine surgeon Dr. Gabe Hendrix. When describing her pain she describes it as a burning sensation starting at her mid thigh and extending distally past her knee.     Past History  Past Medical History:   Diagnosis Date    Ankylosing spondylitis ([address])     Arthritis     Bursitis     Carpal tunnel syndrome     Cerebral artery occlusion with cerebral infarction (HCC)     TIA X 4    CLL (chronic lymphocytic leukemia) (HCC)     Depression     Fractures     Frequent urination     Ganglion cyst     GERD (gastroesophageal reflux disease)     Hiatal hernia     SMALL    HTN (hypertension)     Hx of blood clots     Hx of stroke without residual deficits     Hyperlipidemia     Osteoarthritis     Overweight     Raynaud's disease     Spinal stenosis     Spondylolisthesis     Use of cane as ambulatory aid      Past Surgical History:   Procedure Laterality Date    ABDOMEN SURGERY  7/18/16 & 8/15/16

## 2023-10-03 LAB
BASOPHILS # BLD: 0.1 K/UL (ref 0–0.2)
BASOPHILS NFR BLD: 0.7 %
CRP SERPL-MCNC: <3 MG/L (ref 0–5.1)
DEPRECATED RDW RBC AUTO: 13.7 % (ref 12.4–15.4)
EOSINOPHIL # BLD: 0.2 K/UL (ref 0–0.6)
EOSINOPHIL NFR BLD: 2.3 %
ERYTHROCYTE [SEDIMENTATION RATE] IN BLOOD BY WESTERGREN METHOD: 9 MM/HR (ref 0–30)
HCT VFR BLD AUTO: 35.1 % (ref 36–48)
HGB BLD-MCNC: 12.1 G/DL (ref 12–16)
LYMPHOCYTES # BLD: 4.2 K/UL (ref 1–5.1)
LYMPHOCYTES NFR BLD: 43.6 %
MCH RBC QN AUTO: 32.9 PG (ref 26–34)
MCHC RBC AUTO-ENTMCNC: 34.6 G/DL (ref 31–36)
MCV RBC AUTO: 95.2 FL (ref 80–100)
MONOCYTES # BLD: 0.9 K/UL (ref 0–1.3)
MONOCYTES NFR BLD: 9.2 %
NEUTROPHILS # BLD: 4.3 K/UL (ref 1.7–7.7)
NEUTROPHILS NFR BLD: 44.2 %
PLATELET # BLD AUTO: 245 K/UL (ref 135–450)
PMV BLD AUTO: 9.9 FL (ref 5–10.5)
RBC # BLD AUTO: 3.68 M/UL (ref 4–5.2)
WBC # BLD AUTO: 9.7 K/UL (ref 4–11)

## 2023-10-04 ENCOUNTER — TELEPHONE (OUTPATIENT)
Dept: ORTHOPEDIC SURGERY | Age: 73
End: 2023-10-04

## 2023-10-04 NOTE — TELEPHONE ENCOUNTER
General Question     Subject: BLOOD WORK   Patient and /or Facility Request: Susan Kimball  Contact Number: [phone]    PT CALLING REGARDING BLOOD WORK AND WOULD LIKE TO SPEAK WITH SABINE FAY REGARDING THE MATTER. PLEASE CALL BACK PT AT THE ABOVE NUMBER LISTED.

## 2024-01-11 ENCOUNTER — OFFICE VISIT (OUTPATIENT)
Dept: ORTHOPEDIC SURGERY | Age: 74
End: 2024-01-11
Payer: MEDICARE

## 2024-01-11 VITALS — BODY MASS INDEX: 38.98 KG/M2 | HEIGHT: 63 IN | WEIGHT: 220 LBS

## 2024-01-11 DIAGNOSIS — M25.511 RIGHT SHOULDER PAIN, UNSPECIFIED CHRONICITY: Primary | ICD-10-CM

## 2024-01-11 DIAGNOSIS — M75.121 COMPLETE TEAR OF RIGHT ROTATOR CUFF, UNSPECIFIED WHETHER TRAUMATIC: ICD-10-CM

## 2024-01-11 PROCEDURE — 1123F ACP DISCUSS/DSCN MKR DOCD: CPT | Performed by: ORTHOPAEDIC SURGERY

## 2024-01-11 PROCEDURE — 99213 OFFICE O/P EST LOW 20 MIN: CPT | Performed by: ORTHOPAEDIC SURGERY

## 2024-01-11 PROCEDURE — G8484 FLU IMMUNIZE NO ADMIN: HCPCS | Performed by: ORTHOPAEDIC SURGERY

## 2024-01-11 PROCEDURE — G8427 DOCREV CUR MEDS BY ELIG CLIN: HCPCS | Performed by: ORTHOPAEDIC SURGERY

## 2024-01-11 PROCEDURE — 1036F TOBACCO NON-USER: CPT | Performed by: ORTHOPAEDIC SURGERY

## 2024-01-11 PROCEDURE — G8399 PT W/DXA RESULTS DOCUMENT: HCPCS | Performed by: ORTHOPAEDIC SURGERY

## 2024-01-11 PROCEDURE — 3017F COLORECTAL CA SCREEN DOC REV: CPT | Performed by: ORTHOPAEDIC SURGERY

## 2024-01-11 PROCEDURE — 1090F PRES/ABSN URINE INCON ASSESS: CPT | Performed by: ORTHOPAEDIC SURGERY

## 2024-01-11 PROCEDURE — 20610 DRAIN/INJ JOINT/BURSA W/O US: CPT | Performed by: ORTHOPAEDIC SURGERY

## 2024-01-11 PROCEDURE — G8417 CALC BMI ABV UP PARAM F/U: HCPCS | Performed by: ORTHOPAEDIC SURGERY

## 2024-01-11 RX ORDER — METHYLPREDNISOLONE ACETATE 40 MG/ML
80 INJECTION, SUSPENSION INTRA-ARTICULAR; INTRALESIONAL; INTRAMUSCULAR; SOFT TISSUE ONCE
Status: COMPLETED | OUTPATIENT
Start: 2024-01-11 | End: 2024-01-11

## 2024-01-11 RX ORDER — LIDOCAINE HYDROCHLORIDE 10 MG/ML
8 INJECTION, SOLUTION INFILTRATION; PERINEURAL ONCE
Status: COMPLETED | OUTPATIENT
Start: 2024-01-11 | End: 2024-01-11

## 2024-01-11 RX ORDER — LEVOTHYROXINE SODIUM 0.05 MG/1
50 TABLET ORAL DAILY
COMMUNITY
Start: 2023-10-30

## 2024-01-11 RX ORDER — PANTOPRAZOLE SODIUM 40 MG/1
40 TABLET, DELAYED RELEASE ORAL 2 TIMES DAILY
COMMUNITY
Start: 2023-10-09

## 2024-01-11 RX ADMIN — LIDOCAINE HYDROCHLORIDE 8 ML: 10 INJECTION, SOLUTION INFILTRATION; PERINEURAL at 12:05

## 2024-01-11 RX ADMIN — METHYLPREDNISOLONE ACETATE 80 MG: 40 INJECTION, SUSPENSION INTRA-ARTICULAR; INTRALESIONAL; INTRAMUSCULAR; SOFT TISSUE at 12:06

## 2024-01-11 NOTE — PROGRESS NOTES
[facility]  History and Physical  Shoulder Pain    Date:  2024    Name:  Diana Zapata  Address:  [address] [place] [id number]    :  1950      Age:   73 y.o.    SSN:  [id number]      Medical Record Number:  [id number]    Reason for Visit:    Shoulder Pain (F/U RIGHT SHOULDER)      HPI:   Diana Zapata is a 73 y.o. female who presents to our office today for follow up of her right shoulder.  Patient has history of osteoarthritis with large rotator cuff tear noted on MRI and presents for repeat follow-up of her right shoulder. Regarding her right shoulder she locates her pain in the anterior aspect and states that her pain has been present for roughly 3 months.  She is reporting occasional night pain that wakes her from sleep.  She is unsure whether she has had any treatment on this side regarding injection or therapy.    She has been seen recently for her left shoulder which has osteoarthritis and she underwent viscosupplementation injections.  She states she has done very well with this and would be interested in repeat round of injections when needed.  She is not interested in that today.        Pain Assessment  Location of Pain: Shoulder  Location Modifiers: Right  Severity of Pain: 3  Quality of Pain: Dull, Aching, Sharp, Throbbing  Duration of Pain: Persistent  Frequency of Pain: Constant  Aggravating Factors: Other (Comment), Straightening, Stretching  Limiting Behavior: Yes  Relieving Factors: Rest  Work-Related Injury: No  Are there other pain locations you wish to document?: No        Review of Systems:  A 14 point review of systems available in the scanned medical record as documented by the patient and reviewed on 2024.  The review is negative with the exception of those things mentioned in the History of Present Illness and Past Medical History.      Past Medical History:  Patient's medications, allergies, past medical, surgical,

## 2024-01-15 ENCOUNTER — EVALUATION (OUTPATIENT)
Dept: PHYSICAL THERAPY | Age: 74
End: 2024-01-15

## 2024-01-15 DIAGNOSIS — M25.511 CHRONIC RIGHT SHOULDER PAIN: Primary | ICD-10-CM

## 2024-01-15 DIAGNOSIS — M75.121 NONTRAUMATIC COMPLETE TEAR OF RIGHT ROTATOR CUFF: ICD-10-CM

## 2024-01-15 DIAGNOSIS — G89.29 CHRONIC RIGHT SHOULDER PAIN: Primary | ICD-10-CM

## 2024-01-15 NOTE — PROGRESS NOTES
[facility]            [address] [place] [address]              P:[phone]  F:[phone]     Physical Therapy Certification      Dear German Zaidi MD,    We had the pleasure of evaluating the following patient for physical therapy services at [facility].  A summary of our findings can be found in the initial assessment below.  This includes our plan of care.  If you have any questions or concerns regarding these findings, please do not hesitate to contact me at the office phone number listed above.  Thank you for the referral.     Physician Signature:_______________________________Date:__________________  By signing above (or electronic signature), therapist’s plan is approved by physician       Physical Therapy: Initial Evaluation   Patient: Diana Zapata (73 y.o. female)   Examination Date: 01/15/2024   :  1950 MRN: [id number]   Visit #: 1    Insurance: Payor: MEDICARE / Plan: MEDICARE PART A AND B / Product Type: *No Product type* /   Insurance ID: [id number] - (Medicare)  Secondary Insurance (if applicable):    Treatment Diagnosis:     ICD-10-CM    1. Chronic right shoulder pain  M25.511     G89.29       2. Nontraumatic complete tear of right rotator cuff  M75.121          Medical Diagnosis:  Right shoulder pain, unspecified chronicity [M25.511]   Referring Physician: German Zaidi MD  PCP: Malcolm Hoffman MD                              Precautions/ Contra-indications:           Latex allergy:  NO  Pacemaker:    NO  Contraindications for Manipulation: osteoporosis  and unhealthy/ multiple comorbidities   Date of Surgery: NA  Other:     Red Flags:  None    C-SSRS Triggered by Intake questionnaire:   [x] No, Questionnaire did not trigger screening.   [] Yes, Patient intake triggered further evaluation      [] C-SSRS Screening completed  [] PCP notified via Plan of Care  [] Emergency services notified 
- Reviewed/Progressed HEP activities related to strengthening, flexibility, endurance, ROM performed to prevent loss of range of motion, maintain or improve muscular strength or increase flexibility, following either an injury or surgery.  (31901) NEUROMUSCULAR RE-EDUCATION - Therapeutic procedure, 1 or more areas, each 15 minutes; neuromuscular reeducation of movement, balance, coordination, kinesthetic sense, posture, and/or proprioception for sitting and/or standing activities  (59413) HOME EXERCISE PROGRAM - Reviewed/Progressed HEP activities related to neuromuscular reeducation of movement, balance, coordination, kinesthetic sense, posture, and/or proprioception for sitting and/or standing activities    (35900) THERAPEUTIC ACTIVITY - use of dynamic activities to improve functional performance. (Ex include squatting, ascending/descending stairs, walking, bending, lifting, catching, throwing, pushing, pulling, jumping.)  Direct, one on one contact, billed in 15-minute increments.  (68036) MANUAL THERAPY -  Manual therapy techniques, 1 or more regions, each 15 minutes (Mobilization/manipulation, manual lymphatic drainage, manual traction) for the purpose of modulating pain, promoting relaxation,  increasing ROM, reducing/eliminating soft tissue swelling/inflammation/restriction, improving soft tissue extensibility and allowing for proper ROM for normal function with self care, mobility, lifting and ambulation    TREATMENT PLAN   Plan: POC Initiated today- see eval for details    Electronically Signed by Sergio Baird PT              Date: 01/15/2024      Board Certified Orthopaedic Clinical Specialist  TPI Certified  Physical Therapist    [place] PT #[id number]  [place] PT #[id number]       Note: If patient does not return for scheduled/recommended follow up visits, this note will serve as a discharge from care along with the most recent update on progress.

## 2024-01-23 ENCOUNTER — TREATMENT (OUTPATIENT)
Dept: PHYSICAL THERAPY | Age: 74
End: 2024-01-23

## 2024-01-23 DIAGNOSIS — G89.29 CHRONIC RIGHT SHOULDER PAIN: Primary | ICD-10-CM

## 2024-01-23 DIAGNOSIS — M25.511 CHRONIC RIGHT SHOULDER PAIN: Primary | ICD-10-CM

## 2024-01-23 DIAGNOSIS — M75.121 NONTRAUMATIC COMPLETE TEAR OF RIGHT ROTATOR CUFF: ICD-10-CM

## 2024-01-23 NOTE — PROGRESS NOTES
[facility]            [address] [place] [address]              P:[phone]  F:[phone]      Physical Therapy: TREATMENT/PROGRESS NOTE   Patient: Diana Zapata (73 y.o. female)   Treatment Date: 2024   :  1950 MRN: [id number]   Visit #: 2   Insurance Allowable Auth Needed   BMN []Yes    []No    Insurance: Payor: MEDICARE / Plan: MEDICARE PART A AND B / Product Type: *No Product type* /   Insurance ID: [id number] - (Medicare)  Secondary Insurance (if applicable):    Treatment Diagnosis:     ICD-10-CM    1. Chronic right shoulder pain  M25.511     G89.29       2. Nontraumatic complete tear of right rotator cuff  M75.121          Medical Diagnosis:    No admission diagnoses are documented for this encounter.   Referring Physician: German Zaidi MD  PCP: Malcolm Hoffman MD                             Plan of care signed (Y/N):     Date of Patient follow up with Physician: 2024     Progress Report/POC: NO  POC update due: (10 visits /OR AUTH LIMITS, whichever is less)  2024     PQRS:  2024  Reviewed medication list with patient. No changes since last PT visit.       Preferred Language for Healthcare:   [x]English       []other:    SUBJECTIVE EXAMINATION     Patient Report/Comments:   She states she is doing ok since starting last week.    She does have 3# dumbbells at home and thinks her pulleys are somewhere put away.     She also states she had an MRI of her back yesterday.               Test used Initial score  1/15/24 2024   Pain Summary VAS 3-4    Functional questionnaire Quick DASH 30% disability    Other:                OBJECTIVE EXAMINATION     ROM PROM AROM  Comment    L R L R 1/15/2024   Flexion   125 135    Abduction   100 120    ER@ 90   75 80    IR        Other        Other             Strength L R Comment   Flexion 4/5 4-/5 1/15/2024   Abduction 4/5 4-5    ER 4/5 4-/5    IR 4+/5 4/5    Supraspinatus

## 2024-01-30 ENCOUNTER — TREATMENT (OUTPATIENT)
Dept: PHYSICAL THERAPY | Age: 74
End: 2024-01-30
Payer: MEDICARE

## 2024-01-30 DIAGNOSIS — G89.29 CHRONIC RIGHT SHOULDER PAIN: Primary | ICD-10-CM

## 2024-01-30 DIAGNOSIS — M75.121 NONTRAUMATIC COMPLETE TEAR OF RIGHT ROTATOR CUFF: ICD-10-CM

## 2024-01-30 DIAGNOSIS — M25.511 CHRONIC RIGHT SHOULDER PAIN: Primary | ICD-10-CM

## 2024-01-30 PROCEDURE — G8427 DOCREV CUR MEDS BY ELIG CLIN: HCPCS | Performed by: PHYSICAL THERAPIST

## 2024-01-30 PROCEDURE — 97530 THERAPEUTIC ACTIVITIES: CPT | Performed by: PHYSICAL THERAPIST

## 2024-01-30 PROCEDURE — 97112 NEUROMUSCULAR REEDUCATION: CPT | Performed by: PHYSICAL THERAPIST

## 2024-01-30 PROCEDURE — 97110 THERAPEUTIC EXERCISES: CPT | Performed by: PHYSICAL THERAPIST

## 2024-01-30 NOTE — PROGRESS NOTES
[facility]            [address] [place] [address]              P:[phone]  F:[phone]      Physical Therapy: TREATMENT/PROGRESS NOTE   Patient: Diana Zapata (73 y.o. female)   Treatment Date: 2024   :  1950 MRN: [id number]   Visit #: 3   Insurance Allowable Auth Needed   BMN []Yes    []No    Insurance: Payor: MEDICARE / Plan: MEDICARE PART A AND B / Product Type: *No Product type* /   Insurance ID: [id number] - (Medicare)  Secondary Insurance (if applicable):    Treatment Diagnosis:     ICD-10-CM    1. Chronic right shoulder pain  M25.511     G89.29       2. Nontraumatic complete tear of right rotator cuff  M75.121          Medical Diagnosis:    No admission diagnoses are documented for this encounter.   Referring Physician: German Zaidi MD  PCP: Malcolm Hoffman MD                             Plan of care signed (Y/N):     Date of Patient follow up with Physician: 2024     Progress Report/POC: NO  POC update due: (10 visits /OR AUTH LIMITS, whichever is less)  2024     PQRS:  2024  Reviewed medication list with patient. No changes since last PT visit.       Preferred Language for Healthcare:   [x]English       []other:    SUBJECTIVE EXAMINATION     Patient Report/Comments:   She had a busy time at work over the weekend.     May have done a bit more lifting that she should have.   Her right shoulder was fairly sore yesterday, but already feeling better today.             Test used Initial score  1/15/24 2024   Pain Summary VAS 3-4    Functional questionnaire Quick DASH 30% disability    Other:                OBJECTIVE EXAMINATION     ROM PROM AROM  Comment    L R L R 1/15/2024   Flexion   125 135    Abduction   100 120    ER@ 90   75 80    IR        Other        Other             Strength L R Comment   Flexion 4/5 4-/5 1/15/2024   Abduction 4/5 4-5    ER 4/5 4-/5    IR 4+/5 4/5    Supraspinatus      Upper

## 2024-02-06 ENCOUNTER — TREATMENT (OUTPATIENT)
Dept: PHYSICAL THERAPY | Age: 74
End: 2024-02-06
Payer: MEDICARE

## 2024-02-06 DIAGNOSIS — M75.121 NONTRAUMATIC COMPLETE TEAR OF RIGHT ROTATOR CUFF: ICD-10-CM

## 2024-02-06 DIAGNOSIS — G89.29 CHRONIC RIGHT SHOULDER PAIN: Primary | ICD-10-CM

## 2024-02-06 DIAGNOSIS — M25.511 CHRONIC RIGHT SHOULDER PAIN: Primary | ICD-10-CM

## 2024-02-06 PROCEDURE — 97530 THERAPEUTIC ACTIVITIES: CPT | Performed by: PHYSICAL THERAPIST

## 2024-02-06 PROCEDURE — 97112 NEUROMUSCULAR REEDUCATION: CPT | Performed by: PHYSICAL THERAPIST

## 2024-02-06 PROCEDURE — G8427 DOCREV CUR MEDS BY ELIG CLIN: HCPCS | Performed by: PHYSICAL THERAPIST

## 2024-02-06 PROCEDURE — 97110 THERAPEUTIC EXERCISES: CPT | Performed by: PHYSICAL THERAPIST

## 2024-02-06 NOTE — PROGRESS NOTES
[facility]            [address] [place] [address]              P:[phone]  F:[phone]      Physical Therapy: TREATMENT/PROGRESS NOTE   Patient: Diana Zapata (73 y.o. female)   Treatment Date: 2024   :  1950 MRN: [id number]   Visit #: 4   Insurance Allowable Auth Needed   BMN []Yes    []No    Insurance: Payor: MEDICARE / Plan: MEDICARE PART A AND B / Product Type: *No Product type* /   Insurance ID: [id number] - (Medicare)  Secondary Insurance (if applicable):    Treatment Diagnosis:     ICD-10-CM    1. Chronic right shoulder pain  M25.511     G89.29       2. Nontraumatic complete tear of right rotator cuff  M75.121          Medical Diagnosis:    No admission diagnoses are documented for this encounter.   Referring Physician: German Zaidi MD  PCP: Malcolm Hoffman MD                             Plan of care signed (Y/N):     Date of Patient follow up with Physician: 2024     Progress Report/POC: NO  POC update due: (10 visits /OR AUTH LIMITS, whichever is less)  2024     PQRS:  2024  Reviewed medication list with patient. No changes since last PT visit.       Preferred Language for Healthcare:   [x]English       []other:    SUBJECTIVE EXAMINATION     Patient Report/Comments:   She reports that she saw her Spine MD last week.    Imaging did not look good according to her.    She is going to start with epidural injections and then take it from there.   But it sounds like several levels above her current fusion are going bad.            Test used Initial score  1/15/24 2024   Pain Summary VAS 3-4    Functional questionnaire Quick DASH 30% disability    Other:                OBJECTIVE EXAMINATION     ROM PROM AROM  Comment    L R L R 1/15/2024   Flexion   125 135    Abduction   100 120    ER@ 90   75 80    IR        Other        Other             Strength L R Comment   Flexion 4/5 4-/5 1/15/2024   Abduction 4/5

## 2024-02-13 ENCOUNTER — TREATMENT (OUTPATIENT)
Dept: PHYSICAL THERAPY | Age: 74
End: 2024-02-13
Payer: MEDICARE

## 2024-02-13 DIAGNOSIS — M75.121 NONTRAUMATIC COMPLETE TEAR OF RIGHT ROTATOR CUFF: ICD-10-CM

## 2024-02-13 DIAGNOSIS — M25.511 CHRONIC RIGHT SHOULDER PAIN: Primary | ICD-10-CM

## 2024-02-13 DIAGNOSIS — G89.29 CHRONIC RIGHT SHOULDER PAIN: Primary | ICD-10-CM

## 2024-02-13 PROCEDURE — 97110 THERAPEUTIC EXERCISES: CPT | Performed by: PHYSICAL THERAPIST

## 2024-02-13 PROCEDURE — G8427 DOCREV CUR MEDS BY ELIG CLIN: HCPCS | Performed by: PHYSICAL THERAPIST

## 2024-02-13 PROCEDURE — 97112 NEUROMUSCULAR REEDUCATION: CPT | Performed by: PHYSICAL THERAPIST

## 2024-02-13 PROCEDURE — 97530 THERAPEUTIC ACTIVITIES: CPT | Performed by: PHYSICAL THERAPIST

## 2024-02-13 NOTE — PROGRESS NOTES
[facility]            [address] [place] [address]              P:[phone]  F:[phone]      Physical Therapy: TREATMENT/PROGRESS NOTE   Patient: Diana Zapata (73 y.o. female)   Treatment Date: 2024   :  1950 MRN: [id number]   Visit #: 5   Insurance Allowable Auth Needed   BMN []Yes    []No    Insurance: Payor: MEDICARE / Plan: MEDICARE PART A AND B / Product Type: *No Product type* /   Insurance ID: [id number] - (Medicare)  Secondary Insurance (if applicable):    Treatment Diagnosis:     ICD-10-CM    1. Chronic right shoulder pain  M25.511     G89.29       2. Nontraumatic complete tear of right rotator cuff  M75.121          Medical Diagnosis:    No admission diagnoses are documented for this encounter.   Referring Physician: German Zaidi MD  PCP: Malcolm Hoffman MD                             Plan of care signed (Y/N):     Date of Patient follow up with Physician: 2024     Progress Report/POC: NO  POC update due: (10 visits /OR AUTH LIMITS, whichever is less)  2024     PQRS:  2024  Reviewed medication list with patient. No changes since last PT visit.       Preferred Language for Healthcare:   [x]English       []other:    SUBJECTIVE EXAMINATION     Patient Report/Comments:   She reports that she re organized her basement yesterday.   A lot of bending and lifting and putting away boxes.    She did ok with her shoulders/back.   Was a little sore afterwards.          Test used Initial score  1/15/24 2024   Pain Summary VAS 3-4    Functional questionnaire Quick DASH 30% disability    Other:                OBJECTIVE EXAMINATION     ROM PROM AROM  Comment    L R L R 1/15/2024   Flexion   125 135    Abduction   100 120    ER@ 90   75 80    IR        Other        Other             Strength L R Comment   Flexion 4/5 4-/5 1/15/2024   Abduction 4/5 4-5    ER 4/5 4-/5    IR 4+/5 4/5    Supraspinatus      Upper Trap

## 2024-02-22 ENCOUNTER — TREATMENT (OUTPATIENT)
Dept: PHYSICAL THERAPY | Age: 74
End: 2024-02-22

## 2024-02-22 ENCOUNTER — OFFICE VISIT (OUTPATIENT)
Dept: ORTHOPEDIC SURGERY | Age: 74
End: 2024-02-22
Payer: MEDICARE

## 2024-02-22 VITALS — BODY MASS INDEX: 38.98 KG/M2 | HEIGHT: 63 IN | WEIGHT: 220 LBS

## 2024-02-22 DIAGNOSIS — M75.121 NONTRAUMATIC COMPLETE TEAR OF RIGHT ROTATOR CUFF: ICD-10-CM

## 2024-02-22 DIAGNOSIS — M75.121 COMPLETE TEAR OF RIGHT ROTATOR CUFF, UNSPECIFIED WHETHER TRAUMATIC: ICD-10-CM

## 2024-02-22 DIAGNOSIS — G89.29 CHRONIC RIGHT SHOULDER PAIN: Primary | ICD-10-CM

## 2024-02-22 DIAGNOSIS — M25.511 CHRONIC RIGHT SHOULDER PAIN: Primary | ICD-10-CM

## 2024-02-22 DIAGNOSIS — M25.511 RIGHT SHOULDER PAIN, UNSPECIFIED CHRONICITY: Primary | ICD-10-CM

## 2024-02-22 PROCEDURE — 1123F ACP DISCUSS/DSCN MKR DOCD: CPT | Performed by: PHYSICIAN ASSISTANT

## 2024-02-22 PROCEDURE — G8399 PT W/DXA RESULTS DOCUMENT: HCPCS | Performed by: PHYSICIAN ASSISTANT

## 2024-02-22 PROCEDURE — 99213 OFFICE O/P EST LOW 20 MIN: CPT | Performed by: PHYSICIAN ASSISTANT

## 2024-02-22 PROCEDURE — G8417 CALC BMI ABV UP PARAM F/U: HCPCS | Performed by: PHYSICIAN ASSISTANT

## 2024-02-22 PROCEDURE — 3017F COLORECTAL CA SCREEN DOC REV: CPT | Performed by: PHYSICIAN ASSISTANT

## 2024-02-22 PROCEDURE — G8484 FLU IMMUNIZE NO ADMIN: HCPCS | Performed by: PHYSICIAN ASSISTANT

## 2024-02-22 PROCEDURE — G8428 CUR MEDS NOT DOCUMENT: HCPCS | Performed by: PHYSICIAN ASSISTANT

## 2024-02-22 PROCEDURE — 1090F PRES/ABSN URINE INCON ASSESS: CPT | Performed by: PHYSICIAN ASSISTANT

## 2024-02-22 PROCEDURE — 1036F TOBACCO NON-USER: CPT | Performed by: PHYSICIAN ASSISTANT

## 2024-02-22 NOTE — PROGRESS NOTES
[facility]            [address] [place] [address]              P:[phone]  F:[phone]    Physical Therapy Re-Certification Plan of Care    Dear German Zaidi MD Dr Hasan,     We had the pleasure of treating the following patient for physical therapy services at [facility]. A summary of our findings can be found in the updated assessment below.  This includes our plan of care.  If you have any questions or concerns regarding these findings, please do not hesitate to contact me at the office phone number checked above.  Thank you for the referral.     Physician Signature:________________________________Date:__________________  By signing above (or electronic signature), therapist's plan is approved by physician      Functional Outcome:   Diana Zapata 1950 continues to present with functional deficits in ROM, strength symmetry, and endurance of strength  limiting ability with reaching overhead, carrying items, lifting items, and heavy home activity .  During therapy this date, patient required verbal cueing, tactile cueing, muscle facilitation, modification of technique, progression of exercises and program, and manual interventions for exercise progression, increasing ROM, and allowing for proper ROM. Patient will continue to benefit from ongoing evaluation and advanced clinical decision from a Physical Therapist to improve pain control, ROM, muscle strength, neuromuscular control, endurance, and high level IADLs to safely return to PLOF and ADLs/IADLs without symptoms or restrictions.    Overall Response to Treatment:   [x]Patient is responding well to treatment and improvement is noted with regards to goals   [x]Patient should continue to improve in reasonable time if they continue HEP   []Patient has plateaued and is no longer responding to skilled PT intervention    []Patient is getting worse and would

## 2024-02-26 NOTE — PROGRESS NOTES
[facility]  History and Physical  Shoulder Pain    Date:  2024    Name:  Diana Zapata  Address:  [address] [place] [id number]    :  1950      Age:   73 y.o.    SSN:  [id number]      Medical Record Number:  [id number]    Reason for Visit:    Follow-up (Right shoulder)      HPI:   Diana Zapata is a 73 y.o. female who presents to our office today for follow up of her right shoulder.  Patient has history of osteoarthritis with large rotator cuff tear noted on MRI and presents for repeat follow-up of her right shoulder.   She was given a corticosteroid injection at the last visit however this was not helpful for her at all.  Patient continues to struggle with pain and discomfort.      Pain Assessment  Location of Pain: Shoulder  Location Modifiers: Right  Severity of Pain: 3  Quality of Pain: Aching  Duration of Pain: Persistent  Frequency of Pain: Intermittent  Aggravating Factors:  (general use)  Relieving Factors: Rest, Ice, Heat  Work-Related Injury: No  Are there other pain locations you wish to document?: No        Review of Systems:  A 14 point review of systems available in the scanned medical record as documented by the patient and reviewed on 2024.  The review is negative with the exception of those things mentioned in the History of Present Illness and Past Medical History.      Past Medical History:  Patient's medications, allergies, past medical, surgical, social and family histories were reviewed and updated as appropriate.    Allergies:  Allergies   Allergen Reactions    Tape [Adhesive Tape] Other (See Comments)     blistering    Reglan [Metoclopramide] Itching and Other (See Comments)     Flushed      Dermabond Dermatitis, Itching and Rash    Oxycodone Nausea And Vomiting       Physical Exam:  There were no vitals filed for this visit.  General: Diana Zapata is a healthy and well appearing 73 y.o. female who is sitting

## 2024-12-17 ENCOUNTER — OFFICE VISIT (OUTPATIENT)
Dept: ORTHOPEDIC SURGERY | Age: 74
End: 2024-12-17
Payer: MEDICARE

## 2024-12-17 VITALS — HEIGHT: 63 IN | BODY MASS INDEX: 38.98 KG/M2 | WEIGHT: 220 LBS

## 2024-12-17 DIAGNOSIS — M19.011 PRIMARY OSTEOARTHRITIS OF RIGHT SHOULDER: ICD-10-CM

## 2024-12-17 DIAGNOSIS — M25.511 RIGHT SHOULDER PAIN, UNSPECIFIED CHRONICITY: ICD-10-CM

## 2024-12-17 DIAGNOSIS — M75.121 COMPLETE TEAR OF RIGHT ROTATOR CUFF, UNSPECIFIED WHETHER TRAUMATIC: Primary | ICD-10-CM

## 2024-12-17 PROCEDURE — G8484 FLU IMMUNIZE NO ADMIN: HCPCS | Performed by: PHYSICIAN ASSISTANT

## 2024-12-17 PROCEDURE — G8417 CALC BMI ABV UP PARAM F/U: HCPCS | Performed by: PHYSICIAN ASSISTANT

## 2024-12-17 PROCEDURE — G8427 DOCREV CUR MEDS BY ELIG CLIN: HCPCS | Performed by: PHYSICIAN ASSISTANT

## 2024-12-17 PROCEDURE — 1123F ACP DISCUSS/DSCN MKR DOCD: CPT | Performed by: PHYSICIAN ASSISTANT

## 2024-12-17 PROCEDURE — G8399 PT W/DXA RESULTS DOCUMENT: HCPCS | Performed by: PHYSICIAN ASSISTANT

## 2024-12-17 PROCEDURE — 1036F TOBACCO NON-USER: CPT | Performed by: PHYSICIAN ASSISTANT

## 2024-12-17 PROCEDURE — 3017F COLORECTAL CA SCREEN DOC REV: CPT | Performed by: PHYSICIAN ASSISTANT

## 2024-12-17 PROCEDURE — 99213 OFFICE O/P EST LOW 20 MIN: CPT | Performed by: PHYSICIAN ASSISTANT

## 2024-12-17 PROCEDURE — 20610 DRAIN/INJ JOINT/BURSA W/O US: CPT | Performed by: PHYSICIAN ASSISTANT

## 2024-12-17 PROCEDURE — 1090F PRES/ABSN URINE INCON ASSESS: CPT | Performed by: PHYSICIAN ASSISTANT

## 2024-12-17 PROCEDURE — 1125F AMNT PAIN NOTED PAIN PRSNT: CPT | Performed by: PHYSICIAN ASSISTANT

## 2024-12-17 PROCEDURE — 1159F MED LIST DOCD IN RCRD: CPT | Performed by: PHYSICIAN ASSISTANT

## 2024-12-17 RX ORDER — LISINOPRIL 20 MG/1
20 TABLET ORAL DAILY
COMMUNITY
Start: 2024-10-21

## 2024-12-17 RX ORDER — SOLIFENACIN SUCCINATE 5 MG/1
5 TABLET, FILM COATED ORAL NIGHTLY
COMMUNITY
Start: 2024-11-26

## 2024-12-17 RX ORDER — GABAPENTIN 100 MG/1
100 CAPSULE ORAL 3 TIMES DAILY
COMMUNITY

## 2024-12-17 NOTE — PROGRESS NOTES
[facility]  History and Physical  Shoulder Pain    Date:  2024    Name:  Diana Zapata  Address:  [address] [place] [id number]    :  1950      Age:   74 y.o.    SSN:  [id number]      Medical Record Number:  [id number]    Reason for Visit:    Shoulder Pain (RIGHT SHOULDER)      HPI:   Diana Zapata is a 74 y.o. female who presents to our office today for follow up of her right shoulder.  Patient is well-known to have primary OA with the presence of a large irreparable rotator cuff tear based on MRI results.  Patient has been undergoing nonoperative treatments for some time.  She has had a flareup of symptoms recently after she attempted to put away [employer] decorations.  The patient reports the pain is now affecting her sleep at night and gets worse with any movement overhead.  Patient reports that the steroid injection she received last year was not as helpful however does recall that the viscosupplementation she received in her left shoulder was extremely helpful and would like to know if she is a candidate for that for the right side.        Pain Assessment  Location of Pain: Shoulder  Location Modifiers: Right  Relieving Factors: Rest  Work-Related Injury: No  Are there other pain locations you wish to document?: No        Review of Systems:  A 14 point review of systems available in the scanned medical record as documented by the patient and reviewed on 2024.  The review is negative with the exception of those things mentioned in the History of Present Illness and Past Medical History.      Past Medical History:  Patient's medications, allergies, past medical, surgical, social and family histories were reviewed and updated as appropriate.    Allergies:  Allergies   Allergen Reactions    Tape [Adhesive Tape] Other (See Comments)     blistering    Reglan [Metoclopramide] Itching and Other (See Comments)     Flushed      Dermabond

## 2024-12-31 ENCOUNTER — OFFICE VISIT (OUTPATIENT)
Dept: ORTHOPEDIC SURGERY | Age: 74
End: 2024-12-31
Payer: MEDICARE

## 2024-12-31 VITALS — WEIGHT: 205 LBS | BODY MASS INDEX: 36.33 KG/M2

## 2024-12-31 DIAGNOSIS — M75.121 COMPLETE TEAR OF RIGHT ROTATOR CUFF, UNSPECIFIED WHETHER TRAUMATIC: Primary | ICD-10-CM

## 2024-12-31 DIAGNOSIS — M19.011 PRIMARY OSTEOARTHRITIS OF RIGHT SHOULDER: ICD-10-CM

## 2024-12-31 PROCEDURE — 20610 DRAIN/INJ JOINT/BURSA W/O US: CPT | Performed by: PHYSICIAN ASSISTANT

## 2024-12-31 NOTE — PROGRESS NOTES
VISCO SUPPLEMENTATION  INJECTION of the right shoulder.     HISTORY OF PRESENT ILLNESS: The patient presents for a viscosuppementation injection.    PHYSICAL EXAMINATION: Inspection of the right shoulder reveals warm, dry, intact skin, and no evidence of infection. There is no effusion. The distal neurovascular exam is grossly intact.    PROCEDURE: Risks, benefits, and alternatives to the injections were discussed in detail with the patient. The risks discussed included but are not limited to infection, skin reactions, hot swollen joints, and anaphylaxis.    Diana did state an understanding and gave verbal consent to proceed.  After cleansing the injection site with Chlora-prep and using aseptic techniques, the vendor supplied 25 mg of Supartz preporation were injected in the right glenohumeral joint. She  tolerated the procedure well with no immediate adverse sequelae after the injection.  A bandage was placed over the injection site. Appropriate post injections instructions were given to the patient.     ASSESSMENT: Osteoarthritis right shoulder    PLAN: Return in 1 weeks for follow up as needed.    10:59 AM      Sushma Nagar, PA-C  Orthopaedic Sports Medicine Physician Assistant    This dictation was performed with a verbal recognition program (DRAGON) and it was checked for errors.  It is possible that there are still dictated errors within this office note.  If so, please bring any errors to my attention for an addendum.  All efforts were made to ensure that this office note is accurate.

## 2025-01-13 ENCOUNTER — OFFICE VISIT (OUTPATIENT)
Dept: ORTHOPEDIC SURGERY | Age: 75
End: 2025-01-13
Payer: MEDICARE

## 2025-01-13 VITALS — HEIGHT: 63 IN | WEIGHT: 205 LBS | BODY MASS INDEX: 36.32 KG/M2

## 2025-01-13 DIAGNOSIS — M19.011 PRIMARY OSTEOARTHRITIS OF RIGHT SHOULDER: Primary | ICD-10-CM

## 2025-01-13 PROCEDURE — 20610 DRAIN/INJ JOINT/BURSA W/O US: CPT | Performed by: PHYSICIAN ASSISTANT

## 2025-01-13 RX ORDER — MELOXICAM 15 MG/1
15 TABLET ORAL DAILY
Qty: 30 TABLET | Refills: 1 | Status: SHIPPED | OUTPATIENT
Start: 2025-01-13 | End: 2025-02-12

## 2025-01-23 NOTE — PROGRESS NOTES
VISCO SUPPLEMENTATION  INJECTION of the right shoulder for Supartz #3    HISTORY OF PRESENT ILLNESS: The patient presents for a viscosuppementation injection.    PHYSICAL EXAMINATION: Inspection of the right shoulder reveals warm, dry, intact skin, and no evidence of infection. There is no effusion. The distal neurovascular exam is grossly intact.    PROCEDURE: Risks, benefits, and alternatives to the injections were discussed in detail with the patient. The risks discussed included but are not limited to infection, skin reactions, hot swollen joints, and anaphylaxis.    Daina did state an understanding and gave verbal consent to proceed.  After cleansing the injection site with Chlora-prep and using aseptic techniques, the vendor supplied 25 mg of Supartz preporation were injected in the right glenohumeral joint. She  tolerated the procedure well with no immediate adverse sequelae after the injection.  A bandage was placed over the injection site. Appropriate post injections instructions were given to the patient.     ASSESSMENT: Osteoarthritis right shoulder    PLAN: Return in 6 weeks for follow up as needed.    10:59 AM      Sushma Nagar, PA-C  Orthopaedic Sports Medicine Physician Assistant    This dictation was performed with a verbal recognition program (DRAGON) and it was checked for errors.  It is possible that there are still dictated errors within this office note.  If so, please bring any errors to my attention for an addendum.  All efforts were made to ensure that this office note is accurate.

## 2025-02-11 ENCOUNTER — OFFICE VISIT (OUTPATIENT)
Dept: ORTHOPEDIC SURGERY | Age: 75
End: 2025-02-11
Payer: MEDICARE

## 2025-02-11 VITALS — BODY MASS INDEX: 36.32 KG/M2 | WEIGHT: 205 LBS | HEIGHT: 63 IN

## 2025-02-11 DIAGNOSIS — M25.511 RIGHT SHOULDER PAIN, UNSPECIFIED CHRONICITY: ICD-10-CM

## 2025-02-11 DIAGNOSIS — M19.011 PRIMARY OSTEOARTHRITIS OF RIGHT SHOULDER: Primary | ICD-10-CM

## 2025-02-11 DIAGNOSIS — M75.121 COMPLETE TEAR OF RIGHT ROTATOR CUFF, UNSPECIFIED WHETHER TRAUMATIC: ICD-10-CM

## 2025-02-11 PROCEDURE — 1123F ACP DISCUSS/DSCN MKR DOCD: CPT | Performed by: ORTHOPAEDIC SURGERY

## 2025-02-11 PROCEDURE — 1036F TOBACCO NON-USER: CPT | Performed by: ORTHOPAEDIC SURGERY

## 2025-02-11 PROCEDURE — 99214 OFFICE O/P EST MOD 30 MIN: CPT | Performed by: ORTHOPAEDIC SURGERY

## 2025-02-11 PROCEDURE — G8417 CALC BMI ABV UP PARAM F/U: HCPCS | Performed by: ORTHOPAEDIC SURGERY

## 2025-02-11 PROCEDURE — L3670 SO ACRO/CLAV CAN WEB PRE OTS: HCPCS | Performed by: ORTHOPAEDIC SURGERY

## 2025-02-11 PROCEDURE — 1090F PRES/ABSN URINE INCON ASSESS: CPT | Performed by: ORTHOPAEDIC SURGERY

## 2025-02-11 PROCEDURE — 1159F MED LIST DOCD IN RCRD: CPT | Performed by: ORTHOPAEDIC SURGERY

## 2025-02-11 PROCEDURE — G8399 PT W/DXA RESULTS DOCUMENT: HCPCS | Performed by: ORTHOPAEDIC SURGERY

## 2025-02-11 PROCEDURE — 1125F AMNT PAIN NOTED PAIN PRSNT: CPT | Performed by: ORTHOPAEDIC SURGERY

## 2025-02-11 PROCEDURE — 3017F COLORECTAL CA SCREEN DOC REV: CPT | Performed by: ORTHOPAEDIC SURGERY

## 2025-02-11 PROCEDURE — G8427 DOCREV CUR MEDS BY ELIG CLIN: HCPCS | Performed by: ORTHOPAEDIC SURGERY

## 2025-02-11 RX ORDER — IPRATROPIUM BROMIDE 21 UG/1
1 SPRAY, METERED NASAL 3 TIMES DAILY
COMMUNITY
Start: 2025-02-04

## 2025-02-11 RX ORDER — SPIRONOLACTONE 25 MG/1
25 TABLET ORAL DAILY
COMMUNITY
Start: 2025-01-06

## 2025-02-11 RX ORDER — LATANOPROST 50 UG/ML
1 SOLUTION/ DROPS OPHTHALMIC NIGHTLY
COMMUNITY
Start: 2025-01-22

## 2025-02-11 NOTE — PROGRESS NOTES
[facility]  History and Physical  Shoulder Pain    Date:  2025    Name:  Diana Zapata  Address:  [address] [place] [id number]    :  1950      Age:   74 y.o.    SSN:  [id number]      Medical Record Number:  [id number]    Reason for Visit:    Shoulder Pain (RIGHT SHOULDER)      HPI:   Diana Zapata is a 74 y.o. female who presents to our office today for follow up of her right shoulder.  Patient has history of osteoarthritis with large irreparable rotator cuff tear noted on MRI .  She has undergone a corticosteroid injection and supartz injections but she not feel that it was helpful enough and continues to have discomfort.  The patient continues to struggle with pain and discomfort. She denies any new injuries.     Pain Assessment  Location of Pain: Shoulder  Location Modifiers: Right  Severity of Pain: 3  Quality of Pain: Aching, Dull, Sharp, Throbbing  Frequency of Pain: Constant  Aggravating Factors: Other (Comment)  Limiting Behavior: Yes  Relieving Factors: Rest, Other (Comment), Exercise  Work-Related Injury: No  Are there other pain locations you wish to document?: No    Review of Systems:  A 14 point review of systems available in the scanned medical record as documented by the patient and reviewed on 2025.  The review is negative with the exception of those things mentioned in the History of Present Illness and Past Medical History.      Past Medical History:  Patient's medications, allergies, past medical, surgical, social and family histories were reviewed and updated as appropriate.    Allergies:  Allergies   Allergen Reactions    Tape [Adhesive Tape] Other (See Comments)     blistering    Reglan [Metoclopramide] Itching and Other (See Comments)     Flushed      Dermabond Dermatitis, Itching and Rash    Oxycodone Nausea And Vomiting       Physical Exam:  There were no vitals filed for this visit.  General: Diana Zapata is a

## 2025-02-13 ENCOUNTER — TELEPHONE (OUTPATIENT)
Dept: ORTHOPEDIC SURGERY | Age: 75
End: 2025-02-13

## 2025-02-13 NOTE — TELEPHONE ENCOUNTER
General Question     Subject: SURGERY SCHEDULING   Patient and /or Facility Request: Diana Zapata   Contact Number: [phone]     PATIENT REQUESTING A CALL BACK WANTING TO MOVE HER SURGERY UP TO MARCH INSTEAD OF APRIL     PLEASE CALL BACK AT THE ABOVE NUMBER

## 2025-02-17 ENCOUNTER — PREP FOR PROCEDURE (OUTPATIENT)
Dept: ORTHOPEDIC SURGERY | Age: 75
End: 2025-02-17

## 2025-02-17 DIAGNOSIS — M19.011 PRIMARY OSTEOARTHRITIS OF RIGHT SHOULDER: Primary | ICD-10-CM

## 2025-02-17 DIAGNOSIS — M75.121 COMPLETE TEAR OF RIGHT ROTATOR CUFF, UNSPECIFIED WHETHER TRAUMATIC: ICD-10-CM

## 2025-03-11 RX ORDER — METHOCARBAMOL 750 MG/1
750 TABLET, FILM COATED ORAL 3 TIMES DAILY PRN
COMMUNITY
Start: 2025-03-06 | End: 2025-04-05

## 2025-03-17 ENCOUNTER — ANESTHESIA EVENT (OUTPATIENT)
Dept: OPERATING ROOM | Age: 75
End: 2025-03-17
Payer: MEDICARE

## 2025-03-17 ENCOUNTER — ANESTHESIA (OUTPATIENT)
Dept: OPERATING ROOM | Age: 75
End: 2025-03-17
Payer: MEDICARE

## 2025-03-17 ENCOUNTER — HOSPITAL ENCOUNTER (OUTPATIENT)
Age: 75
Setting detail: OUTPATIENT SURGERY
Discharge: HOME | End: 2025-03-17
Attending: ORTHOPAEDIC SURGERY | Admitting: ORTHOPAEDIC SURGERY
Payer: MEDICARE

## 2025-03-17 VITALS
RESPIRATION RATE: 16 BRPM | TEMPERATURE: 97.8 F | OXYGEN SATURATION: 98 % | DIASTOLIC BLOOD PRESSURE: 80 MMHG | HEART RATE: 62 BPM | BODY MASS INDEX: 36.8 KG/M2 | SYSTOLIC BLOOD PRESSURE: 121 MMHG | WEIGHT: 200 LBS | HEIGHT: 62 IN

## 2025-03-17 DIAGNOSIS — Z98.890 S/P ARTHROSCOPY OF RIGHT SHOULDER: Primary | ICD-10-CM

## 2025-03-17 PROCEDURE — 6360000002 HC RX W HCPCS: Performed by: ANESTHESIOLOGY

## 2025-03-17 PROCEDURE — C1713 ANCHOR/SCREW BN/BN,TIS/BN: HCPCS | Performed by: ORTHOPAEDIC SURGERY

## 2025-03-17 PROCEDURE — 7100000011 HC PHASE II RECOVERY - ADDTL 15 MIN: Performed by: ORTHOPAEDIC SURGERY

## 2025-03-17 PROCEDURE — 3700000000 HC ANESTHESIA ATTENDED CARE: Performed by: ORTHOPAEDIC SURGERY

## 2025-03-17 PROCEDURE — 7100000001 HC PACU RECOVERY - ADDTL 15 MIN: Performed by: ORTHOPAEDIC SURGERY

## 2025-03-17 PROCEDURE — 3600000014 HC SURGERY LEVEL 4 ADDTL 15MIN: Performed by: ORTHOPAEDIC SURGERY

## 2025-03-17 PROCEDURE — C1889 IMPLANT/INSERT DEVICE, NOC: HCPCS | Performed by: ORTHOPAEDIC SURGERY

## 2025-03-17 PROCEDURE — 7100000000 HC PACU RECOVERY - FIRST 15 MIN: Performed by: ORTHOPAEDIC SURGERY

## 2025-03-17 PROCEDURE — 2500000003 HC RX 250 WO HCPCS: Performed by: ORTHOPAEDIC SURGERY

## 2025-03-17 PROCEDURE — 3700000001 HC ADD 15 MINUTES (ANESTHESIA): Performed by: ORTHOPAEDIC SURGERY

## 2025-03-17 PROCEDURE — 6360000002 HC RX W HCPCS: Performed by: NURSE ANESTHETIST, CERTIFIED REGISTERED

## 2025-03-17 PROCEDURE — 7100000010 HC PHASE II RECOVERY - FIRST 15 MIN: Performed by: ORTHOPAEDIC SURGERY

## 2025-03-17 PROCEDURE — 6360000002 HC RX W HCPCS: Performed by: PHYSICIAN ASSISTANT

## 2025-03-17 PROCEDURE — 2709999900 HC NON-CHARGEABLE SUPPLY: Performed by: ORTHOPAEDIC SURGERY

## 2025-03-17 PROCEDURE — 2580000003 HC RX 258: Performed by: NURSE ANESTHETIST, CERTIFIED REGISTERED

## 2025-03-17 PROCEDURE — 2580000003 HC RX 258: Performed by: ANESTHESIOLOGY

## 2025-03-17 PROCEDURE — 6360000002 HC RX W HCPCS: Performed by: ORTHOPAEDIC SURGERY

## 2025-03-17 PROCEDURE — 3600000004 HC SURGERY LEVEL 4 BASE: Performed by: ORTHOPAEDIC SURGERY

## 2025-03-17 PROCEDURE — 2500000003 HC RX 250 WO HCPCS: Performed by: PHYSICIAN ASSISTANT

## 2025-03-17 PROCEDURE — 2580000003 HC RX 258: Performed by: PHYSICIAN ASSISTANT

## 2025-03-17 PROCEDURE — 64415 NJX AA&/STRD BRCH PLXS IMG: CPT | Performed by: ANESTHESIOLOGY

## 2025-03-17 PROCEDURE — 2500000003 HC RX 250 WO HCPCS: Performed by: NURSE ANESTHETIST, CERTIFIED REGISTERED

## 2025-03-17 PROCEDURE — 2720000010 HC SURG SUPPLY STERILE: Performed by: ORTHOPAEDIC SURGERY

## 2025-03-17 DEVICE — SPACER SHLDR SUBACROMIAL MED BIODEGRADABLE INSPACE: Type: IMPLANTABLE DEVICE | Status: FUNCTIONAL

## 2025-03-17 DEVICE — HEALICOIL RG SA 5.5MM W/3 UB-BL CB                                    BL BK
Type: IMPLANTABLE DEVICE | Status: FUNCTIONAL
Brand: HEALICOIL / ULTRABRAID

## 2025-03-17 RX ORDER — LABETALOL HYDROCHLORIDE 5 MG/ML
10 INJECTION, SOLUTION INTRAVENOUS
Status: DISCONTINUED | OUTPATIENT
Start: 2025-03-17 | End: 2025-03-17 | Stop reason: HOSPADM

## 2025-03-17 RX ORDER — SODIUM CHLORIDE 0.9 % (FLUSH) 0.9 %
5-40 SYRINGE (ML) INJECTION EVERY 12 HOURS SCHEDULED
Status: DISCONTINUED | OUTPATIENT
Start: 2025-03-17 | End: 2025-03-17 | Stop reason: HOSPADM

## 2025-03-17 RX ORDER — SODIUM CHLORIDE 0.9 % (FLUSH) 0.9 %
5-40 SYRINGE (ML) INJECTION PRN
Status: DISCONTINUED | OUTPATIENT
Start: 2025-03-17 | End: 2025-03-17 | Stop reason: HOSPADM

## 2025-03-17 RX ORDER — ONDANSETRON 4 MG/1
4 TABLET, FILM COATED ORAL 3 TIMES DAILY PRN
Qty: 15 TABLET | Refills: 0 | Status: SHIPPED | OUTPATIENT
Start: 2025-03-17

## 2025-03-17 RX ORDER — EPHEDRINE SULFATE 50 MG/ML
INJECTION INTRAVENOUS
Status: DISCONTINUED | OUTPATIENT
Start: 2025-03-17 | End: 2025-03-17 | Stop reason: SDUPTHER

## 2025-03-17 RX ORDER — OXYCODONE AND ACETAMINOPHEN 5; 325 MG/1; MG/1
1 TABLET ORAL
Qty: 28 TABLET | Refills: 0 | Status: SHIPPED | OUTPATIENT
Start: 2025-03-17 | End: 2025-03-24

## 2025-03-17 RX ORDER — DEXAMETHASONE SODIUM PHOSPHATE 4 MG/ML
INJECTION, SOLUTION INTRA-ARTICULAR; INTRALESIONAL; INTRAMUSCULAR; INTRAVENOUS; SOFT TISSUE
Status: DISCONTINUED | OUTPATIENT
Start: 2025-03-17 | End: 2025-03-17 | Stop reason: SDUPTHER

## 2025-03-17 RX ORDER — METOPROLOL TARTRATE 1 MG/ML
INJECTION, SOLUTION INTRAVENOUS
Status: DISCONTINUED | OUTPATIENT
Start: 2025-03-17 | End: 2025-03-17 | Stop reason: SDUPTHER

## 2025-03-17 RX ORDER — LIDOCAINE HYDROCHLORIDE 10 MG/ML
1 INJECTION, SOLUTION EPIDURAL; INFILTRATION; INTRACAUDAL; PERINEURAL
Status: DISCONTINUED | OUTPATIENT
Start: 2025-03-17 | End: 2025-03-17 | Stop reason: HOSPADM

## 2025-03-17 RX ORDER — ONDANSETRON 2 MG/ML
INJECTION INTRAMUSCULAR; INTRAVENOUS
Status: DISCONTINUED | OUTPATIENT
Start: 2025-03-17 | End: 2025-03-17 | Stop reason: SDUPTHER

## 2025-03-17 RX ORDER — KETOROLAC TROMETHAMINE 30 MG/ML
INJECTION, SOLUTION INTRAMUSCULAR; INTRAVENOUS
Status: DISCONTINUED | OUTPATIENT
Start: 2025-03-17 | End: 2025-03-17 | Stop reason: SDUPTHER

## 2025-03-17 RX ORDER — OXYCODONE HYDROCHLORIDE 5 MG/1
5 TABLET ORAL PRN
Status: DISCONTINUED | OUTPATIENT
Start: 2025-03-17 | End: 2025-03-17 | Stop reason: HOSPADM

## 2025-03-17 RX ORDER — FENTANYL CITRATE 50 UG/ML
INJECTION, SOLUTION INTRAMUSCULAR; INTRAVENOUS
Status: DISCONTINUED | OUTPATIENT
Start: 2025-03-17 | End: 2025-03-17 | Stop reason: SDUPTHER

## 2025-03-17 RX ORDER — FENTANYL CITRATE 50 UG/ML
25 INJECTION, SOLUTION INTRAMUSCULAR; INTRAVENOUS EVERY 5 MIN PRN
Status: DISCONTINUED | OUTPATIENT
Start: 2025-03-17 | End: 2025-03-17 | Stop reason: HOSPADM

## 2025-03-17 RX ORDER — SODIUM CHLORIDE 9 MG/ML
INJECTION, SOLUTION INTRAVENOUS PRN
Status: DISCONTINUED | OUTPATIENT
Start: 2025-03-17 | End: 2025-03-17 | Stop reason: HOSPADM

## 2025-03-17 RX ORDER — PROCHLORPERAZINE EDISYLATE 5 MG/ML
5 INJECTION INTRAMUSCULAR; INTRAVENOUS
Status: DISCONTINUED | OUTPATIENT
Start: 2025-03-17 | End: 2025-03-17 | Stop reason: HOSPADM

## 2025-03-17 RX ORDER — SODIUM CHLORIDE, SODIUM LACTATE, POTASSIUM CHLORIDE, CALCIUM CHLORIDE 600; 310; 30; 20 MG/100ML; MG/100ML; MG/100ML; MG/100ML
INJECTION, SOLUTION INTRAVENOUS
Status: DISCONTINUED | OUTPATIENT
Start: 2025-03-17 | End: 2025-03-17 | Stop reason: SDUPTHER

## 2025-03-17 RX ORDER — SODIUM CHLORIDE, SODIUM LACTATE, POTASSIUM CHLORIDE, CALCIUM CHLORIDE 600; 310; 30; 20 MG/100ML; MG/100ML; MG/100ML; MG/100ML
INJECTION, SOLUTION INTRAVENOUS CONTINUOUS
Status: DISCONTINUED | OUTPATIENT
Start: 2025-03-17 | End: 2025-03-17 | Stop reason: HOSPADM

## 2025-03-17 RX ORDER — NALOXONE HYDROCHLORIDE 0.4 MG/ML
INJECTION, SOLUTION INTRAMUSCULAR; INTRAVENOUS; SUBCUTANEOUS PRN
Status: DISCONTINUED | OUTPATIENT
Start: 2025-03-17 | End: 2025-03-17 | Stop reason: HOSPADM

## 2025-03-17 RX ORDER — OXYCODONE HYDROCHLORIDE 5 MG/1
10 TABLET ORAL PRN
Status: DISCONTINUED | OUTPATIENT
Start: 2025-03-17 | End: 2025-03-17 | Stop reason: HOSPADM

## 2025-03-17 RX ORDER — SENNOSIDES 8.6 MG
1 TABLET ORAL DAILY
Qty: 30 TABLET | Refills: 0 | Status: SHIPPED | OUTPATIENT
Start: 2025-03-17

## 2025-03-17 RX ORDER — PROPOFOL 10 MG/ML
INJECTION, EMULSION INTRAVENOUS
Status: DISCONTINUED | OUTPATIENT
Start: 2025-03-17 | End: 2025-03-17 | Stop reason: SDUPTHER

## 2025-03-17 RX ORDER — ROCURONIUM BROMIDE 10 MG/ML
INJECTION, SOLUTION INTRAVENOUS
Status: DISCONTINUED | OUTPATIENT
Start: 2025-03-17 | End: 2025-03-17 | Stop reason: SDUPTHER

## 2025-03-17 RX ORDER — HYDROMORPHONE HYDROCHLORIDE 1 MG/ML
0.5 INJECTION, SOLUTION INTRAMUSCULAR; INTRAVENOUS; SUBCUTANEOUS EVERY 5 MIN PRN
Status: DISCONTINUED | OUTPATIENT
Start: 2025-03-17 | End: 2025-03-17 | Stop reason: HOSPADM

## 2025-03-17 RX ORDER — LIDOCAINE HYDROCHLORIDE 20 MG/ML
INJECTION, SOLUTION INFILTRATION; PERINEURAL
Status: DISCONTINUED | OUTPATIENT
Start: 2025-03-17 | End: 2025-03-17 | Stop reason: SDUPTHER

## 2025-03-17 RX ORDER — BUPIVACAINE HYDROCHLORIDE 5 MG/ML
INJECTION, SOLUTION EPIDURAL; INTRACAUDAL; PERINEURAL
Status: COMPLETED | OUTPATIENT
Start: 2025-03-17 | End: 2025-03-17

## 2025-03-17 RX ADMIN — SODIUM CHLORIDE, SODIUM LACTATE, POTASSIUM CHLORIDE, AND CALCIUM CHLORIDE: .6; .31; .03; .02 INJECTION, SOLUTION INTRAVENOUS at 15:03

## 2025-03-17 RX ADMIN — METOPROLOL TARTRATE 5 MG: 1 INJECTION, SOLUTION INTRAVENOUS at 16:55

## 2025-03-17 RX ADMIN — LIDOCAINE HYDROCHLORIDE 100 MG: 20 INJECTION, SOLUTION INFILTRATION; PERINEURAL at 15:05

## 2025-03-17 RX ADMIN — PHENYLEPHRINE HYDROCHLORIDE 200 MCG: 10 INJECTION, SOLUTION INTRAVENOUS at 15:21

## 2025-03-17 RX ADMIN — EPHEDRINE SULFATE 5 MG: 50 INJECTION INTRAVENOUS at 16:01

## 2025-03-17 RX ADMIN — SODIUM CHLORIDE, SODIUM LACTATE, POTASSIUM CHLORIDE, AND CALCIUM CHLORIDE: .6; .31; .03; .02 INJECTION, SOLUTION INTRAVENOUS at 13:17

## 2025-03-17 RX ADMIN — EPHEDRINE SULFATE 10 MG: 50 INJECTION INTRAVENOUS at 15:50

## 2025-03-17 RX ADMIN — KETOROLAC TROMETHAMINE 15 MG: 30 INJECTION, SOLUTION INTRAMUSCULAR at 16:52

## 2025-03-17 RX ADMIN — BUPIVACAINE HYDROCHLORIDE 5 ML: 5 INJECTION, SOLUTION EPIDURAL; INTRACAUDAL; PERINEURAL at 13:26

## 2025-03-17 RX ADMIN — SUGAMMADEX 200 MG: 100 INJECTION, SOLUTION INTRAVENOUS at 16:51

## 2025-03-17 RX ADMIN — PHENYLEPHRINE HYDROCHLORIDE 200 MCG: 10 INJECTION, SOLUTION INTRAVENOUS at 15:35

## 2025-03-17 RX ADMIN — EPHEDRINE SULFATE 10 MG: 50 INJECTION INTRAVENOUS at 15:56

## 2025-03-17 RX ADMIN — PHENYLEPHRINE HYDROCHLORIDE 100 MCG: 10 INJECTION, SOLUTION INTRAVENOUS at 15:30

## 2025-03-17 RX ADMIN — EPHEDRINE SULFATE 10 MG: 50 INJECTION INTRAVENOUS at 16:07

## 2025-03-17 RX ADMIN — EPHEDRINE SULFATE 10 MG: 50 INJECTION INTRAVENOUS at 15:43

## 2025-03-17 RX ADMIN — TRANEXAMIC ACID 1000 MG: 100 INJECTION, SOLUTION INTRAVENOUS at 15:25

## 2025-03-17 RX ADMIN — FENTANYL CITRATE 100 MCG: 50 INJECTION, SOLUTION INTRAMUSCULAR; INTRAVENOUS at 17:02

## 2025-03-17 RX ADMIN — WATER 2000 MG: 1 INJECTION INTRAMUSCULAR; INTRAVENOUS; SUBCUTANEOUS at 15:10

## 2025-03-17 RX ADMIN — PROPOFOL 100 MG: 10 INJECTION, EMULSION INTRAVENOUS at 15:07

## 2025-03-17 RX ADMIN — ONDANSETRON 4 MG: 2 INJECTION INTRAMUSCULAR; INTRAVENOUS at 15:14

## 2025-03-17 RX ADMIN — EPHEDRINE SULFATE 5 MG: 50 INJECTION INTRAVENOUS at 15:37

## 2025-03-17 RX ADMIN — BUPIVACAINE 10 ML: 13.3 INJECTION, SUSPENSION, LIPOSOMAL INFILTRATION at 13:26

## 2025-03-17 RX ADMIN — DEXAMETHASONE SODIUM PHOSPHATE 4 MG: 4 INJECTION INTRA-ARTICULAR; INTRALESIONAL; INTRAMUSCULAR; INTRAVENOUS; SOFT TISSUE at 15:14

## 2025-03-17 RX ADMIN — ROCURONIUM BROMIDE 100 MG: 10 INJECTION, SOLUTION INTRAVENOUS at 15:08

## 2025-03-17 ASSESSMENT — LIFESTYLE VARIABLES: SMOKING_STATUS: 0

## 2025-03-17 ASSESSMENT — PAIN SCALES - GENERAL: PAINLEVEL_OUTOF10: 0

## 2025-03-17 NOTE — ANESTHESIA POSTPROCEDURE EVALUATION
Department of Anesthesiology  Postprocedure Note    Patient: Diana Zapata  MRN: [id number]  YOB: 1950  Date of evaluation: 3/17/2025    Procedure Summary       Date: 03/17/25 Room / Location: [address]    Anesthesia Start: 1503 Anesthesia Stop: 1715    Procedure: RIGHT SHOULDER, EXAM UNDER ANESTHESIA, DIAGNOSTIC SCOPE, SCOPE EXTENSIVE DEBRIDEMENT, PARTIAL SYNOVECTOMY, SUBACROMIAL DECOMPRESSION, ROTATOR CUFF REPAIR, AND INSERTION OF SUBACROMIAL BALLOON SPACER (Right: Shoulder) Diagnosis:       Complete rotator cuff tear or rupture of right shoulder, not specified as traumatic      (Complete rotator cuff tear or rupture of right shoulder, not specified as traumatic [M75.121])    Surgeons: Sharad Gold MD Responsible Provider: Sj Casillas MD    Anesthesia Type: general, regional ASA Status: 3            Anesthesia Type: No value filed.    Flora Phase I: Flora Score: 8    Flora Phase II:      Anesthesia Post Evaluation    Patient location during evaluation: PACU  Patient participation: complete - patient participated  Level of consciousness: awake  Pain score: 0  Nausea & Vomiting: no nausea and no vomiting  Cardiovascular status: blood pressure returned to baseline  Respiratory status: acceptable  Hydration status: euvolemic  Pain management: adequate    No notable events documented.

## 2025-03-17 NOTE — H&P
does not feel that they are providing meaningful relief.  She is having pain with any movement of the shoulder.  She is till very active and continue to work at [employer].     She does have options which includes a balloon arthroplasty vs a reverse total shoulder arthroplasty.  Her overall function is too well-preserved to consider reverse total shoulder arthroplasty at this time.  We do feel that she would be an excellent candidate for a balloon arthroplasty.  She was given educational reading material today.  After shared discussion the patient has opted to proceed with this procedure.  We did fit her with an UltraSling brace today.     Patient has increased radicular symptoms on her LLE. She is consulting her spine doctor but states she is would like to move forward with surgery after I explained her the possible risks of increased fall risk.       Risks, benefits and potential complications of arthroscopic shoulder surgery were discussed with the patient.  Risks discussed include but are not limited to bleeding, infection, anesthetic risk, injury to nerves and blood vessels, deep vein thrombosis, residual stiffness and weakness, and the need for revision surgery. The patient also understands that anesthetic risks include cardiopulmonary issues, drug reactions and even death. The patient voices an understanding of the importance of physical therapy and home exercises after surgery.   All questions were answered and written informed consent for surgery was obtained today.        Diana Zapata will follow up postoperatively and/or as needed. She was in agreement with this plan and all questions were answered to her satisfaction. She was encouraged to call with any questions.     Gerry Parrish DO  Sports Medicine Fellow  [facility]

## 2025-03-17 NOTE — DISCHARGE INSTRUCTIONS
INSTRUCTIONS AFTER SHOULDER SURGERY    1. You have probably had a Scalene Block to your arm. Because this numbs the arm this is great for anesthesia since we didn’t have to give you as much anesthetic agent during the case--hopefully allowing you to feel more normal sooner. The block should last around 12 hours or so, but can last several days. We recommend that you take pain medication even if you are feeling only mild pain in the first couple of days after surgery. It is easier to maintain good pain relief than to try to “catch up”.     2. You may have to take the pain medicine every 4-8 hours for the first day or two. In the first two post operative days, if your pain is severe, it is ok to take two pain pills at a time. Limit this as much as possible. After this, you can wean off the medicine and just take it as needed. Be aware of limiting your tylenol intake as it is built into the narcotic medicines. Keep daily tylenol dose under 4000 milligrams.    3. All pain medications can make you constipated. Drink plenty of fluids and eat lots of fiber to combat this. You will be sent home with a prescription for Miralax. OK to supplement with other over-the-counter laxatives or stool softners as needed.    4. Leave the dressing in place, we will change it at your first post-operative appointment.    5. Getting comfortable for sleeping is difficult after this surgery. It gets easier after several days. Many patients sleep better in a reclining lounger like a Lay-Z-Boy or in bed with several pillows to help prop them up.    6. Ice packs are very helpful to reduce pain and inflammation after the surgery. Since the bandage is so thick you can leave the ice bags on top until the shoulder gets cold. Don’t ice bare skin however or you could get frostbite.    7. Anesthesia and the pain medication can cause nausea. You will be sent home with anti-nausea medication to take as needed if this is a problem.    8. You probably have

## 2025-03-17 NOTE — PROGRESS NOTES
3/11/25 0855: EKG tracing requested 3/11/25 via fax from [facility] Medical Records.- BDP   
3/11/25 1233:  for Melony Martinez notified that pt was recently prescribed a Medrol Dose pack and that pt states that she does not want to receive blood from \" someone who has had the COVID vaccine\" . - BDP   
Ambulatory Surgery/Procedure Discharge Note    Vitals:    03/17/25 1748   BP: 121/80   Pulse: 62   Resp: 16   Temp: 97.8 °F (36.6 °C)   SpO2: 98%       In: 1410 [P.O.:160; I.V.:1250]  Out: 75     Restroom use offered before discharge.  Yes    Pain assessment:  level of pain (1-10, 10 severe),   Pain Level: 0    Patient discharged to home with family.     Patient discharged to home/self care. Patient discharged via wheel chair by transporter to waiting family/S.O.       3/17/2025 6:08 PM    
Arrived for surgery see consent.              H/P 3-10-25 needs up-date   
From OR drowsy, dressing CDI with shoulder immobilizer in place, ice pack applied, VSS.    Report from CRNA and OR RN.    S/P RIGHT SHOULDER, EXAM UNDER ANESTHESIA, DIAGNOSTIC SCOPE, SCOPE EXTENSIVE DEBRIDEMENT, PARTIAL SYNOVECTOMY, SUBACROMIAL DECOMPRESSION, ROTATOR CUFF REPAIR, AND INSERTION OF SUBACROMIAL BALLOON SPACER - Right    
HX LT leg problems ( lifting leg since end of Feb , hurts to get out of car, climbing stairs Dr Stack's Fellow here assessed this  and is  aware of this) and pot  has orho/ spine Dr Appointment ; uses cane to walk  
No IV sedation for block  
PACU Transfer to [facility]    Vitals:    03/17/25 1740   BP: 120/62   Pulse: 64   Resp: 16   Temp: 97.4 °F (36.3 °C)   SpO2: 96%         Intake/Output Summary (Last 24 hours) at 3/17/2025 1744  Last data filed at 3/17/2025 1740  Gross per 24 hour   Intake 1410 ml   Output 75 ml   Net 1335 ml       Pain assessment:    Pain Level: 0    Patient transferred to care of [facility] RN.    Transported by ELISE Soliman.    3/17/2025 5:44 PM    
Pt wants meds to bed filled before the pharmacy closes today; call daughter when ready  
Tolerated ice chips and sips of water, now drinking apple juice and tolerating well.  
potential side effects of anesthesia, such as extreme drowsiness, changes in your vital signs or breathing patterns. Nausea, headache, muscle aches, or sore throat may also occur after anesthesia.  Your nurse will help you manage these potential side effects.    2. For comfort and safety, arrange to have someone at home with you for the first 24 hours after discharge.    3. You and your family will be given written instructions about your diet, activity, dressing care, medications, and return visits.     4. Once at home, should issues with nausea, pain, or bleeding occur, or should you notice any signs of infection, you should call your surgeon.    5. Always clean your hands before and after caring for your wound. Do not let your family touch your surgery site without cleaning their hands.     6. Narcotic pain medications can cause significant constipation.  You may want to add a stool softener to your postoperative medication schedule or speak to your surgeon on how best to manage this SIDE EFFECT.    SPECIAL INSTRUCTIONS: Pt instructed to bring case for eyeglasses on DOS. Pt states understanding of pre-op instructions.     Thank you for allowing us to care for you.  We strive to exceed your expectations in the delivery of care and service provided to you and your family.     If you need to contact the Pre-Admission Testing staff for any reason, please call us at [phone]    Instructions reviewed with patient during preadmission testing phone interview.  Clotilde Vasquez RN.3/11/2025 .12:11 PM      ADDITIONAL EDUCATIONAL INFORMATION REVIEWED PER PHONE WITH YOU AND/OR YOUR FAMILY:  Yes Antibacterial Soap- Pt instructed to use Antibacterial Soap ( Dial or Safeguard) 2-3 days prior to surgery.

## 2025-03-17 NOTE — BRIEF OP NOTE
Brief Postoperative Note      Patient: Diana Zapata  YOB: 1950  MRN: [id number]    Date of Procedure: 3/17/2025    Pre-Op Diagnosis Codes:      * Complete rotator cuff tear or rupture of right shoulder, not specified as traumatic [M75.121]    Post-Op Diagnosis: Same       Procedure(s):  RIGHT SHOULDER ARTHROSCOPY DEBRIDEMENT DECOMPRESSION AND INSERTION OF SUBACROMIAL BALLOON SPACER    Surgeon(s):  Sharad Gold MD    Assistant:  Surgical Assistant: Shamkea Kemp  Fellow: Gerry Parrish DO    Anesthesia: General    Estimated Blood Loss (mL): Minimal    Complications: None    Specimens:   * No specimens in log *    Implants:  * No implants in log *      Drains: * No LDAs found *    Findings:  Infection Present At Time Of Surgery (PATOS) (choose all levels that have infection present):  No infection present  Other Findings: please refer to op note    Electronically signed by Gerry Parrish DO on 3/17/2025 at 4:47 PM

## 2025-03-17 NOTE — ANESTHESIA PRE PROCEDURE
discussed with CRNA.    Attending anesthesiologist reviewed and agrees with Preprocedure content              Pedro Luis Mckeon DO   3/17/2025

## 2025-03-17 NOTE — ANESTHESIA PROCEDURE NOTES
Peripheral Block    Patient location during procedure: pre-op  Reason for block: post-op pain management and at surgeon's request  Start time: 3/17/2025 1:26 PM  End time: 3/17/2025 1:30 PM  Staffing  Performed: anesthesiologist   Anesthesiologist: Pedro Luis Mckeon DO  Performed by: Pedro Luis Mckeon DO  Authorized by: Pedro Luis Mckeon DO    Preanesthetic Checklist  Completed: patient identified, IV checked, site marked, risks and benefits discussed, surgical/procedural consents, equipment checked, pre-op evaluation, timeout performed, anesthesia consent given, oxygen available, monitors applied/VS acknowledged, fire risk safety assessment completed and verbalized and blood product R/B/A discussed and consented  Peripheral Block   Patient position: sitting  Prep: ChloraPrep  Provider prep: mask and sterile gloves  Patient monitoring: cardiac monitor, continuous pulse ox, continuous capnometry, frequent blood pressure checks, IV access, oxygen and responsive to questions  Block type: Brachial plexus  Interscalene  Laterality: right  Injection technique: single-shot  Guidance: ultrasound guided  Local infiltration: bupivacaine  Local infiltration: bupivacaine    Needle   Needle type: insulated echogenic nerve stimulator needle   Needle gauge: 20 G  Needle localization: ultrasound guidance  Needle length: 8 cm  Assessment   Injection assessment: negative aspiration for heme, no paresthesia on injection, local visualized surrounding nerve on ultrasound and no intravascular symptoms  Paresthesia pain: none  Slow fractionated injection: yes  Hemodynamics: stable  Outcomes: uncomplicated and patient tolerated procedure well    Medications Administered  BUPivacaine (MARCAINE) PF injection 0.5% - Perineural   5 mL - 3/17/2025 1:26:00 PM  BUPivacaine liposome (EXPAREL) injection 1.3% - Perineural   10 mL - 3/17/2025 1:26:00 PM

## 2025-03-18 NOTE — OP NOTE
now were working out of a posterolateral portal with the arthroscope.  We retrieved 1 limb of 1 of the sutures in the anchor out the PassPort cannula, loaded on to the FastPass scorpion suture passer, passed suture through the common tissue distal and medial.  Second limb was passed above the first and we repeated the process identically with both limbs of the second suture passing it through the upper subscapularis and proximal common tissue.  We then tied the suture sequentially.  This was done with the arm in maximal external rotation.  We used a Revo knot followed by alternating half hitches.  Six half hitches were passed.  We left the tails short with the Arthrex cutter.  We repeated the process identically with second stitch.  There was still 1 suture from the anchor and this was passed antegrade through the upper subscapularis and a simple stitch was tied to serve as another anchoring point.  Multiple half hitches were tied and then the cutter was used to leave short tails.  Now, we had good tension of the upper subscapularis.  We could carry out our balloon.  We had to create a small window for the balloon above the superior labrum.  We removed quite a bit of synovitis.  We uncovered flap with degenerative rotator cuff tendon.  Again, this was grasped and then amputated with cautery.  We used cautery to remove any synovitis and bleeders.  Finally with the arthroscope posteriorly, we used a calibrated probe from lateral and measured between a 40 and a 50, so we used a medium.  We delivered the medium balloon, removed the sheath.  We hooked up the fluid and inflated the balloon with 24 mL of fluid.  8 were drawn for a net of 16 mL.  This helped to unravel or remove any kinks.  The balloon stayed stable and was appropriately sized.  We cycled the arm through range of motion and it did not spin out.  This was the last step of the procedure, so we removed all arthroscopic instruments.  We closed the arthroscopic

## 2025-03-20 ENCOUNTER — EVALUATION (OUTPATIENT)
Dept: PHYSICAL THERAPY | Age: 75
End: 2025-03-20

## 2025-03-20 DIAGNOSIS — M25.511 ACUTE POSTOPERATIVE PAIN OF RIGHT SHOULDER: Primary | ICD-10-CM

## 2025-03-20 DIAGNOSIS — M25.611 DECREASED RIGHT SHOULDER RANGE OF MOTION: ICD-10-CM

## 2025-03-20 DIAGNOSIS — M75.121 NONTRAUMATIC COMPLETE TEAR OF RIGHT ROTATOR CUFF: ICD-10-CM

## 2025-03-20 DIAGNOSIS — R29.898 WEAKNESS OF RIGHT UPPER EXTREMITY: ICD-10-CM

## 2025-03-20 DIAGNOSIS — G89.18 ACUTE POSTOPERATIVE PAIN OF RIGHT SHOULDER: Primary | ICD-10-CM

## 2025-03-20 NOTE — PROGRESS NOTES
MD Sharad  PCP: Jayden Bundy III, MD     Plan of care signed (Y/N):     Date of Patient follow up with Physician: 3/25/2025     Plan of Care Report: EVAL today  POC update due: (10 visits /OR AUTH LIMITS, whichever is less)   4/18/2025                                             Medical History:  Comorbidities:  Cancer/Tumor  Hypertension  Stroke/TIA  Osteoarthritis  COVID-19  Relevant Medical History: Left shoulder RTC repair 2012, left shoulder RTC revision 2018, R DILAN 2018, L DILAN 2021, L TKA 2019, R TKA 2021  (see chart for complete surgical and PMHx)                                         Precautions/ Contra-indications:           Latex allergy:  NO  Pacemaker:    NO  Contraindications for Manipulation: recent surgical history (relative)  Date of Surgery: 3/17/2025  Other:    Red Flags:  None    Suicide Screening:   The patient did not verbalize a primary behavioral concern, suicidal ideation, suicidal intent, or demonstrate suicidal behaviors.    Preferred Language for Healthcare:   [x] English       [] other:    SUBJECTIVE EXAMINATION     Patient stated complaint: Patient has had her left RTC repaired 2 times before.    She has been here for PT for multiple body parts.     More recently, her right shoulder has been declining.     Received many cortisone injections with temporary relief.    She had lengthy discussions with Dr Gold about this shoulder.    Reverse TSA vs the balloon spacer.    She is now here s/p massive RTC repair.   MD able to repair upper border of subscapularis tendon with subacromial balloon spacer.    This was performed on 3/17/2025.    She had a nerve block and that wore off the next day.   She has been taking some pain meds here and there.     Has a friend that is driving her to her appointments now.      She is sleeping in the recliner.    She has kept her sling on since surgery, just took it off once to change clothes.     She is anxious to take a shower.                Test

## 2025-03-21 ENCOUNTER — HOSPITAL ENCOUNTER (EMERGENCY)
Age: 75
Discharge: HOME | End: 2025-03-21
Attending: STUDENT IN AN ORGANIZED HEALTH CARE EDUCATION/TRAINING PROGRAM
Payer: MEDICARE

## 2025-03-21 ENCOUNTER — APPOINTMENT (OUTPATIENT)
Dept: GENERAL RADIOLOGY | Age: 75
End: 2025-03-21
Payer: MEDICARE

## 2025-03-21 ENCOUNTER — TELEPHONE (OUTPATIENT)
Dept: ORTHOPEDIC SURGERY | Age: 75
End: 2025-03-21

## 2025-03-21 VITALS
HEIGHT: 62 IN | WEIGHT: 210 LBS | BODY MASS INDEX: 38.64 KG/M2 | OXYGEN SATURATION: 97 % | SYSTOLIC BLOOD PRESSURE: 124 MMHG | HEART RATE: 65 BPM | RESPIRATION RATE: 16 BRPM | TEMPERATURE: 98.1 F | DIASTOLIC BLOOD PRESSURE: 61 MMHG

## 2025-03-21 DIAGNOSIS — L23.1 ALLERGIC CONTACT DERMATITIS DUE TO ADHESIVES: Primary | ICD-10-CM

## 2025-03-21 PROCEDURE — 99283 EMERGENCY DEPT VISIT LOW MDM: CPT

## 2025-03-21 PROCEDURE — 6370000000 HC RX 637 (ALT 250 FOR IP)

## 2025-03-21 PROCEDURE — 73030 X-RAY EXAM OF SHOULDER: CPT

## 2025-03-21 RX ORDER — TRIAMCINOLONE ACETONIDE 0.25 MG/G
CREAM TOPICAL
Qty: 15 G | Refills: 0 | Status: SHIPPED | OUTPATIENT
Start: 2025-03-21 | End: 2025-03-21

## 2025-03-21 RX ORDER — TRIAMCINOLONE ACETONIDE 0.25 MG/G
CREAM TOPICAL
Qty: 15 G | Refills: 0 | Status: SHIPPED | OUTPATIENT
Start: 2025-03-21 | End: 2025-03-28

## 2025-03-21 RX ORDER — DIPHENHYDRAMINE HCL 25 MG
25 TABLET ORAL ONCE
Status: COMPLETED | OUTPATIENT
Start: 2025-03-21 | End: 2025-03-21

## 2025-03-21 RX ADMIN — DIPHENHYDRAMINE HYDROCHLORIDE 25 MG: 25 TABLET ORAL at 12:20

## 2025-03-21 ASSESSMENT — ENCOUNTER SYMPTOMS
NAUSEA: 0
ABDOMINAL PAIN: 0
VOMITING: 0
SHORTNESS OF BREATH: 0
BLOOD IN STOOL: 0

## 2025-03-21 ASSESSMENT — PAIN - FUNCTIONAL ASSESSMENT: PAIN_FUNCTIONAL_ASSESSMENT: NONE - DENIES PAIN

## 2025-03-21 NOTE — ED PROVIDER NOTES
ED Attending Attestation Note     Date of evaluation: 3/21/2025    This patient was seen by the resident.  I have seen and examined the patient, agree with the workup, evaluation, management and diagnosis. The care plan has been discussed. I was present for any procedures performed in the resident's  note and have made edits to the note where appropriate.    My assessment reveals 74 y.o. female with history of right shoulder arthroscopy on 3/17 presenting for postop concerns with skin redness, itching, blistering, and drainage.  She has multiple well-delineated rectangular patches where dressings previously were that are erythematous, slightly raised, and with serous draining.  There is no opening of the surgical sites, purulent drainage, or gross evidence of infection.  Most consistent with contact dermatitis secondary to prior dressings that were in place.  Will treat with Benadryl and discussed with her orthopedic team who are comfortable with topical steroids, no oral steroids.        João Chan MD  03/21/25 [id number]

## 2025-03-21 NOTE — DISCHARGE INSTRUCTIONS
You were seen in the emergency room for contact dermatitis or allergic reaction to the adhesives on your shoulder wound.  We gave you Benadryl and some topical steroid cream    Please follow-up with a orthopedics doctor next week via the number above.  Please follow-up your primary care doctor to discuss your overall allergies.    Please come back to emergency room if you have fevers, chills, chest pain, shortness of breath, new nausea and vomiting, significant drainage from the wound that has pus in it.

## 2025-03-21 NOTE — ED PROVIDER NOTES
THE [facility]  EMERGENCY DEPARTMENT ENCOUNTER          EM RESIDENT NOTE       Date of evaluation: 3/21/2025    Chief Complaint     Post-op Problem (Pt had right shoulder surg Monday, PT changed dressing yesterday, having worsening drainage and blisters at incisions )      History of Present Illness     Diana Zapata is a 74 y.o. female w/ PMH of hypothyroidism, HTN, HLD, who presents with draining from shoulder wound that started last night.     Surgery with Dr. Gold here at [facility] on 3/17.     Itchy rash on R shoulder. No significant worsening pain. Concern for allergy to the glue or tape on R shoulder following surgery.  Drainage is only clear fluid that is coming from blisters and not from wound.  No opening of wound.    No significant fevers or systemic signs of infection.    MEDICAL DECISION MAKING / ASSESSMENT / PLAN     INITIAL VITALS: BP: 124/61, Temp: 98.1 °F (36.7 °C), Pulse: 65, Respirations: 16, SpO2: 97 %    Diana Zapata is a 74 y.o. female severe rotator cuff tear and right arm status post surgery on 3/17 with Dr. Gold.  Exam notable for neurovascularly intact right upper extremity and no obvious drainage or erythema around wounds.  Obviously blistering itchy rash concerning for contact dermatitis.    Spoke with orthopedics NP who recommended patient was safe for Benadryl and topical steroid and discharge.    Workup as below.  Normal x-ray.  Orthopedics will schedule an appointment with patient next week.    Patient discharged home with Benadryl and follow-up precautions and return precautions.    Patient discharged home safely.      Is this patient to be included in the SEP-1 core measure? No Exclusion criteria - the patient is NOT to be included for SEP-1 Core Measure due to: Infection is not suspected    Medical Decision Making  Amount and/or Complexity of Data Reviewed  Radiology: ordered. Decision-making details documented in ED Course.    Risk  OTC drugs.  Prescription drug

## 2025-03-25 ENCOUNTER — OFFICE VISIT (OUTPATIENT)
Dept: ORTHOPEDIC SURGERY | Age: 75
End: 2025-03-25

## 2025-03-25 VITALS — WEIGHT: 210 LBS | BODY MASS INDEX: 38.64 KG/M2 | HEIGHT: 62 IN

## 2025-03-25 DIAGNOSIS — Z98.890 S/P RIGHT ROTATOR CUFF REPAIR: Primary | ICD-10-CM

## 2025-03-25 PROCEDURE — 99024 POSTOP FOLLOW-UP VISIT: CPT | Performed by: ORTHOPAEDIC SURGERY

## 2025-03-25 NOTE — PROGRESS NOTES
History of Present Illness:  Diana Zapata is a pleasant 74 y.o. female who presents for a post operative visit. She is 8 days days out following a right shoulder EUA, diagnostic arthroscopy, debridement of rotator cuff, biceps stump, labrum, rotator interval, subacromial bursa, extensive synovectomy, arthroscopic subacromial decompression with acromioplasty, arthroscopic repair of upper subscap tendon, insertion of subacromial balloon spacer with Dr. Gold on 3/17/2025. Overall She is doing okay and feels that their pain is well controlled with current pain medications.  Unfortunately the patient did have a allergic reaction to the Prineo dressing.  This has since improved.  She has been compliant with wearing the UltraSling brace at all times. She plans to do physical therapy at [place]. She denies fevers, chills, numbness, tingling, and shortness of breath.    Medical History:  Patient's medications, allergies, past medical, surgical, social and family histories were reviewed and updated as appropriate.    No notes on file    Review of Systems  A 14 point review of systems was completed by the patient and is available in the media section of the scanned medical record and was reviewed on 3/25/2025.      Vital Signs:  There were no vitals filed for this visit.    General/Appearance: Alert and oriented and in no apparent distress.    Skin:  There are no skin lesions, cellulitis, or extreme edema. The patient has warm and well-perfused Bilateral upper extremities with brisk capillary refill.      Right shoulder Exam:    Inspection: Shoulder incision  is clean, dry and intact and well approximated. The Prineo dressing is still in place. Mild ecchymosis and swelling are present as can be expected.  Patient does have erythema around the previous Dermabond sites. However, these appear to be much improved    Palpation:  No crepitus to gentle motion    Active Range of Motion: Deferred    Passive Range of Motion:

## 2025-03-27 ENCOUNTER — TREATMENT (OUTPATIENT)
Dept: PHYSICAL THERAPY | Age: 75
End: 2025-03-27

## 2025-03-27 DIAGNOSIS — M75.121 NONTRAUMATIC COMPLETE TEAR OF RIGHT ROTATOR CUFF: ICD-10-CM

## 2025-03-27 DIAGNOSIS — R29.898 WEAKNESS OF RIGHT UPPER EXTREMITY: ICD-10-CM

## 2025-03-27 DIAGNOSIS — G89.18 ACUTE POSTOPERATIVE PAIN OF RIGHT SHOULDER: Primary | ICD-10-CM

## 2025-03-27 DIAGNOSIS — M25.611 DECREASED RIGHT SHOULDER RANGE OF MOTION: ICD-10-CM

## 2025-03-27 DIAGNOSIS — M25.511 ACUTE POSTOPERATIVE PAIN OF RIGHT SHOULDER: Primary | ICD-10-CM

## 2025-03-27 NOTE — PROGRESS NOTES
PLAN     Frequency/Duration: 1-2x/week for 8-10 weeks for the following treatment interventions:    Interventions:  Therapeutic Exercise (84031) including: strength training, ROM, and functional mobility  Therapeutic Activities (13126) including: functional mobility training and education.  Neuromuscular Re-education (02258) activation and proprioception, including postural re-education.    Manual Therapy (24172) as indicated to include: Passive Range of Motion and Gr I-IV mobilizations  Modalities as needed that may include: Cryotherapy  Patient education on joint protection, postural re-education, activity modification, and progression of HEP    Plan: POC initiated as per evaluation    Electronically Signed by Sergio Baird, PT  Date: 03/27/2025          Board Certified Orthopaedic Clinical Specialist  TPI Certified  Physical Therapist    [place] PT #[id number]  [place] PT #[id number]       Note: Portions of this note have been templated and/or copied from initial evaluation, reassessments and prior notes for documentation efficiency.    Note: If patient does not return for scheduled/recommended follow up visits, this note will serve as a discharge from care along with the most recent update on progress.

## 2025-04-01 ENCOUNTER — TREATMENT (OUTPATIENT)
Dept: PHYSICAL THERAPY | Age: 75
End: 2025-04-01
Payer: MEDICARE

## 2025-04-01 DIAGNOSIS — G89.18 ACUTE POSTOPERATIVE PAIN OF RIGHT SHOULDER: Primary | ICD-10-CM

## 2025-04-01 DIAGNOSIS — M25.611 DECREASED RIGHT SHOULDER RANGE OF MOTION: ICD-10-CM

## 2025-04-01 DIAGNOSIS — M25.511 ACUTE POSTOPERATIVE PAIN OF RIGHT SHOULDER: Primary | ICD-10-CM

## 2025-04-01 PROCEDURE — 97110 THERAPEUTIC EXERCISES: CPT | Performed by: PHYSICAL THERAPIST

## 2025-04-01 PROCEDURE — 97530 THERAPEUTIC ACTIVITIES: CPT | Performed by: PHYSICAL THERAPIST

## 2025-04-01 NOTE — PROGRESS NOTES
[facility]: [address] [place] [address]   office: [phone]  fax: [phone]      Physical Therapy: TREATMENT/PROGRESS NOTE   Patient: Diana Zapata (74 y.o. female)   Examination Date: 2025   :  1950 MRN: [id number]   Visit #: 3   Insurance Allowable Auth Needed   BMN []Yes    []No    Insurance: Payor: MEDICARE / Plan: MEDICARE PART A AND B / Product Type: *No Product type* /   Insurance ID: [id number] - (Medicare)  Secondary Insurance (if applicable):    Treatment Diagnosis:     ICD-10-CM    1. Acute postoperative pain of right shoulder  G89.18     M25.511       2. Decreased right shoulder range of motion  M25.611          Medical Diagnosis:  PROCEDURES: Right shoulder examination under anesthesia; diagnostic arthroscopy; arthroscopic debridement of rotator cuff, biceps stump, labrum, rotator interval, subacromial bursa; extensive synovectomy, subtotal; arthroscopic subacromial decompression with acromioplasty; arthroscopic repair of upper subscapularis tendon tear (rotator cuff repair); insertion of subacromial balloon spacer.          Referring Physician: Sharad Gold MD  PCP: Jayden Bundy III, MD     Plan of care signed (Y/N):     Date of Patient follow up with Physician: 4/10/2025     Plan of Care Report: NO  POC update due: (10 visits /OR AUTH LIMITS, whichever is less)   2025                                             Medical History:  Comorbidities:  Cancer/Tumor  Hypertension  Stroke/TIA  Osteoarthritis  COVID-19  Relevant Medical History: Left shoulder RTC repair , left shoulder RTC revision , R DILAN , L DILAN , L TKA , R TKA   (see chart for complete surgical and PMHx)                                         Precautions/ Contra-indications:           Latex allergy:  NO  Pacemaker:    NO  Contraindications for Manipulation: recent surgical history (relative)  Date of Surgery:

## 2025-04-10 ENCOUNTER — TREATMENT (OUTPATIENT)
Dept: PHYSICAL THERAPY | Age: 75
End: 2025-04-10

## 2025-04-10 ENCOUNTER — OFFICE VISIT (OUTPATIENT)
Dept: ORTHOPEDIC SURGERY | Age: 75
End: 2025-04-10

## 2025-04-10 VITALS — BODY MASS INDEX: 38.64 KG/M2 | HEIGHT: 62 IN | WEIGHT: 210 LBS

## 2025-04-10 DIAGNOSIS — M25.511 ACUTE POSTOPERATIVE PAIN OF RIGHT SHOULDER: Primary | ICD-10-CM

## 2025-04-10 DIAGNOSIS — M25.611 DECREASED RIGHT SHOULDER RANGE OF MOTION: ICD-10-CM

## 2025-04-10 DIAGNOSIS — Z98.890 S/P RIGHT ROTATOR CUFF REPAIR: Primary | ICD-10-CM

## 2025-04-10 DIAGNOSIS — R29.898 WEAKNESS OF RIGHT UPPER EXTREMITY: ICD-10-CM

## 2025-04-10 DIAGNOSIS — G89.18 ACUTE POSTOPERATIVE PAIN OF RIGHT SHOULDER: Primary | ICD-10-CM

## 2025-04-10 DIAGNOSIS — M75.121 NONTRAUMATIC COMPLETE TEAR OF RIGHT ROTATOR CUFF: ICD-10-CM

## 2025-04-10 PROCEDURE — 99024 POSTOP FOLLOW-UP VISIT: CPT | Performed by: ORTHOPAEDIC SURGERY

## 2025-04-10 NOTE — PROGRESS NOTES
Ultrasound (84650)    Group Therapy (87428)     Estim Attended (91398)    Canalith Repositioning (35746)     Physical Performance Test (00767)    Custom orthotic ()     Other:    Other:    Total Timed Code Tx Minutes 43' 3       Total Treatment Minutes 54'        Charge Justification:  (26326) THERAPEUTIC EXERCISE - Provided verbal/tactile cueing for HEP and/or activities related to strengthening, flexibility, endurance, ROM performed to prevent loss of range of motion, maintain or improve muscular strength or increase flexibility, following either an injury or surgery.   (95984) NEUROMUSCULAR RE-EDUCATION - Provided therapeutic procedure on activities related to neuromuscular reeducation of movement, balance, coordination, kinesthetic sense, posture, and/or proprioception for sitting and/or standing activities. Provided HEP review and/or progression.  (44924) THERAPEUTIC ACTIVITY - use of dynamic activities to improve functional performance. (Ex include squatting, ascending/descending stairs, walking, bending, lifting, catching, throwing, pushing, pulling, jumping.)  Direct, one on one contact, billed in 15-minute increments.  (04615) MANUAL THERAPY -  Manual therapy techniques, 1 or more regions, each 15 minutes (Mobilization/manipulation, manual lymphatic drainage, manual traction) for the purpose of modulating pain, promoting relaxation,  increasing ROM, reducing/eliminating soft tissue swelling/inflammation/restriction, improving soft tissue extensibility and allowing for proper ROM for normal function with self care, mobility, lifting and ambulation    GOALS     Patient stated goal: to be painfree, stronger, and able to raise arm overhead easily  [] Progressing: [] Met: [] Not Met: [] Adjusted    Therapist goals for Patient:   Short Term Goals: To be achieved in: 2 weeks  1Independent in HEP and progression per patient tolerance, in order to prevent re-injury.   [] Progressing: [] Met: [] Not Met: []

## 2025-04-10 NOTE — PROGRESS NOTES
History of Present Illness:  Diana Zapata is a pleasant 74 y.o. female who presents for a post operative visit. She is  days days out following a right shoulder EUA, diagnostic arthroscopy, debridement of rotator cuff, biceps stump, labrum, rotator interval, subacromial bursa, extensive synovectomy, arthroscopic subacromial decompression with acromioplasty, arthroscopic repair of upper subscap tendon, insertion of subacromial balloon spacer with Dr. oGld on 3/17/2025.  The patient reports that since her last visit she had a slight setback.  She was coming out of her shower and suddenly grabbed down to the bathroom railing with her right arm and it abruptly externally rotated her arm.  She has been having slight discomfort since then.  Most of the pain is over the anterior shoulder.  She denies fevers, chills, numbness, tingling, and shortness of breath.    Medical History:  Patient's medications, allergies, past medical, surgical, social and family histories were reviewed and updated as appropriate.    No notes on file    Review of Systems  A 14 point review of systems was completed by the patient and is available in the media section of the scanned medical record and was reviewed on 4/10/2025.      Vital Signs:  There were no vitals filed for this visit.    General/Appearance: Alert and oriented and in no apparent distress.    Skin:  There are no skin lesions, cellulitis, or extreme edema. The patient has warm and well-perfused Bilateral upper extremities with brisk capillary refill.      Right shoulder Exam:    Inspection: Shoulder incision  is clean, dry and intact and well approximated.  There is slight prominence over the anterior shoulder however, these appear to be much improved    Palpation: There is subacromial crepitus and a softness to palpation over the anterior right shoulder.  No crepitus to gentle motion    Active Range of Motion: Deferred    Passive Range of Motion: FE to 90 with pain on arm descent.

## 2025-04-24 ENCOUNTER — OFFICE VISIT (OUTPATIENT)
Dept: ORTHOPEDIC SURGERY | Age: 75
End: 2025-04-24
Payer: MEDICARE

## 2025-04-24 VITALS — HEIGHT: 62 IN | BODY MASS INDEX: 38.64 KG/M2 | WEIGHT: 210 LBS

## 2025-04-24 DIAGNOSIS — M75.121 COMPLETE TEAR OF RIGHT ROTATOR CUFF, UNSPECIFIED WHETHER TRAUMATIC: ICD-10-CM

## 2025-04-24 DIAGNOSIS — Z98.890 S/P ARTHROSCOPY OF RIGHT SHOULDER: Primary | ICD-10-CM

## 2025-04-24 PROCEDURE — G8427 DOCREV CUR MEDS BY ELIG CLIN: HCPCS | Performed by: ORTHOPAEDIC SURGERY

## 2025-04-24 PROCEDURE — 3017F COLORECTAL CA SCREEN DOC REV: CPT | Performed by: ORTHOPAEDIC SURGERY

## 2025-04-24 PROCEDURE — 1123F ACP DISCUSS/DSCN MKR DOCD: CPT | Performed by: ORTHOPAEDIC SURGERY

## 2025-04-24 PROCEDURE — G8417 CALC BMI ABV UP PARAM F/U: HCPCS | Performed by: ORTHOPAEDIC SURGERY

## 2025-04-24 PROCEDURE — 1090F PRES/ABSN URINE INCON ASSESS: CPT | Performed by: ORTHOPAEDIC SURGERY

## 2025-04-24 PROCEDURE — 1036F TOBACCO NON-USER: CPT | Performed by: ORTHOPAEDIC SURGERY

## 2025-04-24 PROCEDURE — G8399 PT W/DXA RESULTS DOCUMENT: HCPCS | Performed by: ORTHOPAEDIC SURGERY

## 2025-04-24 PROCEDURE — 99214 OFFICE O/P EST MOD 30 MIN: CPT | Performed by: ORTHOPAEDIC SURGERY

## 2025-04-24 PROCEDURE — 1159F MED LIST DOCD IN RCRD: CPT | Performed by: ORTHOPAEDIC SURGERY

## 2025-04-24 NOTE — PROGRESS NOTES
shoulder arthroplasty.  We think that this is reasonable as she does have quite a bit of dysfunction and pain.  At this point we will plan to move forward with a right reverse total shoulder arthroplasty with Dr. Gold in mid May.  Because of her syncopal episodes we would like her to get workup with her primary care provider and potentially a cardiologist prior to moving forward with surgery.    Risks, benefits and potential complications of right reverse total shoulder arthroplasty surgery were discussed with the patient.  Risks discussed include but are not limited to bleeding, infection, anesthetic risk, injury to nerves and blood vessels, deep vein thrombosis, residual stiffness and weakness, and the need for revision surgery. The patient also understands that anesthetic risks include cardiopulmonary issues, drug reactions and even death. The patient voices an understanding of the importance of physical therapy and home exercises after surgery.   All questions were answered and written informed consent for surgery was obtained today.     All of her questions were fully answered today. We would like to see Diana Zapata back one week post op for follow-up visit. Diana Zapata is in agreement with this plan.     4/24/2025  2:01 PM    Gerry Parrish DO  Sports Medicine Fellow  [facility]    _______________  I was physically present and personally supervised the Orthopaedic Sports Medicine Fellow in the evaluation and development of a treatment plan for this patient. I personally interviewed the patient and performed a physical examination. In addition, I discussed the patient's condition and treatment options with them. I have also reviewed and agree with the past medical, family and social history unless otherwise noted. All of the patient's questions were answered.         Sharad Gold MD, PhD  4/24/2025

## 2025-04-24 NOTE — H&P (VIEW-ONLY)
History of Present Illness:  Diana Zapata is a pleasant 74 y.o. female who presents for a post operative visit. She is 5 weeks out following a right shoulder EUA, diagnostic arthroscopy, debridement of rotator cuff, biceps stump, labrum, rotator interval, subacromial bursa, extensive synovectomy, arthroscopic subacromial decompression with acromioplasty, arthroscopic repair of upper subscap tendon, insertion of subacromial balloon spacer with Dr. Gold on 3/17/2025.  To recap, the patient had a setback where she was coming out of her shower and suddenly grabbed down to the bathroom railing with her right arm and it abruptly externally rotated her arm.  We did have concerns that the patient may have torn her rotator cuff and balloon spacer had migrated.  She did obtain a MRI which she is here for review.      Of note, patient was recently hospitalized for dizziness and near syncope secondary to benign paroxysmal positional vertigo.        Medical History:  Patient's medications, allergies, past medical, surgical, social and family histories were reviewed and updated as appropriate.    No notes on file    Review of Systems  A 14 point review of systems was completed by the patient and is available in the media section of the scanned medical record and was reviewed on 4/24/2025.      Vital Signs:  There were no vitals filed for this visit.    General/Appearance: Alert and oriented and in no apparent distress.    Skin:  There are no skin lesions, cellulitis, or extreme edema. The patient has warm and well-perfused Bilateral upper extremities with brisk capillary refill.      Right shoulder Exam:    Inspection: Shoulder incisions are well healed      Palpation: There is subacromial crepitus and a softness to palpation over the anterior right shoulder.  No crepitus to gentle motion    Active Range of Motion: 20 degrees forward flexion    Passive Range of Motion: FE to 90 with pain on arm descent.     Strength:   Deferred    Special Tests:  Positive Napolean    Neurovascular: Sensation to light touch is intact, no motor deficits, palpable radial pulses 2+    Radiology:     [facility] taken on/10/25 was reviewed with the following findings:    CONCLUSION:   1. Complete tear and retraction of supraspinatus, infraspinatus, retracted medially 4 cm.   2. Near complete subscapularis tear, now inflamed.  Superior and middle fibers are completely torn.   3. Nonvisualization of the intra-articular long head biceps tendon.  Question high biceps tenodesis.  If high biceps tenodesis was performed findings concerning for dehiscence as the suture anchor is un covered (see key image).   4. Myotendinous junction strains of the supraspinatus and subscapularis.  Moderate fatty atrophy of the rotator cuff muscle bellies.     Assessment :  Ms. Diana Zapata is a pleasant 74 y.o. patient who is right shoulder EUA, diagnostic arthroscopy, debridement of rotator cuff, biceps stump, labrum, rotator interval, subacromial bursa, extensive synovectomy, arthroscopic subacromial decompression with acromioplasty, arthroscopic repair of upper subscap tendon, insertion of subacromial balloon spacer with Dr. Gold on 3/17/2025 unfortunately now with rupture of her subscapularis and posterior dislocation of her subacromial spacer      Impression:  Encounter Diagnoses   Name Primary?    S/P arthroscopy of right shoulder Yes    Complete tear of right rotator cuff, unspecified whether traumatic          Office Procedures:  No orders of the defined types were placed in this encounter.      Treatment Plan:    We reviewed the MRI with Diana Zapata.  Unfortunately the patient has ruptured her subscapularis repair and dislocated her subacromial spacer posteriorly.  At this point, we did discuss treatment options which include conservative management or operative interventions.  Patient stated that she would like to move forward with a reverse total

## 2025-04-28 ENCOUNTER — PREP FOR PROCEDURE (OUTPATIENT)
Dept: ORTHOPEDIC SURGERY | Age: 75
End: 2025-04-28

## 2025-04-28 DIAGNOSIS — Z98.890 S/P RIGHT ROTATOR CUFF REPAIR: Primary | ICD-10-CM

## 2025-04-28 DIAGNOSIS — M25.511 RIGHT SHOULDER PAIN, UNSPECIFIED CHRONICITY: ICD-10-CM

## 2025-05-07 ENCOUNTER — TELEPHONE (OUTPATIENT)
Dept: ORTHOPEDIC SURGERY | Age: 75
End: 2025-05-07

## 2025-05-07 NOTE — TELEPHONE ENCOUNTER
Orthopedic Nurse Navigator Summary    Patient Name:  Diana Zapata   Anticipated Date of Surgery:  05/19/25  Attended Pre-op Education Class:  Video sent to patient email 04/30/25  PCP: Jayden Bundy MD  Date of PCP visit for H&P:  05/13/25  Is patient in a Pain Management program:  Review of Medical history reveals history of: CVA, TIA X4, HTN, Hyperlipidemia, GERD, CLL, Hypothyroidism, Chronic bronchitis, H/O gastric ulcer, Lymphocytic colitis, Depression, Lumbar radiculopathy, Spondylolisthesis, H/O lumbar fusion L5-5, S1, Lumbar spinal stenosis, Vertigo, Lt ankle DVT 2022, Ataxia    Critical Lab Values  - Hemoglobin (g/dL):  Date: 05/13/25 Value 12.1  - Hematocrit(%): Date: 05/13/25  Value 37.9  - HgbA1C:  Date: 05/13/25 Value 5.6  - Albumin:  Date: 05/13/25  Value 4.5  - BUN:  Date: 05/13/25  Value 15  - Creatinine:  Date: 05/13/25  Value 0.75    05/13/25 MRSA swab- negative for MRSA, positive for MSSA    Coronary Artery Disease/HTN/CHF history: Yes  Does the patient see a Cardiologist: Orlando Bales MD  Date of most recent cardiac appt: 05/13/25  On any anticoagulation:  Aspirin 81 mg QD    Diabetes History:  No  Most recent HgbA1C:  Pulmonary:  COPD/Emphysema/Use of home oxygen: H/O chronic bronchitis  Alcohol use: No    BMI greater than 40 at time of scheduling:  BMI 37.68  Additional medical concerns:  Additional recommendations for above concerns:  Attended Pre-Hab program:    Anticipated Discharge Disposition:  Home with OPT  Who will be with patient at home following discharge:  Patient lives alone.  Her daughter will bring her to surgery, and stay with her on Monday and Tuesday.  She lives about 30 minutes away and will come over to check on her frequently PO.  Equipment patient already has:  sling, walker, cane, grab bars in bathtub  Bedroom on first or second floor:  first  Bathroom on first or second floor:  first  Weight bearing status:  nwb RUE  Pre-op ambulatory status: Uses walker around the

## 2025-05-14 NOTE — PROGRESS NOTES
PRE-OP INSTRUCTIONS FOR SURGICAL PATIENTS          Our Pre-admission Testing Nurses tried and were unable to reach you today.  Please read the attached instructions if you did not listen to your voicemail.     Follow all instructions provided to you from your surgeon's office, including your ARRIVAL TIME.   Arrange for someone to drive you home and be with you for the first 24 hours after discharge.     NOTE: at this time ONLY 2 ADULTS may accompany you   One person encouraged to stay at hospital entire time if outpatient surgery    Enter the MAIN entrance located on [address] and report to the surgical desk on the LEFT side of the lobby. Please park in the parking garage or there is free  Parking available after 7am for your use.    Bring your insurance card & photo ID with you to register.  Bring your medication list with you with dose and frequency listed (including over the counter medications)  Contact your ordering physician/surgeon for medication instructions as soon as possible, especially if taking blood thinners, aspirin, heart, or diabetic medication.  Bariatric surgical patients need to call your surgeon if on diabetic medications (as some may need to be stopped 1-week preop)  A Pre-Surgical History and Physical MUST be completed WITHIN 30 DAYS OR LESS prior to your procedure by your Physician or an Urgent Care.  DO NOT EAT ANYTHING 8 hours prior to arrival for surgery.  You may have sips of WATER ONLY (up to 8 ounces) 4 hours prior to your arrival for surgery. Then nothing further 4 hours prior to arriving at hospital.   NOTE: ALL Gastric, Bariatric & Bowel surgery patients - you MUST follow your surgeon's instructions regarding eating/drinking as you will have very specific instructions to follow.  If you did not receive these, call your surgeon's office immediately.   No gum, candy, mints, or ice chips day of procedure.   Please refrain from drinking alcohol the day before or day of your

## 2025-05-14 NOTE — PROGRESS NOTES
[facility] video emailed & reviewed to patient by Dr moe. Tony instructions reviewed to use x 5 days preop.  JAKE screening done. TJ book, IS instructions, TJ video link, and fall contract placed on chart for DOS./rd

## 2025-05-15 NOTE — PROGRESS NOTES
[facility] PRE-SURGICAL TESTING INSTRUCTIONS                      PRIOR TO PROCEDURE DATE:    1. PLEASE FOLLOW ANY INSTRUCTIONS GIVEN TO YOU PER YOUR SURGEON.      2. Arrange for someone to drive you home and be with you for the first 24 hours after discharge for your safety after your procedure for which you received sedation. Ensure it is someone we can share information with regarding your discharge.     NOTE: At this time ONLY 2 ADULTS may accompany you   One person ENCOURAGED to stay at hospital entire time if outpatient surgery      3. You must contact your surgeon for instructions IF:  You are taking any blood thinners, aspirin, anti-inflammatory or vitamins.  There is a change in your physical condition such as a cold, fever, rash, cuts, sores, or any other infection, especially near your surgical site.    4. Do not drink alcohol the day before or day of your procedure.  Do not use any recreational marijuana at least 24 hours or street drugs (heroin, cocaine) at minimum 5 days prior to your procedure.     5. A Pre-Surgical History and Physical MUST be completed WITHIN 30 DAYS OR LESS prior to your procedure.by your Physician or an Urgent Care        THE DAY OF YOUR PROCEDURE:  1.  Follow instructions for ARRIVAL TIME as DIRECTED BY YOUR SURGEON.     2. Enter the MAIN entrance from [facility] and follow the signs to the free Parking Garage or  Parking (offered free of charge 7 am-5pm).      3. Enter the Main Entrance of the hospital (do not enter from the lower level of the parking garage). Upon entrance, check in with the  at the surgical information desk on your LEFT.   Bring your insurance card and photo ID to register      4. DO NOT EAT ANYTHING 8 hours prior to arrival for surgery.  You may have up to 8 ounces of water 4 hours prior to your arrival for surgery.   NOTE: ALL Gastric, Bariatric & Bowel surgery patients - you MUST follow your surgeon's instructions regarding

## 2025-05-15 NOTE — PROGRESS NOTES
[facility] video emailed & reviewed to patient by Dr Gold. Tony instructions reviewed to use x 5 days preop.  JAKE screening done. TJ book, IS instructions, TJ video link, and fall contract placed on chart for DOS./ rd

## 2025-05-19 ENCOUNTER — ANESTHESIA EVENT (OUTPATIENT)
Dept: OPERATING ROOM | Age: 75
End: 2025-05-19
Payer: MEDICARE

## 2025-05-19 ENCOUNTER — ANESTHESIA (OUTPATIENT)
Dept: OPERATING ROOM | Age: 75
End: 2025-05-19
Payer: MEDICARE

## 2025-05-19 ENCOUNTER — APPOINTMENT (OUTPATIENT)
Dept: GENERAL RADIOLOGY | Age: 75
End: 2025-05-19
Attending: ORTHOPAEDIC SURGERY
Payer: MEDICARE

## 2025-05-19 ENCOUNTER — HOSPITAL ENCOUNTER (OUTPATIENT)
Age: 75
Setting detail: OBSERVATION
Discharge: HOME | End: 2025-05-20
Attending: ORTHOPAEDIC SURGERY | Admitting: ORTHOPAEDIC SURGERY
Payer: MEDICARE

## 2025-05-19 DIAGNOSIS — M75.121 COMPLETE TEAR OF RIGHT ROTATOR CUFF, UNSPECIFIED WHETHER TRAUMATIC: Primary | ICD-10-CM

## 2025-05-19 DIAGNOSIS — G89.29 CHRONIC RIGHT SHOULDER PAIN: ICD-10-CM

## 2025-05-19 DIAGNOSIS — M25.511 CHRONIC RIGHT SHOULDER PAIN: ICD-10-CM

## 2025-05-19 PROBLEM — M19.011 ARTHRITIS OF RIGHT SHOULDER: Status: ACTIVE | Noted: 2025-05-19

## 2025-05-19 LAB
ABO/RH: NORMAL
ANTIBODY SCREEN: NORMAL
GLUCOSE BLD-MCNC: 108 MG/DL (ref 70–99)
PERFORMED ON: ABNORMAL

## 2025-05-19 PROCEDURE — 6360000002 HC RX W HCPCS: Performed by: PHYSICIAN ASSISTANT

## 2025-05-19 PROCEDURE — 2580000003 HC RX 258: Performed by: PHYSICIAN ASSISTANT

## 2025-05-19 PROCEDURE — 3600000014 HC SURGERY LEVEL 4 ADDTL 15MIN: Performed by: ORTHOPAEDIC SURGERY

## 2025-05-19 PROCEDURE — 3700000001 HC ADD 15 MINUTES (ANESTHESIA): Performed by: ORTHOPAEDIC SURGERY

## 2025-05-19 PROCEDURE — 86900 BLOOD TYPING SEROLOGIC ABO: CPT

## 2025-05-19 PROCEDURE — 6360000002 HC RX W HCPCS: Performed by: ANESTHESIOLOGY

## 2025-05-19 PROCEDURE — 3600000004 HC SURGERY LEVEL 4 BASE: Performed by: ORTHOPAEDIC SURGERY

## 2025-05-19 PROCEDURE — 86901 BLOOD TYPING SEROLOGIC RH(D): CPT

## 2025-05-19 PROCEDURE — 2500000003 HC RX 250 WO HCPCS: Performed by: PHYSICIAN ASSISTANT

## 2025-05-19 PROCEDURE — 6360000002 HC RX W HCPCS

## 2025-05-19 PROCEDURE — C1776 JOINT DEVICE (IMPLANTABLE): HCPCS | Performed by: ORTHOPAEDIC SURGERY

## 2025-05-19 PROCEDURE — 3700000000 HC ANESTHESIA ATTENDED CARE: Performed by: ORTHOPAEDIC SURGERY

## 2025-05-19 PROCEDURE — 2580000003 HC RX 258: Performed by: ANESTHESIOLOGY

## 2025-05-19 PROCEDURE — 2500000003 HC RX 250 WO HCPCS

## 2025-05-19 PROCEDURE — 6370000000 HC RX 637 (ALT 250 FOR IP): Performed by: PHYSICIAN ASSISTANT

## 2025-05-19 PROCEDURE — 7100000001 HC PACU RECOVERY - ADDTL 15 MIN: Performed by: ORTHOPAEDIC SURGERY

## 2025-05-19 PROCEDURE — 2709999900 HC NON-CHARGEABLE SUPPLY: Performed by: ORTHOPAEDIC SURGERY

## 2025-05-19 PROCEDURE — G0378 HOSPITAL OBSERVATION PER HR: HCPCS

## 2025-05-19 PROCEDURE — 2500000003 HC RX 250 WO HCPCS: Performed by: ORTHOPAEDIC SURGERY

## 2025-05-19 PROCEDURE — 6370000000 HC RX 637 (ALT 250 FOR IP)

## 2025-05-19 PROCEDURE — 6360000002 HC RX W HCPCS: Performed by: ORTHOPAEDIC SURGERY

## 2025-05-19 PROCEDURE — 73020 X-RAY EXAM OF SHOULDER: CPT

## 2025-05-19 PROCEDURE — 86850 RBC ANTIBODY SCREEN: CPT

## 2025-05-19 PROCEDURE — 2720000010 HC SURG SUPPLY STERILE: Performed by: ORTHOPAEDIC SURGERY

## 2025-05-19 PROCEDURE — 64415 NJX AA&/STRD BRCH PLXS IMG: CPT | Performed by: ANESTHESIOLOGY

## 2025-05-19 PROCEDURE — 7100000000 HC PACU RECOVERY - FIRST 15 MIN: Performed by: ORTHOPAEDIC SURGERY

## 2025-05-19 DEVICE — AR, SMALL SOCKET INSERT, 36MM SEMI CONSTRAINED EPLUS
Type: IMPLANTABLE DEVICE | Site: SHOULDER | Status: FUNCTIONAL
Brand: DJO SURGICAL

## 2025-05-19 DEVICE — SCREW, LOCKING BONE, RSP, 5X30
Type: IMPLANTABLE DEVICE | Site: SHOULDER | Status: FUNCTIONAL
Brand: DJO SURGICAL

## 2025-05-19 DEVICE — SCREW, LOCKING BONE, RSP, 5X26
Type: IMPLANTABLE DEVICE | Site: SHOULDER | Status: FUNCTIONAL
Brand: DJO SURGICAL

## 2025-05-19 DEVICE — SCREW, LOCKING BONE, RSP, 5X18
Type: IMPLANTABLE DEVICE | Site: SHOULDER | Status: FUNCTIONAL
Brand: DJO SURGICAL

## 2025-05-19 DEVICE — ALTIVATE REVERSE, HUMERAL STEM, SMALL SHELL, SZ 12X48MM
Type: IMPLANTABLE DEVICE | Site: SHOULDER | Status: FUNCTIONAL
Brand: DJO SURGICAL

## 2025-05-19 DEVICE — IMPL CAPPED SHOULDER S3 TOTAL ADV REVERSE DJO: Type: IMPLANTABLE DEVICE | Site: SHOULDER | Status: FUNCTIONAL

## 2025-05-19 DEVICE — GLENOID, HEAD W/RETAINING SCREW, RSP, 36MM, NEUTRAL
Type: IMPLANTABLE DEVICE | Site: SHOULDER | Status: FUNCTIONAL
Brand: DJO SURGICAL

## 2025-05-19 DEVICE — RSP BASEPLATE, 30MM, W/P2 COATING
Type: IMPLANTABLE DEVICE | Site: SHOULDER | Status: FUNCTIONAL
Brand: DJO SURGICAL

## 2025-05-19 RX ORDER — ASPIRIN 325 MG
325 TABLET ORAL 2 TIMES DAILY
Qty: 28 TABLET | Refills: 0 | Status: SHIPPED | OUTPATIENT
Start: 2025-05-19 | End: 2025-06-02

## 2025-05-19 RX ORDER — LIDOCAINE HYDROCHLORIDE 20 MG/ML
INJECTION, SOLUTION INTRAVENOUS
Status: DISCONTINUED | OUTPATIENT
Start: 2025-05-19 | End: 2025-05-19 | Stop reason: SDUPTHER

## 2025-05-19 RX ORDER — LEVOTHYROXINE SODIUM 50 UG/1
50 TABLET ORAL
Status: DISCONTINUED | OUTPATIENT
Start: 2025-05-20 | End: 2025-05-20 | Stop reason: HOSPADM

## 2025-05-19 RX ORDER — SENNOSIDES 8.6 MG
325 CAPSULE ORAL 2 TIMES DAILY
Status: DISCONTINUED | OUTPATIENT
Start: 2025-05-19 | End: 2025-05-20 | Stop reason: HOSPADM

## 2025-05-19 RX ORDER — FENTANYL CITRATE 50 UG/ML
INJECTION, SOLUTION INTRAMUSCULAR; INTRAVENOUS
Status: COMPLETED | OUTPATIENT
Start: 2025-05-19 | End: 2025-05-19

## 2025-05-19 RX ORDER — SODIUM CHLORIDE 0.9 % (FLUSH) 0.9 %
5-40 SYRINGE (ML) INJECTION PRN
Status: DISCONTINUED | OUTPATIENT
Start: 2025-05-19 | End: 2025-05-19 | Stop reason: HOSPADM

## 2025-05-19 RX ORDER — VANCOMYCIN HYDROCHLORIDE 1 G/20ML
INJECTION, POWDER, LYOPHILIZED, FOR SOLUTION INTRAVENOUS PRN
Status: DISCONTINUED | OUTPATIENT
Start: 2025-05-19 | End: 2025-05-19 | Stop reason: HOSPADM

## 2025-05-19 RX ORDER — SODIUM CHLORIDE 0.9 % (FLUSH) 0.9 %
5-40 SYRINGE (ML) INJECTION EVERY 12 HOURS SCHEDULED
Status: DISCONTINUED | OUTPATIENT
Start: 2025-05-19 | End: 2025-05-20 | Stop reason: HOSPADM

## 2025-05-19 RX ORDER — HYDROMORPHONE HYDROCHLORIDE 1 MG/ML
0.5 INJECTION, SOLUTION INTRAMUSCULAR; INTRAVENOUS; SUBCUTANEOUS EVERY 5 MIN PRN
Status: DISCONTINUED | OUTPATIENT
Start: 2025-05-19 | End: 2025-05-19 | Stop reason: HOSPADM

## 2025-05-19 RX ORDER — SODIUM CHLORIDE 9 MG/ML
INJECTION, SOLUTION INTRAVENOUS PRN
Status: DISCONTINUED | OUTPATIENT
Start: 2025-05-19 | End: 2025-05-19 | Stop reason: HOSPADM

## 2025-05-19 RX ORDER — ROCURONIUM BROMIDE 10 MG/ML
INJECTION, SOLUTION INTRAVENOUS
Status: DISCONTINUED | OUTPATIENT
Start: 2025-05-19 | End: 2025-05-19 | Stop reason: SDUPTHER

## 2025-05-19 RX ORDER — ROSUVASTATIN CALCIUM 10 MG/1
5 TABLET, COATED ORAL NIGHTLY
Status: DISCONTINUED | OUTPATIENT
Start: 2025-05-19 | End: 2025-05-20 | Stop reason: HOSPADM

## 2025-05-19 RX ORDER — ONDANSETRON 2 MG/ML
4 INJECTION INTRAMUSCULAR; INTRAVENOUS EVERY 6 HOURS PRN
Status: DISCONTINUED | OUTPATIENT
Start: 2025-05-19 | End: 2025-05-20 | Stop reason: HOSPADM

## 2025-05-19 RX ORDER — ACETAMINOPHEN 325 MG/1
650 TABLET ORAL
Status: DISCONTINUED | OUTPATIENT
Start: 2025-05-19 | End: 2025-05-19 | Stop reason: HOSPADM

## 2025-05-19 RX ORDER — DEXAMETHASONE SODIUM PHOSPHATE 4 MG/ML
INJECTION, SOLUTION INTRA-ARTICULAR; INTRALESIONAL; INTRAMUSCULAR; INTRAVENOUS; SOFT TISSUE
Status: DISCONTINUED | OUTPATIENT
Start: 2025-05-19 | End: 2025-05-19 | Stop reason: SDUPTHER

## 2025-05-19 RX ORDER — SODIUM CHLORIDE 0.9 % (FLUSH) 0.9 %
5-40 SYRINGE (ML) INJECTION EVERY 12 HOURS SCHEDULED
Status: DISCONTINUED | OUTPATIENT
Start: 2025-05-19 | End: 2025-05-19 | Stop reason: HOSPADM

## 2025-05-19 RX ORDER — PROCHLORPERAZINE EDISYLATE 5 MG/ML
5 INJECTION INTRAMUSCULAR; INTRAVENOUS
Status: DISCONTINUED | OUTPATIENT
Start: 2025-05-19 | End: 2025-05-19 | Stop reason: HOSPADM

## 2025-05-19 RX ORDER — CITALOPRAM HYDROBROMIDE 20 MG/1
20 TABLET ORAL DAILY
Status: DISCONTINUED | OUTPATIENT
Start: 2025-05-20 | End: 2025-05-20 | Stop reason: HOSPADM

## 2025-05-19 RX ORDER — SODIUM CHLORIDE, SODIUM LACTATE, POTASSIUM CHLORIDE, CALCIUM CHLORIDE 600; 310; 30; 20 MG/100ML; MG/100ML; MG/100ML; MG/100ML
INJECTION, SOLUTION INTRAVENOUS CONTINUOUS
Status: DISCONTINUED | OUTPATIENT
Start: 2025-05-19 | End: 2025-05-19 | Stop reason: HOSPADM

## 2025-05-19 RX ORDER — OXYCODONE AND ACETAMINOPHEN 5; 325 MG/1; MG/1
1 TABLET ORAL
Qty: 28 TABLET | Refills: 0 | Status: SHIPPED | OUTPATIENT
Start: 2025-05-19 | End: 2025-05-26

## 2025-05-19 RX ORDER — SODIUM CHLORIDE 9 MG/ML
INJECTION, SOLUTION INTRAVENOUS PRN
Status: DISCONTINUED | OUTPATIENT
Start: 2025-05-19 | End: 2025-05-20 | Stop reason: HOSPADM

## 2025-05-19 RX ORDER — ONDANSETRON 2 MG/ML
INJECTION INTRAMUSCULAR; INTRAVENOUS
Status: DISCONTINUED | OUTPATIENT
Start: 2025-05-19 | End: 2025-05-19 | Stop reason: SDUPTHER

## 2025-05-19 RX ORDER — FENTANYL CITRATE 50 UG/ML
25 INJECTION, SOLUTION INTRAMUSCULAR; INTRAVENOUS EVERY 5 MIN PRN
Status: DISCONTINUED | OUTPATIENT
Start: 2025-05-19 | End: 2025-05-19 | Stop reason: HOSPADM

## 2025-05-19 RX ORDER — ACETAMINOPHEN 500 MG
1000 TABLET ORAL ONCE
Status: COMPLETED | OUTPATIENT
Start: 2025-05-19 | End: 2025-05-19

## 2025-05-19 RX ORDER — FENTANYL CITRATE 50 UG/ML
INJECTION, SOLUTION INTRAMUSCULAR; INTRAVENOUS
Status: COMPLETED
Start: 2025-05-19 | End: 2025-05-19

## 2025-05-19 RX ORDER — ASCORBIC ACID 500 MG
500 TABLET ORAL DAILY
Status: DISCONTINUED | OUTPATIENT
Start: 2025-05-20 | End: 2025-05-20 | Stop reason: HOSPADM

## 2025-05-19 RX ORDER — CELECOXIB 200 MG/1
400 CAPSULE ORAL ONCE
Status: COMPLETED | OUTPATIENT
Start: 2025-05-19 | End: 2025-05-19

## 2025-05-19 RX ORDER — PHENYLEPHRINE HCL IN 0.9% NACL 1 MG/10 ML
SYRINGE (ML) INTRAVENOUS
Status: DISCONTINUED | OUTPATIENT
Start: 2025-05-19 | End: 2025-05-19 | Stop reason: SDUPTHER

## 2025-05-19 RX ORDER — SODIUM CHLORIDE 0.9 % (FLUSH) 0.9 %
5-40 SYRINGE (ML) INJECTION PRN
Status: DISCONTINUED | OUTPATIENT
Start: 2025-05-19 | End: 2025-05-20 | Stop reason: HOSPADM

## 2025-05-19 RX ORDER — ACETAMINOPHEN 325 MG/1
650 TABLET ORAL EVERY 6 HOURS
Status: DISCONTINUED | OUTPATIENT
Start: 2025-05-19 | End: 2025-05-20 | Stop reason: HOSPADM

## 2025-05-19 RX ORDER — IPRATROPIUM BROMIDE AND ALBUTEROL SULFATE 2.5; .5 MG/3ML; MG/3ML
1 SOLUTION RESPIRATORY (INHALATION)
Status: DISCONTINUED | OUTPATIENT
Start: 2025-05-19 | End: 2025-05-19 | Stop reason: HOSPADM

## 2025-05-19 RX ORDER — GABAPENTIN 100 MG/1
100 CAPSULE ORAL 3 TIMES DAILY
Status: DISCONTINUED | OUTPATIENT
Start: 2025-05-19 | End: 2025-05-20 | Stop reason: HOSPADM

## 2025-05-19 RX ORDER — OXYCODONE HYDROCHLORIDE 5 MG/1
5 TABLET ORAL EVERY 4 HOURS PRN
Status: DISCONTINUED | OUTPATIENT
Start: 2025-05-19 | End: 2025-05-20 | Stop reason: HOSPADM

## 2025-05-19 RX ORDER — DOXYCYCLINE HYCLATE 100 MG
100 TABLET ORAL 2 TIMES DAILY
Qty: 10 TABLET | Refills: 0 | Status: SHIPPED | OUTPATIENT
Start: 2025-05-19 | End: 2025-05-24

## 2025-05-19 RX ORDER — SENNOSIDES 8.6 MG
1 TABLET ORAL DAILY
Qty: 30 TABLET | Refills: 0 | Status: SHIPPED | OUTPATIENT
Start: 2025-05-19

## 2025-05-19 RX ORDER — SPIRONOLACTONE 25 MG/1
25 TABLET ORAL DAILY
Status: DISCONTINUED | OUTPATIENT
Start: 2025-05-20 | End: 2025-05-20 | Stop reason: HOSPADM

## 2025-05-19 RX ORDER — PREGABALIN 150 MG/1
150 CAPSULE ORAL ONCE
Status: COMPLETED | OUTPATIENT
Start: 2025-05-19 | End: 2025-05-19

## 2025-05-19 RX ORDER — NALOXONE HYDROCHLORIDE 0.4 MG/ML
INJECTION, SOLUTION INTRAMUSCULAR; INTRAVENOUS; SUBCUTANEOUS PRN
Status: DISCONTINUED | OUTPATIENT
Start: 2025-05-19 | End: 2025-05-19 | Stop reason: HOSPADM

## 2025-05-19 RX ORDER — ONDANSETRON 4 MG/1
4 TABLET, ORALLY DISINTEGRATING ORAL EVERY 8 HOURS PRN
Status: DISCONTINUED | OUTPATIENT
Start: 2025-05-19 | End: 2025-05-20 | Stop reason: HOSPADM

## 2025-05-19 RX ORDER — LIDOCAINE HYDROCHLORIDE 10 MG/ML
1 INJECTION, SOLUTION EPIDURAL; INFILTRATION; INTRACAUDAL; PERINEURAL
Status: DISCONTINUED | OUTPATIENT
Start: 2025-05-19 | End: 2025-05-19 | Stop reason: HOSPADM

## 2025-05-19 RX ORDER — PROPOFOL 10 MG/ML
INJECTION, EMULSION INTRAVENOUS
Status: DISCONTINUED | OUTPATIENT
Start: 2025-05-19 | End: 2025-05-19 | Stop reason: SDUPTHER

## 2025-05-19 RX ORDER — ONDANSETRON 4 MG/1
4 TABLET, FILM COATED ORAL 3 TIMES DAILY PRN
Status: DISCONTINUED | OUTPATIENT
Start: 2025-05-19 | End: 2025-05-19

## 2025-05-19 RX ORDER — BUPIVACAINE HYDROCHLORIDE 5 MG/ML
INJECTION, SOLUTION EPIDURAL; INTRACAUDAL; PERINEURAL
Status: COMPLETED | OUTPATIENT
Start: 2025-05-19 | End: 2025-05-19

## 2025-05-19 RX ORDER — LABETALOL HYDROCHLORIDE 5 MG/ML
10 INJECTION, SOLUTION INTRAVENOUS
Status: DISCONTINUED | OUTPATIENT
Start: 2025-05-19 | End: 2025-05-19 | Stop reason: HOSPADM

## 2025-05-19 RX ORDER — MAGNESIUM HYDROXIDE 1200 MG/15ML
LIQUID ORAL CONTINUOUS PRN
Status: DISCONTINUED | OUTPATIENT
Start: 2025-05-19 | End: 2025-05-19 | Stop reason: HOSPADM

## 2025-05-19 RX ORDER — ONDANSETRON 2 MG/ML
4 INJECTION INTRAMUSCULAR; INTRAVENOUS
Status: DISCONTINUED | OUTPATIENT
Start: 2025-05-19 | End: 2025-05-19 | Stop reason: HOSPADM

## 2025-05-19 RX ORDER — EPHEDRINE SULFATE 50 MG/ML
INJECTION INTRAVENOUS
Status: DISCONTINUED | OUTPATIENT
Start: 2025-05-19 | End: 2025-05-19 | Stop reason: SDUPTHER

## 2025-05-19 RX ORDER — SODIUM CHLORIDE 9 MG/ML
INJECTION, SOLUTION INTRAVENOUS CONTINUOUS
Status: DISCONTINUED | OUTPATIENT
Start: 2025-05-19 | End: 2025-05-20 | Stop reason: HOSPADM

## 2025-05-19 RX ORDER — OXYCODONE HYDROCHLORIDE 5 MG/1
10 TABLET ORAL EVERY 4 HOURS PRN
Status: DISCONTINUED | OUTPATIENT
Start: 2025-05-19 | End: 2025-05-20 | Stop reason: HOSPADM

## 2025-05-19 RX ADMIN — ROCURONIUM BROMIDE 100 MG: 10 INJECTION, SOLUTION INTRAVENOUS at 13:24

## 2025-05-19 RX ADMIN — Medication 100 MCG: at 13:41

## 2025-05-19 RX ADMIN — Medication 50 MCG: at 13:51

## 2025-05-19 RX ADMIN — GABAPENTIN 100 MG: 100 CAPSULE ORAL at 21:31

## 2025-05-19 RX ADMIN — ACETAMINOPHEN 1000 MG: 500 TABLET ORAL at 11:42

## 2025-05-19 RX ADMIN — TRANEXAMIC ACID 1000 MG: 100 INJECTION, SOLUTION INTRAVENOUS at 13:35

## 2025-05-19 RX ADMIN — EPHEDRINE SULFATE 10 MG: 50 INJECTION INTRAVENOUS at 13:51

## 2025-05-19 RX ADMIN — ONDANSETRON 4 MG: 2 INJECTION INTRAMUSCULAR; INTRAVENOUS at 13:51

## 2025-05-19 RX ADMIN — Medication 100 MCG: at 14:27

## 2025-05-19 RX ADMIN — SODIUM CHLORIDE 1500 MG: 0.9 INJECTION, SOLUTION INTRAVENOUS at 13:35

## 2025-05-19 RX ADMIN — FENTANYL CITRATE 25 MCG: 50 INJECTION INTRAMUSCULAR; INTRAVENOUS at 16:21

## 2025-05-19 RX ADMIN — CELECOXIB 400 MG: 200 CAPSULE ORAL at 11:43

## 2025-05-19 RX ADMIN — PREGABALIN 150 MG: 150 CAPSULE ORAL at 11:43

## 2025-05-19 RX ADMIN — ACETAMINOPHEN 650 MG: 325 TABLET ORAL at 21:31

## 2025-05-19 RX ADMIN — DEXAMETHASONE SODIUM PHOSPHATE 4 MG: 4 INJECTION INTRA-ARTICULAR; INTRALESIONAL; INTRAMUSCULAR; INTRAVENOUS; SOFT TISSUE at 13:51

## 2025-05-19 RX ADMIN — SUGAMMADEX 100 MG: 100 INJECTION, SOLUTION INTRAVENOUS at 15:16

## 2025-05-19 RX ADMIN — FENTANYL CITRATE 100 MCG: 50 INJECTION, SOLUTION INTRAMUSCULAR; INTRAVENOUS at 12:20

## 2025-05-19 RX ADMIN — EPHEDRINE SULFATE 5 MG: 50 INJECTION INTRAVENOUS at 14:10

## 2025-05-19 RX ADMIN — PROPOFOL 120 MG: 10 INJECTION, EMULSION INTRAVENOUS at 13:23

## 2025-05-19 RX ADMIN — BUPIVACAINE 10 ML: 13.3 INJECTION, SUSPENSION, LIPOSOMAL INFILTRATION at 12:20

## 2025-05-19 RX ADMIN — ASPIRIN 325 MG: 325 TABLET, COATED ORAL at 21:38

## 2025-05-19 RX ADMIN — BUPIVACAINE HYDROCHLORIDE 10 ML: 5 INJECTION, SOLUTION EPIDURAL; INTRACAUDAL; PERINEURAL at 12:20

## 2025-05-19 RX ADMIN — SODIUM CHLORIDE, SODIUM LACTATE, POTASSIUM CHLORIDE, AND CALCIUM CHLORIDE: .6; .31; .03; .02 INJECTION, SOLUTION INTRAVENOUS at 11:34

## 2025-05-19 RX ADMIN — WATER 2000 MG: 1 INJECTION INTRAMUSCULAR; INTRAVENOUS; SUBCUTANEOUS at 21:31

## 2025-05-19 RX ADMIN — ROSUVASTATIN CALCIUM 5 MG: 10 TABLET, FILM COATED ORAL at 21:31

## 2025-05-19 RX ADMIN — SODIUM CHLORIDE, PRESERVATIVE FREE 10 ML: 5 INJECTION INTRAVENOUS at 21:34

## 2025-05-19 RX ADMIN — SUGAMMADEX 100 MG: 100 INJECTION, SOLUTION INTRAVENOUS at 15:11

## 2025-05-19 RX ADMIN — LIDOCAINE HYDROCHLORIDE 100 MG: 20 INJECTION, SOLUTION INTRAVENOUS at 13:23

## 2025-05-19 RX ADMIN — ROCURONIUM BROMIDE 50 MG: 10 INJECTION, SOLUTION INTRAVENOUS at 14:16

## 2025-05-19 RX ADMIN — EPHEDRINE SULFATE 5 MG: 50 INJECTION INTRAVENOUS at 14:23

## 2025-05-19 RX ADMIN — Medication 100 MCG: at 13:45

## 2025-05-19 RX ADMIN — CEFTRIAXONE SODIUM 2000 MG: 2 INJECTION, POWDER, FOR SOLUTION INTRAMUSCULAR; INTRAVENOUS at 13:35

## 2025-05-19 ASSESSMENT — PAIN DESCRIPTION - LOCATION
LOCATION: SHOULDER
LOCATION: SHOULDER

## 2025-05-19 ASSESSMENT — PAIN - FUNCTIONAL ASSESSMENT
PAIN_FUNCTIONAL_ASSESSMENT: ACTIVITIES ARE NOT PREVENTED
PAIN_FUNCTIONAL_ASSESSMENT: 0-10
PAIN_FUNCTIONAL_ASSESSMENT: NONE - DENIES PAIN

## 2025-05-19 ASSESSMENT — PAIN DESCRIPTION - DESCRIPTORS
DESCRIPTORS: ACHING;SORE
DESCRIPTORS: ACHING;DISCOMFORT

## 2025-05-19 ASSESSMENT — PAIN DESCRIPTION - ORIENTATION
ORIENTATION: RIGHT
ORIENTATION: RIGHT

## 2025-05-19 ASSESSMENT — PAIN SCALES - GENERAL
PAINLEVEL_OUTOF10: 0
PAINLEVEL_OUTOF10: 7
PAINLEVEL_OUTOF10: 5

## 2025-05-19 ASSESSMENT — ENCOUNTER SYMPTOMS: SHORTNESS OF BREATH: 1

## 2025-05-19 NOTE — PROGRESS NOTES
Report from Irina, RN, patient awake and alert, denies any pain at this time, dressing CDI with shoulder immobilizer in place, VSS.  Per Irina's report, patient is just waiting for PT/OT.  Tolerated ice chips and mow drinking apple juice and tolerating well.

## 2025-05-19 NOTE — BRIEF OP NOTE
Brief Postoperative Note      Patient: Diana Zapata  YOB: 1950  MRN: [id number]    Date of Procedure: 5/19/2025    Pre-Op Diagnosis Codes:      * Right shoulder pain [M25.511]    Post-Op Diagnosis: Same       Procedure(s):  RIGHT SHOULDER ARTHROTOMY, REMOVAL OF FOREIGN BODY, REMOVAL OF SUBACROMIAL BALLOON SPACER, REVERSE TOTAL SHOULDER REPLACEMENT    Surgeon(s):  Sharad Gold MD Hasan, Samer, MD    Assistant:  Surgical Assistant: Clifford Berg  Fellow: Zenon Hamilton MD  Resident: Thelma Lee DO    Anesthesia: General    Estimated Blood Loss (mL): 300     Complications: None    Specimens:   * No specimens in log *    Implants:  * No implants in log *      Drains: * No LDAs found *    Findings:  Infection Present At Time Of Surgery (PATOS) (choose all levels that have infection present):  No infection present  Other Findings: None    Electronically signed by Zenon Hamilton MD on 5/19/2025 at 3:03 PM

## 2025-05-19 NOTE — ANESTHESIA POSTPROCEDURE EVALUATION
Department of Anesthesiology  Postprocedure Note    Patient: Diana Zapata  MRN: [id number]  YOB: 1950  Date of evaluation: 5/19/2025    Procedure Summary       Date: 05/19/25 Room / Location: [address]    Anesthesia Start: 1316 Anesthesia Stop: 1533    Procedure: RIGHT SHOULDER ARTHROTOMY, REMOVAL OF FOREIGN BODY, REMOVAL OF SUBACROMIAL BALLOON SPACER, RIGHT REVERSE TOTAL SHOULDER REPLACEMENT (Right: Shoulder) Diagnosis:       Right shoulder pain      (Right shoulder pain [M25.511])    Surgeons: Sharad Gold MD Responsible Provider: Sj Casillas MD    Anesthesia Type: General, Regional ASA Status: 3            Anesthesia Type: General, Regional    Flora Phase I: Flora Score: 10    Flora Phase II:      Anesthesia Post Evaluation    Patient location during evaluation: PACU  Patient participation: complete - patient participated  Level of consciousness: awake  Pain score: 2  Airway patency: patent  Cardiovascular status: blood pressure returned to baseline  Respiratory status: acceptable  Hydration status: euvolemic  Pain management: adequate    No notable events documented.

## 2025-05-19 NOTE — PROGRESS NOTES
Occupational Therapy/Physical Therapy  HOLD    Approached for OT/PT evaluations. Pt in PACU, reports feeling whoozy and is unable to focus her gaze. Deferred therapy at this time. Pt agreeable to admit to hospital and follow up with therapy in the morning. RN aware.    Darlene Tobin, OT [phone]  Phyllis Mercado PT  [phone]

## 2025-05-19 NOTE — PROGRESS NOTES
Patient meets criteria to transfer to floor per Flora Score. There are no available beds at this time.  Placed in CLINICAL DISCHARGE.      Vitals changed to every hour and Head - to - toe assessments changed to every 2 hours.  Family updated.    In: 1210 [I.V.:900]  Out: 300     Temp: 97.2 ( oral )  HR: 66  RR: 17  BP: 113/52

## 2025-05-19 NOTE — FLOWSHEET NOTE
[facility] Same Day Readiness Screen 2.0  PAT Questionnaire    Does patient have at least one day of 24 hr assist of capable caregiver at d/c?  [] Yes = 0  [] No = 6    Was patient using an assistive device to walk prior to surgery?  [] No = 0  [] Yes = 4    How many steps do you have to get to the floor where you plan to initially sleep and use the restroom? (At least 1/2 bath)  [] 0-2 steps= 0 [] 3+ steps = 1    Has patient fallen in the last 3 months? If yes, how many times?  [] 0 falls = 0 [] 1+ fall = 1    [] 2+ falls = 4    Does patient have a hx of post-op nausea/vomiting?  [] No = 0 [] Yes = 2    Other Factors    Age  [] <70 = 0 [x] 71-79 = 1  [] 80+ = 2                     BMI  [] <30 = 0       [x]31-39 = 1    [] >40 = 2    Pertinent co-morbidities (consider cardiopulmonary, cardiovascular, neurological, and psychiatric diagnoses)  [] 0 = 0           [] 1-2 = 2       [] 3+ = 4    Sleep apnea  [] No = 0         [] Yes = 1    Hx of prolonged emergence from general anesthesia  [] No = 0         [] Yes = 3      Score:       Interpretation:  Red (10-29): Low probability of safe same day discharge  Yellow (6-9): Moderate probability of safe same day discharge  Green (0-5): High probability of safe same day discharge    Score completed by:  Phyllis Mercado PT  [phone]

## 2025-05-19 NOTE — ANESTHESIA PROCEDURE NOTES
Peripheral Block    Patient location during procedure: pre-op  Reason for block: procedure for pain, post-op pain management and at surgeon's request  Start time: 5/19/2025 12:20 PM  End time: 5/19/2025 12:23 PM  Staffing  Performed: anesthesiologist   Anesthesiologist: Sj Casillas MD  Performed by: Sj Casillas MD  Authorized by: Sj Casillas MD    Preanesthetic Checklist  Completed: patient identified, IV checked, site marked, risks and benefits discussed, surgical/procedural consents, equipment checked, pre-op evaluation, timeout performed, anesthesia consent given, oxygen available, monitors applied/VS acknowledged, fire risk safety assessment completed and verbalized and blood product R/B/A discussed and consented  Peripheral Block   Patient position: supine  Prep: ChloraPrep  Provider prep: mask and sterile gloves  Patient monitoring: cardiac monitor, continuous pulse ox, continuous capnometry, frequent blood pressure checks, IV access, oxygen and responsive to questions  Block type: Brachial plexus  Interscalene  Laterality: right  Injection technique: single-shot  Guidance: ultrasound guided    Needle   Needle type: insulated echogenic nerve stimulator needle   Needle gauge: 22 G  Needle localization: ultrasound guidance  Needle length: 8 cm  Assessment   Injection assessment: negative aspiration for heme, no paresthesia on injection, local visualized surrounding nerve on ultrasound and no intravascular symptoms  Paresthesia pain: none  Slow fractionated injection: yes  Hemodynamics: stable  Outcomes: uncomplicated and patient tolerated procedure well    Additional Notes  10 ml 0.5% bupivacaine + 10 ml exparel, mixed and injected in 2 ml increments with negative aspiration between. No complications.  Medications Administered  fentaNYL (SUBLIMAZE) injection - IntraVENous   100 mcg - 5/19/2025 12:20:00 PM  BUPivacaine (MARCAINE) PF injection 0.5% - Perineural   10 mL - 5/19/2025 12:20:00

## 2025-05-19 NOTE — ANESTHESIA PRE PROCEDURE
TENOTOMY, REVISION ROTATOR CUFF REPAIR performed by Sharad Gold MD at [facility] OR   • SHOULDER ARTHROSCOPY  10/8/12 & 18    Both Left   • SHOULDER ARTHROSCOPY Right 3/17/2025    RIGHT SHOULDER, EXAM UNDER ANESTHESIA, DIAGNOSTIC SCOPE, SCOPE EXTENSIVE DEBRIDEMENT, PARTIAL SYNOVECTOMY, SUBACROMIAL DECOMPRESSION, ROTATOR CUFF REPAIR, AND INSERTION OF SUBACROMIAL BALLOON SPACER performed by Sharad Gold MD at [facility] OR   • SHOULDER SURGERY      Left   • SPINAL FUSION  13    Lumbar   • TOTAL KNEE ARTHROPLASTY Left 2019    LEFT TOTAL KNEE  ARTHROPLASTY performed by Jacek Coe MD at [facility] OR   • WRIST FRACTURE SURGERY  2018    Fell       Social History:    Social History     Tobacco Use   • Smoking status: Former     Current packs/day: 0.00     Average packs/day: 0.5 packs/day for 15.0 years (7.5 ttl pk-yrs)     Types: Cigarettes     Start date: 1990     Quit date: 2005     Years since quittin.8   • Smokeless tobacco: Never   • Tobacco comments:     Smoke free 19 years.   Was  Closet Smoker.  Sporadic   Substance Use Topics   • Alcohol use: No                                Counseling given: Not Answered  Tobacco comments: Smoke free 19 years.   Was  Closet Smoker.  Sporadic      Vital Signs (Current):   Vitals:    25 1159 25 1100   BP:  125/61   Pulse:  70   Resp:  18   Temp:  97.3 °F (36.3 °C)   TempSrc:  Temporal   SpO2:  97%   Weight: 95.3 kg (210 lb) 96.2 kg (212 lb)   Height: 1.575 m (5' 2\") 1.575 m (5' 2.01\")                                              BP Readings from Last 3 Encounters:   25 125/61   25 124/61   25 121/80       NPO Status: Time of last liquid consumption:                         Time of last solid consumption:                         Date of last liquid consumption: 25                        Date of last solid food consumption: 25    BMI:   Wt Readings from Last 3 Encounters:   25 96.2 kg (212 lb)

## 2025-05-19 NOTE — DISCHARGE INSTRUCTIONS
INSTRUCTIONS AFTER SHOULDER SURGERY    1. You have probably had a Scalene Block to your arm. Because this numbs the arm this is great for anesthesia since we didn’t have to give you as much anesthetic agent during the case--hopefully allowing you to feel more normal sooner. The block should last around 12 hours or so, but can last several days. We recommend that you take pain medication even if you are feeling only mild pain in the first couple of days after surgery. It is easier to maintain good pain relief than to try to “catch up”.     2. You may have to take the pain medicine every 4-8 hours for the first day or two. In the first two post operative days, if your pain is severe, it is ok to take two pain pills at a time. Limit this as much as possible. After this, you can wean off the medicine and just take it as needed. Be aware of limiting your tylenol intake as it is built into the narcotic medicines. Keep daily tylenol dose under 4000 milligrams.    3. All pain medications can make you constipated. Drink plenty of fluids and eat lots of fiber to combat this. You will be sent home with a prescription for Miralax. OK to supplement with other over-the-counter laxatives or stool softners as needed.    4. Leave the dressing in place, we will change it at your first post-operative appointment.    5. Getting comfortable for sleeping is difficult after this surgery. It gets easier after several days. Many patients sleep better in a reclining lounger like a Lay-Z-Boy or in bed with several pillows to help prop them up.    6. Ice packs are very helpful to reduce pain and inflammation after the surgery. Since the bandage is so thick you can leave the ice bags on top until the shoulder gets cold. Don’t ice bare skin however or you could get frostbite.    7. Anesthesia and the pain medication can cause nausea. You will be sent home with anti-nausea medication to take as needed if this is a problem.    8. You probably have  Pain medications should be taken with food.   Consider setting an alarm through the night to help manage your pain level so you do not wake up with too much pain.   Pain medicines can cause more sedation and decrease your breathing so ONLY take as directed. If you have Sleep Apnea, you definitely need to use your C-Pap machine.   What to expect after a nerve block:   Numbness, tingling- arm or leg feels heavy or asleep  Weakness or inability to move or control your arm or leg   Inability to feel temperature changes to your arm or leg  Usually the weakness wears off first, followed by the tingling or heaviness. You may notice more pain at this point and should start taking your pain meds.   If you had a shoulder block, you may experience:  Mild shortness of breath (may be relieved by sitting up in a chair or recliner)  Hoarse voice  Blurry vision  Unequal pupils  Drooping of your face (eye or lip) on the same side as the nerve block  Swelling at the injection site on the side of your neck  These side effects should resolve as the block wears off!   IF you have severe or prolonged shortness of breath-  GO to the nearest Emergency Room!   If you had a nerve block of your arm or leg:  Protect the arm or leg from extreme hot or cold temperatures  Protect the arm or leg from obstructing blood flow by frequent position changes- Make sure fingers/ toes stay pink and warm. Call surgeon with any changes  For leg block, get assistance walking until the block wears off, ie:  crutches, walker, support person   If you continue to feel the effects of the nerve block for longer than 72 hours- call Shilpa Bustamante at [phone] and ask to speak with the Anesthesiologist on call.          Your Surgeon or Anesthesiologist gave you Exparel     Exparel (bupivicaine liposome injectable suspension) is a medication injected into the surgical incision  just before the end of the procedure by your surgeon to help control your pain after

## 2025-05-19 NOTE — PROGRESS NOTES
Patient concerned about her dressing because she had a bad burning reaction to tape in the past. Charge nurse of PACU notified, charge nurse of OR notified. Charge nurse of OR said that he will get in touch with the surgeon or the resident to check if the dressing is appropriate considerating her allergies.

## 2025-05-19 NOTE — PROGRESS NOTES
PACU Transfer Note    Vitals:    05/19/25 1930   BP: 124/60   Pulse: 72   Resp: 16   Temp: 97.5 °F (36.4 °C)   SpO2: 95%       In: 330 [P.O.:250; I.V.:80]  Out: -     Pain assessment:    Pain Level: 0    Report given to Receiving unit ELISE Jha at bedside in PACU.    Transported by Hattie, PACU transporter.    5/19/2025 7:54 PM

## 2025-05-19 NOTE — PROGRESS NOTES
Patient admitted to PACU # 6 from OR at 1528 post RIGHT SHOULDER ARTHROTOMY, REMOVAL OF FOREIGN BODY, REMOVAL OF SUBACROMIAL BALLOON SPACER, RIGHT REVERSE TOTAL SHOULDER REPLACEMENT - Right  per Sharad Gold MD .  Attached to PACU monitoring system and report received from anesthesia provider.  Patient was reported to be hemodynamically stable during procedure.  Patient awake on admission and denied pain.

## 2025-05-19 NOTE — INTERVAL H&P NOTE
Update History & Physical    The patient's History and Physical of April 24, 2025 was reviewed with the patient and I examined the patient. There was no change. The surgical site was confirmed by the patient and me.     Plan: The risks, benefits, expected outcome, and alternative to the recommended procedure have been discussed with the patient. Patient understands and wants to proceed with the procedure.     Electronically signed by Zenon Hamilton MD on 5/19/2025 at 11:40 AM

## 2025-05-19 NOTE — FLOWSHEET NOTE
Procedure: peripheral block  MD: Dr. Casillas  Timeout performed 1219  Start time 1220  End time 1223  Pt monitored closely on heart monitor, 2L NC, continuous pulse oximetry, EtCO2, and frequent BPs.   Pt remained alert and oriented x4. pt tolerated procedure well.

## 2025-05-20 VITALS
BODY MASS INDEX: 39.01 KG/M2 | TEMPERATURE: 98.2 F | WEIGHT: 212 LBS | SYSTOLIC BLOOD PRESSURE: 107 MMHG | HEART RATE: 70 BPM | HEIGHT: 62 IN | DIASTOLIC BLOOD PRESSURE: 66 MMHG | OXYGEN SATURATION: 92 % | RESPIRATION RATE: 16 BRPM

## 2025-05-20 PROCEDURE — 97535 SELF CARE MNGMENT TRAINING: CPT

## 2025-05-20 PROCEDURE — G0378 HOSPITAL OBSERVATION PER HR: HCPCS

## 2025-05-20 PROCEDURE — 2580000003 HC RX 258

## 2025-05-20 PROCEDURE — 2500000003 HC RX 250 WO HCPCS

## 2025-05-20 PROCEDURE — 6360000002 HC RX W HCPCS

## 2025-05-20 PROCEDURE — 97162 PT EVAL MOD COMPLEX 30 MIN: CPT

## 2025-05-20 PROCEDURE — 6370000000 HC RX 637 (ALT 250 FOR IP)

## 2025-05-20 PROCEDURE — 6370000000 HC RX 637 (ALT 250 FOR IP): Performed by: ORTHOPAEDIC SURGERY

## 2025-05-20 PROCEDURE — 97166 OT EVAL MOD COMPLEX 45 MIN: CPT

## 2025-05-20 PROCEDURE — 97116 GAIT TRAINING THERAPY: CPT

## 2025-05-20 PROCEDURE — 97530 THERAPEUTIC ACTIVITIES: CPT

## 2025-05-20 RX ADMIN — OXYCODONE HYDROCHLORIDE AND ACETAMINOPHEN 500 MG: 500 TABLET ORAL at 10:26

## 2025-05-20 RX ADMIN — CITALOPRAM HYDROBROMIDE 20 MG: 20 TABLET ORAL at 10:26

## 2025-05-20 RX ADMIN — BENZOCAINE 6 MG-MENTHOL 10 MG LOZENGES 1 LOZENGE: at 01:26

## 2025-05-20 RX ADMIN — SPIRONOLACTONE 25 MG: 25 TABLET ORAL at 10:26

## 2025-05-20 RX ADMIN — LEVOTHYROXINE SODIUM 50 MCG: 0.05 TABLET ORAL at 05:53

## 2025-05-20 RX ADMIN — ACETAMINOPHEN 650 MG: 325 TABLET ORAL at 05:52

## 2025-05-20 RX ADMIN — ASPIRIN 325 MG: 325 TABLET, COATED ORAL at 10:26

## 2025-05-20 RX ADMIN — ACETAMINOPHEN 650 MG: 325 TABLET ORAL at 10:25

## 2025-05-20 RX ADMIN — GABAPENTIN 100 MG: 100 CAPSULE ORAL at 10:25

## 2025-05-20 RX ADMIN — SODIUM CHLORIDE: 0.9 INJECTION, SOLUTION INTRAVENOUS at 01:27

## 2025-05-20 RX ADMIN — SODIUM CHLORIDE, PRESERVATIVE FREE 10 ML: 5 INJECTION INTRAVENOUS at 10:27

## 2025-05-20 RX ADMIN — WATER 2000 MG: 1 INJECTION INTRAMUSCULAR; INTRAVENOUS; SUBCUTANEOUS at 05:52

## 2025-05-20 NOTE — PLAN OF CARE
Problem: Discharge Planning  Goal: Discharge to home or other facility with appropriate resources  Outcome: Progressing     Problem: Pain  Goal: Verbalizes/displays adequate comfort level or baseline comfort level  5/20/2025 1126 by Brandie Reyes, RN  Outcome: Progressing   Pt expresses limited pain to right shoulder due to block. Being treated with PRN pain medication, rest, and frequent repositioning with pillow support for comfort and pressure relief.     Problem: Safety - Adult  Goal: Free from fall injury  5/20/2025 1126 by Brandie Reyes, RN  Outcome: Progressing   All fall precautions in place. Bed locked and in lowest position with alarm on. Overbed table and personal belonings within reach. Call light within reach and patient instructed to use call light for assistance. Non-skid socks on.     Problem: Chronic Conditions and Co-morbidities  Goal: Patient's chronic conditions and co-morbidity symptoms are monitored and maintained or improved  Outcome: Progressing     Problem: ABCDS Injury Assessment  Goal: Absence of physical injury  Outcome: Progressing   Patient encouraged to communicate with nurse with ambulation needs. All safety equipment at bedside.

## 2025-05-20 NOTE — PROGRESS NOTES
Patient is A&Ox4. VSS this shift on RA. Patient has nerve block in the right sx site that is wearing off with limited to no pain. Patient has strong pulse, circulation and ambulation in RUE. Patient is tolerating PO diet. Tolerating ambulation x1 assist with gait belt, walker and R shoulder immobilizer. Voiding via BRP. Patient updated on plan of care. Fall and safety precautions in place, call light within reach.

## 2025-05-20 NOTE — OP NOTE
[place] [id number]                            OPERATIVE REPORT      PATIENT NAME: PARMINDER TALBOT                  : 1950  MED REC NO: [id number]                      ROOM: 5524  ACCOUNT NO: [id number]                       ADMIT DATE: 2025  PROVIDER: Sharad Gold MD      DATE OF PROCEDURE:  2025    SURGEON:  Sharad Gold MD    PREOPERATIVE DIAGNOSIS:  Recurrent massive irreparable rotator cuff tear, right shoulder with retained subacromial balloon spacer.    POSTOPERATIVE DIAGNOSIS:  Recurrent massive irreparable rotator cuff tear, right shoulder with retained subacromial balloon spacer with posteriorly displaced subacromial balloon spacer, extensive scarring.    PROCEDURES:  Right shoulder arthrotomy with removal foreign object, open lysis of adhesions, reverse total shoulder replacement.  Modifier 22 applied because of the complexity brought on by extensive scarring from prior surgery, which complicated all aspects of procedure requiring additional 30% to 40% added work and effort compared to an otherwise uncomplicated primary reverse shoulder arthroplasty.    ANESTHESIA:  General anesthesia, interscalene block.    IV FLUIDS:  800 mL of crystalloid.    ESTIMATED BLOOD LOSS:  300 mL.    COMPLICATIONS:  None.    ASSISTANTS:  Thelma Watson DO, and Jayson Hamilton MD.  The availability of at least one of these skilled first assistants was critically important to help with the humeral and glenoid exposure and also assisted with exposure, removed the subacromial balloon spacer, translocated posteriorly.    IMPLANT REMOVED:  Subacromial balloon spacer, deflated.    IMPLANT INSERTED:  DJO/Enovis size 12 short stem small shell AltiVate, 36 neutral liner, 36 neutral glenosphere, standard baseplate with 4 locking screws.    FINDINGS:  Examination under anesthesia revealed global motion deficits.  Arthrotomy  the subscapularis and excised some rotator interval scar.  We exposed the glenoid circumferentially.  We used a Fukuda retractor retracting humeral head.  We excised the labrum with a long-handled 15 blade and cautery.  We removed all the labrum.  We used a Walsh elevator, stripped some of the residual cartilage that was all macerated and degenerative.  We released the inferior capsule, which was quite thick, and portions of the inferior and anterior capsule were excised.  At this point, we had good en face view of the glenoid.  We drilled our center line bicortically.  We then measured to 30 mm.  We inserted our tap and then reamed over the tap and started with a small reamer, reaming predominantly inferiorly.  We were within the inferior aspect of the glenoid without any overhang.  We reamed adequately until we had a good preparation of the bone.  We removed the tap.  We used a bur to remove a little bit of bone on the periphery.  We used cautery to release a little bit more of the capsule and the labral attachments.  We then inserted our monoblock baseplate.  We had excellent scratch fit.  We placed our guide and drilled for our peripheral locking screws.  We recorded the screw lengths with a calibrated drill bit.  We inserted screws under power and then hand tightened these one by one.  We used a chamfer reamer around the baseplate.  We irrigated copiously.  We empirically chose a 32 neutral glenosphere and impacted that in place.  We inserted the set screw and engaged with torque-limiting .  We dislocated the humeral head, removed the bone protector.  This was when we uncovered a little bit of residual exposed humeral head inferiorly, so we had to re-cut just a little bit.  We could appreciate softness of the bone.  We used a canal finer to find the canal, reamed up to a size 12, broached up to a size 12 broach, seated the broach.  We removed one of the anchors from the upper subscapularis repair.  We

## 2025-05-20 NOTE — PLAN OF CARE
Problem: Pain  Goal: Verbalizes/displays adequate comfort level or baseline comfort level  Outcome: Progressing  Pt currently denies pain due to block, pt educated on pain control.     Problem: Safety - Adult  Goal: Free from fall injury  Outcome: Progressing   All fall precautions in place. Bed locked and in lowest position with alarm on. Overbed table and personal belonings within reach. Call light within reach and patient instructed to use call light for assistance. Non-skid socks on.

## 2025-05-20 NOTE — PROGRESS NOTES
Total Joint Same Day Readiness Screen 2.0  PAT Questionnaire    Does patient have at least one day of 24 hr assist of capable caregiver at d/c?  [] Yes = 0  [x] No = 6    Was patient using an assistive device to walk prior to surgery?  [] No = 0  [x] Yes = 4    How many steps do you have to get to the floor where you plan to initially sleep and use the restroom? (At least 1/2 bath)  [] 0-2 steps= 0 [x] 3+ steps = 1    Has patient fallen in the last 3 months? If yes, how many times?  [] 0 falls = 0 [] 1+ fall = 1    [x] 2+ falls = 4    Does patient have a hx of post-op nausea/vomiting?  [x] No = 0 [] Yes = 2    Other Factors    Age  [] <70 = 0 [x] 71-79 = 1  [] 80+ = 2                     BMI  [] <30 = 0       [x]31-39 = 1    [] >40 = 2    Pertinent co-morbidities (consider cardiopulmonary, cardiovascular, neurological, and psychiatric diagnoses)  [] 0 = 0           [] 1-2 = 2       [x] 3+ = 4    Sleep apnea  [x] No = 0         [] Yes = 1    Hx of prolonged emergence from general anesthesia  [x] No = 0         [] Yes = 3      Score: 21      Interpretation:  Red (10-29): Low probability of safe same day discharge  Yellow (6-9): Moderate probability of safe same day discharge  Green (0-5): High probability of safe same day discharge    Score completed by:  WILFREDO Olivares/RUBA [id number]

## 2025-05-20 NOTE — PROGRESS NOTES
10/2/13 & 4/16/14 & 6/03/20 (2)); laminectomy (2/14/81 & 11/21/97); Carpal tunnel release (1/17/07 & 11/23/11); Shoulder arthroscopy (10/8/12 & 7/19/18); Spinal fusion (12/20/13); Abdomen surgery (7/18/16 & 8/15/16); Wrist fracture surgery (09/11/2018); Shoulder arthroscopy (Right, 3/17/2025); and shoulder surgery (Right, 5/19/2025).    Treatment Diagnosis: Impaired ADL status; Impaired functional mobility      Assessment  Performance deficits / Impairments: Decreased functional mobility ;Decreased ADL status;Decreased strength;Decreased ROM;Decreased endurance;Decreased high-level IADLs;Decreased balance  Assessment: Pt from home alone and reportedly independent with ADLs and IADLs at baseline. Pt functioning below baseline this date as she required CGA for transfers and functional mobility using a SPC. Pt also max A for UB dressing and min A for LB dressing ADLs this date. Pt able to complete grooming ADLs with CGA but required min A for toileting. Pt given extensive education in UB dressing techniques/ compensatory strategies to complete ADLs. Pt also education in post operative precautions. Pt would benefit from additional OT to increase functional independence with ADLs. Recommend 24hr A upon discharge to maximize safety. Continue per POC.  Treatment Diagnosis: Impaired ADL status; Impaired functional mobility  Decision Making: Medium Complexity  REQUIRES OT FOLLOW-UP: Yes  Activity Tolerance  Activity Tolerance: Patient Tolerated treatment well     Plan  Occupational Therapy Plan  Times Per Week: POD #1-3: 7 days/week; then 1x/day, 5-7 days per week  Current Treatment Recommendations: Strengthening, Balance training, Safety education & training, Functional mobility training, Patient/Caregiver education & training, Equipment evaluation, education, & procurement, Endurance training, Positioning, Self-Care / ADL    Restrictions  Restrictions/Precautions  Restrictions/Precautions: Fall Risk, Weight Bearing (high  donning shirt; Pt able to walk therapist through doffing/donning shoulder immobilizer with min A through intermittent VCs; Pt educated in UB dressing techniques  LE Dressing: Minimal assistance  LE Dressing Skilled Clinical Factors: Pt min A to thread LEs through pants while seated; min A to pull up over hips in stance  Toileting: Minimal assistance  Toileting Skilled Clinical Factors: Pt min A for clothing management in stance; Pt able to complete pericare while seated with SBA  Functional Mobility: Contact guard assistance  Functional Mobility Skilled Clinical Factors: Pt completing to/from bathroom with CGA using SPC  Product Used : Bath wipes;Soap and water        Bed mobility  Supine to Sit: Stand by assistance  Scooting: Stand by assistance  Bed Mobility Comments: HOB elevated; use of rail  Transfers  Stand Step Transfers: Contact guard assistance (with SPC)  Sit to stand: Contact guard assistance  Stand to sit: Contact guard assistance  Transfer Comments: VCs for safe transfer technique  Vision  Vision: Impaired  Vision Exceptions: Wears glasses for distance (watching TV)  Hearing  Hearing: Within functional limits  Cognition  Overall Cognitive Status: WFL  Orientation  Overall Orientation Status: Within Functional Limits  Orientation Level: Oriented X4                  Education Given To: Patient  Education Provided: Role of Therapy;Transfer Training;Mobility Training;Plan of Care;Equipment;Precautions;Orientation;ADL Adaptive Strategies  Education Provided Comments: Pt educated on weight bearing status, post-op precautions, appropriate DME, and safe mobility with AD; adaptive strategies for UB dressing, shoulder immoblizer management, compensatory strategeis to complete grooming ADLs, use of LHS for bathing, fall prevention upon discharge home; using TTB in tub shower rather than using shower chair to reduce fall risk  Education Method: Demonstration;Verbal  Barriers to Learning: None  Education Outcome:

## 2025-05-20 NOTE — PROGRESS NOTES
Called Dr. Hamilton for admit orders since patient still woozy for PT/OT   [id number] Detailed [id number] Comprehensive

## 2025-05-20 NOTE — PROGRESS NOTES
Pt refused early ambulation to chair. Pt educated on importance of early ambulation post-operatively. Pt verbalized understanding and verbalized want to ambulate to chair later this morning.

## 2025-05-20 NOTE — CARE COORDINATION
Patient is discharging home with daughter support.  No CM needs at this time.    Nelida Upton, RN, BSN,    Ortho/Neuro   [phone]

## 2025-05-20 NOTE — PROGRESS NOTES
Pt to d/c to home. 1 PIV removed. D/c packet fully reviewed with Pt with emphasis on f/u appointments, medications, and home safety, Pt verbalized understanding. All belonging with Pt.

## 2025-05-20 NOTE — PROGRESS NOTES
VSS on RA. Pt A+O x4. PT voiding via BRP x1 GB and cane. Pt denies pain due to block. Pt tolerating PO diet/fluids. All fall precautions in place, call light within reach.

## 2025-05-20 NOTE — PROGRESS NOTES
tray for all mobility in home, uses RW at work, uses cane for short community distances)  Prior Level of Assist for Transfers: Independent  Active : Yes  Occupation: Part time employment  Type of Occupation:  at [employer]  Leisure & Hobbies: computer  Additional Comments: Pt reports daughter should be able to come at least 1x/day but is going to ask daughter if she can stay with her for the first couple days  Vision/Hearing  Vision  Vision: Impaired  Vision Exceptions: Wears glasses for distance (watching TV)  Hearing  Hearing: Within functional limits    Cognition   Orientation  Overall Orientation Status: Within Functional Limits  Orientation Level: Oriented X4  Cognition  Overall Cognitive Status: WFL    Objective  Temp: 98.2 °F (36.8 °C)  Pulse: 70  Heart Rate Source: Monitor  Respirations: 16  SpO2: 92 %  O2 Device: None (Room air)  BP: 107/66  MAP (Calculated): 80  BP Location: Left upper arm  BP Method: Automatic  Patient Position: Sitting        Gross Assessment  Sensation: Intact (denies numbness or tingling; right shoulder \"a little numb\" since surgery)       AROM RLE (degrees)  RLE AROM: WFL  AROM LLE (degrees)  LLE AROM : WFL  Strength RLE  Comment: at least 3+/5 by observation  Strength LLE  Comment: at least 3+/5 by observation              Transfers  Sit to Stand: Stand by assistance (to left single point cane)  Stand to Sit: Stand by assistance (from left single point cane)  Stand Pivot Transfers: Stand by assistance (with left single point cane)  Ambulation  Surface: Level tile  Device: Single point cane (left)  Other Apparatus:  (right shoulder immobilizer)  Assistance: Contact guard assistance  Quality of Gait: 150ft x 2 - seated rest in between trials  Distance: trendelenburg gait noted, wide base of support, decreased (B) step length, foot clearance, and heel strike; slow valerie  Comments: patient reports she plans to use RW with one hand upon d/c - \"I've been doing it  Method: Demonstration;Verbal  Barriers to Learning: None  Education Outcome: Verbalized understanding;Demonstrated understanding      Therapy Time   Individual Concurrent Group Co-treatment   Time In 1116         Time Out 1145         Minutes 29              Timed Code Treatment Minutes: 14 minutes    Total Treatment Minutes: 29 Minutes    If patient discharges prior to next treatment, this note will serve as discharge summary.    Keiry Mandujano PT, DPT #[id number]

## 2025-05-20 NOTE — DISCHARGE SUMMARY
Discharge Summary  Total Shoulder Arthroplasty      Date:  2025    Name:  Diana Zapata  Address:  [address] [place] [id number]    :  1950      Age:   75 y.o.    SSN:  [id number]      Medical Record Number:  [id number]    Discharge Date:  2025     Admitting Physician  Sharad Gold MD     Discharge Physician:  Thelma Lee DO     Admission Diagnoses:  Right shoulder pain [M25.511]  Arthritis of right shoulder [M19.011]     Discharge Diagnoses:  Right shoulder pain [M25.511]  Arthritis of right shoulder [M19.011]     Treatments:  right reverse shoulder arthroplasty     Hospital Course:   This is a 75 y.o. female with a history of  right shoulder massive rotator cuff repair with subacromial balloon spacer that failed conservative treatment.  The patient elected to undergo right reverse shoulder arthroplasty after discussing the risks, benefits, and alternatives to surgery.  Right reverse shoulder arthroplasty with removal of retained orthopedic subacromial balloon spacer was performed without complication.  Post op their diet was advanced as tolerated.  Pain was controlled with a combination of IV and PO meds. DVT prophylaxis was with a combination of SCDs and ASA 325mg bid for 14 days. On POD #1 the patient participated in physical therapy and made appropriate progress.  After evaluation by the orthopaedic surgeon and the physical therapist, discharge was allowed on POD  #1.  The patient was medically stable during hospitalization.    Consults:   Medicine     Significant Diagnostic Studies:  none     Disposition:  Home     Meds:  See Med Reconciliation    Patient Instructions:   Activity: NWB operative arm in sling at all times  Diet: regular diet  Wound Care: keep wound clean and dry  DVT Prophylaxis: ASA 325mg bid for 14 days  Antibiotic Prophylaxis: doxycycline 100 mg twice a day for 5 days     Follow-up with Physical Therapy and Surgeon as currently scheduled    Thelma 
normal...

## 2025-05-20 NOTE — DISCHARGE INSTR - COC
Continuity of Care Form    Patient Name: Diana Zapata   :  1950  MRN:  [id number]    Admit date:  2025  Discharge date:  ***    Code Status Order: Full Code   Advance Directives:    Date/Time Healthcare Directive Type of Healthcare Directive Copy in Chart Healthcare Agent Appointed Healthcare Agent's Name Healthcare Agent's Phone Number    25 1121 Yes, patient has an advance directive for healthcare treatment  Living will  No, copy requested from family  Healthcare power of   cuca  [phone]             Admitting Physician:  Sharad Gold MD  PCP: Jayden Bundy III, MD    Discharging Nurse: ***  Discharging Hospital Unit/Room#: 5524/5524-01  Discharging Unit Phone Number: ***    Emergency Contact:   Extended Emergency Contact Information  Primary Emergency Contact: Cuca Eugene  Address: [address] [place] [address] [facility]  Home Phone: [phone]  Mobile Phone: [phone]  Relation: Child  Secondary Emergency Contact: German Zapata  Address: [address] [place] [address]  Home Phone: [phone]  Mobile Phone: [phone]  Relation: Child    Past Surgical History:  Past Surgical History:   Procedure Laterality Date    ABDOMEN SURGERY  16 & 8/15/16    Appendectomy & Repair/Abdominal Incisional Hernia -[facility]    APPENDECTOMY  2016    BACK SURGERY  x3 total     BLADDER REPAIR      CARDIAC CATHETERIZATION  2018    CARPAL TUNNEL RELEASE  07 & 11    Right & Left    FINGER TRIGGER RELEASE  ; 10/13; , 6/3/20    Multiple fingers    FOOT SURGERY  2009    Hammer Toe-Left 2nd Digit    HAMMER TOE SURGERY      Right.    HAND SURGERY  13 & 10/2/13 & 14 & 20 (2)    cont. - 20 (2 trig fingers same day) All trgger fingers    HERNIA REPAIR      ABDOMINAL INCISIONAL HERNIA REPAIR    HIP SURGERY Bilateral     total hip replacement     HYSTERECTOMY (CERVIX  Trivalent PF, 0.5mL 01/08/2019    Pneumococcal, PCV-13, PREVNAR 13, (age 6w+), IM, 0.5mL 12/22/2017    Pneumococcal, PPSV23, PNEUMOVAX 23, (age 2y+), SC/IM, 0.5mL 01/08/2019    TDaP, ADACEL (age 10y-64y), BOOSTRIX (age 10y+), IM, 0.5mL 01/25/2012    Zoster Live (Zostavax) 11/14/2013    Zoster Recombinant (Shingrix) 08/12/2019       Active Problems:  Patient Active Problem List   Diagnosis Code    Neck pain M54.2    Depression F32.A    Herpes zoster B02.9    Other and unspecified hyperlipidemia E78.5    Spinal stenosis M48.00    Raynaud's disease I73.00    GERD (gastroesophageal reflux disease) K21.9    Essential hypertension I10    Mixed hyperlipidemia E78.2    Patellofemoral stress syndrome of left knee M22.2X2    Patella, 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    Effusion of left knee M25.462    Primary osteoarthritis of left knee M17.12    Meniscus, medial, derangement M23.305    Diarrhea of infectious origin A09    Morbid obesity (HCC) E66.01    RAMOS (dyspnea on exertion) R06.09    Stroke ([facility]) I63.9    Pre-syncope R55    S/P total knee arthroplasty, left Z96.652    Primary osteoarthritis of left hip M16.12    Acute postoperative pain of left hip G89.18, M25.552    Decreased range of left hip movement M25.652    Weakness of left hip R29.898    Complete rotator cuff tear or rupture of right shoulder, not specified as traumatic M75.121    Right shoulder pain M25.511    Arthritis of right shoulder M19.011       Isolation/Infection:   Isolation            No Isolation          Patient Infection Status    None to display         Nurse Assessment:  Last Vital Signs: /66   Pulse 70   Temp 98.2 °F (36.8 °C) (Oral)   Resp 16   Ht 1.575 m (5' 2.01\")   Wt 96.2 kg (212 lb)   SpO2 92%   BMI 38.77 kg/m²     Last documented pain score (0-10 scale): Pain Level: 0  Last Weight:   Wt Readings from Last 1 Encounters:   05/19/25 96.2 kg (212 lb)     Mental Status:  {IP PT MENTAL STATUS:20030}    IV Access:  { CAIO IV

## 2025-05-20 NOTE — PROGRESS NOTES
4 Eyes Skin Assessment     NAME:  Diana Zapata  YOB: 1950  MEDICAL RECORD NUMBER:  [id number]    The patient is being assessed for  Post-Op Surgical    I agree that at least one RN has performed a thorough Head to Toe Skin Assessment on the patient. ALL assessment sites listed below have been assessed.      Areas assessed by both nurses:    Head, Face, Ears, Shoulders, Back, Chest, Arms, Elbows, Hands, Sacrum. Buttock, Coccyx, Ischium, Legs. Feet and Heels, and Under Medical Devices         Does the Patient have a Wound? No noted wound(s)       Gustavo Prevention initiated by RN: Yes  Wound Care Orders initiated by RN: No    Pressure Injury (Stage 3,4, Unstageable, DTI, NWPT, and Complex wounds) if present, place Wound referral order by RN under : No    New Ostomies, if present place, Ostomy referral order under : No     Nurse 1 eSignature: Electronically signed by STANISLAV IBRAHIM RN on 5/20/25 at 4:11 AM EDT    **SHARE this note so that the co-signing nurse can place an eSignature**    Nurse 2 eSignature: Electronically signed by Minerva Cook RN on 5/20/25 at 5:42 AM EDT

## 2025-05-21 ENCOUNTER — TELEPHONE (OUTPATIENT)
Dept: ORTHOPEDIC SURGERY | Age: 75
End: 2025-05-21

## 2025-05-21 NOTE — TELEPHONE ENCOUNTER
Called patient for PO evaluation after Right shoulder surgery on 05/19/25 with Dr. Gold.  Unable to reach patient, left voicemail.  Instructed patient to call for any questions or concerns.  She is scheduled to start therapy on 05/23/25, and has her first PO appointment on 05/29/25.    Barbara Paz  Ortho Nurse Navigator  [phone]

## 2025-05-23 ENCOUNTER — EVALUATION (OUTPATIENT)
Dept: PHYSICAL THERAPY | Age: 75
End: 2025-05-23

## 2025-05-23 DIAGNOSIS — G89.18 ACUTE POSTOPERATIVE PAIN OF RIGHT SHOULDER: Primary | ICD-10-CM

## 2025-05-23 DIAGNOSIS — M25.511 ACUTE POSTOPERATIVE PAIN OF RIGHT SHOULDER: Primary | ICD-10-CM

## 2025-05-23 DIAGNOSIS — M25.611 DECREASED RIGHT SHOULDER RANGE OF MOTION: ICD-10-CM

## 2025-05-23 DIAGNOSIS — M75.121 NONTRAUMATIC COMPLETE TEAR OF RIGHT ROTATOR CUFF: ICD-10-CM

## 2025-05-23 DIAGNOSIS — R29.898 WEAKNESS OF RIGHT UPPER EXTREMITY: ICD-10-CM

## 2025-05-23 NOTE — PROGRESS NOTES
[facility]: [address] [place] [address]   office: [phone]  fax: [phone]     Physical Therapy Initial Evaluation Certification      Dear Sharad Gold MD ,    We had the pleasure of evaluating the following patient for physical therapy services at [facility].  A summary of our findings can be found in the initial assessment below.  This includes our plan of care.  If you have any questions or concerns regarding these findings, please do not hesitate to contact me at the office phone number listed above.  Thank you for the referral.     Physician Signature:_______________________________Date:__________________  By signing above (or electronic signature), therapist’s plan is approved by physician       Physical Therapy: TREATMENT/PROGRESS NOTE   Patient: Diana Zapata (75 y.o. female)   Examination Date: 2025   :  1950 MRN: [id number]   Visit #: 5   Insurance Allowable Auth Needed   BMN []Yes    []No    Insurance: Payor: MEDICARE / Plan: MEDICARE PART A AND B / Product Type: *No Product type* /   Insurance ID: [id number] - (Medicare)  Secondary Insurance (if applicable):    Treatment Diagnosis:     ICD-10-CM    1. Acute postoperative pain of right shoulder  G89.18     M25.511       2. Decreased right shoulder range of motion  M25.611       3. Nontraumatic complete tear of right rotator cuff  M75.121       4. Weakness of right upper extremity  R29.898          Medical Diagnosis:  PROCEDURES: Right shoulder arthrotomy with removal foreign object, open lysis of adhesions, reverse total shoulder replacement. Modifier 22 applied because of the complexity brought on by extensive scarring from prior surgery, which complicated all aspects of procedure requiring additional 30% to 40% added work and effort compared to an otherwise uncomplicated primary reverse shoulder arthroplasty.          Referring Physician:

## 2025-05-29 ENCOUNTER — OFFICE VISIT (OUTPATIENT)
Dept: ORTHOPEDIC SURGERY | Age: 75
End: 2025-05-29

## 2025-05-29 VITALS — WEIGHT: 212 LBS | HEIGHT: 62 IN | BODY MASS INDEX: 39.01 KG/M2

## 2025-05-29 DIAGNOSIS — M25.511 RIGHT SHOULDER PAIN, UNSPECIFIED CHRONICITY: Primary | ICD-10-CM

## 2025-05-29 PROCEDURE — 99024 POSTOP FOLLOW-UP VISIT: CPT | Performed by: PHYSICIAN ASSISTANT

## 2025-05-29 NOTE — PROGRESS NOTES
History of Present Illness:  Diana Zapata is a 75 y.o. female who presents for a post operative visit.  The patient underwent a right reverse total shoulder arthroplasty for a displaced subacromial balloon spacer on 5/19/2025 by Dr. Sharad Gold.  Overall she is doing okay and feels that their pain is well controlled with current pain medications.  She has been compliant with wearing the UltraSling brace at all times.  She is hoping to have her staples removed today.  The patient deny fevers, chills, numbness, tingling, and shortness of breath.    Medical History:  Patient's medications, allergies, past medical, surgical, social and family histories were reviewed and updated as appropriate.    No notes on file    Review of Systems  A 14 point review of systems was completed by the patient is available in the media section of the scanned medical record and was reviewed on 5/29/2025.      Vital Signs:  There were no vitals filed for this visit.    General/Appearance: Alert and oriented and in no apparent distress.    Skin:  There are no skin lesions, cellulitis, or extreme edema. The patient has warm and well-perfused Bilateral upper extremities with brisk capillary refill.      right Shoulder Exam:    Inspection: right shoulder incision that is clean, dry and intact and well approximated.  Staples are still in place.   Mild ecchymosis and swelling are present as can be expected. There is no erythema, drainage or other signs of infection    Palpation:  No crepitus to gentle motion    Active Range of Motion: Deferred    Passive Range of Motion: Tolerates pendulum movement.    Strength:  Deferred    Special Tests:  Deferred.    Neurovascular: Sensation to light touch is intact, no motor deficits, palpable radial pulses 2+    Radiology:     Plain radiographs of the 3 shoulder comprising 3 views, AP in and out axillary lateral views were obtained and reviewed in the office: Shows postsurgical changes from the reverse total

## 2025-05-30 ENCOUNTER — TREATMENT (OUTPATIENT)
Dept: PHYSICAL THERAPY | Age: 75
End: 2025-05-30
Payer: MEDICARE

## 2025-05-30 DIAGNOSIS — M25.511 ACUTE POSTOPERATIVE PAIN OF RIGHT SHOULDER: Primary | ICD-10-CM

## 2025-05-30 DIAGNOSIS — G89.18 ACUTE POSTOPERATIVE PAIN OF RIGHT SHOULDER: Primary | ICD-10-CM

## 2025-05-30 DIAGNOSIS — M75.121 NONTRAUMATIC COMPLETE TEAR OF RIGHT ROTATOR CUFF: ICD-10-CM

## 2025-05-30 DIAGNOSIS — M25.611 DECREASED RIGHT SHOULDER RANGE OF MOTION: ICD-10-CM

## 2025-05-30 DIAGNOSIS — R29.898 WEAKNESS OF RIGHT UPPER EXTREMITY: ICD-10-CM

## 2025-05-30 PROCEDURE — 97530 THERAPEUTIC ACTIVITIES: CPT | Performed by: PHYSICAL THERAPIST

## 2025-05-30 PROCEDURE — 97110 THERAPEUTIC EXERCISES: CPT | Performed by: PHYSICAL THERAPIST

## 2025-05-30 NOTE — PROGRESS NOTES
[facility]: [address] [place] [address]   office: [phone]  fax: [phone]         Physical Therapy: TREATMENT/PROGRESS NOTE   Patient: Diana Zapata (75 y.o. female)   Examination Date: 2025   :  1950 MRN: [id number]   Visit #: 6   Insurance Allowable Auth Needed   BMN []Yes    []No    Insurance: Payor: MEDICARE / Plan: MEDICARE PART A AND B / Product Type: *No Product type* /   Insurance ID: [id number] - (Medicare)  Secondary Insurance (if applicable):    Treatment Diagnosis:     ICD-10-CM    1. Acute postoperative pain of right shoulder  G89.18     M25.511       2. Decreased right shoulder range of motion  M25.611       3. Nontraumatic complete tear of right rotator cuff  M75.121       4. Weakness of right upper extremity  R29.898          Medical Diagnosis:  PROCEDURES: Right shoulder arthrotomy with removal foreign object, open lysis of adhesions, reverse total shoulder replacement. Modifier 22 applied because of the complexity brought on by extensive scarring from prior surgery, which complicated all aspects of procedure requiring additional 30% to 40% added work and effort compared to an otherwise uncomplicated primary reverse shoulder arthroplasty.          Referring Physician: Sharad Gold MD  PCP: Jayden Bundy III, MD     Plan of care signed (Y/N):     Date of Patient follow up with Physician:  2025     Plan of Care Report: NO  POC update due: (10 visits /OR AUTH LIMITS, whichever is less)   2025                                             Medical History:  Comorbidities:  Cancer/Tumor  Hypertension  Stroke/TIA  Osteoarthritis  COVID-19  Relevant Medical History: Left shoulder RTC repair , left shoulder RTC revision , R DILAN , L DILAN , L TKA , R TKA   (see chart for complete surgical and PMHx), right shoulder partial repair of massive RTC tear and insertion of subacromial

## 2025-06-12 ENCOUNTER — OFFICE VISIT (OUTPATIENT)
Dept: ORTHOPEDIC SURGERY | Age: 75
End: 2025-06-12

## 2025-06-12 DIAGNOSIS — Z96.611 S/P REVERSE TOTAL SHOULDER ARTHROPLASTY, RIGHT: Primary | ICD-10-CM

## 2025-06-12 PROCEDURE — 99024 POSTOP FOLLOW-UP VISIT: CPT | Performed by: ORTHOPAEDIC SURGERY

## 2025-06-12 NOTE — PROGRESS NOTES
2025.      Impression:  Encounter Diagnosis   Name Primary?    S/P reverse total shoulder arthroplasty, right Yes         Office Procedures:  Orders Placed This Encounter   Procedures    [facility] [place] [facility]     Referral Priority:   Routine     Referral Type:   Physical Therapy     Referral Reason:   Specialty Services Required     Requested Specialty:   Physical Therapy     Number of Visits Requested:   1       Treatment Plan:    Overall,Diana Zaapta is doing well.  The pain is well-controlled.  We recommend that the pain can DC the bump on her sling.  She is still come out of the sling when she is at home.  Otherwise she is to wear it when she is out and about.  We will give a print out of their physical therapy order.  She is to maintain strict nonweightbearing with the right upper extremity.    All of her questions were fully answered today.  We would like to see her back in 4 weeks for follow-up visit.    6/12/2025  5:13 PM    Gerry Parrish DO  Sports Medicine Fellow  [facility]      This dictation was performed with a verbal recognition program (DRAGON) and it was checked for errors.  It is possible that there are still dictated errors within this office note.  If so, please bring any errors to my attention for an addendum.  All efforts were made to ensure that this office note is accurate.  ______________  I was physically present and personally supervised the Orthopaedic Sports Medicine Fellow in the evaluation and development of a treatment plan for this patient. I personally interviewed the patient and performed a physical examination. In addition, I discussed the patient's condition and treatment options with them. I have also reviewed and agree with the past medical, family and social history unless otherwise noted. All of the patient's questions were answered.         Sharad Gold MD, PhD  6/12/2025

## 2025-06-16 ENCOUNTER — TELEPHONE (OUTPATIENT)
Dept: ORTHOPEDIC SURGERY | Age: 75
End: 2025-06-16

## 2025-06-17 ENCOUNTER — TREATMENT (OUTPATIENT)
Dept: PHYSICAL THERAPY | Age: 75
End: 2025-06-17
Payer: MEDICARE

## 2025-06-17 DIAGNOSIS — R29.898 WEAKNESS OF RIGHT UPPER EXTREMITY: ICD-10-CM

## 2025-06-17 DIAGNOSIS — M25.511 ACUTE POSTOPERATIVE PAIN OF RIGHT SHOULDER: Primary | ICD-10-CM

## 2025-06-17 DIAGNOSIS — G89.18 ACUTE POSTOPERATIVE PAIN OF RIGHT SHOULDER: Primary | ICD-10-CM

## 2025-06-17 DIAGNOSIS — M25.611 DECREASED RIGHT SHOULDER RANGE OF MOTION: ICD-10-CM

## 2025-06-17 DIAGNOSIS — M75.121 NONTRAUMATIC COMPLETE TEAR OF RIGHT ROTATOR CUFF: ICD-10-CM

## 2025-06-17 PROCEDURE — 97530 THERAPEUTIC ACTIVITIES: CPT | Performed by: PHYSICAL THERAPIST

## 2025-06-17 PROCEDURE — 97110 THERAPEUTIC EXERCISES: CPT | Performed by: PHYSICAL THERAPIST

## 2025-06-17 PROCEDURE — 97140 MANUAL THERAPY 1/> REGIONS: CPT | Performed by: PHYSICAL THERAPIST

## 2025-06-17 NOTE — PROGRESS NOTES
[facility]: [address] [place] [address]   office: [phone]  fax: [phone]     Physical Therapy Re-Certification Plan of Care    Dear Sharad Gold MD  ,    We had the pleasure of treating the following patient for physical therapy services at [facility]. A summary of our findings can be found in the updated assessment below.  This includes our plan of care.  If you have any questions or concerns regarding these findings, please do not hesitate to contact me at the office phone number checked above.  Thank you for the referral.     Physician Signature:________________________________Date:__________________  By signing above (or electronic signature), therapist's plan is approved by physician      Total Visits: 7     Overall Response to Treatment:  Patient is responding well to treatment and improvement is noted with regards to goals and Patient should continue to improve in reasonable time if they continue HEP    Recommendation:    [x] Continue PT 1-2x / wk for 4-6 weeks.   [] Hold PT, pending MD visit   [] Discharge to [facility]. Follow up with PT or MD PRN.       Physical Therapy: TREATMENT/PROGRESS NOTE   Patient: Diana Zapata (75 y.o. female)   Examination Date: 2025   :  1950 MRN: [id number]   Visit #: 7   Insurance Allowable Auth Needed   BMN []Yes    []No    Insurance: Payor: MEDICARE / Plan: MEDICARE PART A AND B / Product Type: *No Product type* /   Insurance ID: [id number] - (Medicare)  Secondary Insurance (if applicable):    Treatment Diagnosis:     ICD-10-CM    1. Acute postoperative pain of right shoulder  G89.18     M25.511       2. Decreased right shoulder range of motion  M25.611       3. Nontraumatic complete tear of right rotator cuff  M75.121       4. Weakness of right upper extremity  R29.898          Medical Diagnosis:  PROCEDURES: Right shoulder arthrotomy with removal foreign object,

## 2025-06-19 ENCOUNTER — OFFICE VISIT (OUTPATIENT)
Dept: ORTHOPEDIC SURGERY | Age: 75
End: 2025-06-19

## 2025-06-19 VITALS — HEIGHT: 62 IN | BODY MASS INDEX: 39.01 KG/M2 | WEIGHT: 212 LBS

## 2025-06-19 DIAGNOSIS — Z96.611 S/P REVERSE TOTAL SHOULDER ARTHROPLASTY, RIGHT: Primary | ICD-10-CM

## 2025-06-19 PROCEDURE — 99024 POSTOP FOLLOW-UP VISIT: CPT | Performed by: ORTHOPAEDIC SURGERY

## 2025-06-19 RX ORDER — MELOXICAM 15 MG/1
15 TABLET ORAL DAILY
Qty: 30 TABLET | Refills: 1 | Status: SHIPPED | OUTPATIENT
Start: 2025-06-19

## 2025-06-19 NOTE — PROGRESS NOTES
History of Present Illness:  Diana Zapata is a pleasant 75 y.o. female who presents for a post operative visit.  The patient underwent a right reverse total shoulder arthroplasty for a displaced subacromial balloon spacer on 5/19/2025 by Dr. Sharad Gold.  She is now 4.5 weeks out from surgery.  She has been going to physical therapy at [facility].   She was last seen 6/12 and was doing well until the pillow was removed on 6/13.  She now reports increased anterior and lateral shoulder that is worse with movement.  No falls or traumatic events.  The patient denies any fevers, chills, numbness, tingling, and shortness of breath.       Medical History:  Patient's medications, allergies, past medical, surgical, social and family histories were reviewed and updated as appropriate.    No notes on file    Review of Systems  A 14 point review of systems was completed by the patient and is available in the media section of the scanned medical record and was reviewed on 6/19/2025.      Vital Signs:  There were no vitals filed for this visit.    General/Appearance: Alert and oriented and in no apparent distress.    Skin:  There are no skin lesions, cellulitis, or extreme edema. The patient has warm and well-perfused Bilateral upper extremities with brisk capillary refill.      Right Shoulder Exam:    Inspection: Shoulder incision well healed. Mild swelling present as can be expected. There is no erythema, drainage or other signs of infection    Palpation:  + subacromial crepitus to gentle motion, TTP over anterior joint line    Active Assisted Range of Motion: Forward Elevation 90, External Rotation 10    Passive Range of Motion:  deferred    Strength:  Deferred    Special Tests:  Deferred.    Neurovascular: Sensation to light touch is intact, no motor deficits, palpable radial pulses 2+    Radiology:     No new XR obtained at this time.          Assessment :  Ms. Diana Zapata is a pleasant 75 y.o. patient who is now 4.5

## 2025-06-25 ENCOUNTER — TREATMENT (OUTPATIENT)
Dept: PHYSICAL THERAPY | Age: 75
End: 2025-06-25
Payer: MEDICARE

## 2025-06-25 DIAGNOSIS — M25.511 ACUTE POSTOPERATIVE PAIN OF RIGHT SHOULDER: Primary | ICD-10-CM

## 2025-06-25 DIAGNOSIS — M25.611 DECREASED RIGHT SHOULDER RANGE OF MOTION: ICD-10-CM

## 2025-06-25 DIAGNOSIS — M25.511 CHRONIC RIGHT SHOULDER PAIN: ICD-10-CM

## 2025-06-25 DIAGNOSIS — M75.121 NONTRAUMATIC COMPLETE TEAR OF RIGHT ROTATOR CUFF: ICD-10-CM

## 2025-06-25 DIAGNOSIS — R29.898 WEAKNESS OF RIGHT UPPER EXTREMITY: ICD-10-CM

## 2025-06-25 DIAGNOSIS — G89.29 CHRONIC RIGHT SHOULDER PAIN: ICD-10-CM

## 2025-06-25 DIAGNOSIS — G89.18 ACUTE POSTOPERATIVE PAIN OF RIGHT SHOULDER: Primary | ICD-10-CM

## 2025-06-25 PROCEDURE — 97140 MANUAL THERAPY 1/> REGIONS: CPT | Performed by: PHYSICAL THERAPIST

## 2025-06-25 PROCEDURE — 97110 THERAPEUTIC EXERCISES: CPT | Performed by: PHYSICAL THERAPIST

## 2025-06-25 PROCEDURE — 97530 THERAPEUTIC ACTIVITIES: CPT | Performed by: PHYSICAL THERAPIST

## 2025-06-25 NOTE — PROGRESS NOTES
[facility]: [address] [place] [address]   office: [phone]  fax: [phone]        Physical Therapy: TREATMENT/PROGRESS NOTE   Patient: Diana Zapata (75 y.o. female)   Examination Date: 2025   :  1950 MRN: [id number]   Visit #: 8   Insurance Allowable Auth Needed   BMN []Yes    []No    Insurance: Payor: MEDICARE / Plan: MEDICARE PART A AND B / Product Type: *No Product type* /   Insurance ID: [id number] - (Medicare)  Secondary Insurance (if applicable):    Treatment Diagnosis:   No diagnosis found.     Medical Diagnosis:  PROCEDURES: Right shoulder arthrotomy with removal foreign object, open lysis of adhesions, reverse total shoulder replacement. Modifier 22 applied because of the complexity brought on by extensive scarring from prior surgery, which complicated all aspects of procedure requiring additional 30% to 40% added work and effort compared to an otherwise uncomplicated primary reverse shoulder arthroplasty.      Referring Physician: Sharad Gold MD  PCP: Jayden Bundy III, MD     Plan of care signed (Y/N):     Date of Patient follow up with Physician:   2025     Plan of Care Report: NO  POC update due: (10 visits /OR AUTH LIMITS, whichever is less)   2025                                             Medical History:  Comorbidities:  Cancer/Tumor  Hypertension  Stroke/TIA  Osteoarthritis  COVID-19  Relevant Medical History: Left shoulder RTC repair , left shoulder RTC revision 2018, R DILAN 2018, L DILAN , L TKA 2019, R TKA   (see chart for complete surgical and PMHx), right shoulder partial repair of massive RTC tear and insertion of subacromial balloon spacer  3/17/2025                                         Precautions/ Contra-indications:           Latex allergy:  NO  Pacemaker:    NO  Contraindications for Manipulation: recent surgical history (relative)  Date of Surgery:

## 2025-07-03 ENCOUNTER — OFFICE VISIT (OUTPATIENT)
Dept: ORTHOPEDIC SURGERY | Age: 75
End: 2025-07-03

## 2025-07-03 ENCOUNTER — TREATMENT (OUTPATIENT)
Dept: PHYSICAL THERAPY | Age: 75
End: 2025-07-03
Payer: MEDICARE

## 2025-07-03 VITALS — BODY MASS INDEX: 39.01 KG/M2 | WEIGHT: 212 LBS | HEIGHT: 62 IN

## 2025-07-03 DIAGNOSIS — M25.611 DECREASED RIGHT SHOULDER RANGE OF MOTION: ICD-10-CM

## 2025-07-03 DIAGNOSIS — M25.511 ACUTE POSTOPERATIVE PAIN OF RIGHT SHOULDER: Primary | ICD-10-CM

## 2025-07-03 DIAGNOSIS — M75.121 NONTRAUMATIC COMPLETE TEAR OF RIGHT ROTATOR CUFF: ICD-10-CM

## 2025-07-03 DIAGNOSIS — G89.18 ACUTE POSTOPERATIVE PAIN OF RIGHT SHOULDER: Primary | ICD-10-CM

## 2025-07-03 DIAGNOSIS — Z96.611 S/P REVERSE TOTAL SHOULDER ARTHROPLASTY, RIGHT: Primary | ICD-10-CM

## 2025-07-03 DIAGNOSIS — M25.511 RIGHT SHOULDER PAIN, UNSPECIFIED CHRONICITY: ICD-10-CM

## 2025-07-03 PROCEDURE — 97530 THERAPEUTIC ACTIVITIES: CPT | Performed by: PHYSICAL THERAPIST

## 2025-07-03 PROCEDURE — 99024 POSTOP FOLLOW-UP VISIT: CPT | Performed by: ORTHOPAEDIC SURGERY

## 2025-07-03 PROCEDURE — 97140 MANUAL THERAPY 1/> REGIONS: CPT | Performed by: PHYSICAL THERAPIST

## 2025-07-03 PROCEDURE — 97110 THERAPEUTIC EXERCISES: CPT | Performed by: PHYSICAL THERAPIST

## 2025-07-03 NOTE — PROGRESS NOTES
[facility]: [address] [place] [address]   office: [phone]  fax: [phone]        Physical Therapy: TREATMENT/PROGRESS NOTE   Patient: Diana Zapata (75 y.o. female)   Examination Date: 2025   :  1950 MRN: [id number]   Visit #: 9   Insurance Allowable Auth Needed   BMN []Yes    []No    Insurance: Payor: MEDICARE / Plan: MEDICARE PART A AND B / Product Type: *No Product type* /   Insurance ID: [id number] - (Medicare)  Secondary Insurance (if applicable):    Treatment Diagnosis:     ICD-10-CM    1. Acute postoperative pain of right shoulder  G89.18     M25.511       2. Decreased right shoulder range of motion  M25.611       3. Nontraumatic complete tear of right rotator cuff  M75.121            Medical Diagnosis:  PROCEDURES: Right shoulder arthrotomy with removal foreign object, open lysis of adhesions, reverse total shoulder replacement. Modifier 22 applied because of the complexity brought on by extensive scarring from prior surgery, which complicated all aspects of procedure requiring additional 30% to 40% added work and effort compared to an otherwise uncomplicated primary reverse shoulder arthroplasty.      Referring Physician: Sharad Gold MD  PCP: Jayden Bundy III, MD     Plan of care signed (Y/N):     Date of Patient follow up with Physician:        Plan of Care Report: NO  POC update due: (10 visits /OR AUTH LIMITS, whichever is less)   2025                                             Medical History:  Comorbidities:  Cancer/Tumor  Hypertension  Stroke/TIA  Osteoarthritis  COVID-19  Relevant Medical History: Left shoulder RTC repair , left shoulder RTC revision , R DILAN , L DILAN , L TKA , R TKA   (see chart for complete surgical and PMHx), right shoulder partial repair of massive RTC tear and insertion of subacromial balloon spacer  3/17/2025

## 2025-07-07 NOTE — PROGRESS NOTES
Chief Complaint    Shoulder Pain (Ck right shoulder)      History of Present Illness:  Diana Zapata is a pleasant, 75 y.o., female, here today for follow up of her right shoulder. The patient is now 6 weeks out following a right reverse total shoulder arthroplasty for a displaced subacromial balloon spacer. Surgery occurred on 5/19/25. She has continued in physical therapy at the [facility]. The patient reports today that she overall is doing well but does complain of some increased deltoid pain. She reports no new injuries.    Pain Assessment  Location of Pain: Shoulder  Location Modifiers: Right  Severity of Pain: 6  Quality of Pain: Aching, Sharp  Duration of Pain: Persistent  Frequency of Pain: Constant  Aggravating Factors: Bending, Stretching, Straightening  Limiting Behavior: Some  Relieving Factors: Rest  Result of Injury: No  Work-Related Injury: No  Are there other pain locations you wish to document?: No      Medical History:  Patient's medications, allergies, past medical, surgical, social and family histories were reviewed and updated as appropriate.    No notes on file    Review of Systems  A 14 point review of systems was completed by the patient and is available in the media section of the scanned medical record and was reviewed on 7/7/2025.  The review is negative with the exception of those things mentioned in the HPI and Past Medical History    Vital Signs:  There were no vitals filed for this visit.    General/Appearance: Alert and oriented and in no apparent distress.    Skin:  There are no skin lesions, cellulitis, or extreme edema. The patient has warm and well-perfused Bilateral upper extremities with brisk capillary refill.      Right Shoulder Exam:  Inspection: No gross deformities, no signs of infection.    Palpation: Deferred    Active Range of Motion:  Forward Elevation 80, External Rotation 30 (-lag)    Passive Range of Motion: Deferred    Strength: Deferred    Special Tests:

## 2025-07-11 ENCOUNTER — TREATMENT (OUTPATIENT)
Dept: PHYSICAL THERAPY | Age: 75
End: 2025-07-11
Payer: MEDICARE

## 2025-07-11 DIAGNOSIS — G89.18 ACUTE POSTOPERATIVE PAIN OF RIGHT SHOULDER: Primary | ICD-10-CM

## 2025-07-11 DIAGNOSIS — M25.511 ACUTE POSTOPERATIVE PAIN OF RIGHT SHOULDER: Primary | ICD-10-CM

## 2025-07-11 DIAGNOSIS — M75.121 NONTRAUMATIC COMPLETE TEAR OF RIGHT ROTATOR CUFF: ICD-10-CM

## 2025-07-11 DIAGNOSIS — M25.611 DECREASED RIGHT SHOULDER RANGE OF MOTION: ICD-10-CM

## 2025-07-11 PROCEDURE — 97110 THERAPEUTIC EXERCISES: CPT | Performed by: PHYSICAL THERAPIST

## 2025-07-11 PROCEDURE — 97530 THERAPEUTIC ACTIVITIES: CPT | Performed by: PHYSICAL THERAPIST

## 2025-07-11 NOTE — PROGRESS NOTES
[facility]: [address] [place] [address]   office: [phone]  fax: [phone]        Physical Therapy: TREATMENT/PROGRESS NOTE   Patient: Diana Zapata (75 y.o. female)   Examination Date: 2025   :  1950 MRN: [id number]   Visit #: 10   Insurance Allowable Auth Needed   BMN []Yes    []No    Insurance: Payor: MEDICARE / Plan: MEDICARE PART A AND B / Product Type: *No Product type* /   Insurance ID: [id number] - (Medicare)  Secondary Insurance (if applicable):    Treatment Diagnosis:     ICD-10-CM    1. Acute postoperative pain of right shoulder  G89.18     M25.511       2. Nontraumatic complete tear of right rotator cuff  M75.121       3. Decreased right shoulder range of motion  M25.611            Medical Diagnosis:  PROCEDURES: Right shoulder arthrotomy with removal foreign object, open lysis of adhesions, reverse total shoulder replacement. Modifier 22 applied because of the complexity brought on by extensive scarring from prior surgery, which complicated all aspects of procedure requiring additional 30% to 40% added work and effort compared to an otherwise uncomplicated primary reverse shoulder arthroplasty.      Referring Physician: Sharad Gold MD  PCP: Jayden Bundy III, MD     Plan of care signed (Y/N):     Date of Patient follow up with Physician:        Plan of Care Report: NO  POC update due: (10 visits /OR AUTH LIMITS, whichever is less)   2025                                             Medical History:  Comorbidities:  Cancer/Tumor  Hypertension  Stroke/TIA  Osteoarthritis  COVID-19  Relevant Medical History: Left shoulder RTC repair , left shoulder RTC revision , R DILAN , L DILAN , L TKA , R TKA   (see chart for complete surgical and PMHx), right shoulder partial repair of massive RTC tear and insertion of subacromial balloon spacer  3/17/2025

## 2025-07-14 ENCOUNTER — TREATMENT (OUTPATIENT)
Dept: PHYSICAL THERAPY | Age: 75
End: 2025-07-14
Payer: MEDICARE

## 2025-07-14 DIAGNOSIS — M75.121 NONTRAUMATIC COMPLETE TEAR OF RIGHT ROTATOR CUFF: ICD-10-CM

## 2025-07-14 DIAGNOSIS — M25.511 ACUTE POSTOPERATIVE PAIN OF RIGHT SHOULDER: Primary | ICD-10-CM

## 2025-07-14 DIAGNOSIS — M25.611 DECREASED RIGHT SHOULDER RANGE OF MOTION: ICD-10-CM

## 2025-07-14 DIAGNOSIS — G89.18 ACUTE POSTOPERATIVE PAIN OF RIGHT SHOULDER: Primary | ICD-10-CM

## 2025-07-14 PROCEDURE — 97530 THERAPEUTIC ACTIVITIES: CPT | Performed by: PHYSICAL THERAPIST

## 2025-07-14 PROCEDURE — 97110 THERAPEUTIC EXERCISES: CPT | Performed by: PHYSICAL THERAPIST

## 2025-07-14 NOTE — PROGRESS NOTES
contact, billed in 15-minute increments.  (04631) MANUAL THERAPY -  Manual therapy techniques, 1 or more regions, each 15 minutes (Mobilization/manipulation, manual lymphatic drainage, manual traction) for the purpose of modulating pain, promoting relaxation,  increasing ROM, reducing/eliminating soft tissue swelling/inflammation/restriction, improving soft tissue extensibility and allowing for proper ROM for normal function with self care, mobility, lifting and ambulation    GOALS     Patient stated goal: to be painfree, stronger, and able to raise arm overhead easily  [] Progressing: [] Met: [] Not Met: [] Adjusted    Therapist goals for Patient:   Short Term Goals: To be achieved in: 2 weeks  1Independent in HEP and progression per patient tolerance, in order to prevent re-injury.   [] Progressing: [] Met: [] Not Met: [] Adjusted  Patient will have a decrease in pain to <0-2/10 to facilitate improvement in movement, function, and ADLs as indicated by Functional Deficits.  [] Progressing: [] Met: [] Not Met: [] Adjusted   and care for custom orthotic device. (Only if applicable for orthotic eval)     Long Term Goals: To be achieved in: 8-12 weeks  Disability index score of 30% or less for the Quick DASH to assist with reaching prior level of function with activities such as ADLs on her own.  [] Progressing: [] Met: [] Not Met: [] Adjusted  Patient will demonstrate increased AROM of right shoulder to 70-80% of her left without pain to allow for proper joint functioning to enable patient to reach away from her body and begin reaching overhead.   [] Progressing: [] Met: [] Not Met: [] Adjusted  Patient will demonstrate increased Strength of right shoulder to at least 4+/5 throughout without pain to allow for proper functional mobility to enable patient to return to do PLOF around her house.   [] Progressing: [] Met: [] Not Met: [] Adjusted  Patient will return to work (maybe light duty at first) without increased

## 2025-07-16 RX ORDER — MELOXICAM 15 MG/1
15 TABLET ORAL DAILY
Qty: 30 TABLET | Refills: 1 | Status: SHIPPED | OUTPATIENT
Start: 2025-07-16

## 2025-07-17 ENCOUNTER — TREATMENT (OUTPATIENT)
Dept: PHYSICAL THERAPY | Age: 75
End: 2025-07-17

## 2025-07-17 DIAGNOSIS — M75.121 NONTRAUMATIC COMPLETE TEAR OF RIGHT ROTATOR CUFF: ICD-10-CM

## 2025-07-17 DIAGNOSIS — M25.511 ACUTE POSTOPERATIVE PAIN OF RIGHT SHOULDER: Primary | ICD-10-CM

## 2025-07-17 DIAGNOSIS — M25.611 DECREASED RIGHT SHOULDER RANGE OF MOTION: ICD-10-CM

## 2025-07-17 DIAGNOSIS — G89.18 ACUTE POSTOPERATIVE PAIN OF RIGHT SHOULDER: Primary | ICD-10-CM

## 2025-07-17 NOTE — PROGRESS NOTES
Precautions/ Contra-indications:           Latex allergy:  NO  Pacemaker:    NO  Contraindications for Manipulation: recent surgical history (relative)  Date of Surgery: 5/19/2025  Other:    Red Flags:  None    Suicide Screening:   The patient did not verbalize a primary behavioral concern, suicidal ideation, suicidal intent, or demonstrate suicidal behaviors.    Preferred Language for Healthcare:   [x] English       [] other:    SUBJECTIVE EXAMINATION     Patient stated complaint:   8+ weeks post op  She states she has continued to do well.    Pain remains low.   She is sleeping well in her recliner.    She is planning to try her bed some time this weekend.            FROM 3/20/2025 Post op PT visit:   Patient has had her left RTC repaired 2 times before.    She has been here for PT for multiple body parts.     More recently, her right shoulder has been declining.     Received many cortisone injections with temporary relief.    She had lengthy discussions with Dr Gold about this shoulder.    Reverse TSA vs the balloon spacer.    She is now here s/p massive RTC repair.   MD able to repair upper border of subscapularis tendon with subacromial balloon spacer.    This was performed on 3/17/2025.    She had a nerve block and that wore off the next day.   She has been taking some pain meds here and there.     Has a friend that is driving her to her appointments now.      She is sleeping in the recliner.    She has kept her sling on since surgery, just took it off once to change clothes.     She is anxious to take a shower.              Test used Initial score  5/23/2025 07/17/2025   Pain Summary VAS 1-10 1/10   Functional questionnaire Quick DASH 77% disability    Other:              Pain:  Pain location: right shoulder and upper arm  Patient describes pain to be intermittent, dull, and aching  Pain decreases with: Sitting, Resting, Ice, and Medication  Pain increases with: Activity and Movement     Living status:

## 2025-07-21 ENCOUNTER — TREATMENT (OUTPATIENT)
Dept: PHYSICAL THERAPY | Age: 75
End: 2025-07-21
Payer: MEDICARE

## 2025-07-21 DIAGNOSIS — M75.121 NONTRAUMATIC COMPLETE TEAR OF RIGHT ROTATOR CUFF: ICD-10-CM

## 2025-07-21 DIAGNOSIS — M25.611 DECREASED RIGHT SHOULDER RANGE OF MOTION: ICD-10-CM

## 2025-07-21 DIAGNOSIS — M25.511 ACUTE POSTOPERATIVE PAIN OF RIGHT SHOULDER: Primary | ICD-10-CM

## 2025-07-21 DIAGNOSIS — G89.18 ACUTE POSTOPERATIVE PAIN OF RIGHT SHOULDER: Primary | ICD-10-CM

## 2025-07-21 PROCEDURE — 97530 THERAPEUTIC ACTIVITIES: CPT | Performed by: PHYSICAL THERAPIST

## 2025-07-21 PROCEDURE — 97110 THERAPEUTIC EXERCISES: CPT | Performed by: PHYSICAL THERAPIST

## 2025-07-21 NOTE — PROGRESS NOTES
[facility]: [address] [place] [address]   office: [phone]  fax: [phone]        Physical Therapy: TREATMENT/PROGRESS NOTE   Patient: Diana Zapata (75 y.o. female)   Examination Date: 2025   :  1950 MRN: [id number]   Visit #: 13   Insurance Allowable Auth Needed   BMN []Yes    []No    Insurance: Payor: MEDICARE / Plan: MEDICARE PART A AND B / Product Type: *No Product type* /   Insurance ID: [id number] - (Medicare)  Secondary Insurance (if applicable):    Treatment Diagnosis:     ICD-10-CM    1. Acute postoperative pain of right shoulder  G89.18     M25.511       2. Nontraumatic complete tear of right rotator cuff  M75.121       3. Decreased right shoulder range of motion  M25.611            Medical Diagnosis:  PROCEDURES: Right shoulder arthrotomy with removal foreign object, open lysis of adhesions, reverse total shoulder replacement. Modifier 22 applied because of the complexity brought on by extensive scarring from prior surgery, which complicated all aspects of procedure requiring additional 30% to 40% added work and effort compared to an otherwise uncomplicated primary reverse shoulder arthroplasty.      Referring Physician: Sharad Gold MD  PCP: Jayden Bundy III, MD     Plan of care signed (Y/N):     Date of Patient follow up with Physician:   2025     Plan of Care Report: NO  POC update due: (10 visits /OR AUTH LIMITS, whichever is less)   2025                                             Medical History:  Comorbidities:  Cancer/Tumor  Hypertension  Stroke/TIA  Osteoarthritis  COVID-19  Relevant Medical History: Left shoulder RTC repair , left shoulder RTC revision , R DILAN , L DILAN , L TKA , R TKA   (see chart for complete surgical and PMHx), right shoulder partial repair of massive RTC tear and insertion of subacromial balloon spacer  3/17/2025

## 2025-07-24 ENCOUNTER — TREATMENT (OUTPATIENT)
Dept: PHYSICAL THERAPY | Age: 75
End: 2025-07-24
Payer: MEDICARE

## 2025-07-24 DIAGNOSIS — R29.898 WEAKNESS OF RIGHT UPPER EXTREMITY: ICD-10-CM

## 2025-07-24 DIAGNOSIS — M25.511 ACUTE POSTOPERATIVE PAIN OF RIGHT SHOULDER: Primary | ICD-10-CM

## 2025-07-24 DIAGNOSIS — M75.121 NONTRAUMATIC COMPLETE TEAR OF RIGHT ROTATOR CUFF: ICD-10-CM

## 2025-07-24 DIAGNOSIS — G89.18 ACUTE POSTOPERATIVE PAIN OF RIGHT SHOULDER: Primary | ICD-10-CM

## 2025-07-24 DIAGNOSIS — M25.611 DECREASED RIGHT SHOULDER RANGE OF MOTION: ICD-10-CM

## 2025-07-24 PROCEDURE — 97530 THERAPEUTIC ACTIVITIES: CPT | Performed by: PHYSICAL THERAPIST

## 2025-07-24 PROCEDURE — 97110 THERAPEUTIC EXERCISES: CPT | Performed by: PHYSICAL THERAPIST

## 2025-07-28 ENCOUNTER — TREATMENT (OUTPATIENT)
Dept: PHYSICAL THERAPY | Age: 75
End: 2025-07-28
Payer: MEDICARE

## 2025-07-28 DIAGNOSIS — M75.121 NONTRAUMATIC COMPLETE TEAR OF RIGHT ROTATOR CUFF: ICD-10-CM

## 2025-07-28 DIAGNOSIS — M25.511 ACUTE POSTOPERATIVE PAIN OF RIGHT SHOULDER: Primary | ICD-10-CM

## 2025-07-28 DIAGNOSIS — M25.611 DECREASED RIGHT SHOULDER RANGE OF MOTION: ICD-10-CM

## 2025-07-28 DIAGNOSIS — G89.18 ACUTE POSTOPERATIVE PAIN OF RIGHT SHOULDER: Primary | ICD-10-CM

## 2025-07-28 DIAGNOSIS — R29.898 WEAKNESS OF RIGHT UPPER EXTREMITY: ICD-10-CM

## 2025-07-28 PROCEDURE — 97530 THERAPEUTIC ACTIVITIES: CPT | Performed by: PHYSICAL THERAPIST

## 2025-07-28 PROCEDURE — 97110 THERAPEUTIC EXERCISES: CPT | Performed by: PHYSICAL THERAPIST

## 2025-07-28 NOTE — PROGRESS NOTES
[facility]: [address] [place] [address]   office: [phone]  fax: [phone]    Physical Therapy Re-Certification Plan of Care    Dear Sharad Gold MD  ,    We had the pleasure of treating the following patient for physical therapy services at [facility]. A summary of our findings can be found in the updated assessment below.  This includes our plan of care.  If you have any questions or concerns regarding these findings, please do not hesitate to contact me at the office phone number checked above.  Thank you for the referral.     Physician Signature:________________________________Date:__________________  By signing above (or electronic signature), therapist's plan is approved by physician      Total Visits: 15     Overall Response to Treatment:  Patient is responding well to treatment and improvement is noted with regards to goals and Patient should continue to improve in reasonable time if they continue HEP    Recommendation:    [x] Continue PT 2x / wk for 4-6 weeks.   [] Hold PT, pending MD visit   [] Discharge to [facility]. Follow up with PT or MD PRN.         Physical Therapy: TREATMENT/PROGRESS NOTE   Patient: Diana Zapata (75 y.o. female)   Examination Date: 2025   :  1950 MRN: [id number]   Visit #: 15   Insurance Allowable Auth Needed   BMN []Yes    []No    Insurance: Payor: MEDICARE / Plan: MEDICARE PART A AND B / Product Type: *No Product type* /   Insurance ID: [id number] - (Medicare)  Secondary Insurance (if applicable):    Treatment Diagnosis:     ICD-10-CM    1. Acute postoperative pain of right shoulder  G89.18     M25.511       2. Nontraumatic complete tear of right rotator cuff  M75.121       3. Decreased right shoulder range of motion  M25.611       4. Weakness of right upper extremity  R29.898            Medical Diagnosis:  PROCEDURES: Right shoulder arthrotomy with removal foreign

## 2025-07-31 ENCOUNTER — OFFICE VISIT (OUTPATIENT)
Dept: ORTHOPEDIC SURGERY | Age: 75
End: 2025-07-31

## 2025-07-31 ENCOUNTER — TREATMENT (OUTPATIENT)
Dept: PHYSICAL THERAPY | Age: 75
End: 2025-07-31
Payer: MEDICARE

## 2025-07-31 VITALS — BODY MASS INDEX: 38.78 KG/M2 | HEIGHT: 62 IN

## 2025-07-31 DIAGNOSIS — M25.611 DECREASED RIGHT SHOULDER RANGE OF MOTION: ICD-10-CM

## 2025-07-31 DIAGNOSIS — G89.18 ACUTE POSTOPERATIVE PAIN OF RIGHT SHOULDER: Primary | ICD-10-CM

## 2025-07-31 DIAGNOSIS — Z96.611 S/P REVERSE TOTAL SHOULDER ARTHROPLASTY, RIGHT: Primary | ICD-10-CM

## 2025-07-31 DIAGNOSIS — R29.898 WEAKNESS OF RIGHT UPPER EXTREMITY: ICD-10-CM

## 2025-07-31 DIAGNOSIS — M25.511 ACUTE POSTOPERATIVE PAIN OF RIGHT SHOULDER: Primary | ICD-10-CM

## 2025-07-31 DIAGNOSIS — M75.121 NONTRAUMATIC COMPLETE TEAR OF RIGHT ROTATOR CUFF: ICD-10-CM

## 2025-07-31 PROCEDURE — 97112 NEUROMUSCULAR REEDUCATION: CPT | Performed by: PHYSICAL THERAPIST

## 2025-07-31 PROCEDURE — 97110 THERAPEUTIC EXERCISES: CPT | Performed by: PHYSICAL THERAPIST

## 2025-07-31 PROCEDURE — 99024 POSTOP FOLLOW-UP VISIT: CPT | Performed by: ORTHOPAEDIC SURGERY

## 2025-07-31 PROCEDURE — 97530 THERAPEUTIC ACTIVITIES: CPT | Performed by: PHYSICAL THERAPIST

## 2025-07-31 RX ORDER — AZELASTINE 1 MG/ML
SPRAY, METERED NASAL
COMMUNITY
Start: 2025-07-25

## 2025-07-31 RX ORDER — FLUTICASONE PROPIONATE 50 MCG
SPRAY, SUSPENSION (ML) NASAL
COMMUNITY
Start: 2025-07-25

## 2025-07-31 NOTE — PROGRESS NOTES
jumping.)  Direct, one on one contact, billed in 15-minute increments.  ([id number]) MANUAL THERAPY -  Manual therapy techniques, 1 or more regions, each 15 minutes (Mobilization/manipulation, manual lymphatic drainage, manual traction) for the purpose of modulating pain, promoting relaxation,  increasing ROM, reducing/eliminating soft tissue swelling/inflammation/restriction, improving soft tissue extensibility and allowing for proper ROM for normal function with self care, mobility, lifting and ambulation    GOALS     Patient stated goal: to be painfree, stronger, and able to raise arm overhead easily  [x] Progressing: [] Met: [] Not Met: [] Adjusted    Therapist goals for Patient:   Short Term Goals: To be achieved in: 2 weeks  1Independent in HEP and progression per patient tolerance, in order to prevent re-injury.   [] Progressing: [x] Met: [] Not Met: [] Adjusted  Patient will have a decrease in pain to <0-2/10 to facilitate improvement in movement, function, and ADLs as indicated by Functional Deficits.  [x] Progressing: [] Met: [] Not Met: [] Adjusted   and care for custom orthotic device. (Only if applicable for orthotic eval)     Long Term Goals: To be achieved in: 8-12 weeks  Disability index score of 30% or less for the Quick DASH to assist with reaching prior level of function with activities such as ADLs on her own.  [x] Progressing: [] Met: [] Not Met: [] Adjusted  Patient will demonstrate increased AROM of right shoulder to 70-80% of her left without pain to allow for proper joint functioning to enable patient to reach away from her body and begin reaching overhead.   [x] Progressing: [] Met: [] Not Met: [] Adjusted  Patient will demonstrate increased Strength of right shoulder to at least 4+/5 throughout without pain to allow for proper functional mobility to enable patient to return to do PLOF around her house.   [x] Progressing: [] Met: [] Not Met: [] Adjusted  Patient will return to work (maybe light

## 2025-08-04 ENCOUNTER — TREATMENT (OUTPATIENT)
Dept: PHYSICAL THERAPY | Age: 75
End: 2025-08-04
Payer: MEDICARE

## 2025-08-04 DIAGNOSIS — M75.121 NONTRAUMATIC COMPLETE TEAR OF RIGHT ROTATOR CUFF: ICD-10-CM

## 2025-08-04 DIAGNOSIS — M25.511 ACUTE POSTOPERATIVE PAIN OF RIGHT SHOULDER: Primary | ICD-10-CM

## 2025-08-04 DIAGNOSIS — G89.18 ACUTE POSTOPERATIVE PAIN OF RIGHT SHOULDER: Primary | ICD-10-CM

## 2025-08-04 DIAGNOSIS — M25.611 DECREASED RIGHT SHOULDER RANGE OF MOTION: ICD-10-CM

## 2025-08-04 DIAGNOSIS — R29.898 WEAKNESS OF RIGHT UPPER EXTREMITY: ICD-10-CM

## 2025-08-04 PROCEDURE — 97110 THERAPEUTIC EXERCISES: CPT | Performed by: PHYSICAL THERAPIST

## 2025-08-04 PROCEDURE — 97112 NEUROMUSCULAR REEDUCATION: CPT | Performed by: PHYSICAL THERAPIST

## 2025-08-04 PROCEDURE — 97530 THERAPEUTIC ACTIVITIES: CPT | Performed by: PHYSICAL THERAPIST

## 2025-08-07 ENCOUNTER — TREATMENT (OUTPATIENT)
Dept: PHYSICAL THERAPY | Age: 75
End: 2025-08-07
Payer: MEDICARE

## 2025-08-07 DIAGNOSIS — R29.898 WEAKNESS OF RIGHT UPPER EXTREMITY: ICD-10-CM

## 2025-08-07 DIAGNOSIS — M25.511 ACUTE POSTOPERATIVE PAIN OF RIGHT SHOULDER: Primary | ICD-10-CM

## 2025-08-07 DIAGNOSIS — G89.18 ACUTE POSTOPERATIVE PAIN OF RIGHT SHOULDER: Primary | ICD-10-CM

## 2025-08-07 DIAGNOSIS — M25.611 DECREASED RIGHT SHOULDER RANGE OF MOTION: ICD-10-CM

## 2025-08-07 DIAGNOSIS — M75.121 NONTRAUMATIC COMPLETE TEAR OF RIGHT ROTATOR CUFF: ICD-10-CM

## 2025-08-07 PROCEDURE — 97110 THERAPEUTIC EXERCISES: CPT | Performed by: PHYSICAL THERAPIST

## 2025-08-07 PROCEDURE — 97112 NEUROMUSCULAR REEDUCATION: CPT | Performed by: PHYSICAL THERAPIST

## 2025-08-07 PROCEDURE — 97530 THERAPEUTIC ACTIVITIES: CPT | Performed by: PHYSICAL THERAPIST

## 2025-08-11 ENCOUNTER — TREATMENT (OUTPATIENT)
Dept: PHYSICAL THERAPY | Age: 75
End: 2025-08-11
Payer: MEDICARE

## 2025-08-11 DIAGNOSIS — M25.511 ACUTE POSTOPERATIVE PAIN OF RIGHT SHOULDER: Primary | ICD-10-CM

## 2025-08-11 DIAGNOSIS — M75.121 NONTRAUMATIC COMPLETE TEAR OF RIGHT ROTATOR CUFF: ICD-10-CM

## 2025-08-11 DIAGNOSIS — M25.611 DECREASED RIGHT SHOULDER RANGE OF MOTION: ICD-10-CM

## 2025-08-11 DIAGNOSIS — G89.18 ACUTE POSTOPERATIVE PAIN OF RIGHT SHOULDER: Primary | ICD-10-CM

## 2025-08-11 DIAGNOSIS — R29.898 WEAKNESS OF RIGHT UPPER EXTREMITY: ICD-10-CM

## 2025-08-11 PROCEDURE — 97110 THERAPEUTIC EXERCISES: CPT | Performed by: PHYSICAL THERAPIST

## 2025-08-11 PROCEDURE — 97530 THERAPEUTIC ACTIVITIES: CPT | Performed by: PHYSICAL THERAPIST

## 2025-08-11 PROCEDURE — 97112 NEUROMUSCULAR REEDUCATION: CPT | Performed by: PHYSICAL THERAPIST

## 2025-08-14 ENCOUNTER — TREATMENT (OUTPATIENT)
Dept: PHYSICAL THERAPY | Age: 75
End: 2025-08-14
Payer: MEDICARE

## 2025-08-14 DIAGNOSIS — M75.121 NONTRAUMATIC COMPLETE TEAR OF RIGHT ROTATOR CUFF: ICD-10-CM

## 2025-08-14 DIAGNOSIS — G89.18 ACUTE POSTOPERATIVE PAIN OF RIGHT SHOULDER: Primary | ICD-10-CM

## 2025-08-14 DIAGNOSIS — M25.511 ACUTE POSTOPERATIVE PAIN OF RIGHT SHOULDER: Primary | ICD-10-CM

## 2025-08-14 DIAGNOSIS — R29.898 WEAKNESS OF RIGHT UPPER EXTREMITY: ICD-10-CM

## 2025-08-14 DIAGNOSIS — M25.611 DECREASED RIGHT SHOULDER RANGE OF MOTION: ICD-10-CM

## 2025-08-14 PROCEDURE — 97530 THERAPEUTIC ACTIVITIES: CPT | Performed by: PHYSICAL THERAPIST

## 2025-08-14 PROCEDURE — 97112 NEUROMUSCULAR REEDUCATION: CPT | Performed by: PHYSICAL THERAPIST

## 2025-08-14 PROCEDURE — 97110 THERAPEUTIC EXERCISES: CPT | Performed by: PHYSICAL THERAPIST

## 2025-08-21 ENCOUNTER — TREATMENT (OUTPATIENT)
Dept: PHYSICAL THERAPY | Age: 75
End: 2025-08-21

## 2025-08-21 DIAGNOSIS — M25.611 DECREASED RIGHT SHOULDER RANGE OF MOTION: ICD-10-CM

## 2025-08-21 DIAGNOSIS — M25.511 ACUTE POSTOPERATIVE PAIN OF RIGHT SHOULDER: Primary | ICD-10-CM

## 2025-08-21 DIAGNOSIS — M75.121 NONTRAUMATIC COMPLETE TEAR OF RIGHT ROTATOR CUFF: ICD-10-CM

## 2025-08-21 DIAGNOSIS — G89.18 ACUTE POSTOPERATIVE PAIN OF RIGHT SHOULDER: Primary | ICD-10-CM

## 2025-08-21 DIAGNOSIS — R29.898 WEAKNESS OF RIGHT UPPER EXTREMITY: ICD-10-CM

## 2025-08-25 ENCOUNTER — TREATMENT (OUTPATIENT)
Dept: PHYSICAL THERAPY | Age: 75
End: 2025-08-25
Payer: MEDICARE

## 2025-08-25 DIAGNOSIS — G89.18 ACUTE POSTOPERATIVE PAIN OF RIGHT SHOULDER: Primary | ICD-10-CM

## 2025-08-25 DIAGNOSIS — R29.898 WEAKNESS OF RIGHT UPPER EXTREMITY: ICD-10-CM

## 2025-08-25 DIAGNOSIS — M75.121 NONTRAUMATIC COMPLETE TEAR OF RIGHT ROTATOR CUFF: ICD-10-CM

## 2025-08-25 DIAGNOSIS — M25.511 ACUTE POSTOPERATIVE PAIN OF RIGHT SHOULDER: Primary | ICD-10-CM

## 2025-08-25 DIAGNOSIS — M25.611 DECREASED RIGHT SHOULDER RANGE OF MOTION: ICD-10-CM

## 2025-08-25 PROCEDURE — 97110 THERAPEUTIC EXERCISES: CPT | Performed by: PHYSICAL THERAPIST

## 2025-08-25 PROCEDURE — 97530 THERAPEUTIC ACTIVITIES: CPT | Performed by: PHYSICAL THERAPIST

## 2025-08-25 PROCEDURE — 97112 NEUROMUSCULAR REEDUCATION: CPT | Performed by: PHYSICAL THERAPIST

## 2025-08-28 ENCOUNTER — TREATMENT (OUTPATIENT)
Dept: PHYSICAL THERAPY | Age: 75
End: 2025-08-28

## 2025-08-28 ENCOUNTER — OFFICE VISIT (OUTPATIENT)
Dept: ORTHOPEDIC SURGERY | Age: 75
End: 2025-08-28
Payer: MEDICARE

## 2025-08-28 VITALS — BODY MASS INDEX: 38.78 KG/M2 | HEIGHT: 62 IN

## 2025-08-28 DIAGNOSIS — M25.511 ACUTE POSTOPERATIVE PAIN OF RIGHT SHOULDER: Primary | ICD-10-CM

## 2025-08-28 DIAGNOSIS — Z96.611 S/P REVERSE TOTAL SHOULDER ARTHROPLASTY, RIGHT: Primary | ICD-10-CM

## 2025-08-28 DIAGNOSIS — R29.898 WEAKNESS OF RIGHT UPPER EXTREMITY: ICD-10-CM

## 2025-08-28 DIAGNOSIS — M75.121 NONTRAUMATIC COMPLETE TEAR OF RIGHT ROTATOR CUFF: ICD-10-CM

## 2025-08-28 DIAGNOSIS — G89.18 ACUTE POSTOPERATIVE PAIN OF RIGHT SHOULDER: Primary | ICD-10-CM

## 2025-08-28 DIAGNOSIS — M25.611 DECREASED RIGHT SHOULDER RANGE OF MOTION: ICD-10-CM

## 2025-08-28 PROCEDURE — G8399 PT W/DXA RESULTS DOCUMENT: HCPCS | Performed by: ORTHOPAEDIC SURGERY

## 2025-08-28 PROCEDURE — 3017F COLORECTAL CA SCREEN DOC REV: CPT | Performed by: ORTHOPAEDIC SURGERY

## 2025-08-28 PROCEDURE — 1126F AMNT PAIN NOTED NONE PRSNT: CPT | Performed by: ORTHOPAEDIC SURGERY

## 2025-08-28 PROCEDURE — 1090F PRES/ABSN URINE INCON ASSESS: CPT | Performed by: ORTHOPAEDIC SURGERY

## 2025-08-28 PROCEDURE — G8417 CALC BMI ABV UP PARAM F/U: HCPCS | Performed by: ORTHOPAEDIC SURGERY

## 2025-08-28 PROCEDURE — 1036F TOBACCO NON-USER: CPT | Performed by: ORTHOPAEDIC SURGERY

## 2025-08-28 PROCEDURE — 99213 OFFICE O/P EST LOW 20 MIN: CPT | Performed by: ORTHOPAEDIC SURGERY

## 2025-08-28 PROCEDURE — 1159F MED LIST DOCD IN RCRD: CPT | Performed by: ORTHOPAEDIC SURGERY

## 2025-08-28 PROCEDURE — 1123F ACP DISCUSS/DSCN MKR DOCD: CPT | Performed by: ORTHOPAEDIC SURGERY

## 2025-08-28 PROCEDURE — G8427 DOCREV CUR MEDS BY ELIG CLIN: HCPCS | Performed by: ORTHOPAEDIC SURGERY

## (undated) DEVICE — Z DISCONTINUED USE 2744636  DRESSING AQUACEL 14 IN ALG W3.5XL14IN POLYUR FLM CVR W/ HYDRCOLL

## (undated) DEVICE — TUBING PMP L6FT CONT WAVE EXTN

## (undated) DEVICE — 3 BONE CEMENT MIXER: Brand: MIXEVAC

## (undated) DEVICE — PROTECTOR UNIV FOAM EXTREMITY DEVON

## (undated) DEVICE — SURE SET-DOUBLE BASIN-LF: Brand: MEDLINE INDUSTRIES, INC.

## (undated) DEVICE — STOCKINETTE ORTH W9XL36IN COT 2 PLY HLLW FOR HANDLING LMB

## (undated) DEVICE — SURGICAL SET UP - SURE SET: Brand: MEDLINE INDUSTRIES, INC.

## (undated) DEVICE — 3M™ STERI-DRAPE™ INSTRUMENT POUCH 1018: Brand: STERI-DRAPE™

## (undated) DEVICE — CANNULA NSL AD TBNG L7FT PVC STR NONFLARED PRNG O2 DEL W STD

## (undated) DEVICE — TUBING PMP L16FT MAIN DISP FOR AR-6400 AR-6475 Â€“ ORDER MULTIPLES OF 10 EACH

## (undated) DEVICE — ABLATOR ENDOSCOPIC ELECTROCAUTERY 90 DEG RF ASPIRATING STERILE DISPOSABLE APOLLORF I90

## (undated) DEVICE — SUTURE PDS + SZ 0 L27IN ABSRB VLT L36MM CT 1 1 2 CIR PDP340H

## (undated) DEVICE — BLADE SAW W12.5XL70MM THK0.8MM CUT THK1.12MM S STL RECIP

## (undated) DEVICE — UNDERGLOVE SURG SZ 8 BLU LTX FREE SYN POLYISOPRENE POLYMER

## (undated) DEVICE — ZIMMER® STERILE DISPOSABLE TOURNIQUET CUFF WITH PLC, SINGLE PORT, SINGLE BLADDER, 34 IN. (86 CM)

## (undated) DEVICE — HANDPIECE SUCTION TUBING INTERPULSE 10FT

## (undated) DEVICE — NEEDLE SPNL L3.5IN PNK HUB S STL REG WALL FIT STYL W/ QNCKE

## (undated) DEVICE — TOTAL SHOULDER: Brand: MEDLINE INDUSTRIES, INC.

## (undated) DEVICE — 3M™ COBAN™ NL STERILE NON-LATEX SELF-ADHERENT WRAP, 2086S, 6 IN X 5 YD (15 CM X 4,5 M), 12 ROLLS/CASE: Brand: 3M™ COBAN™

## (undated) DEVICE — SUTURE MCRYL SZ 4-0 L27IN ABSRB UD L19MM PS-2 1/2 CIR PRIM Y426H

## (undated) DEVICE — DISCONTINUED NO SUB IDED TG GLOVE SURG SENSICARE ALOE LT LF PF ST GRN SZ 8

## (undated) DEVICE — TRAP FLUID

## (undated) DEVICE — CANNULA ARTHSCP L3CM ID8MM DBL DAM 1 PC MOLD LO PROF FLNG

## (undated) DEVICE — PEEL-AWAY HOOD: Brand: FLYTE, SURGICOOL

## (undated) DEVICE — PACK PROCEDURE SURG TOTAL KNEE

## (undated) DEVICE — SUTURE VCRL SZ 2-0 L18IN ABSRB UD CT-1 L36MM 1/2 CIR J839D

## (undated) DEVICE — GLOVE SURG SZ 9 L1185IN FNGR THK75MIL STRW LTX POLYMER BEAD

## (undated) DEVICE — YANKAUER,OPEN TIP,W/O VENT,STERILE: Brand: MEDLINE INDUSTRIES, INC.

## (undated) DEVICE — 1010 S-DRAPE TOWEL DRAPE 10/BX: Brand: STERI-DRAPE™

## (undated) DEVICE — CANNULA ARTHSCP L7CM DIA7MM TRNSLUC THRD FLX W/ NO SQUIRT

## (undated) DEVICE — GOWN,SIRUS,POLYRNF,BRTHSLV,XLN/XXL,18/CS: Brand: MEDLINE

## (undated) DEVICE — COAXIAL HIGH FLOW TIP WITH SOFT SHIELD

## (undated) DEVICE — DECANTER BAG 9": Brand: MEDLINE INDUSTRIES, INC.

## (undated) DEVICE — ORTHO PRE OP PACK: Brand: MEDLINE INDUSTRIES, INC.

## (undated) DEVICE — SUTURE STRATAFIX SPRL SZ 1 L14IN ABSRB VLT L48CM CTX 1/2 SXPD2B405

## (undated) DEVICE — MEDI-VAC NON-CONDUCTIVE SUCTION TUBING: Brand: CARDINAL HEALTH

## (undated) DEVICE — SUTURE VICRYL + SZ 3-0 L27IN ABSRB UD CT-2 L26MM 1/2 CIR TAPR VCP232H

## (undated) DEVICE — PAD DRY FLOOR ABS 32X58IN GRN

## (undated) DEVICE — TOWEL,STOP FLAG GOLD N-W: Brand: MEDLINE

## (undated) DEVICE — GOWN,SIRUS,POLYRNF,BRTHSLV,XL,30/CS: Brand: MEDLINE

## (undated) DEVICE — INTENDED FOR TISSUE SEPARATION, AND OTHER PROCEDURES THAT REQUIRE A SHARP SURGICAL BLADE TO PUNCTURE OR CUT.: Brand: BARD-PARKER ® CARBON RIB-BACK BLADES

## (undated) DEVICE — SOLUTION IV 1000ML 0.9% SOD CHL

## (undated) DEVICE — GLOVE SURG SZ 65 L12IN FNGR THK87MIL WHT LTX FREE

## (undated) DEVICE — 3M™ STERI-DRAPE™ U-DRAPE 1015: Brand: STERI-DRAPE™

## (undated) DEVICE — BLADE SAW W21 13XL90MM THK119MM S STL OSC STABLECUT

## (undated) DEVICE — ELECTRODE PT RET AD L9FT HI MOIST COND ADH HYDRGEL CORDED

## (undated) DEVICE — STERILE PVP: Brand: MEDLINE INDUSTRIES, INC.

## (undated) DEVICE — SUTURE ETHIBOND EXCEL SZ 2 L30IN NONABSORBABLE GRN L40MM V-37 MX69G

## (undated) DEVICE — COUNTER NDL 40 COUNT HLD 70 NUM FOAM BLK SGL MAG W BLDE REMV

## (undated) DEVICE — GAUZE,SPONGE,4"X4",16PLY,XRAY,STRL,LF: Brand: MEDLINE

## (undated) DEVICE — SHOULDER STABILIZATION KIT,                                    DISPOSABLE 12 PER BOX

## (undated) DEVICE — PRESSURE TUBING: Brand: TRUWAVE

## (undated) DEVICE — ADHESIVE SKIN CLOSURE WND 8.661X1.5 IN 22 CM LIQUIBAND SECUR

## (undated) DEVICE — SOLUTION IV IRRIG 0.9% NACL 3000ML BAG 2B7477

## (undated) DEVICE — TURNOVER KIT RM INF CTRL TECH

## (undated) DEVICE — 3M™ WARMING BLANKET, UPPER BODY, 10 PER CASE, 42268: Brand: BAIR HUGGER™

## (undated) DEVICE — TUBING PMP L16FT MAIN DISP FOR AR-6400 AR-6475

## (undated) DEVICE — SKIN AFFIX SURG ADHESIVE 72/CS 0.55ML: Brand: MEDLINE

## (undated) DEVICE — 3M™ IOBAN™ 2 ANTIMICROBIAL INCISE DRAPE 6640EZ: Brand: IOBAN™ 2

## (undated) DEVICE — BLADE SHV L13CM DIA5MM EXCALIBUR AGG COOLCUT

## (undated) DEVICE — STERILE POLYISOPRENE POWDER-FREE SURGICAL GLOVES WITH EMOLLIENT COATING: Brand: PROTEXIS

## (undated) DEVICE — COVER LT HNDL BLU PLAS

## (undated) DEVICE — SPONGE,LAP,18"X18",DLX,XR,ST,5/PK,40/PK: Brand: MEDLINE

## (undated) DEVICE — SST BUR, WIRE PASS DRILL, 2 FLUTES, MED., 2MM DIA.: Brand: MICROAIRE®

## (undated) DEVICE — GARMENT,MEDLINE,DVT,INT,CALF,MED, GEN2: Brand: MEDLINE

## (undated) DEVICE — SUTURE VCRL SZ 1 L18IN ABSRB UD L36MM CT-1 1/2 CIR J841D

## (undated) DEVICE — NEEDLE SCORPION HD MEGA LOADER

## (undated) DEVICE — SUTURE MONOCRYL + SZ 4-0 L27IN ABSRB UD L19MM PS-2 3/8 CIR MCP426H

## (undated) DEVICE — SYRINGE MED 50ML LUERLOCK TIP

## (undated) DEVICE — PENCIL ES L3M ROCK SWCH S STL HEX LOK BLDE ELECTRD HOLSTER

## (undated) DEVICE — PENCIL ES ULT VAC W TELSCP NOSE EZ CLN BLDE 10FT

## (undated) DEVICE — 3M™ WARMING BLANKET, LOWER BODY, 10 PER CASE, 42568: Brand: BAIR HUGGER™

## (undated) DEVICE — SHOULDER ARTHROSCOPY: Brand: MEDLINE INDUSTRIES, INC.

## (undated) DEVICE — 3M™ STERI-STRIP™ REINFORCED ADHESIVE SKIN CLOSURES, R1547, 1/2 IN X 4 IN (12 MM X 100 MM), 6 STRIPS/ENVELOPE: Brand: 3M™ STERI-STRIP™

## (undated) DEVICE — SUTURE FIBERLOOP SZ 2-0 L20IN NONABSORBABLE BLU STR NDL AR7234

## (undated) DEVICE — SOL IRR SOD CHL 0.9% TITAN XL CNTNR 3000ML

## (undated) DEVICE — CHLORAPREP 26ML ORANGE

## (undated) DEVICE — DRAPE 70X60IN SPLIT IMPERV ADHES STRIP

## (undated) DEVICE — ELECTRODE ELECSURG L 10.2 CM PTFE COAT MONOPOLAR BLADE OPN

## (undated) DEVICE — GLOVE ORANGE PI 8   MSG9080

## (undated) DEVICE — DBD-PACK,SHOULDER III: Brand: MEDLINE

## (undated) DEVICE — SYRINGE IRRIG 60ML SFT PLIABLE BLB EZ TO GRP 1 HND USE W/

## (undated) DEVICE — KIT SHLDR STBL MARCO FOR SPIDER LIMB POS

## (undated) DEVICE — SYRINGE CATH TIP 50ML

## (undated) DEVICE — BIT DRL L230MM DIA2.5MM ST 3 FLUT QUIK CPL NONRADIOPAQUE

## (undated) DEVICE — NEPTUNE E-SEP SMOKE EVACUATION PENCIL, COATED, 70MM BLADE, PUSH BUTTON SWITCH: Brand: NEPTUNE E-SEP

## (undated) DEVICE — MAT FLR W32XL58IN

## (undated) DEVICE — NEEDLE SUT PASS FOR ROT CUF LABRAL REP SUREFIRE SCORPION

## (undated) DEVICE — STANDARD HYPODERMIC NEEDLE,POLYPROPYLENE HUB: Brand: MONOJECT

## (undated) DEVICE — SUTURE ABSORBABLE MONOFILAMENT 1-0 CT1 27 IN VIO PDS + PDP341H

## (undated) DEVICE — MARKER SURG SKIN UTIL BLK REG TIP NONSMEARING W/ 6IN RUL

## (undated) DEVICE — MAT FLR FLD ASPIR DMND

## (undated) DEVICE — EYE PROTECTOR FOAM MEDICHOICE

## (undated) DEVICE — SOLUTION IRRIG 1000ML H2O PIC PLAS SHATTERPROOF CONTAINER

## (undated) DEVICE — NO USE 18 MONTHS PACKS ORTHO TOTAL 120198A

## (undated) DEVICE — TUBING FLD MGMT Y DBL SPIK DUALWAVE

## (undated) DEVICE — 3M™ STERI-DRAPE™ U-DRAPE 1067 1067 5/BX 4BX/CS/CTN&#X20;: Brand: STERI-DRAPE™

## (undated) DEVICE — GLOVE SURG SZ 8 L12IN FNGR THK75MIL WHT LTX POLYMER BEAD

## (undated) DEVICE — BUR SHAVER 5 MMX13 CM 8 FLUT OVL FOR AGGRESSIVE BNE COOLCUT

## (undated) DEVICE — GOWN,REINFRCE,POLY,ECLIPSE,SLV,3XLG: Brand: MEDLINE

## (undated) DEVICE — PLATE ES AD W 9FT CRD 2

## (undated) DEVICE — SPONGE GZ W4XL8IN COT WVN 12 PLY

## (undated) DEVICE — SUTURE VICRYL + SZ 0 L18IN ABSRB UD L36MM CT-1 1/2 CIR VCP840D

## (undated) DEVICE — SUTURE FIBERWIRE SZ 2 W/ TAPERED NEEDLE BLUE L38IN NONABSORB BLU L26.5MM 1/2 CIRCLE AR7200

## (undated) DEVICE — PAD,NON-ADHERENT,3X8,STERILE,LF,1/PK: Brand: MEDLINE

## (undated) DEVICE — X-S/M FLEXIBLE ALEXIS ORTHOPAEDIC PROTECTOR: Brand: ALEXIS® ORTHOPAEDIC PROTECTOR

## (undated) DEVICE — STRAP SFTY W5XL72IN 1 PC

## (undated) DEVICE — SUTURE VICRYL SZ 2-0 L18IN ABSRB UD CT-1 L36MM 1/2 CIR J839D

## (undated) DEVICE — BANDAGE COBAN 4 IN COMPR W4INXL5YD FOAM COHESIVE QUIK STK SELF ADH SFT

## (undated) DEVICE — SUTURE PDS II SZ 1 L27IN ABSRB VLT CT-1 L36MM 1/2 CIR Z341H

## (undated) DEVICE — SOLUTION IRRIG 3000ML LAC RINGERS ARTHROMTC PLAS CONT